# Patient Record
Sex: FEMALE | Race: WHITE | NOT HISPANIC OR LATINO | Employment: OTHER | URBAN - METROPOLITAN AREA
[De-identification: names, ages, dates, MRNs, and addresses within clinical notes are randomized per-mention and may not be internally consistent; named-entity substitution may affect disease eponyms.]

---

## 2017-01-16 ENCOUNTER — GENERIC CONVERSION - ENCOUNTER (OUTPATIENT)
Dept: OTHER | Facility: OTHER | Age: 62
End: 2017-01-16

## 2017-02-15 ENCOUNTER — ALLSCRIPTS OFFICE VISIT (OUTPATIENT)
Dept: OTHER | Facility: OTHER | Age: 62
End: 2017-02-15

## 2017-02-21 ENCOUNTER — LAB CONVERSION - ENCOUNTER (OUTPATIENT)
Dept: OTHER | Facility: OTHER | Age: 62
End: 2017-02-21

## 2017-02-21 LAB
A/G RATIO (HISTORICAL): 1.5 (CALC) (ref 1–2.5)
ALBUMIN SERPL BCP-MCNC: 3.9 G/DL (ref 3.6–5.1)
ALP SERPL-CCNC: 73 U/L (ref 33–130)
ALT SERPL W P-5'-P-CCNC: 26 U/L (ref 6–29)
AST SERPL W P-5'-P-CCNC: 22 U/L (ref 10–35)
BILIRUB SERPL-MCNC: 0.4 MG/DL (ref 0.2–1.2)
BUN SERPL-MCNC: 15 MG/DL (ref 7–25)
BUN/CREA RATIO (HISTORICAL): NORMAL (CALC) (ref 6–22)
CALCIUM SERPL-MCNC: 9 MG/DL (ref 8.6–10.4)
CHLORIDE SERPL-SCNC: 103 MMOL/L (ref 98–110)
CHOLEST SERPL-MCNC: 195 MG/DL (ref 125–200)
CHOLEST/HDLC SERPL: 3.4 (CALC)
CO2 SERPL-SCNC: 29 MMOL/L (ref 20–31)
CREAT SERPL-MCNC: 0.67 MG/DL (ref 0.5–0.99)
EGFR AFRICAN AMERICAN (HISTORICAL): 108 ML/MIN/1.73M2
EGFR-AMERICAN CALC (HISTORICAL): 94 ML/MIN/1.73M2
GAMMA GLOBULIN (HISTORICAL): 2.6 G/DL (CALC) (ref 1.9–3.7)
GLUCOSE (HISTORICAL): 85 MG/DL (ref 65–99)
HDLC SERPL-MCNC: 57 MG/DL
LDL CHOLESTEROL (HISTORICAL): 104 MG/DL (CALC)
LDL CHOLESTEROL DIRECT (HISTORICAL): 112 MG/DL
NON-HDL-CHOL (CHOL-HDL) (HISTORICAL): 138 MG/DL (CALC)
POTASSIUM SERPL-SCNC: 4.5 MMOL/L (ref 3.5–5.3)
SODIUM SERPL-SCNC: 141 MMOL/L (ref 135–146)
T4 FREE SERPL-MCNC: 0.9 NG/DL (ref 0.8–1.8)
TOTAL PROTEIN (HISTORICAL): 6.5 G/DL (ref 6.1–8.1)
TRIGL SERPL-MCNC: 172 MG/DL
TSH SERPL DL<=0.05 MIU/L-ACNC: 4.96 MIU/L (ref 0.4–4.5)

## 2017-02-22 ENCOUNTER — GENERIC CONVERSION - ENCOUNTER (OUTPATIENT)
Dept: OTHER | Facility: OTHER | Age: 62
End: 2017-02-22

## 2017-04-05 DIAGNOSIS — E03.9 HYPOTHYROIDISM: ICD-10-CM

## 2017-07-21 ENCOUNTER — ALLSCRIPTS OFFICE VISIT (OUTPATIENT)
Dept: OTHER | Facility: OTHER | Age: 62
End: 2017-07-21

## 2017-08-06 ENCOUNTER — LAB CONVERSION - ENCOUNTER (OUTPATIENT)
Dept: OTHER | Facility: OTHER | Age: 62
End: 2017-08-06

## 2017-08-06 LAB
A/G RATIO (HISTORICAL): 1.6 (CALC) (ref 1–2.5)
ALBUMIN SERPL BCP-MCNC: 4.2 G/DL (ref 3.6–5.1)
ALP SERPL-CCNC: 67 U/L (ref 33–130)
ALT SERPL W P-5'-P-CCNC: 25 U/L (ref 6–29)
AST SERPL W P-5'-P-CCNC: 25 U/L (ref 10–35)
BILIRUB SERPL-MCNC: 0.5 MG/DL (ref 0.2–1.2)
BUN SERPL-MCNC: 19 MG/DL (ref 7–25)
BUN/CREA RATIO (HISTORICAL): NORMAL (CALC) (ref 6–22)
CALCIUM SERPL-MCNC: 9.1 MG/DL (ref 8.6–10.4)
CHLORIDE SERPL-SCNC: 105 MMOL/L (ref 98–110)
CHOLEST SERPL-MCNC: 205 MG/DL (ref 125–200)
CHOLEST/HDLC SERPL: 3.2 (CALC)
CO2 SERPL-SCNC: 28 MMOL/L (ref 20–31)
CREAT SERPL-MCNC: 0.64 MG/DL (ref 0.5–0.99)
EGFR AFRICAN AMERICAN (HISTORICAL): 110 ML/MIN/1.73M2
EGFR-AMERICAN CALC (HISTORICAL): 95 ML/MIN/1.73M2
GAMMA GLOBULIN (HISTORICAL): 2.6 G/DL (CALC) (ref 1.9–3.7)
GLUCOSE (HISTORICAL): 94 MG/DL (ref 65–99)
HDLC SERPL-MCNC: 65 MG/DL
LDL CHOLESTEROL (HISTORICAL): 114 MG/DL (CALC)
LDL CHOLESTEROL DIRECT (HISTORICAL): 119 MG/DL
NON-HDL-CHOL (CHOL-HDL) (HISTORICAL): 140 MG/DL (CALC)
POTASSIUM SERPL-SCNC: 4.3 MMOL/L (ref 3.5–5.3)
SODIUM SERPL-SCNC: 141 MMOL/L (ref 135–146)
T4 FREE SERPL-MCNC: 0.9 NG/DL (ref 0.8–1.8)
TOTAL PROTEIN (HISTORICAL): 6.8 G/DL (ref 6.1–8.1)
TRIGL SERPL-MCNC: 129 MG/DL
TSH SERPL DL<=0.05 MIU/L-ACNC: 2.87 MIU/L (ref 0.4–4.5)

## 2017-08-18 ENCOUNTER — GENERIC CONVERSION - ENCOUNTER (OUTPATIENT)
Dept: OTHER | Facility: OTHER | Age: 62
End: 2017-08-18

## 2017-11-30 ENCOUNTER — ALLSCRIPTS OFFICE VISIT (OUTPATIENT)
Dept: OTHER | Facility: OTHER | Age: 62
End: 2017-11-30

## 2017-11-30 DIAGNOSIS — Z13.820 ENCOUNTER FOR SCREENING FOR OSTEOPOROSIS: ICD-10-CM

## 2017-11-30 DIAGNOSIS — Z12.31 ENCOUNTER FOR SCREENING MAMMOGRAM FOR MALIGNANT NEOPLASM OF BREAST: ICD-10-CM

## 2017-12-05 NOTE — PROGRESS NOTES
Assessment    1  Hypertension (401 9) (I10)   2  Hypothyroidism (244 9) (E03 9)   3  Hyperlipidemia (272 4) (E78 5)    Plan  Encounter for screening mammogram for breast cancer    · MAMMO SCREENING BILATERAL W 3D & CAD; Status:Active; Requested for:30Nov2017;   Hyperlipidemia, Hypertension, Hypothyroidism    · Follow-up visit in 4 Months Evaluation and Treatment  Follow-up  Status: Complete Done: 28BOS3292  Screening for osteoporosis    · * DXA BONE DENSITY SPINE HIP AND PELVIS; Status:Active; Requested for:30Nov2017;    Discussion/Summary    Jeaneth Mckinney is stable on her current medications  We have made no changes  We will see her back as scheduled  She will go for the testing as ordered  Possible side effects of new medications were reviewed with the patient/guardian today  The treatment plan was reviewed with the patient/guardian  The patient/guardian understands and agrees with the treatment plan      Chief Complaint  Check up Hypertension/Hypothyroidism/Hyperlipidemia  Patient is here today for follow up of chronic conditions described in HPI  History of Present Illness  Coby De La Torre is a 26-year-old female who presents today for checkup her hypertension, hypothyroidism, and hyperlipidemia  She is tolerating the Zetia and has not had muscle cramps  The patient denies any chest pain, shortness of breath, or palpitations  There is no edema  There are no headaches or visual changes  There is no lightheadedness, dizziness, or fainting spells  are occasional headaches, but not bad  is trying to watch her diet and exercise  She was on vacation  The patient states her hyperlipidemia has been under good control since the last visit  Comorbid Illnesses: hypertension  She has no significant interval events  Symptoms: The patient is currently asymptomatic  Associated symptoms include no memory loss  Medications: the patient is adherent with her medication regimen  -- She denies medication side effects     The patient presents for follow-up of essential hypertension  The patient states she has been doing well with her blood pressure control since the last visit  She has no comorbid illnesses  She has no significant interval events  Symptoms: denies impaired vision,-- denies dyspnea,-- denies chest pain,-- denies intermittent leg claudication-- and-- denies lower extremity edema  Medications: the patient is adherent with her medication regimen  -- She denies medication side effects  Review of Systems   Constitutional: as noted in HPI  Eyes: as noted in HPI   ENT: as noted in HPI  Cardiovascular: as noted in HPI  Respiratory: as noted in HPI  Gastrointestinal: No complaints of abdominal pain, no constipation, no nausea or vomiting, no diarrhea, no bloody stools  Genitourinary: No complaints of dysuria, no incontinence, no pelvic pain, no dysmenorrhea, no vaginal discharge or bleeding  Musculoskeletal: No complaints of arthralgias, no myalgias, no joint swelling or stiffness, no limb pain or swelling  Active Problems  1  Cystocele   2  Generalized osteoarthritis (715 00) (M15 9)   3  Hyperlipidemia (272 4) (E78 5)   4  Hypertension (401 9) (I10)   5  Hypothyroidism (244 9) (E03 9)   6  Intermittent claudication (443 9) (I73 9)   7  Sleep disturbances (780 50) (G47 9)   8  Urge and stress incontinence (788 33) (N39 46)   9  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)   10  Vitamin D deficiency (268 9) (E55 9)    Past Medical History  1  History of Allergic contact dermatitis, unspecified trigger (692 9) (L23 9)   2  History of Blunt trauma of face, initial encounter (959 09) (P31 92VZ)   3  History of Facial contusion (920) (S00 83XA)   4  History of Fatigue (780 79) (R53 83)   5  History of Headache (784 0) (R51)   6  History of Polyp of sigmoid colon (211 3) (D12 5)   7  Screening for breast cancer (V76 10) (Z12 31)   8  Screening for cervical cancer (V76 2) (Z12 4)   9   History of Subacromial Bursitis On The Right (726 19)   10  History of Viral URI (465 9) (J06 9,B97 89)    Surgical History  1  History of Complete Colonoscopy    The surgical history was reviewed and updated today  Family History  Mother    1  Family history of Alzheimer's disease (V17 2) (Z82 0)   2  Family history of diabetes mellitus (V18 0) (Z83 3)    The family history was reviewed and updated today  Social History     · Never a smoker   · No alcohol use   · No drug use   · Sleep disturbances (780 50) (G47 9)  The social history was reviewed and updated today  The social history was reviewed and is unchanged  Current Meds   1  Aspirin 81 MG TABS; Take 1 tablet daily Recorded   2  Ezetimibe 10 MG Oral Tablet; TAKE 1 TABLET DAILY; Therapy: 23XFX9523 to (Evaluate:27Jan2018)  Requested for: 04PEB1369; Last Rx:24Qae3620 Ordered   3  Levothyroxine Sodium 100 MCG Oral Tablet; TAKE 1 TABLET DAILY; Therapy: 63HAM6809 to (([phone])  Requested for: 44LXG2532; Last Rx:78Ebh5325 Ordered   4  Lisinopril 20 MG Oral Tablet; TAKE 1 TABLET DAILY; Therapy: 72MXI9071 to (Evaluate:20Apr2018)  Requested for: 96QIQ8670; Last Rx:97Dpr5547 Ordered    Allergies  1  Statins    Vitals  Vital Signs    Recorded: 06OTG8760 05:29PM Recorded: 89IGA6207 05:05PM   Heart Rate 68 72   Systolic 869 089, LUE, Sitting   Diastolic 78 80, LUE, Sitting   BP CUFF SIZE  Large   Height  5 ft 4 5 in   Weight  248 lb    BMI Calculated  41 91   BSA Calculated  2 16       Physical Exam   Constitutional  General appearance: No acute distress, well appearing and well nourished  Eyes  Conjunctiva and lids: No swelling, erythema or discharge  Pupils and irises: Equal, round and reactive to light  Ears, Nose, Mouth, and Throat  External inspection of ears and nose: Normal    Otoscopic examination: Tympanic membranes translucent with normal light reflex  Canals patent without erythema     Nasal mucosa, septum, and turbinates: Normal without edema or erythema  Oropharynx: Normal with no erythema, edema, exudate or lesions  Pulmonary  Respiratory effort: No increased work of breathing or signs of respiratory distress  Auscultation of lungs: Clear to auscultation  Cardiovascular  Auscultation of heart: Normal rate and rhythm, normal S1 and S2, without murmurs  Examination of extremities for edema and/or varicosities: Normal    Carotid pulses: Normal    Abdomen  Abdomen: Non-tender, no masses  Liver and spleen: No hepatomegaly or splenomegaly  Lymphatic  Palpation of lymph nodes in neck: No lymphadenopathy  Skin  Skin and subcutaneous tissue: Normal without rashes or lesions  Neurologic  Cranial nerves: Cranial nerves 2-12 intact  Reflexes: 2+ and symmetric  Sensation: No sensory loss  Health Management  History of Colon cancer screening   COLONOSCOPY; every 5 years; Last 12LDU3847; Next Due: 32OXF2634; Active  History of Encounter for screening mammogram for malignant neoplasm of breast   MAMMO SCREENING BILATERAL W 3D & CAD; every 1 year; Last 89HXQ3016; Next Due: 07UCM5349; Overdue  Digital Bilateral Screening Mammogram With CAD; every 1 year; Last 33ESD1327; Next Due:14Shl7362; Overdue  History of Screening for osteoporosis   * Dexa Scan Axial Skel (Hip, Pelvis, Spine); every 2 years; Last 04FFW6002; Next Due: 77PNO0553; Overdue    Signatures   Electronically signed by : Joan Leyva DO; Dec  3 2017 11:36PM EST                       (Author)

## 2018-01-10 NOTE — MISCELLANEOUS
Message   Recorded as Task   Date: 11/02/2016 01:20 PM, Created By: Juan Murphy   Task Name: Follow Up   Assigned To: Arianna Melendez   Regarding Patient: Leonidas Reis, Status: Active   Comment:    Judy Yadav - 02 Nov 2016 1:20 PM     TASK CREATED  Please let the patient know the pap was normal    Lowe,April - 02 Nov 2016 3:52 PM     TASK EDITED  patient informed by home number        Active Problems    1  Cystocele (618 01) (N81 10)   2  Encounter for cervical Pap smear with pelvic exam (V76 2,V72 31) (Z01 419)   3  Encounter for screening mammogram for malignant neoplasm of breast (V76 12)   (Z12 31)   4  Generalized osteoarthritis (715 00) (M15 9)   5  Hyperlipidemia (272 4) (E78 5)   6  Hypertension (401 9) (I10)   7  Hypothyroidism (244 9) (E03 9)   8  Intermittent claudication (443 9) (I73 9)   9  Left ankle pain (719 47) (M25 572)   10  Pain of left anterior lower extremity (729 5) (M79 605)   11  Sleep disturbances (780 50) (G47 9)   12  Urge and stress incontinence (788 33) (N39 46)   13  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)   14  Vitamin D deficiency (268 9) (E55 9)    Current Meds   1  Aspirin 81 MG TABS; Take 1 tablet daily Recorded   2  Levothyroxine Sodium 75 MCG Oral Tablet; TAKE 1 TABLET DAILY; Therapy: 33WJM6568 to (Trino Figueroa)  Requested for: 92NJB8590; Last   Rx:09Jun2016 Ordered   3  Lisinopril 20 MG Oral Tablet; TAKE 1 TABLET DAILY; Therapy: 36CGL8572 to (Trino Figueroa)  Requested for: 71GCZ2852; Last   Rx:09Jun2016 Ordered   4  Meloxicam 7 5 MG Oral Tablet; TAKE 1 TABLET Twice daily with food; Therapy: 26MWX9029 to (Complete:15Nov2016)  Requested for: 26Oct2016; Last   Rx:26Oct2016 Ordered   5  Zetia 10 MG Oral Tablet; TAKE 1 TABLET DAILY; Therapy: 27TVG9447 to (Trino Figueroa)  Requested for: 83RMB3480; Last   Rx:09Jun2016 Ordered    Allergies    1   Statins    Signatures   Electronically signed by : Greg Lizarraga, ; Nov 2 2016  3:53PM EST                       (Author)

## 2018-01-10 NOTE — MISCELLANEOUS
Message   Recorded as Task   Date: 08/14/2017 03:47 PM, Created By: Dg Armstrong   Task Name: Call Back   Assigned To: Antonietta Hamlin   Regarding Patient: Caleb Stokes, Status: In Progress   IsaiasKitronald Kamkarla - 14 Aug 2017 3:47 PM     TASK CREATED  Caller: Self; Results Inquiry; [phone] (Home); [phone] x,,,,, (Work)  PT IS ASKING FOR HER BLOODWORK TEST RESULTS CONTACT NO FOR HER IS [address] - 14 Aug 2017 5:25 PM     TASK REASSIGNED: Previously Assigned To Judy Yadav  Please let Alex Pinedo know that the labs are okay- her lipids are stable  Her other labs were okay- stay on the same medications and follow up as scheduled  Antonietta Hamlin - 14 Aug 2017 5:43 PM     TASK EDITED  Left message call office  trb   Antonietta Hamlin - 14 Aug 2017 5:43 PM     TASK IN PROGRESS   Dg Armstrong - 17 Aug 2017 3:52 PM     TASK REASSIGNED: Previously Assigned To [address] HER TEST RESULTS LEFT CONTACT NO [phone]   08/18/2017 Left message on machine labs were all okay and to continue on same medications  If she has any questions she can call the office, I would be back on Monday morning  trb      Active Problems    1  Cystocele   2  Generalized osteoarthritis (715 00) (M15 9)   3  Hyperlipidemia (272 4) (E78 5)   4  Hypertension (401 9) (I10)   5  Hypothyroidism (244 9) (E03 9)   6  Intermittent claudication (443 9) (I73 9)   7  Sleep disturbances (780 50) (G47 9)   8  Urge and stress incontinence (788 33) (N39 46)   9  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)   10  Vitamin D deficiency (268 9) (E55 9)    Current Meds   1  Aspirin 81 MG TABS; Take 1 tablet daily Recorded   2  Ezetimibe 10 MG Oral Tablet (Zetia); TAKE 1 TABLET DAILY; Therapy: 21ISS3629 to (Evaluate:27Jan2018)  Requested for: 65RQS6277; Last   Rx:02May2017 Ordered   3  Levothyroxine Sodium 100 MCG Oral Tablet; TAKE 1 TABLET DAILY;    Therapy: 22Feb2017 to (IBSFXMVK:84MLJ2117)  Requested for: 85MLK2647; Last   Rx:99Gxf0234 Ordered   4  Lisinopril 20 MG Oral Tablet; TAKE 1 TABLET DAILY; Therapy: 72BSF7474 to (Evaluate:55Poa2940)  Requested for: 94MIZ9314; Last   Rx:66Dzf0783 Ordered    Allergies    1  Statins    Signatures   Electronically signed by :  Cody Chinchilla, ; Aug 18 2017  4:05PM EST                       (Author)

## 2018-01-12 NOTE — MISCELLANEOUS
Message   Recorded as Task   Date: 08/08/2016 06:19 PM, Created By: Xiomara Carrillo   Task Name: Follow Up   Assigned To: Hortensia Castellanos   Regarding Patient: Edgar Walker, Status: In Progress   Comment:    LeifJudy - 08 Aug 2016 6:19 PM     TASK CREATED  Please let Marisa Rizo know her labs looked good, stay on the same medications  Chrissy,April - 09 Aug 2016 9:39 AM     TASK EDITED                 Left message on home number to call office back  Left message on cell phone to call office back  Chrissy,April - 09 Aug 2016 9:39 AM     TASK IN PROGRESS   Chrissy,April - 09 Aug 2016 4:29 PM     TASK EDITED                 Left message on cell phone to call office back  Andreina Farias - 09 Aug 2016 6:01 PM     TASK EDITED  Patient advised  PLM        Active Problems    1  Allergic contact dermatitis, unspecified trigger (692 9) (L23 9)   2  Cystocele (618 01) (N81 10)   3  Encounter for gynecological examination without abnormal finding (V72 31) (Z01 419)   4  Facial contusion (920) (S00 83XA)   5  Generalized osteoarthritis (715 00) (M15 9)   6  Hyperlipidemia (272 4) (E78 5)   7  Hypertension (401 9) (I10)   8  Hypothyroidism (244 9) (E03 9)   9  Intermittent claudication (443 9) (I73 9)   10  Need for immunization against influenza (V04 81) (Z23)   11  Sleep disturbances (780 50) (G47 9)   12  Urge and stress incontinence (788 33) (N39 46)   13  Varicose veins of leg with edema, unspecified laterality (454 8) (I83 899)   14  Vitamin D deficiency (268 9) (E55 9)    Current Meds   1  Aspirin 81 MG TABS; Take 1 tablet daily Recorded   2  Levothyroxine Sodium 75 MCG Oral Tablet; TAKE 1 TABLET DAILY; Therapy: 19XQL8546 to (Ney Jeffers)  Requested for: 23KVC3124; Last   Rx:09Jun2016 Ordered   3  Lisinopril 20 MG Oral Tablet; TAKE 1 TABLET DAILY; Therapy: 29OPA4681 to (Ney Jeffers)  Requested for: 93DMS3175; Last   Rx:09Jun2016 Ordered   4  MethylPREDNISolone 4 MG Oral Tablet Therapy Pack;  Take as directed on pack   instructions; Therapy: 49KJU2873 to (Last Rx:09Jun2016)  Requested for: 55LTN9621 Ordered   5  Zetia 10 MG Oral Tablet; TAKE 1 TABLET DAILY; Therapy: 07QGI4058 to (Wan Herbert)  Requested for: 68EGB7800; Last   Rx:09Jun2016 Ordered    Allergies    1   Statins    Signatures   Electronically signed by : Yadira Brand, ; Aug  9 2016  6:01PM EST                       (Author)

## 2018-01-14 VITALS
HEIGHT: 65 IN | HEART RATE: 68 BPM | WEIGHT: 242.5 LBS | DIASTOLIC BLOOD PRESSURE: 80 MMHG | BODY MASS INDEX: 40.4 KG/M2 | SYSTOLIC BLOOD PRESSURE: 134 MMHG

## 2018-01-14 VITALS
WEIGHT: 248 LBS | HEIGHT: 65 IN | DIASTOLIC BLOOD PRESSURE: 78 MMHG | BODY MASS INDEX: 41.32 KG/M2 | TEMPERATURE: 98.3 F | HEART RATE: 68 BPM | SYSTOLIC BLOOD PRESSURE: 130 MMHG

## 2018-01-14 VITALS
SYSTOLIC BLOOD PRESSURE: 110 MMHG | DIASTOLIC BLOOD PRESSURE: 78 MMHG | HEART RATE: 68 BPM | HEIGHT: 65 IN | BODY MASS INDEX: 41.32 KG/M2 | WEIGHT: 248 LBS

## 2018-01-14 NOTE — MISCELLANEOUS
Message   Recorded as Task   Date: 02/22/2017 08:35 AM, Created By: Sujit Martin   Task Name: Go to Result   Assigned To: Andreina Farias   Regarding Patient: Crystal Nair, Status: Active   Comment:    Judy Yadav - 22 Feb 2017 8:35 AM     TASK CREATED  Let the patient know that her labs were okay, but she is showing signs of being in the hypothyroid range- we need to increase her levothyroxine to 100mcg  daily, #30, RF5 and check TSH and T4 free in 6 weeks- please send med in and notify her  Her other labs are okay  Andreina Farias - 22 Feb 2017 4:15 PM     TASK EDITED  Patient advised of lab results and change in Levothyroxine dose  [facility] requisition completed and ready to   PLM        Active Problems    1  Cystocele (618 01) (N81 10)   2  Generalized osteoarthritis (715 00) (M15 9)   3  Hyperlipidemia (272 4) (E78 5)   4  Hypertension (401 9) (I10)   5  Hypothyroidism (244 9) (E03 9)   6  Intermittent claudication (443 9) (I73 9)   7  Sleep disturbances (780 50) (G47 9)   8  Urge and stress incontinence (788 33) (N39 46)   9  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)   10  Viral URI (465 9) (J06 9,B97 89)   11  Vitamin D deficiency (268 9) (E55 9)    Current Meds   1  Aspirin 81 MG TABS; Take 1 tablet daily Recorded   2  Levothyroxine Sodium 75 MCG Oral Tablet; TAKE 1 TABLET DAILY; Therapy: 65KXR8426 to (Gerldine Reynaldo)  Requested for: 91AXM8502; Last   Rx:09Jun2016 Ordered   3  Lisinopril 20 MG Oral Tablet; TAKE 1 TABLET DAILY; Therapy: 67KVJ1024 to (Gerldine Reynaldo)  Requested for: 12SUK3521; Last   Rx:09Jun2016 Ordered   4  Zetia 10 MG Oral Tablet (Ezetimibe); TAKE 1 TABLET DAILY; Therapy: 07MKG4942 to (Gerldine Reynaldo)  Requested for: 48RDP4879; Last   Rx:09Jun2016 Ordered    Allergies    1   Statins    Plan  Hypothyroidism    · Levothyroxine Sodium 75 MCG Oral Tablet   · Levothyroxine Sodium 100 MCG Oral Tablet; TAKE 1 TABLET DAILY   · (1) T4, FREE; Status:Active - Retrospective Authorization; Requested for:05Apr2017;    · (1) TSH; Status:Active - Retrospective Authorization;  Requested for:05Apr2017;     Signatures   Electronically signed by : Zahraa Rios, ; Feb 22 2017  4:15PM EST                       (Author)

## 2018-02-05 ENCOUNTER — HOSPITAL ENCOUNTER (OUTPATIENT)
Dept: MAMMOGRAPHY | Facility: CLINIC | Age: 63
Discharge: HOME | End: 2018-02-05
Payer: COMMERCIAL

## 2018-02-05 DIAGNOSIS — Z13.820 ENCOUNTER FOR SCREENING FOR OSTEOPOROSIS: ICD-10-CM

## 2018-02-05 DIAGNOSIS — Z12.31 ENCOUNTER FOR SCREENING MAMMOGRAM FOR MALIGNANT NEOPLASM OF BREAST: ICD-10-CM

## 2018-02-05 PROCEDURE — 77063 BREAST TOMOSYNTHESIS BI: CPT

## 2018-02-05 PROCEDURE — 77080 DXA BONE DENSITY AXIAL: CPT

## 2018-02-05 PROCEDURE — 77067 SCR MAMMO BI INCL CAD: CPT

## 2018-02-11 DIAGNOSIS — E03.9 HYPOTHYROIDISM, UNSPECIFIED TYPE: Primary | ICD-10-CM

## 2018-02-11 RX ORDER — LEVOTHYROXINE SODIUM 0.1 MG/1
TABLET ORAL
Qty: 90 TABLET | Refills: 2 | Status: SHIPPED | OUTPATIENT
Start: 2018-02-11 | End: 2018-12-03 | Stop reason: SDUPTHER

## 2018-02-12 DIAGNOSIS — E78.2 MIXED HYPERLIPIDEMIA: Primary | ICD-10-CM

## 2018-02-12 RX ORDER — EZETIMIBE 10 MG/1
TABLET ORAL
Qty: 90 TABLET | Refills: 2 | Status: SHIPPED | OUTPATIENT
Start: 2018-02-12 | End: 2018-12-03 | Stop reason: SDUPTHER

## 2018-02-21 NOTE — PROGRESS NOTES
Dr Pascale Perez notified, via EPIC, that I have been unable to contact this patient regarding the need for additional imaging as a result of her screening mammogram

## 2018-02-23 ENCOUNTER — TELEPHONE (OUTPATIENT)
Dept: FAMILY MEDICINE CLINIC | Facility: CLINIC | Age: 63
End: 2018-02-23

## 2018-02-23 NOTE — TELEPHONE ENCOUNTER
----- Message from Darci Hernández RN sent at 2/22/2018  6:00 PM EST -----  Attempted to call Jean Pierre Graham 02/21/18 & 02/22/18  No answer to the calls and unable to leave message  PLM  ----- Message -----  From: Michael Felipe DO  Sent: 2/21/2018   3:22 PM  To: Darci Hernández RN    Please see if you are able to reach this patient to notify her that she needs additional imaging     ----- Message -----  From: Keren Naylor RN  Sent: 2/21/2018   9:23 AM  To: Michael Felipe DO    I have been unable to reach this patient regarding the need for additional imaging as a result of her screening mammogram   I have left multiple messages and a result letter was mailed on 2/7/18

## 2018-02-26 ENCOUNTER — TELEPHONE (OUTPATIENT)
Dept: FAMILY MEDICINE CLINIC | Facility: CLINIC | Age: 63
End: 2018-02-26

## 2018-02-26 NOTE — TELEPHONE ENCOUNTER
----- Message from Jennifer Rivera RN sent at 2/22/2018  6:00 PM EST -----  Attempted to call Ann Lugo 02/21/18 & 02/22/18  No answer to the calls and unable to leave message  PLM  ----- Message -----  From: Lexii Richardson DO  Sent: 2/21/2018   3:22 PM  To: Jennifer Rivera RN    Please see if you are able to reach this patient to notify her that she needs additional imaging     ----- Message -----  From: Thea Stern RN  Sent: 2/21/2018   9:23 AM  To: Lexii Richardson DO    I have been unable to reach this patient regarding the need for additional imaging as a result of her screening mammogram   I have left multiple messages and a result letter was mailed on 2/7/18

## 2018-03-28 ENCOUNTER — APPOINTMENT (OUTPATIENT)
Dept: RADIOLOGY | Facility: CLINIC | Age: 63
End: 2018-03-28
Payer: COMMERCIAL

## 2018-03-28 ENCOUNTER — OFFICE VISIT (OUTPATIENT)
Dept: FAMILY MEDICINE CLINIC | Facility: CLINIC | Age: 63
End: 2018-03-28
Payer: COMMERCIAL

## 2018-03-28 VITALS
SYSTOLIC BLOOD PRESSURE: 130 MMHG | DIASTOLIC BLOOD PRESSURE: 74 MMHG | HEART RATE: 78 BPM | WEIGHT: 250 LBS | BODY MASS INDEX: 41.65 KG/M2 | HEIGHT: 65 IN

## 2018-03-28 DIAGNOSIS — M19.011 PRIMARY OSTEOARTHRITIS OF RIGHT SHOULDER: Primary | ICD-10-CM

## 2018-03-28 DIAGNOSIS — M25.511 CHRONIC RIGHT SHOULDER PAIN: Primary | ICD-10-CM

## 2018-03-28 DIAGNOSIS — G89.29 CHRONIC RIGHT SHOULDER PAIN: ICD-10-CM

## 2018-03-28 DIAGNOSIS — G89.29 CHRONIC RIGHT SHOULDER PAIN: Primary | ICD-10-CM

## 2018-03-28 DIAGNOSIS — M75.101 ROTATOR CUFF TEAR ARTHROPATHY OF RIGHT SHOULDER: ICD-10-CM

## 2018-03-28 DIAGNOSIS — M75.41 IMPINGEMENT SYNDROME OF RIGHT SHOULDER: ICD-10-CM

## 2018-03-28 DIAGNOSIS — S43.001A SUBLUXATION OF RIGHT SHOULDER JOINT, INITIAL ENCOUNTER: ICD-10-CM

## 2018-03-28 DIAGNOSIS — M12.811 ROTATOR CUFF TEAR ARTHROPATHY OF RIGHT SHOULDER: ICD-10-CM

## 2018-03-28 DIAGNOSIS — M25.511 CHRONIC RIGHT SHOULDER PAIN: ICD-10-CM

## 2018-03-28 PROCEDURE — 73030 X-RAY EXAM OF SHOULDER: CPT

## 2018-03-28 PROCEDURE — 99213 OFFICE O/P EST LOW 20 MIN: CPT | Performed by: FAMILY MEDICINE

## 2018-03-28 RX ORDER — LISINOPRIL 20 MG/1
1 TABLET ORAL DAILY
COMMUNITY
Start: 2012-01-18 | End: 2018-05-16 | Stop reason: SDUPTHER

## 2018-03-28 RX ORDER — MELOXICAM 7.5 MG/1
7.5 TABLET ORAL DAILY
Qty: 20 TABLET | Refills: 0 | Status: SHIPPED | OUTPATIENT
Start: 2018-03-28 | End: 2018-05-11 | Stop reason: SDUPTHER

## 2018-03-28 NOTE — PATIENT INSTRUCTIONS
Exercises for Shoulder Flexion and Extension   WHAT YOU NEED TO KNOW:   What are exercises for shoulder flexion and extension? Shoulder flexion and extension exercises work the muscles in your upper back  What should I do before I exercise? Warm up and stretch before you exercise  Walk or ride a stationary bike for 5 to 10 minutes to help you warm up  Stretching helps increase range of motion  It may also decrease muscle soreness and help prevent another injury  Your healthcare provider will tell you which of the following stretches to do:  · Crossover arm stretch:  Relax your shoulders  Hold your upper arm with the opposite hand  Pull your arm across your chest until you feel a stretch  Hold the stretch for 30 seconds  Return to the starting position  · Shoulder flexion stretch:  Stand facing a wall  Slowly walk your fingers up the wall until you feel a stretch  Hold the stretch for 30 seconds  Return to the starting position  · Sleeper stretch:  Lie on your injured side on a firm, flat surface  Bend the elbow of your injured arm 90° with your hand facing up  Use your arm that is not injured to slowly push your injured arm down  Stop when you feel a stretch at the back of your injured shoulder  Hold the stretch for 30 seconds  Slowly return to the starting position  How do I exercise with a weight? Your healthcare provider will tell you how much weight to exercise with  · Shoulder extension:  Lie on a hard table on your stomach  Let your arms hang off the side  Hold a weight in both hands with your palms facing toward your body  Keep your arms straight and slowly raise your arms parallel to the floor in a "Y" shape  Stop when your arms are level with your body  Hold for as long as directed  Slowly return to the starting position  · Shoulder flexion:  Stand and hold a weight in the hand of your injured shoulder   Keep your arm straight and slowly raise your arm over your head as far as you can without pain  Do not raise your arm over your head unless your healthcare provider says it is okay  Do not let your shoulder shrug  Hold this position for as many seconds as directed  Slowly return to the starting position  How do I exercise with an exercise band? · Shoulder extension:  Wrap the exercise band around a heavy, stable object  The band should be level with your chest  Stand and hold each end of the band in both hands  Step back and extend your arms straight  Squeeze your shoulder blades together and pull your arms back and down  Hold for as long as directed  Slowly return to the starting position  · Shoulder flexion:  Wrap the exercise band around a heavy, stable object near your foot  Grab the band with the hand of your injured shoulder  Keep your arm straight  Slowly pull the band up and past your head as far as you can without pain  Do not raise your arm over your head unless your healthcare provider says it is okay  Do not let your shoulder shrug  Hold this position for as many seconds as directed  Slowly return to the starting position  When should I contact my healthcare provider? · You have sharp or worsening pain during exercise or at rest     · You have questions or concerns about your shoulder exercises  CARE AGREEMENT:   You have the right to help plan your care  Learn about your health condition and how it may be treated  Discuss treatment options with your caregivers to decide what care you want to receive  You always have the right to refuse treatment  The above information is an  only  It is not intended as medical advice for individual conditions or treatments  Talk to your doctor, nurse or pharmacist before following any medical regimen to see if it is safe and effective for you  © 2017 [employer] Information is for End User's use only and may not be sold, redistributed or otherwise used for commercial purposes   All illustrations and images included in [employer] are the copyrighted property of [employer]  or [employer]

## 2018-03-28 NOTE — PROGRESS NOTES
Assessment/Plan:  Chronic right shoulder pain in impingement of the right shoulder we will get an initial x-ray of the shoulder to evaluate the joint  She has had limited relief at least 3 weeks of physical therapy  I have also given her an anti-inflammatory to use and she will continue to alternate between ice and heat  We will follow up closely with the results of the x-ray and have further instructions at that time  Diagnoses and all orders for this visit:    Chronic right shoulder pain  -     XR shoulder 2+ vw right; Future  -     meloxicam (MOBIC) 7 5 mg tablet; Take 1 tablet (7 5 mg total) by mouth daily    Impingement syndrome of right shoulder  -     XR shoulder 2+ vw right; Future  -     meloxicam (MOBIC) 7 5 mg tablet; Take 1 tablet (7 5 mg total) by mouth daily    Other orders  -     aspirin 81 MG tablet; Take 1 tablet by mouth daily  -     lisinopril (ZESTRIL) 20 mg tablet; Take 1 tablet by mouth daily       No Follow-up on file  Subjective:   Chief Complaint   Patient presents with    Shoulder Pain     right shoulder        Patient ID: Calixto Jenkins is a 57 y o  female presents today for evaluation of right shoulder pain  Kelsie Hernandez is a 49-year-old female presents today with right shoulder pain for several months  She has been receiving physical therapy on the shoulder at work for this with some relief  She does a lot of repetitious work with her right arm which has been aggravating it  The discomfort waxes and wanes  There is no known injury related to this  There is decreased range of motion  There is decreased elevation of the shoulder  There is no numbness or tingling  There is no cracking or popping in the shoulder  She has not had to take any medication for it  There  Is occasional radiation down her arm  She has no history of shoulder problems  There is no chest pain or shortness of breath  Patient is unable to elevate her arm fully        Shoulder Pain    The pain is present in the right shoulder  This is a chronic problem  The current episode started more than 1 month ago  The problem occurs constantly  The problem has been gradually worsening  The quality of the pain is described as aching and burning  The pain is moderate  Associated symptoms include a limited range of motion and stiffness  Pertinent negatives include no fever, inability to bear weight, itching, joint locking, joint swelling, numbness or tingling  The symptoms are aggravated by activity  She has tried cold, heat and NSAIDS for the symptoms  The treatment provided mild relief  The following portions of the patient's history were reviewed and updated as appropriate: allergies, current medications, past family history, past medical history, past social history, past surgical history and problem list   Patient Active Problem List   Diagnosis    Female cystocele    Generalized osteoarthritis    Hyperlipidemia    Hypertension    Hypothyroidism    Intermittent claudication ([address] )    Sleep disturbances    Urge and stress incontinence    Varicose veins of both lower extremities with pain    Vitamin D deficiency     Past Medical History:   Diagnosis Date    Polyp of sigmoid colon     last assessed 6/12/12    Sleep disturbances     last assessed 6/5/14     Past Surgical History:   Procedure Laterality Date    COLONOSCOPY  06/18/2011    Complete  Repeat in 5yrs       Allergies   Allergen Reactions    Statins Myalgia     Family History   Problem Relation Age of Onset    Alzheimer's disease Mother     Diabetes Mother      Social History     Social History    Marital status: Single     Spouse name: N/A    Number of children: N/A    Years of education: N/A     Occupational History    Not on file       Social History Main Topics    Smoking status: Never Smoker    Smokeless tobacco: Never Used    Alcohol use No    Drug use: No    Sexual activity: Not on file     Other Topics Concern    Not on file     Social History Narrative    Sleep disturbances      Current Outpatient Prescriptions on File Prior to Visit   Medication Sig Dispense Refill    ezetimibe (ZETIA) 10 mg tablet TAKE 1 TABLET DAILY  90 tablet 2    levothyroxine 100 mcg tablet TAKE 1 TABLET DAILY  90 tablet 2     No current facility-administered medications on file prior to visit  Review of Systems   Constitutional: Negative for fever  Musculoskeletal: Positive for stiffness  Skin: Negative for itching  Neurological: Negative for tingling and numbness  Objective:  Vitals:    03/28/18 1457   BP: 130/74   BP Location: Right arm   Patient Position: Sitting   Cuff Size: Standard   Pulse: 78   Weight: 113 kg (250 lb)   Height: 5' 4 5" (1 638 m)     Body mass index is 42 25 kg/m²  Physical Exam   Constitutional: She is oriented to person, place, and time  She appears well-developed and well-nourished  HENT:   Head: Normocephalic and atraumatic  Mouth/Throat: No oropharyngeal exudate  Eyes: Conjunctivae and EOM are normal  Pupils are equal, round, and reactive to light  Neck: Normal range of motion  No JVD present  No tracheal deviation present  No thyromegaly present  Cardiovascular: Normal rate, regular rhythm, normal heart sounds and intact distal pulses  Exam reveals no gallop and no friction rub  No murmur heard  Pulmonary/Chest: Effort normal and breath sounds normal  No stridor  No respiratory distress  She has no wheezes  She has no rales  She exhibits no tenderness  Abdominal: Soft  Bowel sounds are normal  She exhibits no distension and no mass  There is no tenderness  There is no rebound and no guarding  Musculoskeletal: She exhibits tenderness  She exhibits no edema  Right shoulder: She exhibits decreased range of motion, tenderness, bony tenderness, swelling, deformity, pain and spasm  Arms:  Lymphadenopathy:     She has no cervical adenopathy     Neurological: She is alert and oriented to person, place, and time  She has normal reflexes  No cranial nerve deficit  She exhibits normal muscle tone  Coordination normal    Skin: Skin is warm and dry         }

## 2018-03-29 PROBLEM — S43.001A SHOULDER SUBLUXATION, RIGHT: Status: ACTIVE | Noted: 2018-03-29

## 2018-03-29 PROBLEM — M19.011 DJD OF RIGHT SHOULDER: Status: ACTIVE | Noted: 2018-03-29

## 2018-03-29 PROBLEM — M75.101 ROTATOR CUFF TEAR ARTHROPATHY OF RIGHT SHOULDER: Status: ACTIVE | Noted: 2018-03-29

## 2018-03-29 PROBLEM — M12.811 ROTATOR CUFF TEAR ARTHROPATHY OF RIGHT SHOULDER: Status: ACTIVE | Noted: 2018-03-29

## 2018-03-30 NOTE — PROGRESS NOTES
I had spoken with the patient and reviewed the results of the x-ray of her right shoulder  It demonstrates a subluxation of the humeral head consistent with arthropathy or tear of the rotator cuff  There is also evidence of arthritis at the acromioclavicular joint in the right shoulder  Patient has failed several weeks of physical therapy and has limited range of motion in her shoulder  I suspect that she has a tear of the rotator cuff  Will send her for the MRI of the shoulder as ordered

## 2018-03-30 NOTE — PROGRESS NOTES
I spoke with the patient on 3/29/2018 and reviewed the results of her testing  The x-ray of the right shoulder demonstrated progressive degenerative changes and superior subluxation of the humeral head consistent with rotator cuff arthropathy or tear  The patient has arthritis in the acromioclavicular joint  I advised the patient that we will need to send her for an MRI of the right shoulder to further evaluate this  We will follow up closely with her with the results  The patient has tried several weeks of physical therapy and had little improvement

## 2018-04-09 ENCOUNTER — HOSPITAL ENCOUNTER (OUTPATIENT)
Dept: MAMMOGRAPHY | Facility: CLINIC | Age: 63
Discharge: HOME | End: 2018-04-09
Payer: COMMERCIAL

## 2018-04-09 ENCOUNTER — HOSPITAL ENCOUNTER (OUTPATIENT)
Dept: ULTRASOUND IMAGING | Facility: CLINIC | Age: 63
Discharge: HOME | End: 2018-04-09
Payer: COMMERCIAL

## 2018-04-09 DIAGNOSIS — R92.8 ABNORMAL SCREENING MAMMOGRAM: ICD-10-CM

## 2018-04-09 PROCEDURE — 76642 ULTRASOUND BREAST LIMITED: CPT

## 2018-04-09 PROCEDURE — 77065 DX MAMMO INCL CAD UNI: CPT

## 2018-04-09 PROCEDURE — G0279 TOMOSYNTHESIS, MAMMO: HCPCS

## 2018-04-25 ENCOUNTER — HOSPITAL ENCOUNTER (OUTPATIENT)
Dept: MRI IMAGING | Facility: HOSPITAL | Age: 63
Discharge: HOME | End: 2018-04-25
Payer: COMMERCIAL

## 2018-04-25 DIAGNOSIS — G89.29 CHRONIC RIGHT SHOULDER PAIN: ICD-10-CM

## 2018-04-25 DIAGNOSIS — M75.41 IMPINGEMENT SYNDROME OF RIGHT SHOULDER: ICD-10-CM

## 2018-04-25 DIAGNOSIS — M12.811 ROTATOR CUFF TEAR ARTHROPATHY OF RIGHT SHOULDER: ICD-10-CM

## 2018-04-25 DIAGNOSIS — M75.101 ROTATOR CUFF TEAR ARTHROPATHY OF RIGHT SHOULDER: ICD-10-CM

## 2018-04-25 DIAGNOSIS — M25.511 CHRONIC RIGHT SHOULDER PAIN: ICD-10-CM

## 2018-04-25 DIAGNOSIS — S43.001A SUBLUXATION OF RIGHT SHOULDER JOINT, INITIAL ENCOUNTER: ICD-10-CM

## 2018-04-25 DIAGNOSIS — M19.011 PRIMARY OSTEOARTHRITIS OF RIGHT SHOULDER: ICD-10-CM

## 2018-04-25 PROCEDURE — 73221 MRI JOINT UPR EXTREM W/O DYE: CPT

## 2018-05-04 DIAGNOSIS — M75.121 COMPLETE TEAR OF RIGHT ROTATOR CUFF: Primary | ICD-10-CM

## 2018-05-11 ENCOUNTER — TELEPHONE (OUTPATIENT)
Dept: FAMILY MEDICINE CLINIC | Facility: CLINIC | Age: 63
End: 2018-05-11

## 2018-05-11 DIAGNOSIS — M75.41 IMPINGEMENT SYNDROME OF RIGHT SHOULDER: ICD-10-CM

## 2018-05-11 DIAGNOSIS — G89.29 CHRONIC RIGHT SHOULDER PAIN: ICD-10-CM

## 2018-05-11 DIAGNOSIS — M15.9 GENERALIZED OSTEOARTHRITIS: Primary | ICD-10-CM

## 2018-05-11 DIAGNOSIS — M25.511 CHRONIC RIGHT SHOULDER PAIN: ICD-10-CM

## 2018-05-11 RX ORDER — MELOXICAM 7.5 MG/1
7.5 TABLET ORAL DAILY
Qty: 30 TABLET | Refills: 0 | Status: SHIPPED | OUTPATIENT
Start: 2018-05-11 | End: 2018-09-11 | Stop reason: ALTCHOICE

## 2018-05-14 ENCOUNTER — OFFICE VISIT (OUTPATIENT)
Dept: FAMILY MEDICINE CLINIC | Facility: CLINIC | Age: 63
End: 2018-05-14
Payer: COMMERCIAL

## 2018-05-14 VITALS
HEART RATE: 68 BPM | DIASTOLIC BLOOD PRESSURE: 78 MMHG | RESPIRATION RATE: 15 BRPM | BODY MASS INDEX: 42.49 KG/M2 | WEIGHT: 255 LBS | HEIGHT: 65 IN | SYSTOLIC BLOOD PRESSURE: 118 MMHG

## 2018-05-14 DIAGNOSIS — E03.9 HYPOTHYROIDISM, UNSPECIFIED TYPE: ICD-10-CM

## 2018-05-14 DIAGNOSIS — E78.2 MIXED HYPERLIPIDEMIA: ICD-10-CM

## 2018-05-14 DIAGNOSIS — I10 ESSENTIAL HYPERTENSION: Primary | ICD-10-CM

## 2018-05-14 PROCEDURE — 99214 OFFICE O/P EST MOD 30 MIN: CPT | Performed by: FAMILY MEDICINE

## 2018-05-14 NOTE — PROGRESS NOTES
Assessment/Plan:  Hypertension  1  Hypertension-the patient's blood pressure is under excellent control with her current medication  She will continue to work on her diet and increasing exercise to lose some weight  We will see her back as scheduled  Hyperlipidemia  2  Hyperlipidemia-the patient will continue on her current dose of her statin  She will also work on her diet and exercise  Hypothyroidism  3  Hypothyroidism-the patient will continue on her current dose of the levothyroxine 100 mcg daily  She will go for the lab work as ordered and we will follow up with the results  Diagnoses and all orders for this visit:    Essential hypertension  -     CBC and differential; Future  -     Comprehensive metabolic panel; Future  -     LDL cholesterol, direct; Future  -     Lipid panel; Future  -     Urinalysis with reflex to microscopic; Future  -     T4, free; Future  -     TSH, 3rd generation; Future  -     CBC and differential  -     Comprehensive metabolic panel  -     LDL cholesterol, direct  -     Lipid panel  -     Urinalysis with reflex to microscopic  -     T4, free  -     TSH, 3rd generation    Mixed hyperlipidemia  -     Comprehensive metabolic panel; Future  -     LDL cholesterol, direct; Future  -     Lipid panel; Future  -     Comprehensive metabolic panel  -     LDL cholesterol, direct  -     Lipid panel    Hypothyroidism, unspecified type  -     T4, free; Future  -     TSH, 3rd generation; Future  -     T4, free  -     TSH, 3rd generation       Return in about 5 months (around 10/14/2018) for Recheck, Pap  Subjective:   Chief Complaint   Patient presents with    Follow-up     6 month follow up for htn        Patient ID: Nicky Feliciano is a 57 y o  female presents today for A routine checkup  Nicky Feliciano is a 57 y o  Female  Who presents today for follow-up of her hypertension, hyperlipidemia, and hypothyroidism    She is seeing Orthopedics on 5/16/2018 for her right rotator cuff tear  The patient denies any chest pain, shortness of breath, or palpitations  There is no edema  There are no headaches or visual changes  There is no lightheadedness, dizziness, or fainting spells  There are no problems with her medications  There are no Gi problems  Hypertension   This is a chronic problem  The current episode started more than 1 year ago  The problem is unchanged  The problem is controlled  Pertinent negatives include no anxiety, blurred vision, chest pain, headaches, malaise/fatigue, neck pain, orthopnea, palpitations, peripheral edema, PND, shortness of breath or sweats  Past treatments include ACE inhibitors  The current treatment provides significant improvement  The following portions of the patient's history were reviewed and updated as appropriate: allergies, current medications, past family history, past medical history, past social history, past surgical history and problem list   Patient Active Problem List   Diagnosis    Female cystocele    Generalized osteoarthritis    Hyperlipidemia    Hypertension    Hypothyroidism    Intermittent claudication ([address] )    Sleep disturbances    Urge and stress incontinence    Varicose veins of both lower extremities with pain    Vitamin D deficiency    Shoulder subluxation, right    DJD of right shoulder    Rotator cuff tear arthropathy of right shoulder     Past Medical History:   Diagnosis Date    Polyp of sigmoid colon     last assessed 6/12/12    Sleep disturbances     last assessed 6/5/14     Past Surgical History:   Procedure Laterality Date    COLONOSCOPY  06/18/2011    Complete     Repeat in 5yrs       Allergies   Allergen Reactions    Statins Myalgia     Family History   Problem Relation Age of Onset    Alzheimer's disease Mother     Diabetes Mother      Social History     Social History    Marital status: Single     Spouse name: N/A    Number of children: N/A    Years of education: N/A Occupational History    Not on file  Social History Main Topics    Smoking status: Never Smoker    Smokeless tobacco: Never Used    Alcohol use No    Drug use: No    Sexual activity: Not on file     Other Topics Concern    Not on file     Social History Narrative    Sleep disturbances      Current Outpatient Prescriptions on File Prior to Visit   Medication Sig Dispense Refill    aspirin 81 MG tablet Take 1 tablet by mouth daily      ezetimibe (ZETIA) 10 mg tablet TAKE 1 TABLET DAILY  90 tablet 2    levothyroxine 100 mcg tablet TAKE 1 TABLET DAILY  90 tablet 2    lisinopril (ZESTRIL) 20 mg tablet Take 1 tablet by mouth daily      meloxicam (MOBIC) 7 5 mg tablet Take 1 tablet (7 5 mg total) by mouth daily 30 tablet 0     No current facility-administered medications on file prior to visit  Review of Systems   Constitutional: Negative  Negative for malaise/fatigue  HENT: Negative  Eyes: Negative  Negative for blurred vision  Respiratory: Negative  Negative for shortness of breath  Cardiovascular: Negative  Negative for chest pain, palpitations, orthopnea and PND  Gastrointestinal: Negative  Endocrine: Negative  Genitourinary: Negative  Musculoskeletal: Negative  Negative for neck pain  Skin: Negative  Allergic/Immunologic: Negative  Neurological: Negative  Negative for headaches  Hematological: Negative  Psychiatric/Behavioral: Negative  Objective:  Vitals:    05/14/18 1528 05/14/18 1611   BP: 118/78 118/78   BP Location: Right arm    Patient Position: Sitting    Cuff Size: Large    Pulse: 92 68   Resp: 15    Weight: 116 kg (255 lb)    Height: 5' 4 5" (1 638 m)      Body mass index is 43 09 kg/m²    Wt Readings from Last 3 Encounters:   05/14/18 116 kg (255 lb)   04/25/18 113 kg (250 lb)   03/28/18 113 kg (250 lb)     Temp Readings from Last 3 Encounters:   02/15/17 98 3 °F (36 8 °C)   06/09/16 97 7 °F (36 5 °C) (Tympanic)   07/29/15 97 8 °F (36 6 °C) (Tympanic)     BP Readings from Last 3 Encounters:   05/14/18 118/78   03/28/18 130/74   11/30/17 110/78     Pulse Readings from Last 3 Encounters:   05/14/18 68   03/28/18 78   11/30/17 68        Physical Exam   Constitutional: She is oriented to person, place, and time  She appears well-developed and well-nourished  HENT:   Head: Normocephalic and atraumatic  Mouth/Throat: No oropharyngeal exudate  Eyes: Conjunctivae and EOM are normal  Pupils are equal, round, and reactive to light  Neck: Normal range of motion  No JVD present  No tracheal deviation present  No thyromegaly present  Cardiovascular: Normal rate, regular rhythm, normal heart sounds and intact distal pulses  Exam reveals no gallop and no friction rub  No murmur heard  Pulmonary/Chest: Effort normal and breath sounds normal  No stridor  No respiratory distress  She has no wheezes  She has no rales  She exhibits no tenderness  Abdominal: Soft  Bowel sounds are normal  She exhibits no distension and no mass  There is no tenderness  There is no rebound and no guarding  Musculoskeletal: Normal range of motion  She exhibits no edema, tenderness or deformity  Lymphadenopathy:     She has no cervical adenopathy  Neurological: She is alert and oriented to person, place, and time  She has normal reflexes  No cranial nerve deficit  She exhibits normal muscle tone  Coordination normal    Skin: Skin is warm and dry  No visits with results within 2 Month(s) from this visit     Latest known visit with results is:   Lab Conversion - Encounter on 08/06/2017   Component Date Value    TSH 3RD GENERATON 08/05/2017 2 87

## 2018-05-15 NOTE — ASSESSMENT & PLAN NOTE
1   Hypertension-the patient's blood pressure is under excellent control with her current medication  She will continue to work on her diet and increasing exercise to lose some weight  We will see her back as scheduled

## 2018-05-15 NOTE — ASSESSMENT & PLAN NOTE
2   Hyperlipidemia-the patient will continue on her current dose of her statin  She will also work on her diet and exercise

## 2018-05-15 NOTE — ASSESSMENT & PLAN NOTE
3   Hypothyroidism-the patient will continue on her current dose of the levothyroxine 100 mcg daily  She will go for the lab work as ordered and we will follow up with the results

## 2018-05-16 DIAGNOSIS — I10 ESSENTIAL HYPERTENSION: Primary | ICD-10-CM

## 2018-05-16 RX ORDER — LISINOPRIL 20 MG/1
TABLET ORAL
Qty: 90 TABLET | Refills: 2 | Status: SHIPPED | OUTPATIENT
Start: 2018-05-16 | End: 2019-02-28 | Stop reason: SDUPTHER

## 2018-06-05 ENCOUNTER — OFFICE VISIT (OUTPATIENT)
Dept: OBGYN CLINIC | Facility: CLINIC | Age: 63
End: 2018-06-05
Payer: COMMERCIAL

## 2018-06-05 VITALS — HEIGHT: 65 IN | HEART RATE: 87 BPM | DIASTOLIC BLOOD PRESSURE: 75 MMHG | SYSTOLIC BLOOD PRESSURE: 109 MMHG

## 2018-06-05 DIAGNOSIS — M75.121 COMPLETE TEAR OF RIGHT ROTATOR CUFF: ICD-10-CM

## 2018-06-05 DIAGNOSIS — M12.811 ROTATOR CUFF TEAR ARTHROPATHY OF RIGHT SHOULDER: Primary | ICD-10-CM

## 2018-06-05 DIAGNOSIS — M75.101 ROTATOR CUFF TEAR ARTHROPATHY OF RIGHT SHOULDER: Primary | ICD-10-CM

## 2018-06-05 PROCEDURE — 20610 DRAIN/INJ JOINT/BURSA W/O US: CPT | Performed by: ORTHOPAEDIC SURGERY

## 2018-06-05 PROCEDURE — 99203 OFFICE O/P NEW LOW 30 MIN: CPT | Performed by: ORTHOPAEDIC SURGERY

## 2018-06-05 RX ORDER — BETAMETHASONE SODIUM PHOSPHATE AND BETAMETHASONE ACETATE 3; 3 MG/ML; MG/ML
6 INJECTION, SUSPENSION INTRA-ARTICULAR; INTRALESIONAL; INTRAMUSCULAR; SOFT TISSUE
Status: COMPLETED | OUTPATIENT
Start: 2018-06-05 | End: 2018-06-05

## 2018-06-05 RX ORDER — LIDOCAINE HYDROCHLORIDE 10 MG/ML
8 INJECTION, SOLUTION INFILTRATION; PERINEURAL
Status: COMPLETED | OUTPATIENT
Start: 2018-06-05 | End: 2018-06-05

## 2018-06-05 RX ADMIN — BETAMETHASONE SODIUM PHOSPHATE AND BETAMETHASONE ACETATE 6 MG: 3; 3 INJECTION, SUSPENSION INTRA-ARTICULAR; INTRALESIONAL; INTRAMUSCULAR; SOFT TISSUE at 15:33

## 2018-06-05 RX ADMIN — LIDOCAINE HYDROCHLORIDE 8 ML: 10 INJECTION, SOLUTION INFILTRATION; PERINEURAL at 15:33

## 2018-06-05 NOTE — ASSESSMENT & PLAN NOTE
76-year-old female with right shoulder rotator cuff tear arthropathy  I discussed the diagnosis with her  We discussed treatment options including cortisone injection versus a reverse total shoulder arthroplasty  She is not interested in surgery at this time  She did wish to proceed with an injection  Please refer to the procedure note  She has no restrictions  She was encouraged to continue using the shoulder as much as she wished  I will see her back as needed  The soon as she could get another injection would be in three months

## 2018-06-05 NOTE — PROGRESS NOTES
Assessment:     1  Rotator cuff tear arthropathy of right shoulder    2  Complete tear of right rotator cuff          Plan:     Problem List Items Addressed This Visit        Musculoskeletal and Integument    Rotator cuff tear arthropathy of right shoulder - Primary     76-year-old female with right shoulder rotator cuff tear arthropathy  I discussed the diagnosis with her  We discussed treatment options including cortisone injection versus a reverse total shoulder arthroplasty  She is not interested in surgery at this time  She did wish to proceed with an injection  Please refer to the procedure note  She has no restrictions  She was encouraged to continue using the shoulder as much as she wished  I will see her back as needed  The soon as she could get another injection would be in three months  Other Visit Diagnoses     Complete tear of right rotator cuff               Patient ID: Arsenio Goetz is a 57 y o  female  Chief Complaint:  Right shoulder pain    HPI:  76-year-old female who comes in today for evaluation of her right shoulder  She thinks she has had pain in that shoulder for the last six months or so  She has difficulty recalling exactly how long  She has had no specific injuries  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 and finds that it gets her sore when she does this  She has pain, sometimes at night  She is ambidextrous but does seem to use her right hand more than the left  She complains of difficulty with lifting or having her arms out in front of her or overhead  She has done physical therapy at work which failed to give her any significant relief  She saw her primary care provider who ordered the MRI  She has not had any injections  She would like to see what she can do to help with her pain   She has been taking meloxicam         Allergy:  Allergies   Allergen Reactions    Statins Myalgia       Medications:  all current active meds have been reviewed    Past Medical History:  Past Medical History:   Diagnosis Date    Polyp of sigmoid colon     last assessed 6/12/12    Sleep disturbances     last assessed 6/5/14       Past Surgical History:  Past Surgical History:   Procedure Laterality Date    COLONOSCOPY  06/18/2011    Complete  Repeat in 5yrs         Family History:  Family History   Problem Relation Age of Onset    Alzheimer's disease Mother     Diabetes Mother        Social History:  History   Alcohol Use No     History   Drug Use No     History   Smoking Status    Never Smoker   Smokeless Tobacco    Never Used           ROS:  Review of Systems   Constitutional: Negative  HENT: Negative  Eyes: Negative  Respiratory: Negative  Gastrointestinal: Negative  Endocrine: Negative  Genitourinary: Negative  Musculoskeletal: Positive for arthralgias  Skin: Negative  Allergic/Immunologic: Negative  Neurological: Negative  Hematological: Negative  Psychiatric/Behavioral: Negative  Objective:  BP Readings from Last 1 Encounters:   06/05/18 109/75      Wt Readings from Last 1 Encounters:   05/14/18 116 kg (255 lb)        BMI:   Estimated body mass index is 43 09 kg/m² as calculated from the following:    Height as of 5/14/18: 5' 4 5" (1 638 m)  Weight as of 5/14/18: 116 kg (255 lb)  EXAM:   Physical Exam   Constitutional: She is oriented to person, place, and time  She appears well-developed and well-nourished  No distress  HENT:   Head: Normocephalic and atraumatic  Eyes: Right eye exhibits no discharge  Left eye exhibits no discharge  Neck: Normal range of motion  Neck supple  No tracheal deviation present  Cardiovascular: Normal rate and regular rhythm  Pulmonary/Chest: Effort normal and breath sounds normal  No respiratory distress  She exhibits no tenderness  Abdominal: Soft  She exhibits no distension  There is no tenderness     Neurological: She is alert and oriented to person, place, and time    Skin: Skin is warm and dry  She is not diaphoretic  No erythema  Psychiatric: She has a normal mood and affect  Her behavior is normal    Vitals reviewed  Right Shoulder Exam     Tenderness   The patient is experiencing no tenderness  Range of Motion   Forward Flexion: 150   External Rotation: 30     Muscle Strength   External Rotation: 4/5   Supraspinatus: 4/5   Subscapularis: 5/5     Tests   Apprehension: negative  Drop Arm: negative  Hawkin's test: positive  Impingement: positive    Other   Erythema: absent  Sensation: normal  Pulse: present    Comments:  Positive Arroyo's test      Left Shoulder Exam   Left shoulder exam is normal     Tenderness   The patient is experiencing no tenderness  Range of Motion   The patient has normal left shoulder ROM  Muscle Strength   The patient has normal left shoulder strength  Tests   Apprehension: negative  Cross Arm: negative  Drop Arm: negative  Hawkin's test: negative  Impingement: negative    Other   Erythema: absent  Sensation: normal  Pulse: present               Radiographs:  I have personally reviewed pertinent films in PACS and my interpretation is Right shoulder rotator cuff tear arthropathy, MRI confirms the diagnosis with retraction past the glenoid of the supraspinatus and infraspinatus tears with muscle atrophy and biceps tendinitis      Large joint arthrocentesis  Date/Time: 6/5/2018 3:33 PM  Consent given by: patient  Timeout: Immediately prior to procedure a time out was called to verify the correct patient, procedure, equipment, support staff and site/side marked as required   Supporting Documentation  Indications: pain   Procedure Details  Location: shoulder - R subacromial bursa  Preparation: Patient was prepped and draped in the usual sterile fashion  Needle size: 22 G  Ultrasound guidance: no  Approach: posterior  Medications administered: 8 mL lidocaine 1 %; 6 mg betamethasone acetate-betamethasone sodium phosphate 6 (3-3) mg/mL    Patient tolerance: patient tolerated the procedure well with no immediate complications  Dressing:  Sterile dressing applied

## 2018-06-09 LAB
ALBUMIN SERPL-MCNC: 4.2 G/DL (ref 3.6–5.1)
ALBUMIN/GLOB SERPL: 1.7 (CALC) (ref 1–2.5)
ALP SERPL-CCNC: 62 U/L (ref 33–130)
ALT SERPL-CCNC: 25 U/L (ref 6–29)
APPEARANCE UR: CLEAR
AST SERPL-CCNC: 21 U/L (ref 10–35)
BASOPHILS # BLD AUTO: 58 CELLS/UL (ref 0–200)
BASOPHILS NFR BLD AUTO: 0.8 %
BILIRUB SERPL-MCNC: 0.5 MG/DL (ref 0.2–1.2)
BILIRUB UR QL STRIP: NEGATIVE
BUN SERPL-MCNC: 20 MG/DL (ref 7–25)
BUN/CREAT SERPL: NORMAL (CALC) (ref 6–22)
CALCIUM SERPL-MCNC: 9.4 MG/DL (ref 8.6–10.4)
CHLORIDE SERPL-SCNC: 104 MMOL/L (ref 98–110)
CHOLEST SERPL-MCNC: 237 MG/DL
CHOLEST/HDLC SERPL: 3.4 (CALC)
CO2 SERPL-SCNC: 31 MMOL/L (ref 20–31)
COLOR UR: YELLOW
CREAT SERPL-MCNC: 0.6 MG/DL (ref 0.5–0.99)
EOSINOPHIL # BLD AUTO: 310 CELLS/UL (ref 15–500)
EOSINOPHIL NFR BLD AUTO: 4.3 %
ERYTHROCYTE [DISTWIDTH] IN BLOOD BY AUTOMATED COUNT: 13.1 % (ref 11–15)
GLOBULIN SER CALC-MCNC: 2.5 G/DL (CALC) (ref 1.9–3.7)
GLUCOSE SERPL-MCNC: 84 MG/DL (ref 65–99)
GLUCOSE UR QL STRIP: NEGATIVE
HCT VFR BLD AUTO: 39.4 % (ref 35–45)
HDLC SERPL-MCNC: 69 MG/DL
HGB BLD-MCNC: 12.9 G/DL (ref 11.7–15.5)
HGB UR QL STRIP: NEGATIVE
KETONES UR QL STRIP: NEGATIVE
LDLC SERPL CALC-MCNC: 140 MG/DL (CALC)
LDLC SERPL DIRECT ASSAY-MCNC: 141 MG/DL
LEUKOCYTE ESTERASE UR QL STRIP: NEGATIVE
LYMPHOCYTES # BLD AUTO: 2196 CELLS/UL (ref 850–3900)
LYMPHOCYTES NFR BLD AUTO: 30.5 %
MCH RBC QN AUTO: 31.5 PG (ref 27–33)
MCHC RBC AUTO-ENTMCNC: 32.7 G/DL (ref 32–36)
MCV RBC AUTO: 96.1 FL (ref 80–100)
MONOCYTES # BLD AUTO: 605 CELLS/UL (ref 200–950)
MONOCYTES NFR BLD AUTO: 8.4 %
NEUTROPHILS # BLD AUTO: 4032 CELLS/UL (ref 1500–7800)
NEUTROPHILS NFR BLD AUTO: 56 %
NITRITE UR QL STRIP: NEGATIVE
NONHDLC SERPL-MCNC: 168 MG/DL (CALC)
PH UR STRIP: 7 [PH] (ref 5–8)
PLATELET # BLD AUTO: 175 THOUSAND/UL (ref 140–400)
PMV BLD REES-ECKER: 12.3 FL (ref 7.5–12.5)
POTASSIUM SERPL-SCNC: 4.4 MMOL/L (ref 3.5–5.3)
PROT SERPL-MCNC: 6.7 G/DL (ref 6.1–8.1)
PROT UR QL STRIP: NEGATIVE
RBC # BLD AUTO: 4.1 MILLION/UL (ref 3.8–5.1)
SL AMB EGFR AFRICAN AMERICAN: 112 ML/MIN/1.73M2
SL AMB EGFR NON AFRICAN AMERICAN: 96 ML/MIN/1.73M2
SODIUM SERPL-SCNC: 142 MMOL/L (ref 135–146)
SP GR UR STRIP: 1.01 (ref 1–1.03)
T4 FREE SERPL-MCNC: 1.2 NG/DL (ref 0.8–1.8)
TRIGL SERPL-MCNC: 153 MG/DL
TSH SERPL-ACNC: 4.68 MIU/L (ref 0.4–4.5)
WBC # BLD AUTO: 7.2 THOUSAND/UL (ref 3.8–10.8)

## 2018-09-05 ENCOUNTER — TELEPHONE (OUTPATIENT)
Dept: FAMILY MEDICINE CLINIC | Facility: CLINIC | Age: 63
End: 2018-09-05

## 2018-09-05 NOTE — TELEPHONE ENCOUNTER
Patient seen was admitted to [facility] from 8/24 to 8/31 and has a follow up appointment with you on 9/11  She would like a work note but does not know yet when she will be returning to work

## 2018-09-11 ENCOUNTER — TELEPHONE (OUTPATIENT)
Dept: FAMILY MEDICINE CLINIC | Facility: CLINIC | Age: 63
End: 2018-09-11

## 2018-09-11 ENCOUNTER — OFFICE VISIT (OUTPATIENT)
Dept: FAMILY MEDICINE CLINIC | Facility: CLINIC | Age: 63
End: 2018-09-11
Payer: COMMERCIAL

## 2018-09-11 VITALS
DIASTOLIC BLOOD PRESSURE: 94 MMHG | TEMPERATURE: 97.6 F | BODY MASS INDEX: 39.48 KG/M2 | WEIGHT: 233.6 LBS | SYSTOLIC BLOOD PRESSURE: 130 MMHG | HEART RATE: 96 BPM

## 2018-09-11 DIAGNOSIS — Z78.9 ALCOHOL CONSUMPTION OF MORE THAN FOUR DRINKS PER WEEK: ICD-10-CM

## 2018-09-11 DIAGNOSIS — E66.09 OBESITY DUE TO EXCESS CALORIES, UNSPECIFIED CLASSIFICATION, UNSPECIFIED WHETHER SERIOUS COMORBIDITY PRESENT: ICD-10-CM

## 2018-09-11 DIAGNOSIS — E03.9 HYPOTHYROIDISM, UNSPECIFIED TYPE: ICD-10-CM

## 2018-09-11 DIAGNOSIS — E78.2 MIXED HYPERLIPIDEMIA: ICD-10-CM

## 2018-09-11 DIAGNOSIS — I10 ESSENTIAL HYPERTENSION: ICD-10-CM

## 2018-09-11 DIAGNOSIS — K85.20 ALCOHOL-INDUCED ACUTE PANCREATITIS, UNSPECIFIED COMPLICATION STATUS: Primary | ICD-10-CM

## 2018-09-11 DIAGNOSIS — R74.01 TRANSAMINITIS: ICD-10-CM

## 2018-09-11 DIAGNOSIS — K80.10 CCC (CHRONIC CALCULOUS CHOLECYSTITIS): ICD-10-CM

## 2018-09-11 PROBLEM — E66.9 OBESE: Status: ACTIVE | Noted: 2018-08-25

## 2018-09-11 PROCEDURE — 99214 OFFICE O/P EST MOD 30 MIN: CPT | Performed by: FAMILY MEDICINE

## 2018-09-11 NOTE — PROGRESS NOTES
Assessment/Plan:  1  Status post alcohol induced pancreatitis-the patient has improved since her hospitalization  She finished out the course of her antibiotics  We will send her for the follow-up lab work as ordered to recheck her pancreas enzymes and her blood count  She will continue to refrain from alcohol intake  She will gradually advance her diet but I did advise her to stick with a bland diet because of her gallbladder issues  I did advise the patient to schedule an appointment with GI as recommended as soon as possible  2   Chronic calculous cholecystitis-the patient will follow up with General surgery as scheduled for probable cholecystectomy  I advised her to avoid fatty foods since this will trigger a gallbladder attack  She should increase her fluids and rest   I advised her to follow up in the emergency room immediately with any worsening abdominal pain, nausea, fever, or bloody stool  3   Elevated transaminases-the patient will go for the repeat lab work as ordered  We will follow up with the results when available  The patient will schedule her follow-up with GI   4   Hypertension-the patient's blood pressure stable on her current medication we have made no changes  5   History of excessive alcohol consumption-the patient has quit drinking altogether  We will continue to monitor her as scheduled  6   Hypothyroidism-the patient will continue with her current dose of the levothyroxine  8   Hyperlipidemia-the patient will continue with her Zetia  She will follow up with General surgery and GI as recommended  We will see her after her surgery  Diagnoses and all orders for this visit:    Alcohol-induced acute pancreatitis, unspecified complication status  -     CBC and differential; Future  -     Comprehensive metabolic panel; Future  -     Gamma GT; Future  -     Amylase; Future  -     Lipase;  Future  -     CBC and differential  -     Comprehensive metabolic panel  -     Gamma GT  -     Amylase  -     Lipase    CCC (chronic calculous cholecystitis)  -     CBC and differential; Future  -     Comprehensive metabolic panel; Future  -     Gamma GT; Future  -     Amylase; Future  -     Lipase; Future  -     CBC and differential  -     Comprehensive metabolic panel  -     Gamma GT  -     Amylase  -     Lipase    Transaminitis  -     CBC and differential; Future  -     Comprehensive metabolic panel; Future  -     Gamma GT; Future  -     Amylase; Future  -     Lipase; Future  -     CBC and differential  -     Comprehensive metabolic panel  -     Gamma GT  -     Amylase  -     Lipase    Obesity due to excess calories, unspecified classification, unspecified whether serious comorbidity present    Alcohol consumption of more than four drinks per week    Essential hypertension    Hypothyroidism, unspecified type    Mixed hyperlipidemia       Return if symptoms worsen or fail to improve  Subjective:   Chief Complaint   Patient presents with    Follow-up     Follow up [facility] for abdominal pain  Patient ID: Sandie Gupta is a 57 y o  female presents today for a hospital follow-up  Sandie Gupta is a 57 y o  Female who presents today for a follow-up from recent hospital admission at [facility] from 8/24/2018 until 8/31/2018 for an alcohol induced pancreatitis, leukocytosis, and transaminitis  The patient presents today for follow-up  Hospital records from [facility] were obtained and reviewed today  The patient presented to [facility] on 8/24/2018 with the chief complaint of abdominal pain and nausea and vomiting  She was diagnosed with acute pancreatitis from alcohol, possibly gallstones  The symptoms had started on 8/21/2018 and progressively became worse  Patient admitted to drinking about 10-12 beers per week  The day prior to admission, she vomited 3 times and was unable to eat or drink    She underwent a workup in the emergency room demonstrating leukocytosis and is CT scan of the abdomen pelvis was performed demonstrating acute uncomplicated pancreatitis, colonic diverticulosis without diverticulitis, hepatic steatosis, and gallbladder thickening  She also had an ultrasound performed of the right upper quadrant which demonstrated a prominent slightly edematous pancreas and cholelithiasis with a top normal gallbladder wall thickness  She was admitted for IV fluids and pain control  Her pain did improve and her diet was advanced and she was able to tolerated a  p o  diet  Since the right upper quadrant ultrasound revealed gallstones, General surgery was consulted for evaluation of possible cholecystectomy  Surgery recommended outpatient follow-up for possible cholecystectomy  During the course of her hospitalization, she developed worsening back pain and her white blood cell count was going up  Therefore, surgery recommended repeating the CT of her abdomen and pelvis  The repeat CT scan of the abdomen pelvis with contrast was performed on 8/30/2018 and the findings demonstrated progression of the patient's previously seen acute pancreatitis with significant interval increase in the degree of aaliyah pancreatic inflammatory changes and fluid  It suggested a possible evolving pseudocyst with superimposed infection  There was also a concern of possible pancreatic necrosis  GI was consulted for the CT scan findings as well as transaminitis and they started the patient on a course of Cipro and Flagyl  The patient's abdominal pain and back pain resolved by the time of the day of discharge on 8/31/2018  She was cleared for discharge by both GI and General surgery  She is scheduled to follow up with both of the specialties on an outpatient basis  She was discharged home on her regular medications along with Cipro 500 mg twice a day for 6 days and metronidazole 500 mg t i d  for 6 days    The patient still demonstrated a slightly elevated white blood cell count upon discharge  Her other lab work was stable  She is going to see [facility] on September 17, 2018, to discuss scheduling a laparoscopic cholecystectomy  In the near future for the gallstone  She also needs to follow up with [facility] and needs to call and schedule  She has stopped drinking all alcohol intake completely! She is still having mid-epigastric pain on and off and back pain- it is brief and not nearly as severe  It is waking her up at times  In the am, she feels like her stomach is upset  There is no nausea or vomiting  There is normal stools now, no diarrhea  She still has a decreased appetite  She is tolerating fluids  There are no GERD symptoms  There are no further fevers  She is watching her diet  Abdominal Pain   This is a new problem  The current episode started 1 to 4 weeks ago  The onset quality is gradual  The problem occurs constantly  The pain is located in the epigastric region and RUQ  The quality of the pain is cramping and sharp  The abdominal pain radiates to the back  Associated symptoms include nausea  Pertinent negatives include no anorexia, arthralgias, belching, constipation, diarrhea, dysuria, fever, flatus, frequency, headaches, hematochezia, hematuria, melena, myalgias, vomiting or weight loss  She has tried antibiotics for the symptoms  The treatment provided moderate relief  Prior diagnostic workup includes CT scan, GI consult and ultrasound       The following portions of the patient's history were reviewed and updated as appropriate: allergies, current medications, past family history, past medical history, past social history, past surgical history and problem list   Patient Active Problem List   Diagnosis    Female cystocele    Generalized osteoarthritis    Hyperlipidemia    Hypertension    Hypothyroidism    Intermittent claudication ([address] )    Sleep disturbances    Urge and stress incontinence    Varicose veins of both lower extremities with pain    Vitamin D deficiency    Shoulder subluxation, right    DJD of right shoulder    Rotator cuff tear arthropathy of right shoulder    Alcohol induced acute pancreatitis    Obese     Past Medical History:   Diagnosis Date    Polyp of sigmoid colon     last assessed 6/12/12    Sleep disturbances     last assessed 6/5/14     Past Surgical History:   Procedure Laterality Date    COLONOSCOPY  06/18/2011    Complete  Repeat in 5yrs       Allergies   Allergen Reactions    Statins Myalgia     Family History   Problem Relation Age of Onset    Alzheimer's disease Mother     Diabetes Mother      Social History     Social History    Marital status: Single     Spouse name: N/A    Number of children: N/A    Years of education: N/A     Occupational History    Not on file  Social History Main Topics    Smoking status: Never Smoker    Smokeless tobacco: Never Used    Alcohol use No    Drug use: No    Sexual activity: Not on file     Other Topics Concern    Not on file     Social History Narrative    Sleep disturbances      Current Outpatient Prescriptions on File Prior to Visit   Medication Sig Dispense Refill    aspirin 81 MG tablet Take 1 tablet by mouth daily      ezetimibe (ZETIA) 10 mg tablet TAKE 1 TABLET DAILY  90 tablet 2    levothyroxine 100 mcg tablet TAKE 1 TABLET DAILY  90 tablet 2    lisinopril (ZESTRIL) 20 mg tablet TAKE 1 TABLET DAILY  90 tablet 2    [DISCONTINUED] meloxicam (MOBIC) 7 5 mg tablet Take 1 tablet (7 5 mg total) by mouth daily 30 tablet 0     No current facility-administered medications on file prior to visit  Review of Systems   Constitutional: Negative  Negative for fever and weight loss  HENT: Negative  Eyes: Negative  Respiratory: Negative  Cardiovascular: Negative  Gastrointestinal: Positive for abdominal pain and nausea   Negative for anorexia, constipation, diarrhea, flatus, hematochezia, melena and vomiting  Endocrine: Negative  Genitourinary: Negative  Negative for dysuria, frequency and hematuria  Musculoskeletal: Negative  Negative for arthralgias and myalgias  Skin: Negative  Allergic/Immunologic: Negative  Neurological: Negative  Negative for headaches  Hematological: Negative  Psychiatric/Behavioral: Negative  Objective:  Vitals:    09/11/18 1122   BP: 130/94   BP Location: Right arm   Patient Position: Sitting   Cuff Size: Standard   Pulse: 96   Temp: 97 6 °F (36 4 °C)   TempSrc: Tympanic   Weight: 106 kg (233 lb 9 6 oz)     Body mass index is 39 48 kg/m²  Wt Readings from Last 3 Encounters:   09/11/18 106 kg (233 lb 9 6 oz)   05/14/18 116 kg (255 lb)   04/25/18 113 kg (250 lb)     Temp Readings from Last 3 Encounters:   09/11/18 97 6 °F (36 4 °C) (Tympanic)   02/15/17 98 3 °F (36 8 °C)   06/09/16 97 7 °F (36 5 °C) (Tympanic)     BP Readings from Last 3 Encounters:   09/11/18 130/94   06/05/18 109/75   05/14/18 118/78     Pulse Readings from Last 3 Encounters:   09/11/18 96   06/05/18 87   05/14/18 68        Physical Exam   Constitutional: She is oriented to person, place, and time  She appears well-developed and well-nourished  HENT:   Head: Normocephalic and atraumatic  Mouth/Throat: No oropharyngeal exudate  Eyes: Conjunctivae and EOM are normal  Pupils are equal, round, and reactive to light  Neck: Normal range of motion  No JVD present  No tracheal deviation present  No thyromegaly present  Cardiovascular: Normal rate, regular rhythm, normal heart sounds and intact distal pulses  Exam reveals no gallop and no friction rub  No murmur heard  Pulmonary/Chest: Effort normal and breath sounds normal  No stridor  No respiratory distress  She has no wheezes  She has no rales  She exhibits no tenderness  Abdominal: Soft   Bowel sounds are normal  She exhibits no shifting dullness, no distension, no pulsatile liver, no fluid wave, no abdominal bruit, no ascites, no pulsatile midline mass and no mass  There is splenomegaly  There is no hepatosplenomegaly or hepatomegaly  There is tenderness in the right upper quadrant and epigastric area  There is no rebound, no guarding and no CVA tenderness  No hernia  Hernia confirmed negative in the ventral area, confirmed negative in the right inguinal area and confirmed negative in the left inguinal area  Musculoskeletal: Normal range of motion  She exhibits no edema, tenderness or deformity  Lymphadenopathy:     She has no cervical adenopathy  Neurological: She is alert and oriented to person, place, and time  She has normal reflexes  No cranial nerve deficit  She exhibits normal muscle tone  Coordination normal    Skin: Skin is warm and dry  No visits with results within 2 Month(s) from this visit     Latest known visit with results is:   Orders Only on 06/08/2018   Component Date Value    Total Cholesterol 06/08/2018 237*    HDL 06/08/2018 69     Triglycerides 06/08/2018 153*    LDL Direct 06/08/2018 140*    Chol HDLC Ratio 06/08/2018 3 4     Non-HDL Cholesterol 06/08/2018 168*    LDL Cholesterol 06/08/2018 141*    Glucose 06/08/2018 84     BUN 06/08/2018 20     Creatinine 06/08/2018 0 60     eGFR Non  06/08/2018 96     SL AMB EGFR  AMER* 06/08/2018 112     SL AMB BUN/CREATININE RA* 07/17/7525 NOT APPLICABLE     Sodium 70/80/9276 142     SL AMB POTASSIUM 06/08/2018 4 4     Chloride 06/08/2018 104     CO2 06/08/2018 31     SL AMB CALCIUM 06/08/2018 9 4     SL AMB PROTEIN, TOTAL 06/08/2018 6 7     Albumin 06/08/2018 4 2     Globulin 06/08/2018 2 5     SL AMB ALBUMIN/GLOBULIN * 06/08/2018 1 7     TOTAL BILIRUBIN 06/08/2018 0 5     Alkaline Phosphatase 06/08/2018 62     SL AMB AST 06/08/2018 21     SL AMB ALT 06/08/2018 25     White Blood Cell Count 06/08/2018 7 2     Red Blood Cell Count 06/08/2018 4 10     Hemoglobin 06/08/2018 12 9     HCT 06/08/2018 39 4     MCV 06/08/2018 96 1     MCH 06/08/2018 31 5     MCHC 06/08/2018 32 7     RDW 06/08/2018 13 1     Platelet Count 38/47/8416 175     SL AMB MPV 06/08/2018 12 3     Neutrophils (Absolute) 06/08/2018 4032     Lymphocytes (Absolute) 06/08/2018 2196     Monocytes (Absolute) 06/08/2018 605     Eosinophils (Absolute) 06/08/2018 310     Basophils (Absolute) 06/08/2018 58     Neutrophils 06/08/2018 56     Lymphocytes 06/08/2018 30 5     Monocytes 06/08/2018 8 4     Eosinophils 06/08/2018 4 3     Basophils Relative 06/08/2018 0 8     TSH 06/08/2018 4 68*    Free t4 06/08/2018 1 2     Color UA 06/08/2018 YELLOW     Urine Appearance 06/08/2018 CLEAR     Specific Gravity 06/08/2018 1 015     Ph 06/08/2018 7 0     SL AMB GLUCOSE, URINE, Q* 06/08/2018 NEGATIVE     SL AMB BILIRUBIN, URINE 06/08/2018 NEGATIVE     SL AMB KETONE, URINE, QU* 06/08/2018 NEGATIVE     SL AMB BLOOD, URINE 06/08/2018 NEGATIVE     SL AMB PROTEIN, URINE, Q* 06/08/2018 NEGATIVE     SL AMB NITRITES URINE, Q* 06/08/2018 NEGATIVE     SL AMB LEUKOCYTE ESTERAS* 06/08/2018 NEGATIVE

## 2018-09-11 NOTE — PATIENT INSTRUCTIONS
Pancreatitis   WHAT YOU NEED TO KNOW:   Pancreatitis is inflammation of your pancreas  The pancreas is an organ that makes insulin  It also makes enzymes (digestive juices) that help your body digest food  Pancreatitis may be an acute (short-term) problem that happens only once  It may become a chronic (long-term) problem that comes and goes over time  WHILE YOU ARE HERE:   Informed consent  is a legal document that explains the tests, treatments, or procedures that you may need  Informed consent means you understand what will be done and can make decisions about what you want  You give your permission when you sign the consent form  You can have someone sign this form for you if you are not able to sign it  You have the right to understand your medical care in words you know  Before you sign the consent form, understand the risks and benefits of what will be done  Make sure all your questions are answered  An IV  is a small tube placed in your vein that is used to give you medicine or liquids  You may need extra oxygen  if your blood oxygen level is lower than it should be  You may get oxygen through a mask placed over your nose and mouth or through small tubes placed in your nostrils  Ask your healthcare provider before you take off the mask or oxygen tubing  A pulse oximeter  is a device that measures the amount of oxygen in your blood  A cord with a clip or sticky strip is placed on your finger, ear, or toe  The other end of the cord is hooked to a machine  Telemetry  is continuous monitoring of your heart rhythm  Sticky pads placed on your skin connect to an EKG machine that records your heart rhythm  Medicines:   · Antibiotics  treat a bacterial infection  · Nausea medicine  helps calm your stomach and prevents vomiting  · Pain medicine  may be given  Do not wait until the pain is severe before you ask for more medicine    Nutrition support:  If you are not able to eat food normally, you may need to be fed through a feeding tube or a catheter  You may need any of the following:  · Total parenteral nutrition (TPN)  is given through your IV  TPN provides your body with nutrition such as protein, sugar, and vitamins  · A nasogastric (NG) tube  is inserted through your nose and guided down into your stomach  Food and medicine may be given through the NG tube  The tube may instead be attached to suction if your stomach needs to be empty  · A jejunostomy tube (J-tube)  is inserted through an incision in your abdomen  The end of the tube goes into your intestine  The tube is used to give you liquids, food, and medicine  Tests: You may be given contrast liquid to help your pancreas show up better in the pictures  Tell the healthcare provider if you have ever had an allergic reaction to contrast liquid  You may need any of the following:  · Blood tests  may show infection, pancreas function, or provide information about your overall health  · An x-ray, ultrasound, or CT  may show the cause of your pancreatitis and help healthcare providers plan your treatment  · Fine needle aspiration  is a procedure used to find tissue damage or infection in your pancreas  · Endoscopic retrograde cholangiopancreatography (ERCP)  is a procedure used to find stones, tumors, or narrowed bile ducts  · Magnetic retrograde cholangiopancreatography (MRCP)  is similar to ERCP but uses magnetic waves to find gallstones, masses, or other problems  Surgery  may be needed to remove your gallbladder if gallstones are causing your pancreatitis  Surgery may also be done to open blocked ducts or drain pockets of pus caused by infection  RISKS:   Pancreatitis may lead to tissue damage and infection inside the pancreas  It can cause bleeding and fluid leakage into the abdomen  This can lead to low blood pressure, and failure of other organs  Pancreatitis can be life-threatening     CARE AGREEMENT:   You have the right to help plan your care  Learn about your health condition and how it may be treated  Discuss treatment options with your caregivers to decide what care you want to receive  You always have the right to refuse treatment  © 2017 [employer] [employer] Information is for End User's use only and may not be sold, redistributed or otherwise used for commercial purposes  All illustrations and images included in CareNotes® are the copyrighted property of A D A M , Inc  or Brian Cross  The above information is an  only  It is not intended as medical advice for individuBiliary Colic   WHAT YOU NEED TO KNOW:   What is biliary colic? Biliary colic is severe pain in your upper abdomen caused by a gallbladder problem  Your gallbladder stores bile, which helps break down the fats that you eat  What causes biliary colic? Biliary colic happens when something blocks the duct that moves bile out of the gallbladder  Any of the following can cause blockage:  Gallstones    Swelling of the gallbladder    Narrow bile duct    Injury    Pancreatitis (inflammation of pancreas)    Duodenitis (inflammation of small intestine)    Spasms in the esophagus  What increases my risk for biliary colic? Family history of biliary colic    Certain medicines, such as birth control    Older age    Pregnancy    Obesity    Medical conditions, such as liver cirrhosis or hemolytic disease  What are the signs and symptoms of biliary colic? Pain in the middle of your upper abdomen, just below your breast bone    Pain that is worse on your right side, just below your ribs    Pain in your back, just under your shoulder blade    Nausea and vomiting    Pain after you eat a meal  How is biliary colic diagnosed? Blood tests  check for what is causing your biliary colic  Urine tests  check gallbladder function  An x-ray  of your abdomen may help determine what is causing your biliary colic      An ultrasound  uses sound waves to show pictures on a monitor  An ultrasound may be done to show blockages or other causes of your pain  A CT scan , or CAT scan, is a type of x-ray that uses a computer takes pictures of your abdomen  The pictures may show a blockage or other causes of your pain  You may be given a dye before the pictures are taken to help healthcare providers see the pictures better  Tell the healthcare provider if you have ever had an allergic reaction to contrast dye  An MRI  is a scan that uses powerful magnets and a computer to take pictures of your biliary system  An MRI may show blockages or growths  You may be given dye to help the pictures show up better  Tell the healthcare provider if you have ever had an allergic reaction to contrast dye  Do not enter the MRI room with anything metal  Metal can cause serious injury  Tell the healthcare provider if you have any metal in or on your body  How is biliary colic treated? Medicines  can help decrease pain and muscle spasms  You may also need medicine to calm your stomach and stop vomiting  Procedures  may be needed to remove blockages or widen your bile duct  Ask your healthcare provider for more information about any procedures you may need  How can I manage my symptoms? Avoid alcohol  Alcohol can damage your gallbladder and make your symptoms worse  Maintain a healthy weight  Ask your healthcare provider how much you should weigh  Ask him to help you create a weight loss plan if you are overweight  Eat a variety of healthy foods  Healthy foods include fruits, vegetables, whole-grain breads, low-fat dairy products, beans, lean meats, and fish  Foods that are high in fiber and low in fat and cholesterol may decrease your symptoms  Ask if you need to be on a special diet  Exercise  Ask your healthcare provider about the best exercise plan for you  Exercise may help improve your symptoms  When should I contact my healthcare provider? You have a fever  Your pain gets worse, even after you take medicine  You have nausea or are vomiting  Your skin or eyes are yellow  You have questions or concerns about your condition or care  When should I seek immediate care or call 911? You have severe pain  You feel like you are going to faint  You are short of breath  CARE AGREEMENT:   You have the right to help plan your care  Learn about your health condition and how it may be treated  Discuss treatment options with your caregivers to decide what care you want to receive  You always have the right to refuse treatment  The above information is an  only  It is not intended as medical advice for individual conditions or treatments  Talk to your doctor, nurse or pharmacist before following any medical regimen to see if it is safe and effective for you  © 2017 [address] [employer]  Information is for End User's use only and may not be sold, redistributed or otherwise used for commercial purposes  All illustrations and images included in CareNotes® are the copyrighted property of A D A M , Inc  or Brian flores conditions or treatments  Talk to your doctor, nurse or pharmacist before following any medical regimen to see if it is safe and effective for you  Diet for Stomach Ulcers and Gastritis   AMBULATORY CARE:   A diet for stomach ulcers and gastritis  is a meal plan that limits foods that irritate your stomach  Certain foods may worsen symptoms such as stomach pain, bloating, heartburn, or indigestion  Foods to limit or avoid:  You may need to avoid acidic, spicy, or high-fat foods  Not all foods affect everyone the same way  You will need to learn which foods worsen your symptoms and limit those foods   The following are some foods that may worsen ulcer or gastritis symptoms:  · Beverages:      ¨ Whole milk and chocolate milk    ¨ Hot cocoa and cola    ¨ Any beverage with caffeine    ¨ Regular and decaffeinated coffee    ¨ Peppermint and spearmint tea    ¨ Green and black tea, with or without caffeine    ¨ Orange and grapefruit juices    ¨ Drinks that contain alcohol    · Spices and seasonings:      ¨ Black and red pepper    ¨ Chili powder    ¨ Mustard seed and nutmeg    · Other foods:      ¨ Dairy foods made from whole milk or cream    ¨ Chocolate    ¨ Spicy or strongly flavored cheeses, such as jalapeno or black pepper    ¨ Highly seasoned, high-fat meats, such as sausage, salami, ragland, ham, and cold cuts    ¨ Hot chiles and peppers    ¨ Tomato products, such as tomato paste, tomato sauce, or tomato juice  Foods to include:  Eat a variety of healthy foods from all the food groups  Eat fruits, vegetables, whole grains, and fat-free or low-fat dairy foods  Whole grains include whole-wheat breads, cereals, pasta, and brown rice  Choose lean meats, poultry (chicken and turkey), fish, beans, eggs, and nuts  A healthy meal plan is low in unhealthy fats, salt, and added sugar  Healthy fats include olive oil and canola oil  Ask your dietitian for more information about a healthy diet  Other helpful guidelines:   · Do not eat right before bedtime  Stop eating at least 2 hours before bedtime  · Eat small, frequent meals  Your stomach may tolerate small, frequent meals better than large meals  © 2017 [address] [employer] Information is for End User's use only and may not be sold, redistributed or otherwise used for commercial purposes  All illustrations and images included in CareNotes® are the copyrighted property of [employer] [place]  or Brian Cross  The above information is an  only  It is not intended as medical advice for individual conditions or treatments  Talk to your doctor, nurse or pharmacist before following any medical regimen to see if it is safe and effective for you

## 2018-09-11 NOTE — LETTER
September 11, 2018     Patient: Doris Dye   YOB: 1955   Date of Visit: 9/11/2018       To Whom it May Concern:    Jaguar Gonzalez is under my professional care  She was seen in my office on 9/11/2018  She may return to work on 10/01/2018  Please excuse Jaguar Gonzalez from work from 8/24/2018 until 10/01/2018 for a serious medical condition  If you have any questions or concerns, please don't hesitate to call           Sincerely,          Julius Ortiz,         CC: No Recipients

## 2018-09-11 NOTE — TELEPHONE ENCOUNTER
LEAH'S BROTHER FROM [facility] IS CALLING AND WANTS A CALL BACK RE LEAH'S VISIT TODAY AND POSSIBLY  SURGERY CALL Faheem Nieto GOES BY [facility] # [phone] ANY TIME AFTER 8PM TODAY

## 2018-09-12 NOTE — TELEPHONE ENCOUNTER
I spoke with the patient's sister in law on 9/11/2018 at 8:10 p m   I advised her that the patient will be seeing a general surgeon at [facility] on follow-up to discuss possible cholecystectomy for calculous cholecystitis  I advised her that she should be contacting patient's surgeon after the patient has been seen to further discuss the scheduled procedure

## 2018-09-13 LAB
ALBUMIN SERPL-MCNC: 3.7 G/DL (ref 3.6–5.1)
ALBUMIN/GLOB SERPL: 1.2 (CALC) (ref 1–2.5)
ALP SERPL-CCNC: 92 U/L (ref 33–130)
ALT SERPL-CCNC: 18 U/L (ref 6–29)
AMYLASE SERPL-CCNC: 71 U/L (ref 21–101)
AST SERPL-CCNC: 26 U/L (ref 10–35)
BASOPHILS # BLD AUTO: 72 CELLS/UL (ref 0–200)
BASOPHILS NFR BLD AUTO: 0.8 %
BILIRUB SERPL-MCNC: 0.6 MG/DL (ref 0.2–1.2)
BUN SERPL-MCNC: 10 MG/DL (ref 7–25)
BUN/CREAT SERPL: NORMAL (CALC) (ref 6–22)
CALCIUM SERPL-MCNC: 9.3 MG/DL (ref 8.6–10.4)
CHLORIDE SERPL-SCNC: 99 MMOL/L (ref 98–110)
CO2 SERPL-SCNC: 29 MMOL/L (ref 20–32)
CREAT SERPL-MCNC: 0.7 MG/DL (ref 0.5–0.99)
EOSINOPHIL # BLD AUTO: 279 CELLS/UL (ref 15–500)
EOSINOPHIL NFR BLD AUTO: 3.1 %
ERYTHROCYTE [DISTWIDTH] IN BLOOD BY AUTOMATED COUNT: 12.6 % (ref 11–15)
GGT SERPL-CCNC: 96 U/L (ref 3–65)
GLOBULIN SER CALC-MCNC: 3.2 G/DL (CALC) (ref 1.9–3.7)
GLUCOSE SERPL-MCNC: 107 MG/DL (ref 65–139)
HCT VFR BLD AUTO: 37.7 % (ref 35–45)
HGB BLD-MCNC: 12.5 G/DL (ref 11.7–15.5)
LIPASE SERPL-CCNC: 59 U/L (ref 7–60)
LYMPHOCYTES # BLD AUTO: 1584 CELLS/UL (ref 850–3900)
LYMPHOCYTES NFR BLD AUTO: 17.6 %
MCH RBC QN AUTO: 32 PG (ref 27–33)
MCHC RBC AUTO-ENTMCNC: 33.2 G/DL (ref 32–36)
MCV RBC AUTO: 96.4 FL (ref 80–100)
MONOCYTES # BLD AUTO: 846 CELLS/UL (ref 200–950)
MONOCYTES NFR BLD AUTO: 9.4 %
NEUTROPHILS # BLD AUTO: 6219 CELLS/UL (ref 1500–7800)
NEUTROPHILS NFR BLD AUTO: 69.1 %
PLATELET # BLD AUTO: 404 THOUSAND/UL (ref 140–400)
PMV BLD REES-ECKER: 11.4 FL (ref 7.5–12.5)
POTASSIUM SERPL-SCNC: 4.8 MMOL/L (ref 3.5–5.3)
PROT SERPL-MCNC: 6.9 G/DL (ref 6.1–8.1)
RBC # BLD AUTO: 3.91 MILLION/UL (ref 3.8–5.1)
SL AMB EGFR AFRICAN AMERICAN: 106 ML/MIN/1.73M2
SL AMB EGFR NON AFRICAN AMERICAN: 92 ML/MIN/1.73M2
SODIUM SERPL-SCNC: 138 MMOL/L (ref 135–146)
WBC # BLD AUTO: 9 THOUSAND/UL (ref 3.8–10.8)

## 2018-09-14 ENCOUNTER — TELEPHONE (OUTPATIENT)
Dept: FAMILY MEDICINE CLINIC | Facility: CLINIC | Age: 63
End: 2018-09-14

## 2018-09-14 NOTE — TELEPHONE ENCOUNTER
Patient's brother called with update that patient is back in the hospital at [facility]  If there are any questions Jeremy Greco can be reached at [phone]

## 2018-09-20 ENCOUNTER — TRANSITIONAL CARE MANAGEMENT (OUTPATIENT)
Dept: FAMILY MEDICINE CLINIC | Facility: CLINIC | Age: 63
End: 2018-09-20

## 2018-09-21 DIAGNOSIS — E78.2 MIXED HYPERLIPIDEMIA: ICD-10-CM

## 2018-09-21 DIAGNOSIS — I10 ESSENTIAL HYPERTENSION: ICD-10-CM

## 2018-09-21 DIAGNOSIS — E03.9 HYPOTHYROIDISM, UNSPECIFIED TYPE: Primary | ICD-10-CM

## 2018-09-21 DIAGNOSIS — E55.9 VITAMIN D DEFICIENCY: ICD-10-CM

## 2018-09-27 ENCOUNTER — OFFICE VISIT (OUTPATIENT)
Dept: FAMILY MEDICINE CLINIC | Facility: CLINIC | Age: 63
End: 2018-09-27
Payer: COMMERCIAL

## 2018-09-27 VITALS
BODY MASS INDEX: 37.45 KG/M2 | DIASTOLIC BLOOD PRESSURE: 80 MMHG | RESPIRATION RATE: 15 BRPM | TEMPERATURE: 97.5 F | SYSTOLIC BLOOD PRESSURE: 122 MMHG | HEIGHT: 65 IN | WEIGHT: 224.8 LBS | OXYGEN SATURATION: 98 % | HEART RATE: 86 BPM

## 2018-09-27 DIAGNOSIS — K76.0 STEATOSIS OF LIVER: ICD-10-CM

## 2018-09-27 DIAGNOSIS — E03.9 HYPOTHYROIDISM, UNSPECIFIED TYPE: ICD-10-CM

## 2018-09-27 DIAGNOSIS — E78.2 MIXED HYPERLIPIDEMIA: ICD-10-CM

## 2018-09-27 DIAGNOSIS — K86.1 CHRONIC PANCREATITIS, UNSPECIFIED PANCREATITIS TYPE (HCC): Primary | ICD-10-CM

## 2018-09-27 DIAGNOSIS — K86.3 PANCREATIC PSEUDOCYST/CYST: ICD-10-CM

## 2018-09-27 DIAGNOSIS — K86.2 PANCREATIC PSEUDOCYST/CYST: ICD-10-CM

## 2018-09-27 DIAGNOSIS — I10 ESSENTIAL HYPERTENSION: ICD-10-CM

## 2018-09-27 PROCEDURE — 99495 TRANSJ CARE MGMT MOD F2F 14D: CPT | Performed by: FAMILY MEDICINE

## 2018-09-27 RX ORDER — HYDROCODONE BITARTRATE AND ACETAMINOPHEN 5; 325 MG/1; MG/1
TABLET ORAL
COMMUNITY
Start: 2018-09-19 | End: 2018-09-27

## 2018-09-27 NOTE — LETTER
September 27, 2018     Patient: Shasha Cuevas   YOB: 1955   Date of Visit: 9/27/2018       To Whom it May Concern:    Will High is under my professional care  She was seen in my office on 9/27/2018  She may return to work on 11/01/2018  Please excuse Rudy You from work from 09/19/2018 until 11/01/2018 for a medical illness  If you have any questions or concerns, please don't hesitate to call           Sincerely,          Maegan Peres,         CC: No Recipients

## 2018-09-27 NOTE — PROGRESS NOTES
Assessment/Plan:  Chronic pancreatitis and pancreatic pseudocyst-the patient has improved since her hospitalization  She will follow up with GI as scheduled  She will continue with her bland diet  She will continue to avoid alcohol use  We will monitor her closely and send her for the follow-up lab work next week  We will see her back as scheduled  She will call or follow up in the emergency room with any worsening abdominal pain, nausea, or vomiting  Zeenat Sousa was seen today for transition of care management  Diagnoses and all orders for this visit:    Chronic pancreatitis, unspecified pancreatitis type ([facility] [address] )  -     Comprehensive metabolic panel; Future  -     CBC and differential; Future  -     Lipase; Future  -     Amylase; Future  -     Comprehensive metabolic panel  -     CBC and differential  -     Lipase  -     Amylase    Pancreatic pseudocyst/cyst  -     Comprehensive metabolic panel; Future  -     CBC and differential; Future  -     Lipase; Future  -     Amylase; Future  -     Comprehensive metabolic panel  -     CBC and differential  -     Lipase  -     Amylase    Essential hypertension  -     Comprehensive metabolic panel; Future  -     CBC and differential; Future  -     Lipase; Future  -     Amylase; Future  -     Comprehensive metabolic panel  -     CBC and differential  -     Lipase  -     Amylase    Mixed hyperlipidemia    Hypothyroidism, unspecified type    Steatosis of liver        Chief Complaint   Patient presents with    Transition of Care Management     [facility] follow up  discharged 9/19/18       Subjective:    Patient ID:  David Bledsoe is a 57 y  o female  David Bledsoe is a 57 y o  female who presents for transitional care management visit after her recent hospitalization [facility] from 9/13/2018 until 9/19/2018 for worsening pancreatitis  Her hospital records were reviewed  She presented to the hospital on 9/13/2018 with worsening abdominal pain   She has a history of alcohol abuse, hyperlipidemia, and hypothyroidism  The patient had a repeat CT scan performed in the emergency room that demonstrated worsening pancreatitis with a pseudocyst formation  The pseudocyst was noted to be 10 x 5 centimeters in size  The patient was admitted to the hospital and GI and surgery were both consulted  She was treated for pancreatitis with IV fluids, being placed on NPO, and pain control  There is also evidence of hepatic steatosis on her CT scan and was recommended that she have hepatology follow her as an outpatient  She demonstrated improvement with pain control and IV fluids  She was tolerating a diet prior to discharge  She did have a mild temp of a 100 5 the night prior to discharge but there are no signs of infection and she was clinically well appearing  She was given consultation about alcohol cessation and monitoring the types of food she eats  She was given 10 tablets of Norco to help with severe pain and instructed only to take it if absolutely needed  She was advised to avoid alcohol or operating heavy machinery red taking the medication  She did have it extremely high lipase on admission which did normalize during her hospitalization  She was discharged home in stable condition  She was discharged home with pain medication and she was given pain medication, but she has not had to take them due to the pain subsiding  She has visiting nurses coming to the home- they are coming out tomorrow  She is scheduled to see Denita [facility]BARD[facility] GI on October 9, 2018 and she is scheduled for a repeat CT scan on 10/15/2018    She is feeling better and she is following a diet and she had a decreased appetite  She is eating bland foods  The patient denies any chest pain, shortness of breath, or palpitations  There is no edema  There are no headaches or visual changes  There is no lightheadedness, dizziness, or fainting spells      There is no nausea, vomiting, or diarrhea  Her cholecystectomy is postponed until the pancreatitis has completely resolved  The following portions of the patient's history were reviewed and updated as appropriate: allergies, current medications, past family history, past medical history, past social history, past surgical history and problem list   Patient Active Problem List   Diagnosis    Female cystocele    Generalized osteoarthritis    Hyperlipidemia    Hypertension    Hypothyroidism    Intermittent claudication ([address] )    Sleep disturbances    Urge and stress incontinence    Varicose veins of both lower extremities with pain    Vitamin D deficiency    Shoulder subluxation, right    DJD of right shoulder    Rotator cuff tear arthropathy of right shoulder    Alcohol induced acute pancreatitis    Obese     Past Medical History:   Diagnosis Date    Polyp of sigmoid colon     last assessed 6/12/12    Sleep disturbances     last assessed 6/5/14     Past Surgical History:   Procedure Laterality Date    COLONOSCOPY  06/18/2011    Complete  Repeat in 5yrs       Allergies   Allergen Reactions    Statins Myalgia     Family History   Problem Relation Age of Onset    Alzheimer's disease Mother     Diabetes Mother      Social History     Social History    Marital status: Single     Spouse name: N/A    Number of children: N/A    Years of education: N/A     Occupational History    Not on file  Social History Main Topics    Smoking status: Never Smoker    Smokeless tobacco: Never Used    Alcohol use No    Drug use: No    Sexual activity: Not on file     Other Topics Concern    Not on file     Social History Narrative    Sleep disturbances      Current Outpatient Prescriptions on File Prior to Visit   Medication Sig Dispense Refill    aspirin 81 MG tablet Take 1 tablet by mouth daily      ezetimibe (ZETIA) 10 mg tablet TAKE 1 TABLET DAILY  90 tablet 2    levothyroxine 100 mcg tablet TAKE 1 TABLET DAILY   90 tablet 2  lisinopril (ZESTRIL) 20 mg tablet TAKE 1 TABLET DAILY  90 tablet 2     No current facility-administered medications on file prior to visit  Review of Systems   Constitutional: Negative  HENT: Negative  Eyes: Negative  Respiratory: Negative  Cardiovascular: Negative  Gastrointestinal: Negative  Endocrine: Negative  Genitourinary: Negative  Musculoskeletal: Negative  Skin: Negative  Allergic/Immunologic: Negative  Neurological: Negative  Hematological: Negative  Psychiatric/Behavioral: Negative  Objective:    Vitals:    09/27/18 1202   BP: 122/80   BP Location: Left arm   Patient Position: Sitting   Cuff Size: Large   Pulse: 86   Resp: 15   Temp: 97 5 °F (36 4 °C)   TempSrc: Tympanic   SpO2: 98%   Weight: 102 kg (224 lb 12 8 oz)   Height: 5' 4 5" (1 638 m)     Physical Exam   Constitutional: She is oriented to person, place, and time  She appears well-developed and well-nourished  No distress  HENT:   Head: Normocephalic and atraumatic  Right Ear: External ear normal    Left Ear: External ear normal    Nose: Nose normal    Mouth/Throat: Oropharynx is clear and moist  No oropharyngeal exudate  Eyes: Pupils are equal, round, and reactive to light  EOM are normal  Right eye exhibits no discharge  Left eye exhibits no discharge  No scleral icterus  Neck: Normal range of motion  Neck supple  No JVD present  No tracheal deviation present  No thyromegaly present  Cardiovascular: Normal rate, regular rhythm, normal heart sounds and intact distal pulses  Exam reveals no gallop and no friction rub  No murmur heard  Pulmonary/Chest: Effort normal and breath sounds normal  No respiratory distress  She has no wheezes  She has no rales  She exhibits no tenderness  Abdominal: Soft  Bowel sounds are normal  She exhibits no distension and no mass  There is no tenderness  There is no rebound and no guarding  No hernia  Musculoskeletal: Normal range of motion  She exhibits no edema, tenderness or deformity  Lymphadenopathy:     She has no cervical adenopathy  Neurological: She is alert and oriented to person, place, and time  She displays normal reflexes  No cranial nerve deficit or sensory deficit  She exhibits normal muscle tone  Coordination normal    Skin: Skin is warm and dry  No rash noted  She is not diaphoretic  No erythema  No pallor  Psychiatric: She has a normal mood and affect  Her behavior is normal  Thought content normal          Date and time hospital follow up call was made:  9/20/2018  3:39 PM  Hospital care reviewed:  Records not available  Patient was hopsitalized at:  [facility]  Date of admission:  9/13/18  Date of discharge:  9/19/18  Disposition:  Home  Were the patients medicaitons reviewed and updated:  Yes  Current symptoms:  None  Post hospital issues:  None  Should patient be enrolled in anticoag monitoring?:  No  Scheduled for follow up?:  Yes  Referrals needed:  Bariatic Surgeon  Did you obtain your prescribed medications:  Yes  Do you need help managing your perscriptions or medications:  No  Is transportation to your appointments needed:  No  I have advised the patient to call PCP with any new or worsening symptoms (please type in name along with any credentials):  Shaw VILLATORO    Living Arrangements:  Alone  Support System:  Friends  The type of support provided:  Physical, Emotional  Do you have social support:  No, not at all  Are you recieving outpatient services:  No  Are you recieving home care services:  Yes  Types of home care services:  Home health aid  Are you using any community resources:  No  Current waiver service:  No  Have you fallen in the last 12 months:  No  Interperter language line required?:  No

## 2018-10-04 LAB
ALBUMIN SERPL-MCNC: 3.6 G/DL (ref 3.6–5.1)
ALBUMIN/GLOB SERPL: 1.1 (CALC) (ref 1–2.5)
ALP SERPL-CCNC: 107 U/L (ref 33–130)
ALT SERPL-CCNC: 17 U/L (ref 6–29)
AMYLASE SERPL-CCNC: 320 U/L (ref 21–101)
AST SERPL-CCNC: 20 U/L (ref 10–35)
BASOPHILS # BLD AUTO: 58 CELLS/UL (ref 0–200)
BASOPHILS NFR BLD AUTO: 0.7 %
BILIRUB SERPL-MCNC: 0.5 MG/DL (ref 0.2–1.2)
BUN SERPL-MCNC: 7 MG/DL (ref 7–25)
BUN/CREAT SERPL: ABNORMAL (CALC) (ref 6–22)
CALCIUM SERPL-MCNC: 9.4 MG/DL (ref 8.6–10.4)
CHLORIDE SERPL-SCNC: 100 MMOL/L (ref 98–110)
CO2 SERPL-SCNC: 30 MMOL/L (ref 20–32)
CREAT SERPL-MCNC: 0.68 MG/DL (ref 0.5–0.99)
EOSINOPHIL # BLD AUTO: 199 CELLS/UL (ref 15–500)
EOSINOPHIL NFR BLD AUTO: 2.4 %
ERYTHROCYTE [DISTWIDTH] IN BLOOD BY AUTOMATED COUNT: 12.4 % (ref 11–15)
GLOBULIN SER CALC-MCNC: 3.3 G/DL (CALC) (ref 1.9–3.7)
GLUCOSE SERPL-MCNC: 105 MG/DL (ref 65–99)
HCT VFR BLD AUTO: 39.5 % (ref 35–45)
HGB BLD-MCNC: 12.5 G/DL (ref 11.7–15.5)
LIPASE SERPL-CCNC: 255 U/L (ref 7–60)
LYMPHOCYTES # BLD AUTO: 1569 CELLS/UL (ref 850–3900)
LYMPHOCYTES NFR BLD AUTO: 18.9 %
MCH RBC QN AUTO: 29.8 PG (ref 27–33)
MCHC RBC AUTO-ENTMCNC: 31.6 G/DL (ref 32–36)
MCV RBC AUTO: 94.3 FL (ref 80–100)
MONOCYTES # BLD AUTO: 523 CELLS/UL (ref 200–950)
MONOCYTES NFR BLD AUTO: 6.3 %
NEUTROPHILS # BLD AUTO: 5951 CELLS/UL (ref 1500–7800)
NEUTROPHILS NFR BLD AUTO: 71.7 %
PLATELET # BLD AUTO: 326 THOUSAND/UL (ref 140–400)
PMV BLD REES-ECKER: 11.9 FL (ref 7.5–12.5)
POTASSIUM SERPL-SCNC: 4.4 MMOL/L (ref 3.5–5.3)
PROT SERPL-MCNC: 6.9 G/DL (ref 6.1–8.1)
RBC # BLD AUTO: 4.19 MILLION/UL (ref 3.8–5.1)
SL AMB EGFR AFRICAN AMERICAN: 107 ML/MIN/1.73M2
SL AMB EGFR NON AFRICAN AMERICAN: 92 ML/MIN/1.73M2
SODIUM SERPL-SCNC: 139 MMOL/L (ref 135–146)
WBC # BLD AUTO: 8.3 THOUSAND/UL (ref 3.8–10.8)

## 2018-10-05 ENCOUNTER — TELEPHONE (OUTPATIENT)
Dept: FAMILY MEDICINE CLINIC | Facility: CLINIC | Age: 63
End: 2018-10-05

## 2018-10-05 NOTE — TELEPHONE ENCOUNTER
LEAH was supposed to be flying out to [place] but since she was in the hospital that didn't happen so she needs a letter from you if you would be willing to help her out, saying that she was in the hospital and was unable to fly so she can try to g et her money back or maybe some of it      [phone]  Any questions

## 2018-10-05 NOTE — TELEPHONE ENCOUNTER
[employer]      December 24th, 2017 and January 3rd, 2018    Confirmation #: [id number]    Patient added the confirmation number but I am not sure if you needed it

## 2018-10-05 NOTE — TELEPHONE ENCOUNTER
Please let Kita Whitehead know that I can do this, but I would need the dates of the flight and who this gets directed to

## 2018-10-05 NOTE — LETTER
[address]  [address] [place] [address] Jose BasisCode [address]  Phone#  [phone]  Fax#  [phone]      October 15, 2018      Re: Siomara Baldwin          1955    [employer] Confirmation# [id number]      To Whom It May Concern:     Liz Dueñas is a 57 y o  female who is a well-established patient in our practice  Unfortunately, she has had multiple hospitalizations for an ongoing medical condition, pancreatitis, in both September and 2018  She continues to undergo treatment and further evaluation for this medical condition  As result, she continues to feel unwell and is not feeling well enough for any travel  Therefore, she will not be  making any future strips this year  Therefore, she will not be able to make her upcoming trip to [place]  She cannot travel on her upcoming [employer] flight leaving on 2018 and scheduled to return in 1/3/2019  She needs to cancel this flight and should be issued a refund due to medical reasons  Thank you for your assistance in this matter  Please contact our office with any additional questions  Sincerely,        Mally Crowe

## 2018-10-06 ENCOUNTER — TELEPHONE (OUTPATIENT)
Dept: FAMILY MEDICINE CLINIC | Facility: CLINIC | Age: 63
End: 2018-10-06

## 2018-10-06 DIAGNOSIS — K86.0 ALCOHOL-INDUCED CHRONIC PANCREATITIS (HCC): Primary | ICD-10-CM

## 2018-10-06 NOTE — TELEPHONE ENCOUNTER
I spoke with the patient on 9/4/2018  I advised her that her amylase and lipase levels are still high  They have improved significantly though however since her hospitalization  Her other labs look normal   She has scheduled follow-up with GI in next week  She states that her abdominal pain has improved significantly and she is feeling good and has started to advance her diet  I advised her that we will repeat her amylase and lipase again early next week  She will call or follow up in the emergency room with any worsening abdominal pain  She will follow up with GI as scheduled      Office Visit on 09/27/2018   Component Date Value    Glucose 10/03/2018 105*    BUN 10/03/2018 7     Creatinine 10/03/2018 0 68     eGFR Non African American 10/03/2018 92     SL AMB EGFR  AMER* 10/03/2018 107     SL AMB BUN/CREATININE RA* 24/40/9938 NOT APPLICABLE     Sodium 24/42/3909 139     SL AMB POTASSIUM 10/03/2018 4 4     Chloride 10/03/2018 100     CO2 10/03/2018 30     SL AMB CALCIUM 10/03/2018 9 4     SL AMB PROTEIN, TOTAL 10/03/2018 6 9     Albumin 10/03/2018 3 6     Globulin 10/03/2018 3 3     SL AMB ALBUMIN/GLOBULIN * 10/03/2018 1 1     TOTAL BILIRUBIN 10/03/2018 0 5     Alkaline Phosphatase 10/03/2018 107     SL AMB AST 10/03/2018 20     SL AMB ALT 10/03/2018 17     White Blood Cell Count 10/03/2018 8 3     Red Blood Cell Count 10/03/2018 4 19     Hemoglobin 10/03/2018 12 5     HCT 10/03/2018 39 5     MCV 10/03/2018 94 3     MCH 10/03/2018 29 8     MCHC 10/03/2018 31 6*    RDW 10/03/2018 12 4     Platelet Count 80/28/5503 326     SL AMB MPV 10/03/2018 11 9     Neutrophils (Absolute) 10/03/2018 5951     Lymphocytes (Absolute) 10/03/2018 1569     Monocytes (Absolute) 10/03/2018 523     Eosinophils (Absolute) 10/03/2018 199     Basophils (Absolute) 10/03/2018 58     Neutrophils 10/03/2018 71 7     Lymphocytes 10/03/2018 18 9     Monocytes 10/03/2018 6 3     Eosinophils 10/03/2018 2 4     Basophils Relative 10/03/2018 0 7     Lipase, Serum 10/03/2018 255*    SL AMB AMYLASE, SERUM 10/03/2018 320*   Office Visit on 09/11/2018   Component Date Value    White Blood Cell Count 09/12/2018 9 0     Red Blood Cell Count 09/12/2018 3 91     Hemoglobin 09/12/2018 12 5     HCT 09/12/2018 37 7     MCV 09/12/2018 96 4     MCH 09/12/2018 32 0     MCHC 09/12/2018 33 2     RDW 09/12/2018 12 6     Platelet Count 29/85/5223 404*    SL AMB MPV 09/12/2018 11 4     Neutrophils (Absolute) 09/12/2018 6219     Lymphocytes (Absolute) 09/12/2018 1584     Monocytes (Absolute) 09/12/2018 846     Eosinophils (Absolute) 09/12/2018 279     Basophils (Absolute) 09/12/2018 72     Neutrophils 09/12/2018 69 1     Lymphocytes 09/12/2018 17 6     Monocytes 09/12/2018 9 4     Eosinophils 09/12/2018 3 1     Basophils Relative 09/12/2018 0 8     Glucose 09/12/2018 107     BUN 09/12/2018 10     Creatinine 09/12/2018 0 70     eGFR Non  09/12/2018 92     SL AMB EGFR  AMER* 09/12/2018 106     SL AMB BUN/CREATININE RA* 79/56/1776 NOT APPLICABLE     Sodium 21/26/7882 138     SL AMB POTASSIUM 09/12/2018 4 8     Chloride 09/12/2018 99     CO2 09/12/2018 29     SL AMB CALCIUM 09/12/2018 9 3     SL AMB PROTEIN, TOTAL 09/12/2018 6 9     Albumin 09/12/2018 3 7     Globulin 09/12/2018 3 2     SL AMB ALBUMIN/GLOBULIN * 09/12/2018 1 2     TOTAL BILIRUBIN 09/12/2018 0 6     Alkaline Phosphatase 09/12/2018 92     SL AMB AST 09/12/2018 26     SL AMB ALT 09/12/2018 18     GGT 09/12/2018 96*    SL AMB AMYLASE, SERUM 09/12/2018 71     Lipase, Serum 09/12/2018 59

## 2018-10-08 NOTE — TELEPHONE ENCOUNTER
Please double check with Brandyn Ordoñez about these dates  I was trying to complete this over the weekend  Brandyn Ordoñez was just in the hospital last month- I can't use that to excuse her from a flight back in December 2017  Is this to cancel an upcoming flight this December? I was not aware she was in the hospital in December 2017

## 2018-10-08 NOTE — TELEPHONE ENCOUNTER
Dr Christie Stoner, it looks like there was some miscommunication during the orginal phone call and so on  Mynor Bal has an upcoming flight out to see her brother on December 24th 2018 and to return home on January 3rd 2019  She has been feeling unwell since the multiple hospitalizations and does not feel up to making the trip out to see him  Her brother is going to travel here instead to see her but she wants a refund on the airline tickets she wont be using        [employer]    December 24th 2018 and January 3rd 2019    Confirmation #: [id number] (not sure if the confirmation # is needed or not)

## 2018-10-09 LAB
ALBUMIN SERPL-MCNC: 3.8 G/DL (ref 3.6–5.1)
ALBUMIN/GLOB SERPL: 1.2 (CALC) (ref 1–2.5)
ALP SERPL-CCNC: 99 U/L (ref 33–130)
ALT SERPL-CCNC: 14 U/L (ref 6–29)
AMYLASE SERPL-CCNC: 163 U/L (ref 21–101)
AST SERPL-CCNC: 17 U/L (ref 10–35)
BILIRUB SERPL-MCNC: 0.4 MG/DL (ref 0.2–1.2)
BUN SERPL-MCNC: 7 MG/DL (ref 7–25)
BUN/CREAT SERPL: NORMAL (CALC) (ref 6–22)
CALCIUM SERPL-MCNC: 9.3 MG/DL (ref 8.6–10.4)
CHLORIDE SERPL-SCNC: 103 MMOL/L (ref 98–110)
CO2 SERPL-SCNC: 29 MMOL/L (ref 20–32)
CREAT SERPL-MCNC: 0.58 MG/DL (ref 0.5–0.99)
GLOBULIN SER CALC-MCNC: 3.1 G/DL (CALC) (ref 1.9–3.7)
GLUCOSE SERPL-MCNC: 98 MG/DL (ref 65–99)
LIPASE SERPL-CCNC: 111 U/L (ref 7–60)
POTASSIUM SERPL-SCNC: 4.3 MMOL/L (ref 3.5–5.3)
PROT SERPL-MCNC: 6.9 G/DL (ref 6.1–8.1)
SL AMB EGFR AFRICAN AMERICAN: 113 ML/MIN/1.73M2
SL AMB EGFR NON AFRICAN AMERICAN: 97 ML/MIN/1.73M2
SODIUM SERPL-SCNC: 141 MMOL/L (ref 135–146)

## 2018-10-11 ENCOUNTER — TELEPHONE (OUTPATIENT)
Dept: FAMILY MEDICINE CLINIC | Facility: CLINIC | Age: 63
End: 2018-10-11

## 2018-10-11 NOTE — TELEPHONE ENCOUNTER
PT HAD BW LAST WEEK AND WANTS THE RESULTS AND ALSO SHE NEEDS A COPY OF THE RESULTS TO GIVE TO THE [facility] PAU ON Monday CALL [phone]

## 2018-10-15 NOTE — TELEPHONE ENCOUNTER
I spoke with the patient on 10/11/2018 and reviewed the results of her testing  Her amylase and lipase have improved significantly    Telephone on 10/06/2018   Component Date Value Ref Range Status    SL AMB AMYLASE, SERUM 10/08/2018 163* 21 - 101 U/L Final    Lipase, Serum 10/08/2018 111* 7 - 60 U/L Final    Glucose 10/08/2018 98  65 - 99 mg/dL Final    BUN 10/08/2018 7  7 - 25 mg/dL Final    Creatinine 10/08/2018 0 58  0 50 - 0 99 mg/dL Final    eGFR Non  10/08/2018 97  > OR = 60 mL/min/1 73m2 Final    SL AMB EGFR  10/08/2018 113  > OR = 60 mL/min/1 73m2 Final    SL AMB BUN/CREATININE RATIO 51/85/5591 NOT APPLICABLE  6 - 22 (calc) Final    Sodium 10/08/2018 141  135 - 146 mmol/L Final    SL AMB POTASSIUM 10/08/2018 4 3  3 5 - 5 3 mmol/L Final    Chloride 10/08/2018 103  98 - 110 mmol/L Final    CO2 10/08/2018 29  20 - 32 mmol/L Final    SL AMB CALCIUM 10/08/2018 9 3  8 6 - 10 4 mg/dL Final    SL AMB PROTEIN, TOTAL 10/08/2018 6 9  6 1 - 8 1 g/dL Final    Albumin 10/08/2018 3 8  3 6 - 5 1 g/dL Final    Globulin 10/08/2018 3 1  1 9 - 3 7 g/dL (calc) Final    SL AMB ALBUMIN/GLOBULIN RATIO 10/08/2018 1 2  1 0 - 2 5 (calc) Final    TOTAL BILIRUBIN 10/08/2018 0 4  0 2 - 1 2 mg/dL Final    Alkaline Phosphatase 10/08/2018 99  33 - 130 U/L Final    SL AMB AST 10/08/2018 17  10 - 35 U/L Final    SL AMB ALT 10/08/2018 14  6 - 29 U/L Final   Office Visit on 09/27/2018   Component Date Value Ref Range Status    Glucose 10/03/2018 105* 65 - 99 mg/dL Final    BUN 10/03/2018 7  7 - 25 mg/dL Final    Creatinine 10/03/2018 0 68  0 50 - 0 99 mg/dL Final    eGFR Non African American 10/03/2018 92  > OR = 60 mL/min/1 73m2 Final    SL AMB EGFR  10/03/2018 107  > OR = 60 mL/min/1 73m2 Final    SL AMB BUN/CREATININE RATIO 85/62/3053 NOT APPLICABLE  6 - 22 (calc) Final    Sodium 10/03/2018 139  135 - 146 mmol/L Final    SL AMB POTASSIUM 10/03/2018 4 4  3 5 - 5 3 mmol/L Final    Chloride 10/03/2018 100  98 - 110 mmol/L Final    CO2 10/03/2018 30  20 - 32 mmol/L Final    SL AMB CALCIUM 10/03/2018 9 4  8 6 - 10 4 mg/dL Final    SL AMB PROTEIN, TOTAL 10/03/2018 6 9  6 1 - 8 1 g/dL Final    Albumin 10/03/2018 3 6  3 6 - 5 1 g/dL Final    Globulin 10/03/2018 3 3  1 9 - 3 7 g/dL (calc) Final    SL AMB ALBUMIN/GLOBULIN RATIO 10/03/2018 1 1  1 0 - 2 5 (calc) Final    TOTAL BILIRUBIN 10/03/2018 0 5  0 2 - 1 2 mg/dL Final    Alkaline Phosphatase 10/03/2018 107  33 - 130 U/L Final    SL AMB AST 10/03/2018 20  10 - 35 U/L Final    SL AMB ALT 10/03/2018 17  6 - 29 U/L Final    White Blood Cell Count 10/03/2018 8 3  3 8 - 10 8 Thousand/uL Final    Red Blood Cell Count 10/03/2018 4 19  3 80 - 5 10 Million/uL Final    Hemoglobin 10/03/2018 12 5  11 7 - 15 5 g/dL Final    HCT 10/03/2018 39 5  35 0 - 45 0 % Final    MCV 10/03/2018 94 3  80 0 - 100 0 fL Final    MCH 10/03/2018 29 8  27 0 - 33 0 pg Final    MCHC 10/03/2018 31 6* 32 0 - 36 0 g/dL Final    RDW 10/03/2018 12 4  11 0 - 15 0 % Final    Platelet Count 08/95/1521 326  140 - 400 Thousand/uL Final    SL AMB MPV 10/03/2018 11 9  7 5 - 12 5 fL Final    Neutrophils (Absolute) 10/03/2018 5951  1,500 - 7,800 cells/uL Final    Lymphocytes (Absolute) 10/03/2018 1569  850 - 3,900 cells/uL Final    Monocytes (Absolute) 10/03/2018 523  200 - 950 cells/uL Final    Eosinophils (Absolute) 10/03/2018 199  15 - 500 cells/uL Final    Basophils (Absolute) 10/03/2018 58  0 - 200 cells/uL Final    Neutrophils 10/03/2018 71 7  % Final    Lymphocytes 10/03/2018 18 9  % Final    Monocytes 10/03/2018 6 3  % Final    Eosinophils 10/03/2018 2 4  % Final    Basophils Relative 10/03/2018 0 7  % Final    Lipase, Serum 10/03/2018 255* 7 - 60 U/L Final    SL AMB AMYLASE, SERUM 10/03/2018 320* 21 - 101 U/L Final   Office Visit on 09/11/2018   Component Date Value Ref Range Status    White Blood Cell Count 09/12/2018 9 0  3 8 - 10 8 Thousand/uL Final    Red Blood Cell Count 09/12/2018 3 91  3 80 - 5 10 Million/uL Final    Hemoglobin 09/12/2018 12 5  11 7 - 15 5 g/dL Final    HCT 09/12/2018 37 7  35 0 - 45 0 % Final    MCV 09/12/2018 96 4  80 0 - 100 0 fL Final    MCH 09/12/2018 32 0  27 0 - 33 0 pg Final    MCHC 09/12/2018 33 2  32 0 - 36 0 g/dL Final    RDW 09/12/2018 12 6  11 0 - 15 0 % Final    Platelet Count 93/15/1088 404* 140 - 400 Thousand/uL Final    SL AMB MPV 09/12/2018 11 4  7 5 - 12 5 fL Final    Neutrophils (Absolute) 09/12/2018 6219  1,500 - 7,800 cells/uL Final    Lymphocytes (Absolute) 09/12/2018 1584  850 - 3,900 cells/uL Final    Monocytes (Absolute) 09/12/2018 846  200 - 950 cells/uL Final    Eosinophils (Absolute) 09/12/2018 279  15 - 500 cells/uL Final    Basophils (Absolute) 09/12/2018 72  0 - 200 cells/uL Final    Neutrophils 09/12/2018 69 1  % Final    Lymphocytes 09/12/2018 17 6  % Final    Monocytes 09/12/2018 9 4  % Final    Eosinophils 09/12/2018 3 1  % Final    Basophils Relative 09/12/2018 0 8  % Final    Glucose 09/12/2018 107  65 - 139 mg/dL Final    BUN 09/12/2018 10  7 - 25 mg/dL Final    Creatinine 09/12/2018 0 70  0 50 - 0 99 mg/dL Final    eGFR Non  09/12/2018 92  > OR = 60 mL/min/1 73m2 Final    SL AMB EGFR  09/12/2018 106  > OR = 60 mL/min/1 73m2 Final    SL AMB BUN/CREATININE RATIO 36/94/0617 NOT APPLICABLE  6 - 22 (calc) Final    Sodium 09/12/2018 138  135 - 146 mmol/L Final    SL AMB POTASSIUM 09/12/2018 4 8  3 5 - 5 3 mmol/L Final    Chloride 09/12/2018 99  98 - 110 mmol/L Final    CO2 09/12/2018 29  20 - 32 mmol/L Final    SL AMB CALCIUM 09/12/2018 9 3  8 6 - 10 4 mg/dL Final    SL AMB PROTEIN, TOTAL 09/12/2018 6 9  6 1 - 8 1 g/dL Final    Albumin 09/12/2018 3 7  3 6 - 5 1 g/dL Final    Globulin 09/12/2018 3 2  1 9 - 3 7 g/dL (calc) Final    SL AMB ALBUMIN/GLOBULIN RATIO 09/12/2018 1 2  1 0 - 2 5 (calc) Final    TOTAL BILIRUBIN 09/12/2018 0 6  0 2 - 1 2 mg/dL Final    Alkaline Phosphatase 09/12/2018 92  33 - 130 U/L Final    SL AMB AST 09/12/2018 26  10 - 35 U/L Final    SL AMB ALT 09/12/2018 18  6 - 29 U/L Final    GGT 09/12/2018 96* 3 - 65 U/L Final    SL AMB AMYLASE, SERUM 09/12/2018 71  21 - 101 U/L Final    Lipase, Serum 09/12/2018 59  7 - 60 U/L Final       She is scheduled to follow up with GI in next week  She will follow up as scheduled  We will see her back in the office as scheduled  She states her pain has subsided

## 2018-10-23 ENCOUNTER — TELEPHONE (OUTPATIENT)
Dept: FAMILY MEDICINE CLINIC | Facility: CLINIC | Age: 63
End: 2018-10-23

## 2018-10-23 NOTE — TELEPHONE ENCOUNTER
Patient's brother Tiera Tse would like update on how patient is doing  He can be reached at [phone] or [phone] if during the work day this is his office #

## 2018-10-23 NOTE — TELEPHONE ENCOUNTER
Please let Janes Moore know Kita Whitehead is coming in for an appointment tomorrow, I will have more info then  He may also want to touch base with her GI specialist as well

## 2018-10-23 NOTE — TELEPHONE ENCOUNTER
I called Chris Lira and told him you would call him tomorrow after Vee's Appt  He said he appreciates us doing for Tyra Suero and you taking the time to call him     [employer]

## 2018-10-24 ENCOUNTER — OFFICE VISIT (OUTPATIENT)
Dept: FAMILY MEDICINE CLINIC | Facility: CLINIC | Age: 63
End: 2018-10-24
Payer: COMMERCIAL

## 2018-10-24 VITALS
BODY MASS INDEX: 36.22 KG/M2 | TEMPERATURE: 97.9 F | DIASTOLIC BLOOD PRESSURE: 80 MMHG | RESPIRATION RATE: 14 BRPM | SYSTOLIC BLOOD PRESSURE: 124 MMHG | WEIGHT: 217.4 LBS | HEART RATE: 86 BPM | HEIGHT: 65 IN

## 2018-10-24 DIAGNOSIS — K86.3 PANCREATIC PSEUDOCYST: Primary | ICD-10-CM

## 2018-10-24 DIAGNOSIS — K85.20 ALCOHOL-INDUCED ACUTE PANCREATITIS, UNSPECIFIED COMPLICATION STATUS: ICD-10-CM

## 2018-10-24 PROCEDURE — 99213 OFFICE O/P EST LOW 20 MIN: CPT | Performed by: FAMILY MEDICINE

## 2018-10-24 RX ORDER — CIPROFLOXACIN 500 MG/1
1 TABLET, FILM COATED ORAL DAILY
Refills: 0 | COMMUNITY
Start: 2018-08-31 | End: 2019-01-03 | Stop reason: ALTCHOICE

## 2018-10-24 NOTE — PROGRESS NOTES
Assessment/Plan:  1  Pancreatic pseudocyst-the patient will follow up with GI as scheduled for endoscopy and drainage of the cyst   She will continue with her current prescription for the Dexilant  We will continue to monitor closely  2  Alcohol-induced pancreatitis/chronic pancreatitis-the patient continues with some mild abdominal pain but has improved significantly  We will check up-to-date lab work on her as ordered  She will follow up with GI as scheduled  She does continue to improve and and we will give her a few more days to rest   At this point we will release her to return to work on 11/1/2018 without restriction  We will monitor her closely  Diagnoses and all orders for this visit:    Pancreatic pseudocyst  -     CBC and differential; Future  -     Comprehensive metabolic panel; Future  -     Amylase; Future  -     Lipase; Future  -     CBC and differential  -     Comprehensive metabolic panel  -     Amylase  -     Lipase    Alcohol-induced acute pancreatitis, unspecified complication status  -     CBC and differential; Future  -     Comprehensive metabolic panel; Future  -     Amylase; Future  -     Lipase; Future  -     CBC and differential  -     Comprehensive metabolic panel  -     Amylase  -     Lipase    Other orders  -     ciprofloxacin (CIPRO) 500 mg tablet; Take 1 tablet by mouth daily  -     DEXILANT 60 MG capsule; Take 1 tablet by mouth daily       Return in about 3 weeks (around 11/14/2018) for Recheck  Subjective:   Chief Complaint   Patient presents with    Follow-up     one month follow up         Patient ID: Layla Lugo is a 57 y o  female presents today for a routine follow-up  Layla Lugo is a 57 y o  female who presents today for follow-up of her pancreatic pseudocyst and pancreatitis    She was recently in to see Dr Kimberley Jimenez, the surgeon at [facility] for follow-up on the pancreatic pseudocyst   It was decided that she was going to follow up with GI on 11/5/2018 to discuss and discopathy drainage of the pancreatic pseudocyst and if this is unsuccessful they will consider doing either laparoscopic or open drainage of the cyst   She was told the cyst is getting smaller  She still has sharp abdominal pains on and off  They are no pursuing gallbladder surgery at this point  She still has the pain on and off, but it is better  She is eating better and eating more  She has been eating a lot of soup  There is no nausea or vomiting  She was a little nauseated today  They did start her on Dexilant and that is helping  There are no fevers or chills  She has lost 7 lbs  The patient denies any chest pain, shortness of breath, or palpitations  There is no edema  There are no headaches or visual changes  There is no lightheadedness, dizziness, or fainting spells  She does not need refills  She is looking to return to work on 11/01/2018  Abdominal Pain   This is a recurrent problem  The current episode started more than 1 month ago  The onset quality is sudden  The problem occurs intermittently  The problem has been gradually improving  The pain is located in the epigastric region and periumbilical region  The pain is at a severity of 3/10  The pain is mild  The quality of the pain is colicky  Pertinent negatives include no anorexia, arthralgias, belching, constipation, diarrhea, dysuria, fever, flatus, frequency, headaches, hematochezia, hematuria, melena, myalgias, nausea, vomiting or weight loss       The following portions of the patient's history were reviewed and updated as appropriate: allergies, current medications, past family history, past medical history, past social history, past surgical history and problem list   Patient Active Problem List   Diagnosis    Female cystocele    Generalized osteoarthritis    Hyperlipidemia    Hypertension    Hypothyroidism    Intermittent claudication ([facility] [address] )    Sleep disturbances    Urge and stress incontinence    Varicose veins of both lower extremities with pain    Vitamin D deficiency    Shoulder subluxation, right    DJD of right shoulder    Rotator cuff tear arthropathy of right shoulder    Alcohol induced acute pancreatitis    Obese    Pancreatic pseudocyst     Past Medical History:   Diagnosis Date    Polyp of sigmoid colon     last assessed 6/12/12    Sleep disturbances     last assessed 6/5/14     Past Surgical History:   Procedure Laterality Date    COLONOSCOPY  06/18/2011    Complete  Repeat in 5yrs       Allergies   Allergen Reactions    Statins Myalgia     Family History   Problem Relation Age of Onset    Alzheimer's disease Mother     Diabetes Mother      Social History     Social History    Marital status: Single     Spouse name: N/A    Number of children: N/A    Years of education: N/A     Occupational History    Not on file  Social History Main Topics    Smoking status: Never Smoker    Smokeless tobacco: Never Used    Alcohol use No    Drug use: No    Sexual activity: Not on file     Other Topics Concern    Not on file     Social History Narrative    Sleep disturbances      Current Outpatient Prescriptions on File Prior to Visit   Medication Sig Dispense Refill    aspirin 81 MG tablet Take 1 tablet by mouth daily      ezetimibe (ZETIA) 10 mg tablet TAKE 1 TABLET DAILY  90 tablet 2    levothyroxine 100 mcg tablet TAKE 1 TABLET DAILY  90 tablet 2    lisinopril (ZESTRIL) 20 mg tablet TAKE 1 TABLET DAILY  90 tablet 2     No current facility-administered medications on file prior to visit  Review of Systems   Constitutional: Negative  Negative for fever and weight loss  HENT: Negative  Eyes: Negative  Respiratory: Negative  Cardiovascular: Negative  Gastrointestinal: Positive for abdominal pain  Negative for anorexia, constipation, diarrhea, flatus, hematochezia, melena, nausea and vomiting  Endocrine: Negative  Genitourinary: Negative  Negative for dysuria, frequency and hematuria  Musculoskeletal: Negative  Negative for arthralgias and myalgias  Skin: Negative  Allergic/Immunologic: Negative  Neurological: Negative  Negative for headaches  Hematological: Negative  Psychiatric/Behavioral: Negative  Objective:  Vitals:    10/24/18 1109   BP: 124/80   BP Location: Left arm   Patient Position: Sitting   Cuff Size: Large   Pulse: 86   Resp: 14   Temp: 97 9 °F (36 6 °C)   TempSrc: Tympanic   Weight: 98 6 kg (217 lb 6 4 oz)   Height: 5' 4 5" (1 638 m)     Body mass index is 36 74 kg/m²  Wt Readings from Last 3 Encounters:   10/24/18 98 6 kg (217 lb 6 4 oz)   09/27/18 102 kg (224 lb 12 8 oz)   09/11/18 106 kg (233 lb 9 6 oz)     Temp Readings from Last 3 Encounters:   10/24/18 97 9 °F (36 6 °C) (Tympanic)   09/27/18 97 5 °F (36 4 °C) (Tympanic)   09/11/18 97 6 °F (36 4 °C) (Tympanic)     BP Readings from Last 3 Encounters:   10/24/18 124/80   09/27/18 122/80   09/11/18 130/94     Pulse Readings from Last 3 Encounters:   10/24/18 86   09/27/18 86   09/11/18 96        Physical Exam   Constitutional: She is oriented to person, place, and time  She appears well-developed and well-nourished  HENT:   Head: Normocephalic and atraumatic  Mouth/Throat: No oropharyngeal exudate  Eyes: Pupils are equal, round, and reactive to light  Conjunctivae and EOM are normal    Neck: Normal range of motion  No JVD present  No tracheal deviation present  No thyromegaly present  Cardiovascular: Normal rate, regular rhythm, normal heart sounds and intact distal pulses  Exam reveals no gallop and no friction rub  No murmur heard  Pulmonary/Chest: Effort normal and breath sounds normal  No stridor  No respiratory distress  She has no wheezes  She has no rales  She exhibits no tenderness  Abdominal: Soft  Bowel sounds are normal  She exhibits no distension and no mass  There is no tenderness  There is no rebound and no guarding  Musculoskeletal: Normal range of motion  She exhibits no edema, tenderness or deformity  Lymphadenopathy:     She has no cervical adenopathy  Neurological: She is alert and oriented to person, place, and time  She has normal reflexes  No cranial nerve deficit  She exhibits normal muscle tone  Coordination normal    Skin: Skin is warm and dry         Telephone on 10/06/2018   Component Date Value    SL AMB AMYLASE, SERUM 10/08/2018 163*    Lipase, Serum 10/08/2018 111*    Glucose 10/08/2018 98     BUN 10/08/2018 7     Creatinine 10/08/2018 0 58     eGFR Non  10/08/2018 97     SL AMB EGFR  AMER* 10/08/2018 113     SL AMB BUN/CREATININE RA* 64/77/4476 NOT APPLICABLE     Sodium 76/20/9901 141     Potassium 10/08/2018 4 3     Chloride 10/08/2018 103     CO2 10/08/2018 29     SL AMB CALCIUM 10/08/2018 9 3     SL AMB PROTEIN, TOTAL 10/08/2018 6 9     Albumin 10/08/2018 3 8     Globulin 10/08/2018 3 1     SL AMB ALBUMIN/GLOBULIN * 10/08/2018 1 2     TOTAL BILIRUBIN 10/08/2018 0 4     Alkaline Phosphatase 10/08/2018 99     SL AMB AST 10/08/2018 17     SL AMB ALT 10/08/2018 14    Office Visit on 09/27/2018   Component Date Value    Glucose 10/03/2018 105*    BUN 10/03/2018 7     Creatinine 10/03/2018 0 68     eGFR Non African American 10/03/2018 92     SL AMB EGFR  AMER* 10/03/2018 107     SL AMB BUN/CREATININE RA* 78/74/8335 NOT APPLICABLE     Sodium 51/13/1557 139     Potassium 10/03/2018 4 4     Chloride 10/03/2018 100     CO2 10/03/2018 30     SL AMB CALCIUM 10/03/2018 9 4     SL AMB PROTEIN, TOTAL 10/03/2018 6 9     Albumin 10/03/2018 3 6     Globulin 10/03/2018 3 3     SL AMB ALBUMIN/GLOBULIN * 10/03/2018 1 1     TOTAL BILIRUBIN 10/03/2018 0 5     Alkaline Phosphatase 10/03/2018 107     SL AMB AST 10/03/2018 20     SL AMB ALT 10/03/2018 17     White Blood Cell Count 10/03/2018 8 3     Red Blood Cell Count 10/03/2018 4 19     Hemoglobin 10/03/2018 12 5     HCT 10/03/2018 39 5     MCV 10/03/2018 94 3     MCH 10/03/2018 29 8     MCHC 10/03/2018 31 6*    RDW 10/03/2018 12 4     Platelet Count 20/72/3700 326     SL AMB MPV 10/03/2018 11 9     Neutrophils (Absolute) 10/03/2018 5951     Lymphocytes (Absolute) 10/03/2018 1569     Monocytes (Absolute) 10/03/2018 523     Eosinophils (Absolute) 10/03/2018 199     Basophils (Absolute) 10/03/2018 58     Neutrophils 10/03/2018 71 7     Lymphocytes 10/03/2018 18 9     Monocytes 10/03/2018 6 3     Eosinophils 10/03/2018 2 4     Basophils Relative 10/03/2018 0 7     Lipase, Serum 10/03/2018 255*    SL AMB AMYLASE, SERUM 10/03/2018 320*   Office Visit on 09/11/2018   Component Date Value    White Blood Cell Count 09/12/2018 9 0     Red Blood Cell Count 09/12/2018 3 91     Hemoglobin 09/12/2018 12 5     HCT 09/12/2018 37 7     MCV 09/12/2018 96 4     MCH 09/12/2018 32 0     MCHC 09/12/2018 33 2     RDW 09/12/2018 12 6     Platelet Count 86/21/0743 404*    SL AMB MPV 09/12/2018 11 4     Neutrophils (Absolute) 09/12/2018 6219     Lymphocytes (Absolute) 09/12/2018 1584     Monocytes (Absolute) 09/12/2018 846     Eosinophils (Absolute) 09/12/2018 279     Basophils (Absolute) 09/12/2018 72     Neutrophils 09/12/2018 69 1     Lymphocytes 09/12/2018 17 6     Monocytes 09/12/2018 9 4     Eosinophils 09/12/2018 3 1     Basophils Relative 09/12/2018 0 8     Glucose 09/12/2018 107     BUN 09/12/2018 10     Creatinine 09/12/2018 0 70     eGFR Non  09/12/2018 92     SL AMB EGFR  AMER* 09/12/2018 106     SL AMB BUN/CREATININE RA* 63/09/2164 NOT APPLICABLE     Sodium 17/04/2750 138     Potassium 09/12/2018 4 8     Chloride 09/12/2018 99     CO2 09/12/2018 29     SL AMB CALCIUM 09/12/2018 9 3     SL AMB PROTEIN, TOTAL 09/12/2018 6 9     Albumin 09/12/2018 3 7     Globulin 09/12/2018 3 2     SL AMB ALBUMIN/GLOBULIN * 09/12/2018 1 2     TOTAL BILIRUBIN 09/12/2018 0 6     Alkaline Phosphatase 09/12/2018 92     SL AMB AST 09/12/2018 26     SL AMB ALT 09/12/2018 18     GGT 09/12/2018 96*    SL AMB AMYLASE, SERUM 09/12/2018 71     Lipase, Serum 09/12/2018 59

## 2018-10-29 ENCOUNTER — TELEPHONE (OUTPATIENT)
Dept: FAMILY MEDICINE CLINIC | Facility: CLINIC | Age: 63
End: 2018-10-29

## 2018-10-29 NOTE — TELEPHONE ENCOUNTER
I spoke with Branden Norton on 10/29/2018 at 5:45 p m  He advised me that the patient's GI appointment was moved up to tomorrow because she was not feeling well last PM  I advised that we will await the outcome of her visit tomorrow and determine when she can return to work  Will follow-up from there

## 2018-10-29 NOTE — TELEPHONE ENCOUNTER
I did speak with Caron Narvaez on 10/20/2018 at 5:45 p m  Dara Soulier Please see the note from that day

## 2018-10-29 NOTE — TELEPHONE ENCOUNTER
Patients brother Tiffani Lu returned your call   He would like to be called after 4:30 today @ [phone]

## 2018-10-30 LAB
ALBUMIN SERPL-MCNC: 3.7 G/DL (ref 3.6–5.1)
ALBUMIN/GLOB SERPL: 1.3 (CALC) (ref 1–2.5)
ALP SERPL-CCNC: 76 U/L (ref 33–130)
ALT SERPL-CCNC: 12 U/L (ref 6–29)
AMYLASE SERPL-CCNC: 145 U/L (ref 21–101)
AST SERPL-CCNC: 18 U/L (ref 10–35)
BASOPHILS # BLD AUTO: 57 CELLS/UL (ref 0–200)
BASOPHILS NFR BLD AUTO: 0.8 %
BILIRUB SERPL-MCNC: 0.4 MG/DL (ref 0.2–1.2)
BUN SERPL-MCNC: 8 MG/DL (ref 7–25)
BUN/CREAT SERPL: NORMAL (CALC) (ref 6–22)
CALCIUM SERPL-MCNC: 9.2 MG/DL (ref 8.6–10.4)
CHLORIDE SERPL-SCNC: 102 MMOL/L (ref 98–110)
CO2 SERPL-SCNC: 32 MMOL/L (ref 20–32)
CREAT SERPL-MCNC: 0.61 MG/DL (ref 0.5–0.99)
EOSINOPHIL # BLD AUTO: 227 CELLS/UL (ref 15–500)
EOSINOPHIL NFR BLD AUTO: 3.2 %
ERYTHROCYTE [DISTWIDTH] IN BLOOD BY AUTOMATED COUNT: 13.3 % (ref 11–15)
GLOBULIN SER CALC-MCNC: 2.9 G/DL (CALC) (ref 1.9–3.7)
GLUCOSE SERPL-MCNC: 97 MG/DL (ref 65–99)
HCT VFR BLD AUTO: 40.2 % (ref 35–45)
HGB BLD-MCNC: 12.8 G/DL (ref 11.7–15.5)
LIPASE SERPL-CCNC: 47 U/L (ref 7–60)
LYMPHOCYTES # BLD AUTO: 1477 CELLS/UL (ref 850–3900)
LYMPHOCYTES NFR BLD AUTO: 20.8 %
MCH RBC QN AUTO: 28.8 PG (ref 27–33)
MCHC RBC AUTO-ENTMCNC: 31.8 G/DL (ref 32–36)
MCV RBC AUTO: 90.5 FL (ref 80–100)
MONOCYTES # BLD AUTO: 540 CELLS/UL (ref 200–950)
MONOCYTES NFR BLD AUTO: 7.6 %
NEUTROPHILS # BLD AUTO: 4800 CELLS/UL (ref 1500–7800)
NEUTROPHILS NFR BLD AUTO: 67.6 %
PLATELET # BLD AUTO: 232 THOUSAND/UL (ref 140–400)
PMV BLD REES-ECKER: 11.8 FL (ref 7.5–12.5)
POTASSIUM SERPL-SCNC: 4 MMOL/L (ref 3.5–5.3)
PROT SERPL-MCNC: 6.6 G/DL (ref 6.1–8.1)
RBC # BLD AUTO: 4.44 MILLION/UL (ref 3.8–5.1)
SL AMB EGFR AFRICAN AMERICAN: 111 ML/MIN/1.73M2
SL AMB EGFR NON AFRICAN AMERICAN: 96 ML/MIN/1.73M2
SODIUM SERPL-SCNC: 141 MMOL/L (ref 135–146)
WBC # BLD AUTO: 7.1 THOUSAND/UL (ref 3.8–10.8)

## 2018-10-31 ENCOUNTER — TELEPHONE (OUTPATIENT)
Dept: FAMILY MEDICINE CLINIC | Facility: CLINIC | Age: 63
End: 2018-10-31

## 2018-10-31 NOTE — TELEPHONE ENCOUNTER
LEAH'S brother daya called to let you know that Leslye Lopez is going to be operated on by Dr Dorothy Anand on 11/12    That is all he said, his #'s are [phone] office and [phone] home

## 2018-11-12 ENCOUNTER — TELEPHONE (OUTPATIENT)
Dept: FAMILY MEDICINE CLINIC | Facility: CLINIC | Age: 63
End: 2018-11-12

## 2018-11-12 NOTE — TELEPHONE ENCOUNTER
Patient's brother Jessica Corea wanted to update you that she had surgery this morning at [facility] by Dr Nubia Arias

## 2018-11-16 ENCOUNTER — OFFICE VISIT (OUTPATIENT)
Dept: FAMILY MEDICINE CLINIC | Facility: CLINIC | Age: 63
End: 2018-11-16
Payer: COMMERCIAL

## 2018-11-16 VITALS
DIASTOLIC BLOOD PRESSURE: 78 MMHG | HEIGHT: 65 IN | BODY MASS INDEX: 34.85 KG/M2 | WEIGHT: 209.2 LBS | TEMPERATURE: 97.6 F | HEART RATE: 68 BPM | SYSTOLIC BLOOD PRESSURE: 120 MMHG

## 2018-11-16 DIAGNOSIS — K86.3 PANCREATIC PSEUDOCYST: Primary | ICD-10-CM

## 2018-11-16 DIAGNOSIS — E03.9 HYPOTHYROIDISM, UNSPECIFIED TYPE: ICD-10-CM

## 2018-11-16 DIAGNOSIS — I10 ESSENTIAL HYPERTENSION: ICD-10-CM

## 2018-11-16 DIAGNOSIS — E78.2 MIXED HYPERLIPIDEMIA: ICD-10-CM

## 2018-11-16 PROCEDURE — 3078F DIAST BP <80 MM HG: CPT | Performed by: FAMILY MEDICINE

## 2018-11-16 PROCEDURE — 99213 OFFICE O/P EST LOW 20 MIN: CPT | Performed by: FAMILY MEDICINE

## 2018-11-16 PROCEDURE — 3074F SYST BP LT 130 MM HG: CPT | Performed by: FAMILY MEDICINE

## 2018-11-16 PROCEDURE — 3008F BODY MASS INDEX DOCD: CPT | Performed by: FAMILY MEDICINE

## 2018-11-16 PROCEDURE — 1036F TOBACCO NON-USER: CPT | Performed by: FAMILY MEDICINE

## 2018-11-16 RX ORDER — DIPHENOXYLATE HYDROCHLORIDE AND ATROPINE SULFATE 2.5; .025 MG/1; MG/1
1 TABLET ORAL
COMMUNITY

## 2018-11-16 NOTE — PROGRESS NOTES
Assessment/Plan:  1  Pancreatic pseudocyst-the patient will continue to follow up with GI as scheduled  She is steadily improving  2  Hypertension-the patient is stable on her current medication have made no changes today  3  Hyperlipidemia-the patient will continue with her current dose of the Zetia  4  Hypothyroidism-the patient will continue with her current dose of levothyroxine  We will see her back as scheduled  Diagnoses and all orders for this visit:    Pancreatic pseudocyst    Essential hypertension    Mixed hyperlipidemia    Hypothyroidism, unspecified type    Other orders  -     Omega-3 Fatty Acids (FISH OIL PO); Take 2 g by mouth  -     multivitamin (THERAGRAN) TABS; Take 1 tablet by mouth       Return in about 1 month (around 12/16/2018), or physical      Subjective:   Chief Complaint   Patient presents with    Follow-up     3 week follow up        Patient ID: Cassie Hinson is a 57 y o  female presents today for a routine follow-up  Cassie Hinson is a 57 y o  female who presents today for routine follow-up of her pancreatic pseudocyst, hypertension, hypothyroidism, and hyperlipidemia  The patient had a recent EGD performed on 11/12/2018 with a drainage of her pseudocyst and a stent placement by Dr Monique Ortega  She is going to be having a repeat EGD on 11/20/2018  She does not have much of an appetite  There is no pain  She does have some nausea  There is no diarrhea  There was some slight vomiting  She tolerating liquids and bland foods  There is no fever  She is not permitted to return to work  The patient denies any chest pain, shortness of breath, or palpitations  There is no edema  There are no headaches or visual changes  There is no fevers or chills  There is no lightheadedness, dizziness, or fainting spells  There is slight lightheadedness  She is stable on all of her other medications        The following portions of the patient's history were reviewed and updated as appropriate: allergies, current medications, past family history, past medical history, past social history, past surgical history and problem list   Patient Active Problem List   Diagnosis    Female cystocele    Generalized osteoarthritis    Hyperlipidemia    Hypertension    Hypothyroidism    Intermittent claudication ([address] )    Sleep disturbances    Urge and stress incontinence    Varicose veins of both lower extremities with pain    Vitamin D deficiency    Shoulder subluxation, right    DJD of right shoulder    Rotator cuff tear arthropathy of right shoulder    Alcohol induced acute pancreatitis    Obese    Pancreatic pseudocyst     Past Medical History:   Diagnosis Date    Polyp of sigmoid colon     last assessed 6/12/12    Sleep disturbances     last assessed 6/5/14     Past Surgical History:   Procedure Laterality Date    COLONOSCOPY  06/18/2011    Complete  Repeat in 5yrs       Allergies   Allergen Reactions    Statins Myalgia     Family History   Problem Relation Age of Onset    Alzheimer's disease Mother     Diabetes Mother      Social History     Social History    Marital status: Single     Spouse name: N/A    Number of children: N/A    Years of education: N/A     Occupational History    Not on file  Social History Main Topics    Smoking status: Never Smoker    Smokeless tobacco: Never Used    Alcohol use No    Drug use: No    Sexual activity: Not on file     Other Topics Concern    Not on file     Social History Narrative    Sleep disturbances      Current Outpatient Prescriptions on File Prior to Visit   Medication Sig Dispense Refill    aspirin 81 MG tablet Take 1 tablet by mouth daily      ciprofloxacin (CIPRO) 500 mg tablet Take 1 tablet by mouth daily  0    DEXILANT 60 MG capsule Take 1 tablet by mouth daily  4    ezetimibe (ZETIA) 10 mg tablet TAKE 1 TABLET DAILY  90 tablet 2    levothyroxine 100 mcg tablet TAKE 1 TABLET DAILY   90 tablet 2    lisinopril (ZESTRIL) 20 mg tablet TAKE 1 TABLET DAILY  90 tablet 2     No current facility-administered medications on file prior to visit  Review of Systems   Constitutional: Negative  HENT: Negative  Eyes: Negative  Respiratory: Negative  Cardiovascular: Negative  Gastrointestinal: Negative  Endocrine: Negative  Genitourinary: Negative  Musculoskeletal: Negative  Skin: Negative  Allergic/Immunologic: Negative  Neurological: Negative  Hematological: Negative  Psychiatric/Behavioral: Negative  Objective:  Vitals:    11/16/18 1459 11/16/18 1558   BP: 110/70 120/78   BP Location: Left arm    Patient Position: Sitting    Cuff Size: Large    Pulse: 84 68   Temp: 97 6 °F (36 4 °C)    TempSrc: Tympanic    Weight: 94 9 kg (209 lb 3 2 oz)    Height: 5' 4 5" (1 638 m)      Body mass index is 35 35 kg/m²  Wt Readings from Last 3 Encounters:   11/16/18 94 9 kg (209 lb 3 2 oz)   10/24/18 98 6 kg (217 lb 6 4 oz)   09/27/18 102 kg (224 lb 12 8 oz)     Temp Readings from Last 3 Encounters:   11/16/18 97 6 °F (36 4 °C) (Tympanic)   10/24/18 97 9 °F (36 6 °C) (Tympanic)   09/27/18 97 5 °F (36 4 °C) (Tympanic)     BP Readings from Last 3 Encounters:   11/16/18 120/78   10/24/18 124/80   09/27/18 122/80     Pulse Readings from Last 3 Encounters:   11/16/18 68   10/24/18 86   09/27/18 86        Physical Exam   Constitutional: She is oriented to person, place, and time  She appears well-developed and well-nourished  HENT:   Head: Normocephalic and atraumatic  Mouth/Throat: No oropharyngeal exudate  Eyes: Pupils are equal, round, and reactive to light  Conjunctivae and EOM are normal    Neck: Normal range of motion  No JVD present  No tracheal deviation present  No thyromegaly present  Cardiovascular: Normal rate, regular rhythm, normal heart sounds and intact distal pulses  Exam reveals no gallop and no friction rub  No murmur heard    Pulmonary/Chest: Effort normal and breath sounds normal  No stridor  No respiratory distress  She has no wheezes  She has no rales  She exhibits no tenderness  Abdominal: Soft  Bowel sounds are normal  She exhibits no distension and no mass  There is no tenderness  There is no rebound and no guarding  Musculoskeletal: Normal range of motion  She exhibits no edema, tenderness or deformity  Lymphadenopathy:     She has no cervical adenopathy  Neurological: She is alert and oriented to person, place, and time  She has normal reflexes  No cranial nerve deficit  She exhibits normal muscle tone  Coordination normal    Skin: Skin is warm and dry

## 2018-11-26 DIAGNOSIS — E03.9 HYPOTHYROIDISM, UNSPECIFIED TYPE: ICD-10-CM

## 2018-11-26 DIAGNOSIS — E78.2 MIXED HYPERLIPIDEMIA: ICD-10-CM

## 2018-11-26 RX ORDER — LEVOTHYROXINE SODIUM 0.1 MG/1
TABLET ORAL
Qty: 90 TABLET | Refills: 2 | OUTPATIENT
Start: 2018-11-26

## 2018-11-26 RX ORDER — EZETIMIBE 10 MG/1
TABLET ORAL
Qty: 90 TABLET | Refills: 2 | OUTPATIENT
Start: 2018-11-26

## 2018-12-03 ENCOUNTER — TELEPHONE (OUTPATIENT)
Dept: FAMILY MEDICINE CLINIC | Facility: CLINIC | Age: 63
End: 2018-12-03

## 2018-12-03 DIAGNOSIS — E03.9 HYPOTHYROIDISM, UNSPECIFIED TYPE: ICD-10-CM

## 2018-12-03 DIAGNOSIS — E78.2 MIXED HYPERLIPIDEMIA: ICD-10-CM

## 2018-12-03 RX ORDER — EZETIMIBE 10 MG/1
10 TABLET ORAL DAILY
Qty: 90 TABLET | Refills: 1 | Status: SHIPPED | OUTPATIENT
Start: 2018-12-03 | End: 2019-06-20 | Stop reason: SDUPTHER

## 2018-12-03 RX ORDER — LEVOTHYROXINE SODIUM 0.1 MG/1
100 TABLET ORAL DAILY
Qty: 90 TABLET | Refills: 1 | Status: SHIPPED | OUTPATIENT
Start: 2018-12-03 | End: 2018-12-04 | Stop reason: SDUPTHER

## 2018-12-04 RX ORDER — LEVOTHYROXINE SODIUM 0.05 MG/1
100 TABLET ORAL DAILY
Qty: 60 TABLET | Refills: 5 | Status: SHIPPED | OUTPATIENT
Start: 2018-12-04 | End: 2019-06-20 | Stop reason: SDUPTHER

## 2018-12-04 NOTE — TELEPHONE ENCOUNTER
The patient's pharmacy contacted us to notify us that the levothyroxine 100 mcg tablets are currently on back order  Therefore we will substitute with 50 mcg and have the patient's take 2 tablets daily

## 2018-12-04 NOTE — TELEPHONE ENCOUNTER
Brianna Hilario from [facility] called the office and the LEVOTHYROXINE 100 MCG tablets are on back order with their supplier and he wants to know if the PT can be prescribed 2 tablets of 50 MCG  If so please send a new script  Brianna Hilario can be contacted at   Thank You!

## 2018-12-18 ENCOUNTER — TELEPHONE (OUTPATIENT)
Dept: FAMILY MEDICINE CLINIC | Facility: CLINIC | Age: 63
End: 2018-12-18

## 2018-12-18 DIAGNOSIS — R53.81 PHYSICAL DECONDITIONING: ICD-10-CM

## 2018-12-18 DIAGNOSIS — R53.1 WEAKNESS GENERALIZED: Primary | ICD-10-CM

## 2018-12-18 NOTE — TELEPHONE ENCOUNTER
I would like to refer the patient to physical therapy downstairs  Please give her an order for this and advised her I would like her to follow up with our physical therapy department downstairs  The diagnosis could be for generalized deconditioning and generalized weakness  Please put the order in for this

## 2018-12-18 NOTE — TELEPHONE ENCOUNTER
Patient had just seen Dr Sharan Diego he is very pleased with you she is recovering from getting cyst drained the CT came back that her gallbladder is normal, he has released her to go back to work on 1/18/19  Patient would like to get some physical therapy before returning to work so she better perform her job  If an order could be created to refer her to physical therapy  Please advise patient unsure if she can go downstairs or to [employer]

## 2018-12-27 ENCOUNTER — EVALUATION (OUTPATIENT)
Dept: PHYSICAL THERAPY | Facility: CLINIC | Age: 63
End: 2018-12-27
Payer: COMMERCIAL

## 2018-12-27 DIAGNOSIS — R53.81 PHYSICAL DECONDITIONING: ICD-10-CM

## 2018-12-27 DIAGNOSIS — R53.1 WEAKNESS GENERALIZED: Primary | ICD-10-CM

## 2018-12-27 PROCEDURE — G8978 MOBILITY CURRENT STATUS: HCPCS | Performed by: PHYSICAL THERAPIST

## 2018-12-27 PROCEDURE — G8979 MOBILITY GOAL STATUS: HCPCS | Performed by: PHYSICAL THERAPIST

## 2018-12-27 PROCEDURE — 97161 PT EVAL LOW COMPLEX 20 MIN: CPT | Performed by: PHYSICAL THERAPIST

## 2018-12-27 PROCEDURE — 97110 THERAPEUTIC EXERCISES: CPT | Performed by: PHYSICAL THERAPIST

## 2018-12-27 NOTE — PROGRESS NOTES
PT Evaluation     Today's date: 2018  Patient name: Jacinta Sanchez  : 1955  MRN: [id number]  Referring provider: Charu Khan DO  Dx:   Encounter Diagnosis     ICD-10-CM    1  Weakness generalized R53 1 Ambulatory referral to Physical Therapy   2  Physical deconditioning R53 81 Ambulatory referral to Physical Therapy                  Assessment  Assessment details: Patient presents with upper extremity weakness generalized following 4 month medical leave from work  Demonstrates decrease bilateral shoulder strength and ROM, thoracic hypomobility, and poor postural control and awareness  Salazar Aguilar presents with the impairments as listed above and would benefit from skilled Physical Therapy to address these impairments in order to maximize functional capacity and return to prior level of function  Thank you for this referral!  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 in 4-8 weeks  3   Patient is able to reach and lift overhead at work without shoulder pain in 4-8 weeks    Plan  Patient would benefit from: PT eval and skilled physical therapy  Planned therapy interventions: manual therapy, home exercise program, therapeutic activities, therapeutic exercise, neuromuscular re-education, postural training and patient education  Frequency: 2x week  Duration in weeks: 8  Plan of Care expiration date: 2019  Treatment plan discussed with: patient        Subjective Evaluation    History of Present Illness  Mechanism of injury: 71 yo female presents with upper extremity deconditioning and weakness following 4 months of inactivity and not working due to medical leave for GI infection  Pt reports stents from belly was removed 2 weeks ago and infection cleared  Pt notes she has stopped drinking alcohol, which was the cause of the infection  Pt worries right shoulder pain with return upon returning to work due to level of UE weakness  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  Pt is cleared to return to work by GI doctor on 19, but pt waiting to be cleared by PCP as well  Pt reports hx of right shoulder pain, but pain subsided with cortisone injection in   Pain  Current pain ratin  At best pain ratin  At worst pain ratin  Location: Shoulders    Patient Goals  Patient goals for therapy: increased strength and return to work          Objective     Tenderness     Left Shoulder   Tenderness in the biceps tendon (proximal) and bicipital groove  Right Shoulder  Tenderness in the bicipital groove and clavicle       Passive Range of Motion   Left Shoulder   Flexion: 130 degrees with pain  Abduction: 115 degrees with pain  External rotation 45°: 75 degrees with pain  Internal rotation 45°: 85 degrees with pain    Right Shoulder   Flexion: 135 degrees with pain  Abduction: 100 degrees with pain  External rotation 45°: 50 degrees with pain  Internal rotation 45°: 88 degrees with pain    Additional Passive Range of Motion Details  Thoracic mobility: severe hypomobility with PA mobs to thoracic spine, pt denies pain    Posture: moderate forward head and rounded shoulders, moderate-severe thoracic kyphosis    Strength/Myotome Testing     Left Shoulder     Planes of Motion   Flexion: 4   Abduction: 4   External rotation at 0°: 3   Internal rotation at 0°: 4-     Isolated Muscles   Lower trapezius: 3   Middle trapezius: 4-   Upper trapezius: 4     Right Shoulder     Planes of Motion   Flexion: 4- (pain)   Abduction: 4- (pain)   External rotation at 0°: 3- Internal rotation at 0°: 4-     Isolated Muscles   Lower trapezius: 3   Middle trapezius: 4-   Upper trapezius: 4     Tests     Left Shoulder   Positive Hawkin's and Neer's  Negative empty can and full can  Right Shoulder   Positive empty can, full can and Neer's  Negative Hawkin's             Precautions: HTN, Hx of GI infection, hx of right shoulder pain    Daily Treatment Diary     Manual  12/27            Thoracic PA mobs                                                                     Exercise Diary  12/27            Shoulder flexion S supine 10"x10            Scapular Retraction 5"x10            Shoulder Shrugs 10x            TB shoulder ext             TB Rows             TB Triceps             Prone I             Prone T                                                                                                                                                                             Modalities

## 2018-12-31 ENCOUNTER — OFFICE VISIT (OUTPATIENT)
Dept: PHYSICAL THERAPY | Facility: CLINIC | Age: 63
End: 2018-12-31
Payer: COMMERCIAL

## 2018-12-31 DIAGNOSIS — R53.1 WEAKNESS GENERALIZED: Primary | ICD-10-CM

## 2018-12-31 DIAGNOSIS — R53.81 PHYSICAL DECONDITIONING: ICD-10-CM

## 2018-12-31 PROCEDURE — 97110 THERAPEUTIC EXERCISES: CPT

## 2018-12-31 NOTE — PROGRESS NOTES
Daily Note     Today's date: 2018  Patient name: Nancy Isaac  : 1955  MRN: [id number]  Referring provider: Denis Connelly DO  Dx:   Encounter Diagnosis     ICD-10-CM    1  Weakness generalized R53 1    2  Physical deconditioning R53 81                   Subjective: notes achiness in shoulder upon entering  Objective: See treatment diary below  Precautions: HTN, Hx of GI infection, hx of right shoulder pain    Daily Treatment Diary     Manual             Thoracic PA mobs  RN                                                                   Exercise Diary             Shoulder flexion S supine 10"x10 10"x  10           Scapular Retraction 5"x10 HEP           Shoulder Shrugs 10x            TB shoulder ext  gtb  5"x10           TB Rows  gtb  5"x10           TB Triceps  gtb  2x10           Prone I  5"x10           Prone T  2x10           UBE  2'x2'           S/l ER  2x10           S/l sh abd  2x10           S/l sh flex  2x10                                                                                                                       Modalities                                                             Assessment:   Initiated exercise program as noted above, tolerated with no pain  Patient demonstrated fatigue post treatment  Generalized muscular soreness in B shoulders post, no modalities needed  Plan: Continue per plan of care

## 2019-01-03 ENCOUNTER — APPOINTMENT (OUTPATIENT)
Dept: PHYSICAL THERAPY | Facility: CLINIC | Age: 64
End: 2019-01-03
Payer: COMMERCIAL

## 2019-01-03 ENCOUNTER — OFFICE VISIT (OUTPATIENT)
Dept: FAMILY MEDICINE CLINIC | Facility: CLINIC | Age: 64
End: 2019-01-03
Payer: COMMERCIAL

## 2019-01-03 VITALS
TEMPERATURE: 96.3 F | BODY MASS INDEX: 33.79 KG/M2 | WEIGHT: 202.8 LBS | HEIGHT: 65 IN | HEART RATE: 68 BPM | SYSTOLIC BLOOD PRESSURE: 130 MMHG | DIASTOLIC BLOOD PRESSURE: 70 MMHG

## 2019-01-03 DIAGNOSIS — K85.20 ALCOHOL-INDUCED ACUTE PANCREATITIS, UNSPECIFIED COMPLICATION STATUS: ICD-10-CM

## 2019-01-03 DIAGNOSIS — E78.2 MIXED HYPERLIPIDEMIA: ICD-10-CM

## 2019-01-03 DIAGNOSIS — K86.3 PANCREATIC PSEUDOCYST: ICD-10-CM

## 2019-01-03 DIAGNOSIS — I10 ESSENTIAL HYPERTENSION: Primary | ICD-10-CM

## 2019-01-03 DIAGNOSIS — E03.9 HYPOTHYROIDISM, UNSPECIFIED TYPE: ICD-10-CM

## 2019-01-03 PROCEDURE — 1036F TOBACCO NON-USER: CPT | Performed by: FAMILY MEDICINE

## 2019-01-03 PROCEDURE — 3075F SYST BP GE 130 - 139MM HG: CPT | Performed by: FAMILY MEDICINE

## 2019-01-03 PROCEDURE — 1160F RVW MEDS BY RX/DR IN RCRD: CPT | Performed by: FAMILY MEDICINE

## 2019-01-03 PROCEDURE — 99213 OFFICE O/P EST LOW 20 MIN: CPT | Performed by: FAMILY MEDICINE

## 2019-01-03 PROCEDURE — 3008F BODY MASS INDEX DOCD: CPT | Performed by: FAMILY MEDICINE

## 2019-01-03 PROCEDURE — 3078F DIAST BP <80 MM HG: CPT | Performed by: FAMILY MEDICINE

## 2019-01-03 NOTE — PROGRESS NOTES
Daily Note     Today's date: 1/3/2019  Patient name: Layla Lugo  : 1955  MRN: [id number]  Referring provider: Jaxon Childers DO  Dx:   Encounter Diagnosis     ICD-10-CM    1  Weakness generalized R53 1    2  Physical deconditioning R53 81                   Subjective: ***      Objective: See treatment diary below      Assessment: Tolerated treatment {Tolerated treatment :[id number]}   Patient {assessment:}      Plan: {PLAN:[id number]}      Precautions: HTN, Hx of GI infection, hx of right shoulder pain    Daily Treatment Diary     Manual             Thoracic PA cirilo  RN                                                                   Exercise Diary             Shoulder flexion S supine 10"x10 10"x  10           Scapular Retraction 5"x10 HEP           Shoulder Shrugs 10x            TB shoulder ext  gtb  5"x10           TB Rows  gtb  5"x10           TB Triceps  gtb  2x10           Prone I  5"x10           Prone T  2x10           UBE  2'x2'           S/l ER  2x10           S/l sh abd  2x10           S/l sh flex  2x10                                                                                                                       Modalities

## 2019-01-03 NOTE — PROGRESS NOTES
Assessment/Plan:  Hypertension  The patient's blood pressure is well controlled with her current medication  We have made no changes today  She will continue with her healthy diet and exercise  We will see her back as scheduled  Hypothyroidism  The patient will continue with her current dose of the levothyroxine 50 mcg daily  We have made no changes today  Hyperlipidemia  The patient will continue with her current dose of the generic Zetia  We have made no changes today  Pancreatic pseudocyst  The pseudocyst has reduced in size with drainage  The patient is doing much better  She will continue to follow up with GI as scheduled  She will follow up with the surgeon tomorrow as scheduled for evaluation of her gallbladder disease  We will see her back in the office as scheduled  Diagnoses and all orders for this visit:    Essential hypertension    Hypothyroidism, unspecified type    Mixed hyperlipidemia    Pancreatic pseudocyst    Alcohol-induced acute pancreatitis, unspecified complication status       Return in about 1 month (around 2/3/2019) for Recheck  Subjective:   Chief Complaint   Patient presents with    Follow-up     Check up Hypothyroidism/Hypertension        Patient ID: Fantasma Daily is a 70 y o  female presents today for a routine checkup  Fantasma Daily is a 70 y o  Female who presents today for checkup of hypertension, hypothyroidism, and hyperlipidemia  She is seeing the General Surgeon tomorrow to discuss her gallbladder  She is seeing the GI physician in March 2018  The pseudocyst was drained and has improved  She is returning to work on January 18, 2019  There is still some stomach discomfort on and off- but it is not as severe  There are no fevers or chills  She is lightheaded in the am for a few seconds  There is no nausea or vomiting  There is no diarrhea  There is improving appetite  She is refraining from alcohol    The pain is midepigastric and last a few minutes to an hour at times  She is taking her medications  The patient denies any chest pain, shortness of breath, or palpitations  There is no edema  There are no headaches or visual changes  There is no lightheadedness, dizziness, or fainting spells  She is going to physical therapy to get her strength back and it is helping  She is spraying and working on the line  Hypertension   This is a chronic problem  The current episode started more than 1 year ago  The problem is unchanged  The problem is controlled  Pertinent negatives include no anxiety, blurred vision, chest pain, headaches, malaise/fatigue, neck pain, orthopnea, palpitations, peripheral edema, PND, shortness of breath or sweats  Risk factors for coronary artery disease include dyslipidemia  Past treatments include ACE inhibitors  The current treatment provides significant improvement  Hyperlipidemia   This is a chronic problem  The problem is controlled  Recent lipid tests were reviewed and are normal  Pertinent negatives include no chest pain or shortness of breath  Current antihyperlipidemic treatment includes ezetimibe  The current treatment provides moderate improvement of lipids  There are no compliance problems        The following portions of the patient's history were reviewed and updated as appropriate: allergies, current medications, past family history, past medical history, past social history, past surgical history and problem list   Patient Active Problem List   Diagnosis    Female cystocele    Generalized osteoarthritis    Hyperlipidemia    Hypertension    Hypothyroidism    Intermittent claudication ([address] )    Sleep disturbances    Urge and stress incontinence    Varicose veins of both lower extremities with pain    Vitamin D deficiency    Shoulder subluxation, right    DJD of right shoulder    Rotator cuff tear arthropathy of right shoulder    Alcohol induced acute pancreatitis    Obese    Pancreatic pseudocyst     Past Medical History:   Diagnosis Date    Polyp of sigmoid colon     last assessed 6/12/12    Sleep disturbances     last assessed 6/5/14     Past Surgical History:   Procedure Laterality Date    COLONOSCOPY  06/18/2011    Complete  Repeat in 5yrs       Allergies   Allergen Reactions    Statins Myalgia     Family History   Problem Relation Age of Onset    Alzheimer's disease Mother     Diabetes Mother      Social History     Social History    Marital status: Single     Spouse name: N/A    Number of children: N/A    Years of education: N/A     Occupational History    Not on file  Social History Main Topics    Smoking status: Never Smoker    Smokeless tobacco: Never Used    Alcohol use No    Drug use: No    Sexual activity: Not on file     Other Topics Concern    Not on file     Social History Narrative    Sleep disturbances      Current Outpatient Prescriptions on File Prior to Visit   Medication Sig Dispense Refill    aspirin 81 MG tablet Take 1 tablet by mouth daily      DEXILANT 60 MG capsule Take 1 tablet by mouth daily  4    ezetimibe (ZETIA) 10 mg tablet Take 1 tablet (10 mg total) by mouth daily 90 tablet 1    levothyroxine 50 mcg tablet Take 2 tablets (100 mcg total) by mouth daily 60 tablet 5    lisinopril (ZESTRIL) 20 mg tablet TAKE 1 TABLET DAILY  90 tablet 2    multivitamin (THERAGRAN) TABS Take 1 tablet by mouth      Omega-3 Fatty Acids (FISH OIL PO) Take 2 g by mouth       No current facility-administered medications on file prior to visit  Review of Systems   Constitutional: Negative  Negative for malaise/fatigue  HENT: Negative  Eyes: Negative  Negative for blurred vision  Respiratory: Negative  Negative for shortness of breath  Cardiovascular: Negative  Negative for chest pain, palpitations, orthopnea and PND  Gastrointestinal: Negative  Endocrine: Negative  Genitourinary: Negative  Musculoskeletal: Negative  Negative for neck pain  Skin: Negative  Allergic/Immunologic: Negative  Neurological: Negative  Negative for headaches  Hematological: Negative  Psychiatric/Behavioral: Negative  Objective:  Vitals:    01/03/19 1328 01/03/19 1430   BP: 140/88 130/70   BP Location: Right arm    Patient Position: Sitting    Cuff Size: Standard    Pulse: 70 68   Temp: (!) 96 3 °F (35 7 °C)    TempSrc: Tympanic    Weight: 92 kg (202 lb 12 8 oz)    Height: 5' 4 5" (1 638 m)      Body mass index is 34 27 kg/m²  Wt Readings from Last 3 Encounters:   01/03/19 92 kg (202 lb 12 8 oz)   11/16/18 94 9 kg (209 lb 3 2 oz)   10/24/18 98 6 kg (217 lb 6 4 oz)     Temp Readings from Last 3 Encounters:   01/03/19 (!) 96 3 °F (35 7 °C) (Tympanic)   11/16/18 97 6 °F (36 4 °C) (Tympanic)   10/24/18 97 9 °F (36 6 °C) (Tympanic)     BP Readings from Last 3 Encounters:   01/03/19 130/70   11/16/18 120/78   10/24/18 124/80     Pulse Readings from Last 3 Encounters:   01/03/19 68   11/16/18 68   10/24/18 86        Physical Exam   Constitutional: She is oriented to person, place, and time  She appears well-developed and well-nourished  HENT:   Head: Normocephalic and atraumatic  Mouth/Throat: No oropharyngeal exudate  Eyes: Pupils are equal, round, and reactive to light  Conjunctivae and EOM are normal    Neck: Normal range of motion  No JVD present  No tracheal deviation present  No thyromegaly present  Cardiovascular: Normal rate, regular rhythm, normal heart sounds and intact distal pulses  Exam reveals no gallop and no friction rub  No murmur heard  Pulmonary/Chest: Effort normal and breath sounds normal  No stridor  No respiratory distress  She has no wheezes  She has no rales  She exhibits no tenderness  Abdominal: Soft  Bowel sounds are normal  She exhibits no distension and no mass  There is no tenderness  There is no rebound and no guarding  Musculoskeletal: Normal range of motion   She exhibits no edema, tenderness or deformity  Lymphadenopathy:     She has no cervical adenopathy  Neurological: She is alert and oriented to person, place, and time  She has normal reflexes  No cranial nerve deficit  She exhibits normal muscle tone  Coordination normal    Skin: Skin is warm and dry  No visits with results within 2 Month(s) from this visit     Latest known visit with results is:   Office Visit on 10/24/2018   Component Date Value    White Blood Cell Count 10/29/2018 7 1     Red Blood Cell Count 10/29/2018 4 44     Hemoglobin 10/29/2018 12 8     HCT 10/29/2018 40 2     MCV 10/29/2018 90 5     MCH 10/29/2018 28 8     MCHC 10/29/2018 31 8*    RDW 10/29/2018 13 3     Platelet Count 33/95/3852 232     SL AMB MPV 10/29/2018 11 8     Neutrophils (Absolute) 10/29/2018 4800     Lymphocytes (Absolute) 10/29/2018 1477     Monocytes (Absolute) 10/29/2018 540     Eosinophils (Absolute) 10/29/2018 227     Basophils ABS 10/29/2018 57     Neutrophils 10/29/2018 67 6     Lymphocytes 10/29/2018 20 8     Monocytes 10/29/2018 7 6     Eosinophils 10/29/2018 3 2     Basophils PCT 10/29/2018 0 8     Glucose, Random 10/29/2018 97     BUN 10/29/2018 8     Creatinine 10/29/2018 0 61     eGFR Non African American 10/29/2018 96     SL AMB EGFR  AMER* 10/29/2018 111     SL AMB BUN/CREATININE RA* 62/02/5126 NOT APPLICABLE     Sodium 53/95/0176 141     Potassium 10/29/2018 4 0     Chloride 10/29/2018 102     CO2 10/29/2018 32     SL AMB CALCIUM 10/29/2018 9 2     SL AMB PROTEIN, TOTAL 10/29/2018 6 6     Albumin 10/29/2018 3 7     Globulin 10/29/2018 2 9     Albumin/Globulin Ratio 10/29/2018 1 3     TOTAL BILIRUBIN 10/29/2018 0 4     Alkaline Phosphatase 10/29/2018 76     SL AMB AST 10/29/2018 18     SL AMB ALT 10/29/2018 12     Amylase, Serum 10/29/2018 145*    Lipase, Serum 10/29/2018 47

## 2019-01-04 NOTE — ASSESSMENT & PLAN NOTE
The pseudocyst has reduced in size with drainage  The patient is doing much better  She will continue to follow up with GI as scheduled  She will follow up with the surgeon tomorrow as scheduled for evaluation of her gallbladder disease  We will see her back in the office as scheduled

## 2019-01-04 NOTE — ASSESSMENT & PLAN NOTE
The patient will continue with her current dose of the levothyroxine 50 mcg daily  We have made no changes today

## 2019-01-04 NOTE — ASSESSMENT & PLAN NOTE
The patient's blood pressure is well controlled with her current medication  We have made no changes today  She will continue with her healthy diet and exercise  We will see her back as scheduled

## 2019-01-04 NOTE — ASSESSMENT & PLAN NOTE
The patient will continue with her current dose of the generic Zetia  We have made no changes today

## 2019-01-07 ENCOUNTER — OFFICE VISIT (OUTPATIENT)
Dept: PHYSICAL THERAPY | Facility: CLINIC | Age: 64
End: 2019-01-07
Payer: COMMERCIAL

## 2019-01-07 DIAGNOSIS — R53.81 PHYSICAL DECONDITIONING: ICD-10-CM

## 2019-01-07 DIAGNOSIS — R53.1 WEAKNESS GENERALIZED: Primary | ICD-10-CM

## 2019-01-07 PROCEDURE — 97110 THERAPEUTIC EXERCISES: CPT | Performed by: PHYSICAL THERAPIST

## 2019-01-07 PROCEDURE — 97112 NEUROMUSCULAR REEDUCATION: CPT | Performed by: PHYSICAL THERAPIST

## 2019-01-07 NOTE — PROGRESS NOTES
Daily Note     Today's date: 2019  Patient name: Katy Reynoso  : 1955  MRN: [id number]  Referring provider: Aleksandr Lowe DO  Dx:   Encounter Diagnosis     ICD-10-CM    1  Weakness generalized R53 1    2  Physical deconditioning R53 81               Subjective: Pt reports that she had no soreness after last session  Objective: See treatment diary below    Precautions: HTN, Hx of GI infection, hx of right shoulder pain     Daily Treatment Diary      Manual                   Thoracic PA cirilo   RN RN                                                                                                                       Exercise Diary                   Shoulder flexion S supine 10"x10 10"x  10 10x10''                 Scapular Retraction 5"x10 HEP                  Shoulder Shrugs 10x                    TB shoulder ext   gtb  5"x10 5''x10 BTB                 TB Rows   gtb  5"x10 5''x10 BTB                  TB Triceps   gtb  2x10 2x10 BTB                 Prone I   5"x10 5''x10                 Prone T   2x10 2x10                 UBE   2'x2' 3'x3' lvl 3                 S/l ER   2x10 2x10 ea, 2#                 S/l sh abd   2x10 2x10 ea                 S/l sh flex   2x10 2x10 ea                                                                                                                                                                                                                       Modalities                                                                                                   Assessment: Tolerated treatment well  Patient demonstrated fatigue post treatment  Pt responded well to increased resistance during TE this session  Verbal cueing required throughout session to correct scapular activation and position  No pain reproduced during session, continue to progress nv as able  Plan: Continue per plan of care

## 2019-01-10 ENCOUNTER — OFFICE VISIT (OUTPATIENT)
Dept: PHYSICAL THERAPY | Facility: CLINIC | Age: 64
End: 2019-01-10
Payer: COMMERCIAL

## 2019-01-10 DIAGNOSIS — R53.1 WEAKNESS GENERALIZED: Primary | ICD-10-CM

## 2019-01-10 DIAGNOSIS — R53.81 PHYSICAL DECONDITIONING: ICD-10-CM

## 2019-01-10 PROCEDURE — 97140 MANUAL THERAPY 1/> REGIONS: CPT | Performed by: PHYSICAL THERAPIST

## 2019-01-10 PROCEDURE — 97112 NEUROMUSCULAR REEDUCATION: CPT | Performed by: PHYSICAL THERAPIST

## 2019-01-10 NOTE — PROGRESS NOTES
Daily Note     Today's date: 1/10/2019  Patient name: Katie Feliciano  : 1955  MRN: [id number]  Referring provider: Julio Gao DO  Dx:   Encounter Diagnosis     ICD-10-CM    1  Weakness generalized R53 1    2  Physical deconditioning R53 81                   Subjective: Pt c/o left shoulder pain with rolling over in bed  Objective: See treatment diary below      Assessment: Pt c/o left shoulder pain with TB exercises if arm extended behind 0* extension  Instructed patient to avoid left shoulder pain range during exercises  Demonstrates appropriate fatigue with exercises  Pt notes decrease in left shoulder soreness post tx  Patient would benefit from continued PT      Plan: Continue per plan of care         Precautions: HTN, Hx of GI infection, hx of right shoulder pain    Daily Treatment Diary     Manual  12/27 12/31 1/7 1/10         Thoracic PA cirilo BELTRAN RN [facility]                                                                 Exercise Diary   1/10         Shoulder flexion S supine 10"x10 10"x  10 10"x10 10"x10         Scapular Retraction 5"x10 HEP           Shoulder Shrugs 10x   Blue  10x         TB shoulder ext  gtb  5"x10 gtb  5"x20 gtb  5"x20         TB Rows  gtb  5"x10 michelle  5"x10 michelle  5"x10         TB Triceps  gtb  2x10 grn  2x10 grn  2x10         Prone I  5"x10  5"x20         Prone T  2x10  5"x20         UBE  2'x2' 3'/3' 2'/2'         S/l ER  2x10           S/l sh abd  2x10           S/l sh flex  2x10                                                                                                                       Modalities

## 2019-01-14 ENCOUNTER — OFFICE VISIT (OUTPATIENT)
Dept: PHYSICAL THERAPY | Facility: CLINIC | Age: 64
End: 2019-01-14
Payer: COMMERCIAL

## 2019-01-14 ENCOUNTER — APPOINTMENT (OUTPATIENT)
Dept: PHYSICAL THERAPY | Facility: CLINIC | Age: 64
End: 2019-01-14
Payer: COMMERCIAL

## 2019-01-14 DIAGNOSIS — R53.81 PHYSICAL DECONDITIONING: ICD-10-CM

## 2019-01-14 DIAGNOSIS — R53.1 WEAKNESS GENERALIZED: Primary | ICD-10-CM

## 2019-01-14 PROCEDURE — 97110 THERAPEUTIC EXERCISES: CPT | Performed by: PHYSICAL THERAPIST

## 2019-01-14 PROCEDURE — 97112 NEUROMUSCULAR REEDUCATION: CPT | Performed by: PHYSICAL THERAPIST

## 2019-01-14 PROCEDURE — 97140 MANUAL THERAPY 1/> REGIONS: CPT | Performed by: PHYSICAL THERAPIST

## 2019-01-14 NOTE — PROGRESS NOTES
Daily Note     Today's date: 2019  Patient name: Nixon Davis  : 1955  MRN: [id number]  Referring provider: Aldo Taveras DO  Dx:   Encounter Diagnosis     ICD-10-CM    1  Weakness generalized R53 1    2  Physical deconditioning R53 81                   Subjective: Pt continues to c/o anterior shoulder/bicep soreness in left shoulder  But denies increase in left shoulder pain with HEP over the weekend  Objective: See treatment diary below      Assessment: Pt tolerated modified TB exercises with isometric shoulder ext and rows  Pt noted left shoulder soreness through most exercises, but reports willingness to continue exercises to make progress  Patient would benefit from continued PT      Plan: Continue per plan of care         Precautions: HTN, Hx of GI infection, hx of right shoulder pain    Daily Treatment Diary     Manual  12/27 12/31 1/7 1/10 1/14        Thoracic PA carlines  RN  [email or web address]        Left Shoulder ROM    [address]                                                   Exercise Diary  12/27 12/31 1/7 1/10 1/14        Shoulder flexion S supine 10"x10 10"x  10 10"x10 10"x10 10"x10        Scapular Retraction 5"x10 HEP   5"x10        Shoulder Shrugs 10x   Blue  10x Blue  10x        TB shoulder ext  gtb  5"x10 gtb  5"x20 gtb  5"x20 Grn  5"x20  iso        TB Rows  gtb  5"x10 michelle  5"x10 michelle  5"x10 michelle  5"x20  iso        TB Triceps  gtb  2x10 grn  2x10 grn  2x10 grn  2x10        Prone I  5"x10  5"x20 5"x20        Prone T  2x10  5"x20 5"x20        UBE  2'x2' 3'/3' 2'/2' 2'/2'        S/l ER  2x10   10x        S/l sh abd  2x10   10x        S/l sh flex  2x10   10x        Posterior cap S     10"x10        Bicep Curls     4#  10x        Pulleys     10"  2 min                                                                             Modalities

## 2019-01-17 ENCOUNTER — OFFICE VISIT (OUTPATIENT)
Dept: PHYSICAL THERAPY | Facility: CLINIC | Age: 64
End: 2019-01-17
Payer: COMMERCIAL

## 2019-01-17 ENCOUNTER — APPOINTMENT (OUTPATIENT)
Dept: PHYSICAL THERAPY | Facility: CLINIC | Age: 64
End: 2019-01-17
Payer: COMMERCIAL

## 2019-01-17 DIAGNOSIS — R53.1 WEAKNESS GENERALIZED: Primary | ICD-10-CM

## 2019-01-17 DIAGNOSIS — R53.81 PHYSICAL DECONDITIONING: ICD-10-CM

## 2019-01-17 PROCEDURE — 97140 MANUAL THERAPY 1/> REGIONS: CPT | Performed by: PHYSICAL THERAPIST

## 2019-01-17 PROCEDURE — 97112 NEUROMUSCULAR REEDUCATION: CPT | Performed by: PHYSICAL THERAPIST

## 2019-01-17 PROCEDURE — 97110 THERAPEUTIC EXERCISES: CPT | Performed by: PHYSICAL THERAPIST

## 2019-01-17 NOTE — PROGRESS NOTES
Daily Note     Today's date: 2019  Patient name: Dale Saini  : 1955  MRN: [id number]  Referring provider: Eliana Martinez DO  Dx:   Encounter Diagnosis     ICD-10-CM    1  Weakness generalized R53 1    2  Physical deconditioning R53 81                   Subjective: Pt continues to report constant mild left shoulder pain pre tx  Objective: See treatment diary below      Assessment: Patient tolerated exercises despite chronic level of shoulder pain, but denies increase in pain with exercises  Patient required cues to avoid compensation and pain  Patient would benefit from continued PT      Plan: Continue per plan of care         Precautions: HTN, Hx of GI infection, hx of right shoulder pain    Daily Treatment Diary     Manual  12/27 12/31 1/7 1/10 1/14 1/17       Thoracic PA cirilo  RN  [facility]       Left Shoulder ROM    [facility] DONNIE DONNIE                                                  Exercise Diary  12/27 12/31 1/7 1/10 1/14 1/17       Shoulder flexion S supine 10"x10 10"x  10 10"x10 10"x10 10"x10 10"x10       Scapular Retraction 5"x10 HEP   5"x10        Shoulder Shrugs 10x   Blue  10x Blue  10x Blue  2x10       TB shoulder ext  gtb  5"x10 gtb  5"x20 gtb  5"x20 Grn  5"x20  iso Blue  5"x20  iso       TB Rows  gtb  5"x10 michelle  5"x10 michelle  5"x10 michelle  5"x20  iso Blue  5"x20 Iso       TB Triceps  gtb  2x10 grn  2x10 grn  2x10 grn  2x10 michelle  2x10       Prone I  5"x10  5"x20 5"x20 5"x20       Prone T  2x10  5"x20 5"x20 5"x20       UBE  2'x2' 3'/3' 2'/2' 2'/2' 2'/2'       S/l ER  2x10   10x 5"x10       S/l sh abd  2x10   10x 10x       S/l sh flex  2x10   10x        Posterior cap S     10"x10 10"x10       Bicep Curls     4#  10x 4#  2x10       Pulleys     10"  2 min 10"  2 min                                                                            Modalities

## 2019-01-21 ENCOUNTER — APPOINTMENT (OUTPATIENT)
Dept: PHYSICAL THERAPY | Facility: CLINIC | Age: 64
End: 2019-01-21
Payer: COMMERCIAL

## 2019-01-24 ENCOUNTER — OFFICE VISIT (OUTPATIENT)
Dept: PHYSICAL THERAPY | Facility: CLINIC | Age: 64
End: 2019-01-24
Payer: COMMERCIAL

## 2019-01-24 DIAGNOSIS — R53.81 PHYSICAL DECONDITIONING: ICD-10-CM

## 2019-01-24 DIAGNOSIS — R53.1 WEAKNESS GENERALIZED: Primary | ICD-10-CM

## 2019-01-24 PROCEDURE — 97140 MANUAL THERAPY 1/> REGIONS: CPT | Performed by: PHYSICAL THERAPIST

## 2019-01-24 PROCEDURE — 97112 NEUROMUSCULAR REEDUCATION: CPT | Performed by: PHYSICAL THERAPIST

## 2019-01-24 NOTE — PROGRESS NOTES
Daily Note     Today's date: 2019  Patient name: Juan Ramon Mcgowan  : 1955  MRN: [id number]  Referring provider: Krystal Gamboa DO  Dx:   Encounter Diagnosis     ICD-10-CM    1  Weakness generalized R53 1    2  Physical deconditioning R53 81                   Subjective: Pt returned to work on Friday and pt notes left shoulder pain with overhead activities at work  Objective: See treatment diary below      Assessment: Patient tolerated exercises without increase in shoulder pain, but required cues to maintain proper posture with TB exercises  Patient demonstrated fatigue post treatment      Plan: Continue per plan of care         Precautions: HTN, Hx of GI infection, hx of right shoulder pain    Daily Treatment Diary     Manual  12/27 12/31 1/7 1/10 1/14 1/17 1/24      Thoracic PA mobs  RN  [address] DONNIE       B/l Shoulder ROM    [address] DONNIE DONNIE      Cervical PROM       DONNIE      [facility]       DONNIE                       Exercise Diary  12/27 12/31 1/7 1/10 1/14 1/17 1/24      Shoulder flexion S supine 10"x10 10"x  10 10"x10 10"x10 10"x10 10"x10 10"x10      Scapular Retraction 5"x10 HEP   5"x10        Shoulder Shrugs 10x   Blue  10x Blue  10x Blue  2x10 Black  2x10      TB shoulder ext  gtb  5"x10 gtb  5"x20 gtb  5"x20 Grn  5"x20  iso Blue  5"x20  iso Black  5"x20      TB Rows  gtb  5"x10 michelle  5"x10 michelle  5"x10 michelle  5"x20  iso Blue  5"x20 Iso Black  5"x20      TB Triceps  gtb  2x10 grn  2x10 grn  2x10 grn  2x10 michelle  2x10 michelle  2x10      Prone I  5"x10  5"x20 5"x20 5"x20 5"x20      Prone T  2x10  5"x20 5"x20 5"x20 5"x20      UBE  2'x2' 3'/3' 2'/2' 2'/2' 2'/2' 3'/3'      S/l ER  2x10   10x 5"x10       S/l sh abd  2x10   10x 10x       S/l sh flex  2x10   10x        Posterior cap S     10"x10 10"x10       Bicep Curls     4#  10x 4#  2x10 4#  2x10      Pulleys     10"  2 min 10"  2 min 10"  2 min                                                                           Modalities

## 2019-01-28 ENCOUNTER — OFFICE VISIT (OUTPATIENT)
Dept: PHYSICAL THERAPY | Facility: CLINIC | Age: 64
End: 2019-01-28
Payer: COMMERCIAL

## 2019-01-28 DIAGNOSIS — R53.81 PHYSICAL DECONDITIONING: ICD-10-CM

## 2019-01-28 DIAGNOSIS — R53.1 WEAKNESS GENERALIZED: Primary | ICD-10-CM

## 2019-01-28 PROCEDURE — 97112 NEUROMUSCULAR REEDUCATION: CPT | Performed by: PHYSICAL THERAPIST

## 2019-01-28 PROCEDURE — G8990 OTHER PT/OT CURRENT STATUS: HCPCS | Performed by: PHYSICAL THERAPIST

## 2019-01-28 PROCEDURE — G8991 OTHER PT/OT GOAL STATUS: HCPCS | Performed by: PHYSICAL THERAPIST

## 2019-01-28 PROCEDURE — 97140 MANUAL THERAPY 1/> REGIONS: CPT | Performed by: PHYSICAL THERAPIST

## 2019-01-28 NOTE — PROGRESS NOTES
Daily Note     Today's date: 2019  Patient name: Juan Ramon Mcgowan  : 1955  MRN: [id number]  Referring provider: Krystal Gamboa DO  Dx:   Encounter Diagnosis     ICD-10-CM    1  Weakness generalized R53 1    2  Physical deconditioning R53 81                   Subjective: Pt reports minimal shoulder pain pre tx  Pt notes she was instructed to hold off work until cleared by PCP  Objective: See treatment diary below      Assessment: Pt tolerated exercises without complaints and added wall slides and AROM scaption with mild fatigue noted  Patient would benefit from continued PT  Plan: Continue per plan of care  Addendum 19: Pt reports she was must transfer her physical therapy to on work site therapy facility per work physician instruction  Self-discharge at this time        Precautions: HTN, Hx of GI infection, hx of right shoulder pain    Daily Treatment Diary     Manual  12/27 12/31 1/7 1/10 1/14 1/17 1/24 1/28     Thoracic PA cirilo  RN  [address] DONNIE       B/l Shoulder ROM    [facility] DONNIE DONNIE     Cervical PROM       [facility] DONNIE     UT TPR       [facility] DONNIE                      Exercise Diary  12/27 12/31 1/7 1/10 1/14 1/17 1/24 1/28     Shoulder flexion S supine 10"x10 10"x  10 10"x10 10"x10 10"x10 10"x10 10"x10 10"x10     Scapular Retraction 5"x10 HEP   5"x10        Shoulder Shrugs 10x   Blue  10x Blue  10x Blue  2x10 Black  2x10      TB shoulder ext  gtb  5"x10 gtb  5"x20 gtb  5"x20 Grn  5"x20  iso Blue  5"x20  iso Black  5"x20 Black  5"x20     TB Rows  gtb  5"x10 michelle  5"x10 michelle  5"x10 michelle  5"x20  iso Blue  5"x20 Iso Black  5"x20 Black  5"x20     TB Triceps  gtb  2x10 grn  2x10 grn  2x10 grn  2x10 michelle  2x10 michelle  2x10 Black  2x10     Prone I  5"x10  5"x20 5"x20 5"x20 5"x20 5"x20     Prone T  2x10  5"x20 5"x20 5"x20 5"x20 5"x20     UBE  2'x2' 3'/3' 2'/2' 2'/2' 2'/2' 3'/3' 3'/3'     S/l ER  2x10   10x 5"x10       S/l sh abd  2x10   10x 10x       S/l sh flex  2x10   10x        Posterior cap S     10"x10 10"x10       Bicep Curls     4#  10x 4#  2x10 4#  2x10 4#  2x10     Pulleys     10"  2 min 10"  2 min 10"  2 min 10"  2 min     Scaption AROM        10x     Wall Slid Shld flexion        10x                                                Modalities

## 2019-01-31 ENCOUNTER — APPOINTMENT (OUTPATIENT)
Dept: PHYSICAL THERAPY | Facility: CLINIC | Age: 64
End: 2019-01-31
Payer: COMMERCIAL

## 2019-01-31 NOTE — PROGRESS NOTES
Daily Note     Today's date: 2019  Patient name: Nancy Gimenez  : 1955  MRN: [id number]  Referring provider: Nicholas Perez DO  Dx:   Encounter Diagnosis     ICD-10-CM    1  Weakness generalized R53 1    2  Physical deconditioning R53 81                   Subjective: ***      Objective: See treatment diary below      Assessment: Tolerated treatment {Tolerated treatment :4015134108}   Patient {assessment:9859316521}      Plan: {PLAN:0130438543}      Precautions: HTN, Hx of GI infection, hx of right shoulder pain    Daily Treatment Diary     Manual  12/27 12/31 1/7 1/10 1/14 1/17 1/24 1/28     Thoracic PA mobs  RN  [address]       B/l Shoulder ROM    [place] DONNIE DONNIE     Cervical PROM       [address] DONNIE     UT TPR       [address] DONNIE                      Exercise Diary  12/27 12/31 1/7 1/10 1/14 1/17 1/24 1/28     Shoulder flexion S supine 10"x10 10"x  10 10"x10 10"x10 10"x10 10"x10 10"x10 10"x10     Scapular Retraction 5"x10 HEP   5"x10        Shoulder Shrugs 10x   Blue  10x Blue  10x Blue  2x10 Black  2x10      TB shoulder ext  gtb  5"x10 gtb  5"x20 gtb  5"x20 Grn  5"x20  iso Blue  5"x20  iso Black  5"x20 Black  5"x20     TB Rows  gtb  5"x10 michelle  5"x10 michelle  5"x10 michelle  5"x20  iso Blue  5"x20 Iso Black  5"x20 Black  5"x20     TB Triceps  gtb  2x10 grn  2x10 grn  2x10 grn  2x10 michelle  2x10 michelle  2x10 Black  2x10     Prone I  5"x10  5"x20 5"x20 5"x20 5"x20 5"x20     Prone T  2x10  5"x20 5"x20 5"x20 5"x20 5"x20     UBE  2'x2' 3'/3' 2'/2' 2'/2' 2'/2' 3'/3' 3'/3'     S/l ER  2x10   10x 5"x10       S/l sh abd  2x10   10x 10x       S/l sh flex  2x10   10x        Posterior cap S     10"x10 10"x10       Bicep Curls     4#  10x 4#  2x10 4#  2x10 4#  2x10     Pulleys     10"  2 min 10"  2 min 10"  2 min 10"  2 min     Scaption AROM        10x     Wall Slid Shld flexion        10x                                                Modalities

## 2019-02-20 DIAGNOSIS — I10 ESSENTIAL HYPERTENSION: ICD-10-CM

## 2019-02-20 RX ORDER — LISINOPRIL 20 MG/1
TABLET ORAL
Qty: 90 TABLET | Refills: 2 | OUTPATIENT
Start: 2019-02-20

## 2019-02-28 DIAGNOSIS — I10 ESSENTIAL HYPERTENSION: ICD-10-CM

## 2019-02-28 RX ORDER — LISINOPRIL 20 MG/1
20 TABLET ORAL DAILY
Qty: 90 TABLET | Refills: 2 | Status: SHIPPED | OUTPATIENT
Start: 2019-02-28 | End: 2019-06-19 | Stop reason: SDUPTHER

## 2019-06-11 DIAGNOSIS — E03.9 HYPOTHYROIDISM, UNSPECIFIED TYPE: ICD-10-CM

## 2019-06-11 RX ORDER — LEVOTHYROXINE SODIUM 0.05 MG/1
100 TABLET ORAL DAILY
Qty: 60 TABLET | Refills: 5 | OUTPATIENT
Start: 2019-06-11

## 2019-06-19 DIAGNOSIS — I10 ESSENTIAL HYPERTENSION: ICD-10-CM

## 2019-06-20 DIAGNOSIS — E78.2 MIXED HYPERLIPIDEMIA: ICD-10-CM

## 2019-06-20 DIAGNOSIS — E03.9 HYPOTHYROIDISM, UNSPECIFIED TYPE: ICD-10-CM

## 2019-06-20 RX ORDER — EZETIMIBE 10 MG/1
TABLET ORAL
Qty: 90 TABLET | Refills: 2 | Status: SHIPPED | OUTPATIENT
Start: 2019-06-20 | End: 2019-06-20 | Stop reason: SDUPTHER

## 2019-06-20 RX ORDER — LISINOPRIL 20 MG/1
20 TABLET ORAL DAILY
Qty: 90 TABLET | Refills: 2 | Status: SHIPPED | OUTPATIENT
Start: 2019-06-20 | End: 2020-04-03 | Stop reason: SDUPTHER

## 2019-06-21 RX ORDER — LEVOTHYROXINE SODIUM 0.05 MG/1
100 TABLET ORAL DAILY
Qty: 180 TABLET | Refills: 1 | Status: SHIPPED | OUTPATIENT
Start: 2019-06-21 | End: 2019-12-27 | Stop reason: SDUPTHER

## 2019-06-21 RX ORDER — EZETIMIBE 10 MG/1
10 TABLET ORAL DAILY
Qty: 90 TABLET | Refills: 1 | Status: SHIPPED | OUTPATIENT
Start: 2019-06-21 | End: 2020-03-26 | Stop reason: SDUPTHER

## 2019-07-18 ENCOUNTER — OFFICE VISIT (OUTPATIENT)
Dept: FAMILY MEDICINE CLINIC | Facility: CLINIC | Age: 64
End: 2019-07-18
Payer: COMMERCIAL

## 2019-07-18 VITALS
TEMPERATURE: 96 F | WEIGHT: 217.4 LBS | BODY MASS INDEX: 37.11 KG/M2 | OXYGEN SATURATION: 97 % | SYSTOLIC BLOOD PRESSURE: 120 MMHG | DIASTOLIC BLOOD PRESSURE: 78 MMHG | HEART RATE: 68 BPM | RESPIRATION RATE: 16 BRPM | HEIGHT: 64 IN

## 2019-07-18 DIAGNOSIS — E78.2 MIXED HYPERLIPIDEMIA: ICD-10-CM

## 2019-07-18 DIAGNOSIS — I10 ESSENTIAL HYPERTENSION: Primary | ICD-10-CM

## 2019-07-18 DIAGNOSIS — Z12.31 ENCOUNTER FOR SCREENING MAMMOGRAM FOR BREAST CANCER: ICD-10-CM

## 2019-07-18 DIAGNOSIS — E03.9 HYPOTHYROIDISM, UNSPECIFIED TYPE: ICD-10-CM

## 2019-07-18 DIAGNOSIS — Z11.59 ENCOUNTER FOR HEPATITIS C SCREENING TEST FOR LOW RISK PATIENT: ICD-10-CM

## 2019-07-18 DIAGNOSIS — K86.3 PANCREATIC PSEUDOCYST: ICD-10-CM

## 2019-07-18 DIAGNOSIS — K85.20 ALCOHOL-INDUCED ACUTE PANCREATITIS, UNSPECIFIED COMPLICATION STATUS: ICD-10-CM

## 2019-07-18 PROCEDURE — 1160F RVW MEDS BY RX/DR IN RCRD: CPT | Performed by: FAMILY MEDICINE

## 2019-07-18 PROCEDURE — 3074F SYST BP LT 130 MM HG: CPT | Performed by: FAMILY MEDICINE

## 2019-07-18 PROCEDURE — 3008F BODY MASS INDEX DOCD: CPT | Performed by: FAMILY MEDICINE

## 2019-07-18 PROCEDURE — 99214 OFFICE O/P EST MOD 30 MIN: CPT | Performed by: FAMILY MEDICINE

## 2019-07-18 PROCEDURE — 1101F PT FALLS ASSESS-DOCD LE1/YR: CPT | Performed by: FAMILY MEDICINE

## 2019-07-18 NOTE — PROGRESS NOTES
Assessment/Plan:  Hypertension  The patient's blood pressure stable on her current medication  We have made no changes today  She will go for the lab work as ordered and we will follow up with the results  She is again encouraged to work on improving her diet and increasing her exercise  Hypothyroidism  The patient is stable on her current dose of levothyroxine  She will go for the up-to-date thyroid levels as ordered  Hyperlipidemia  The patient will continue on the generic Zetia  She will continue to work on improving her diet and exercise  Pancreatic pseudocyst  The patient will follow up with GI as scheduled  Diagnoses and all orders for this visit:    Essential hypertension  -     Comprehensive metabolic panel; Future  -     Hemoglobin A1C W/EAG With Reflex To Glycomark(R); Future  -     LDL cholesterol, direct; Future  -     Lipid panel; Future  -     T4, free; Future  -     TSH, 3rd generation; Future  -     CBC and differential; Future  -     Comprehensive metabolic panel  -     Hemoglobin A1C W/EAG With Reflex To Glycomark(R)  -     LDL cholesterol, direct  -     Lipid panel  -     T4, free  -     TSH, 3rd generation  -     CBC and differential    Hypothyroidism, unspecified type  -     T4, free; Future  -     TSH, 3rd generation; Future  -     T4, free  -     TSH, 3rd generation    Mixed hyperlipidemia  -     Comprehensive metabolic panel; Future  -     LDL cholesterol, direct; Future  -     Lipid panel; Future  -     Comprehensive metabolic panel  -     LDL cholesterol, direct  -     Lipid panel    Pancreatic pseudocyst    Alcohol-induced acute pancreatitis, unspecified complication status    Encounter for screening mammogram for breast cancer  -     Mammo screening bilateral w 3d & cad; Future    BMI 37 0-37 9, adult  -     Comprehensive metabolic panel; Future  -     Hemoglobin A1C W/EAG With Reflex To Glycomark(R); Future  -     LDL cholesterol, direct;  Future  -     Lipid panel; Future  -     T4, free; Future  -     TSH, 3rd generation; Future  -     CBC and differential; Future  -     Comprehensive metabolic panel  -     Hemoglobin A1C W/EAG With Reflex To Glycomark(R)  -     LDL cholesterol, direct  -     Lipid panel  -     T4, free  -     TSH, 3rd generation  -     CBC and differential    Encounter for hepatitis C screening test for low risk patient  -     Hepatitis C Ab W/Refl To HCV RNA, Qn, PCR; Future  -     Hepatitis C Ab W/Refl To HCV RNA, Qn, PCR       Return in about 3 months (around 10/18/2019)  Subjective:   Chief Complaint   Patient presents with    Follow-up     6 month check up         Patient ID: Talat Reynolds is a 70 y o  female presents today for a six-month checkup  Talat Reynolds is a 70 y o  Female who presents for a follow up of her HTN, hypothyroidism, hyperlipidemia, and pancreatic pseudocyst   She has not had the cholecystectomy yet  She sees the GI specialist in September, 2019- Dr Rosamaria Hilario  She has not been back to see her surgeon  The patient denies any chest pain, shortness of breath, or palpitations  There is no edema  There are no headaches or visual changes  There is no lightheadedness, dizziness, or fainting spells  The patient currently denies any nausea, vomiting, or GERD symptoms  she has normal bowel movements and normal urine output  she has a normal appetite  There are no problems  She does not need refills          The following portions of the patient's history were reviewed and updated as appropriate: allergies, current medications, past family history, past medical history, past social history, past surgical history and problem list   Patient Active Problem List   Diagnosis    Female cystocele    Generalized osteoarthritis    Hyperlipidemia    Hypertension    Hypothyroidism    Intermittent claudication ([address] )    Sleep disturbances    Urge and stress incontinence    Varicose veins of both lower extremities with pain    Vitamin D deficiency    Shoulder subluxation, right    DJD of right shoulder    Rotator cuff tear arthropathy of right shoulder    Alcohol induced acute pancreatitis    Obese    Pancreatic pseudocyst     Past Medical History:   Diagnosis Date    Polyp of sigmoid colon     last assessed 6/12/12    Sleep disturbances     last assessed 6/5/14     Past Surgical History:   Procedure Laterality Date    COLONOSCOPY  06/18/2011    Complete     Repeat in 5yrs       Allergies   Allergen Reactions    Statins Myalgia     Family History   Problem Relation Age of Onset    Alzheimer's disease Mother     Diabetes Mother      Social History     Socioeconomic History    Marital status: Single     Spouse name: Not on file    Number of children: Not on file    Years of education: Not on file    Highest education level: Not on file   Occupational History    Not on file   Social Needs    Financial resource strain: Not on file    Food insecurity:     Worry: Not on file     Inability: Not on file    Transportation needs:     Medical: Not on file     Non-medical: Not on file   Tobacco Use    Smoking status: Never Smoker    Smokeless tobacco: Never Used   Substance and Sexual Activity    Alcohol use: No    Drug use: No    Sexual activity: Not on file   Lifestyle    Physical activity:     Days per week: Not on file     Minutes per session: Not on file    Stress: Not on file   Relationships    Social connections:     Talks on phone: Not on file     Gets together: Not on file     Attends [employer]ism service: Not on file     Active member of club or organization: Not on file     Attends meetings of clubs or organizations: Not on file     Relationship status: Not on file    Intimate partner violence:     Fear of current or ex partner: Not on file     Emotionally abused: Not on file     Physically abused: Not on file     Forced sexual activity: Not on file   Other Topics Concern    Not on file   Social History Narrative Sleep disturbances      Current Outpatient Medications on File Prior to Visit   Medication Sig Dispense Refill    aspirin 81 MG tablet Take 1 tablet by mouth daily      DEXILANT 60 MG capsule Take 1 tablet by mouth daily  4    ezetimibe (ZETIA) 10 mg tablet Take 1 tablet (10 mg total) by mouth daily 90 tablet 1    levothyroxine 50 mcg tablet Take 2 tablets (100 mcg total) by mouth daily 180 tablet 1    lisinopril (ZESTRIL) 20 mg tablet Take 1 tablet (20 mg total) by mouth daily 90 tablet 2    multivitamin (THERAGRAN) TABS Take 1 tablet by mouth      Omega-3 Fatty Acids (FISH OIL PO) Take 2 g by mouth       No current facility-administered medications on file prior to visit  Review of Systems   Constitutional: Negative  HENT: Negative  Eyes: Negative  Respiratory: Negative  Cardiovascular: Negative  Gastrointestinal: Negative  Endocrine: Negative  Genitourinary: Negative  Musculoskeletal: Negative  Skin: Negative  Allergic/Immunologic: Negative  Neurological: Negative  Hematological: Negative  Psychiatric/Behavioral: Negative  Objective:  Vitals:    07/18/19 1500 07/18/19 1543   BP: 128/80 120/78   BP Location: Right arm    Patient Position: Sitting    Cuff Size: Standard    Pulse: 72 68   Resp: 16    Temp: (!) 96 °F (35 6 °C)    TempSrc: Tympanic    SpO2: 97%    Weight: 98 6 kg (217 lb 6 4 oz)    Height: 5' 4" (1 626 m)      Body mass index is 37 32 kg/m²  Physical Exam   Constitutional: She is oriented to person, place, and time  She appears well-developed and well-nourished  HENT:   Head: Normocephalic and atraumatic  Mouth/Throat: No oropharyngeal exudate  Eyes: Pupils are equal, round, and reactive to light  Conjunctivae and EOM are normal    Neck: Normal range of motion  No JVD present  No tracheal deviation present  No thyromegaly present  Cardiovascular: Normal rate, regular rhythm, normal heart sounds and intact distal pulses   Exam reveals no gallop and no friction rub  No murmur heard  Pulmonary/Chest: Effort normal and breath sounds normal  No stridor  No respiratory distress  She has no wheezes  She has no rales  She exhibits no tenderness  Abdominal: Soft  Bowel sounds are normal  She exhibits no distension and no mass  There is no tenderness  There is no rebound and no guarding  Musculoskeletal: Normal range of motion  She exhibits no edema, tenderness or deformity  Lymphadenopathy:     She has no cervical adenopathy  Neurological: She is alert and oriented to person, place, and time  She has normal reflexes  She displays normal reflexes  No cranial nerve deficit  She exhibits normal muscle tone  Coordination normal    Skin: Skin is warm and dry  No visits with results within 2 Month(s) from this visit     Latest known visit with results is:   Office Visit on 10/24/2018   Component Date Value    White Blood Cell Count 10/29/2018 7 1     Red Blood Cell Count 10/29/2018 4 44     Hemoglobin 10/29/2018 12 8     HCT 10/29/2018 40 2     MCV 10/29/2018 90 5     MCH 10/29/2018 28 8     MCHC 10/29/2018 31 8*    RDW 10/29/2018 13 3     Platelet Count 74/11/1522 232     SL AMB MPV 10/29/2018 11 8     Neutrophils (Absolute) 10/29/2018 4,800     Lymphocytes (Absolute) 10/29/2018 1,477     Monocytes (Absolute) 10/29/2018 540     Eosinophils (Absolute) 10/29/2018 227     Basophils ABS 10/29/2018 57     Neutrophils 10/29/2018 67 6     Lymphocytes 10/29/2018 20 8     Monocytes 10/29/2018 7 6     Eosinophils 10/29/2018 3 2     Basophils PCT 10/29/2018 0 8     Glucose, Random 10/29/2018 97     BUN 10/29/2018 8     Creatinine 10/29/2018 0 61     eGFR Non African American 10/29/2018 96     eGFR  10/29/2018 111     SL AMB BUN/CREATININE RA* 67/41/4108 NOT APPLICABLE     Sodium 26/92/8757 141     Potassium 10/29/2018 4 0     Chloride 10/29/2018 102     CO2 10/29/2018 32     SL AMB CALCIUM 10/29/2018 9  2     Protein, Total 10/29/2018 6 6     Albumin 10/29/2018 3 7     Globulin 10/29/2018 2 9     Albumin/Globulin Ratio 10/29/2018 1 3     TOTAL BILIRUBIN 10/29/2018 0 4     Alkaline Phosphatase 10/29/2018 76     AST 10/29/2018 18     ALT 10/29/2018 12     Amylase, Serum 10/29/2018 145*    Lipase, Serum 10/29/2018 47       BMI Counseling: Body mass index is 37 32 kg/m²  Discussed the patient's BMI with her  The BMI is above average  BMI counseling and education was provided to the patient  Nutrition recommendations include reducing portion sizes, decreasing overall calorie intake, 3-5 servings of fruits/vegetables daily, reducing fast food intake, consuming healthier snacks, decreasing soda and/or juice intake, moderation in carbohydrate intake, increasing intake of lean protein, reducing intake of saturated fat and trans fat and reducing intake of cholesterol  Exercise recommendations include exercising 3-5 times per week and strength training exercises

## 2019-07-19 NOTE — ASSESSMENT & PLAN NOTE
The patient will continue on the generic Zetia  She will continue to work on improving her diet and exercise

## 2019-07-19 NOTE — ASSESSMENT & PLAN NOTE
The patient is stable on her current dose of levothyroxine  She will go for the up-to-date thyroid levels as ordered

## 2019-07-19 NOTE — ASSESSMENT & PLAN NOTE
The patient's blood pressure stable on her current medication  We have made no changes today  She will go for the lab work as ordered and we will follow up with the results  She is again encouraged to work on improving her diet and increasing her exercise

## 2019-08-14 ENCOUNTER — HOSPITAL ENCOUNTER (OUTPATIENT)
Dept: MAMMOGRAPHY | Facility: CLINIC | Age: 64
Discharge: HOME | End: 2019-08-14
Payer: COMMERCIAL

## 2019-08-14 VITALS — WEIGHT: 217 LBS | BODY MASS INDEX: 37.05 KG/M2 | HEIGHT: 64 IN

## 2019-08-14 DIAGNOSIS — Z12.31 ENCOUNTER FOR SCREENING MAMMOGRAM FOR BREAST CANCER: ICD-10-CM

## 2019-08-14 PROCEDURE — 77067 SCR MAMMO BI INCL CAD: CPT

## 2019-08-14 PROCEDURE — 77063 BREAST TOMOSYNTHESIS BI: CPT

## 2019-08-28 LAB
ALBUMIN SERPL-MCNC: 3.9 G/DL (ref 3.6–5.1)
ALBUMIN/GLOB SERPL: 1.4 (CALC) (ref 1–2.5)
ALP SERPL-CCNC: 78 U/L (ref 33–130)
ALT SERPL-CCNC: 21 U/L (ref 6–29)
AST SERPL-CCNC: 18 U/L (ref 10–35)
BASOPHILS # BLD AUTO: 40 CELLS/UL (ref 0–200)
BASOPHILS NFR BLD AUTO: 0.7 %
BILIRUB SERPL-MCNC: 0.3 MG/DL (ref 0.2–1.2)
BUN SERPL-MCNC: 17 MG/DL (ref 7–25)
BUN/CREAT SERPL: NORMAL (CALC) (ref 6–22)
CALCIUM SERPL-MCNC: 9.1 MG/DL (ref 8.6–10.4)
CHLORIDE SERPL-SCNC: 106 MMOL/L (ref 98–110)
CHOLEST SERPL-MCNC: 193 MG/DL
CHOLEST/HDLC SERPL: 3.1 (CALC)
CO2 SERPL-SCNC: 32 MMOL/L (ref 20–32)
CREAT SERPL-MCNC: 0.68 MG/DL (ref 0.5–0.99)
EOSINOPHIL # BLD AUTO: 211 CELLS/UL (ref 15–500)
EOSINOPHIL NFR BLD AUTO: 3.7 %
ERYTHROCYTE [DISTWIDTH] IN BLOOD BY AUTOMATED COUNT: 13 % (ref 11–15)
EST. AVERAGE GLUCOSE BLD GHB EST-MCNC: 126 (CALC)
EST. AVERAGE GLUCOSE BLD GHB EST-SCNC: 7 (CALC)
GLOBULIN SER CALC-MCNC: 2.7 G/DL (CALC) (ref 1.9–3.7)
GLUCOSE SERPL-MCNC: 95 MG/DL (ref 65–99)
HBA1C MFR BLD: 6 % OF TOTAL HGB
HCT VFR BLD AUTO: 39.3 % (ref 35–45)
HCV AB S/CO SERPL IA: 0.01
HCV AB SERPL QL IA: NORMAL
HDLC SERPL-MCNC: 62 MG/DL
HGB BLD-MCNC: 12.7 G/DL (ref 11.7–15.5)
LDLC SERPL CALC-MCNC: 111 MG/DL (CALC)
LYMPHOCYTES # BLD AUTO: 1277 CELLS/UL (ref 850–3900)
LYMPHOCYTES NFR BLD AUTO: 22.4 %
MCH RBC QN AUTO: 30 PG (ref 27–33)
MCHC RBC AUTO-ENTMCNC: 32.3 G/DL (ref 32–36)
MCV RBC AUTO: 92.7 FL (ref 80–100)
MONOCYTES # BLD AUTO: 416 CELLS/UL (ref 200–950)
MONOCYTES NFR BLD AUTO: 7.3 %
NEUTROPHILS # BLD AUTO: 3756 CELLS/UL (ref 1500–7800)
NEUTROPHILS NFR BLD AUTO: 65.9 %
NONHDLC SERPL-MCNC: 131 MG/DL (CALC)
PLATELET # BLD AUTO: 178 THOUSAND/UL (ref 140–400)
PMV BLD REES-ECKER: 12.1 FL (ref 7.5–12.5)
POTASSIUM SERPL-SCNC: 4.9 MMOL/L (ref 3.5–5.3)
PROT SERPL-MCNC: 6.6 G/DL (ref 6.1–8.1)
RBC # BLD AUTO: 4.24 MILLION/UL (ref 3.8–5.1)
SL AMB EGFR AFRICAN AMERICAN: 106 ML/MIN/1.73M2
SL AMB EGFR NON AFRICAN AMERICAN: 92 ML/MIN/1.73M2
SODIUM SERPL-SCNC: 142 MMOL/L (ref 135–146)
T4 FREE SERPL-MCNC: 1 NG/DL (ref 0.8–1.8)
TRIGL SERPL-MCNC: 92 MG/DL
TSH SERPL-ACNC: 4.86 MIU/L (ref 0.4–4.5)
WBC # BLD AUTO: 5.7 THOUSAND/UL (ref 3.8–10.8)

## 2019-08-28 PROCEDURE — 3044F HG A1C LEVEL LT 7.0%: CPT | Performed by: FAMILY MEDICINE

## 2019-09-18 PROCEDURE — 88305 TISSUE EXAM BY PATHOLOGIST: CPT | Performed by: PATHOLOGY

## 2019-09-18 PROCEDURE — 88344 IMHCHEM/IMCYTCHM EA MLT ANTB: CPT | Performed by: PATHOLOGY

## 2019-09-18 PROCEDURE — 88342 IMHCHEM/IMCYTCHM 1ST ANTB: CPT | Performed by: PATHOLOGY

## 2019-09-19 ENCOUNTER — TELEPHONE (OUTPATIENT)
Dept: FAMILY MEDICINE CLINIC | Facility: CLINIC | Age: 64
End: 2019-09-19

## 2019-09-19 NOTE — TELEPHONE ENCOUNTER
Patient called about labs   I told her her labs looked okay and her cholesterol has improved and she should continue to work on her diet and exercise

## 2019-10-21 ENCOUNTER — OFFICE VISIT (OUTPATIENT)
Dept: FAMILY MEDICINE CLINIC | Facility: CLINIC | Age: 64
End: 2019-10-21
Payer: COMMERCIAL

## 2019-10-21 VITALS
DIASTOLIC BLOOD PRESSURE: 70 MMHG | RESPIRATION RATE: 18 BRPM | HEART RATE: 60 BPM | HEIGHT: 64 IN | TEMPERATURE: 97.6 F | WEIGHT: 228 LBS | SYSTOLIC BLOOD PRESSURE: 120 MMHG | BODY MASS INDEX: 38.93 KG/M2

## 2019-10-21 DIAGNOSIS — E03.9 HYPOTHYROIDISM, UNSPECIFIED TYPE: ICD-10-CM

## 2019-10-21 DIAGNOSIS — E78.2 MIXED HYPERLIPIDEMIA: ICD-10-CM

## 2019-10-21 DIAGNOSIS — I10 ESSENTIAL HYPERTENSION: Primary | ICD-10-CM

## 2019-10-21 DIAGNOSIS — Z23 NEED FOR VACCINATION: ICD-10-CM

## 2019-10-21 PROCEDURE — 99214 OFFICE O/P EST MOD 30 MIN: CPT | Performed by: FAMILY MEDICINE

## 2019-10-21 PROCEDURE — 90471 IMMUNIZATION ADMIN: CPT | Performed by: FAMILY MEDICINE

## 2019-10-21 PROCEDURE — 90662 IIV NO PRSV INCREASED AG IM: CPT | Performed by: FAMILY MEDICINE

## 2019-10-21 PROCEDURE — 3008F BODY MASS INDEX DOCD: CPT | Performed by: FAMILY MEDICINE

## 2019-10-21 PROCEDURE — 90472 IMMUNIZATION ADMIN EACH ADD: CPT | Performed by: FAMILY MEDICINE

## 2019-10-21 PROCEDURE — 3074F SYST BP LT 130 MM HG: CPT | Performed by: FAMILY MEDICINE

## 2019-10-21 PROCEDURE — 3078F DIAST BP <80 MM HG: CPT | Performed by: FAMILY MEDICINE

## 2019-10-21 PROCEDURE — 90670 PCV13 VACCINE IM: CPT | Performed by: FAMILY MEDICINE

## 2019-10-21 NOTE — PROGRESS NOTES
Assessment/Plan:  Hypertension  The patient's blood pressure is stable on her current medication  We have made no changes today  She will continue with her healthy diet and exercise  We will see her back as scheduled  Hyperlipidemia  The patient's cholesterol is well controlled with her current medication  We have made no changes today  We will see her back as scheduled  Hypothyroidism  The patient will continue on the current dose of her levothyroxine  We have made no changes today  We will see her back as scheduled  Diagnoses and all orders for this visit:    Essential hypertension    Mixed hyperlipidemia    Hypothyroidism, unspecified type    Need for vaccination  -     PNEUMOCOCCAL CONJUGATE VACCINE 13-VALENT GREATER THAN 6 MONTHS  -     influenza vaccine, [id number], high-dose, PF 0 5 mL (FLUZONE HIGH-DOSE)       Return in about 1 month (around 11/21/2019) for Annual physical- needs physical  for work  Subjective:   Chief Complaint   Patient presents with    Follow-up     3 month checkup        Patient ID: Yamila Bird is a 70 y o  female presents today for routine checkup  Yamila Bird is a 70 y o  Female who presents for a follow up of her HTN, hyperlipidemia, and hypothyroidism  She is no longer drinking  She saw GI last month and is good for a year  The patient denies any chest pain, shortness of breath, or palpitations  There is no edema  There are no headaches or visual changes  There is no lightheadedness, dizziness, or fainting spells  The patient currently denies any nausea, vomiting, or GERD symptoms  she has normal bowel movements and normal urine output  she has a normal appetite  She has gained weight  Hypertension   This is a chronic problem  The current episode started more than 1 year ago  The problem is unchanged  The problem is controlled   Pertinent negatives include no anxiety, blurred vision, chest pain, headaches, malaise/fatigue, neck pain, orthopnea, palpitations, peripheral edema, PND, shortness of breath or sweats  Past treatments include ACE inhibitors  The current treatment provides significant improvement  There are no compliance problems  The following portions of the patient's history were reviewed and updated as appropriate: allergies, current medications, past family history, past medical history, past social history, past surgical history and problem list   Patient Active Problem List   Diagnosis    Female cystocele    Generalized osteoarthritis    Hyperlipidemia    Hypertension    Hypothyroidism    Intermittent claudication ([address] )    Sleep disturbances    Urge and stress incontinence    Varicose veins of both lower extremities with pain    Vitamin D deficiency    Shoulder subluxation, right    DJD of right shoulder    Rotator cuff tear arthropathy of right shoulder    Alcohol induced acute pancreatitis    Obese    Pancreatic pseudocyst     Past Medical History:   Diagnosis Date    Disease of thyroid gland     GERD (gastroesophageal reflux disease)     HL (hearing loss)     Hyperlipidemia     Polyp of sigmoid colon     last assessed 6/12/12    Sleep disturbances     last assessed 6/5/14     Past Surgical History:   Procedure Laterality Date    BREAST CYST ASPIRATION Right     20 yrs ago in dr office net results    COLONOSCOPY  06/18/2011    Complete     Repeat in 5yrs      NASAL FRACTURE SURGERY       Allergies   Allergen Reactions    Statins Myalgia     Family History   Problem Relation Age of Onset    Alzheimer's disease Mother     Diabetes Mother     No Known Problems Father     No Known Problems Maternal Grandmother     No Known Problems Maternal Grandfather     No Known Problems Paternal Grandmother     No Known Problems Paternal Grandfather     No Known Problems Maternal Aunt     No Known Problems Maternal Aunt     No Known Problems Maternal Aunt     No Known Problems Maternal Aunt     No Known Problems Maternal Aunt     No Known Problems Paternal Aunt     No Known Problems Paternal Aunt     No Known Problems Paternal Aunt      Social History     Socioeconomic History    Marital status: Single     Spouse name: Not on file    Number of children: Not on file    Years of education: Not on file    Highest education level: Not on file   Occupational History    Not on file   Social Needs    Financial resource strain: Not on file    Food insecurity:     Worry: Not on file     Inability: Not on file    Transportation needs:     Medical: Not on file     Non-medical: Not on file   Tobacco Use    Smoking status: Never Smoker    Smokeless tobacco: Never Used   Substance and Sexual Activity    Alcohol use: No    Drug use: No    Sexual activity: Not on file   Lifestyle    Physical activity:     Days per week: Not on file     Minutes per session: Not on file    Stress: Not on file   Relationships    Social connections:     Talks on phone: Not on file     Gets together: Not on file     Attends Shinto service: Not on file     Active member of club or organization: Not on file     Attends meetings of clubs or organizations: Not on file     Relationship status: Not on file    Intimate partner violence:     Fear of current or ex partner: Not on file     Emotionally abused: Not on file     Physically abused: Not on file     Forced sexual activity: Not on file   Other Topics Concern    Not on file   Social History Narrative    Sleep disturbances      Current Outpatient Medications on File Prior to Visit   Medication Sig Dispense Refill    aspirin 81 MG tablet Take 1 tablet by mouth daily      DEXILANT 60 MG capsule Take 1 tablet by mouth daily  4    ezetimibe (ZETIA) 10 mg tablet Take 1 tablet (10 mg total) by mouth daily 90 tablet 1    levothyroxine 50 mcg tablet Take 2 tablets (100 mcg total) by mouth daily 180 tablet 1    lisinopril (ZESTRIL) 20 mg tablet Take 1 tablet (20 mg total) by mouth daily 90 tablet 2    multivitamin (THERAGRAN) TABS Take 1 tablet by mouth      Omega-3 Fatty Acids (FISH OIL PO) Take 2 g by mouth      pantoprazole (PROTONIX) 40 mg tablet        No current facility-administered medications on file prior to visit  Review of Systems   Constitutional: Negative  Negative for malaise/fatigue  HENT: Negative  Eyes: Negative  Negative for blurred vision  Respiratory: Negative  Negative for shortness of breath  Cardiovascular: Negative  Negative for chest pain, palpitations, orthopnea and PND  Gastrointestinal: Negative  Endocrine: Negative  Genitourinary: Negative  Musculoskeletal: Negative  Negative for neck pain  Skin: Negative  Allergic/Immunologic: Negative  Neurological: Negative  Negative for headaches  Hematological: Negative  Psychiatric/Behavioral: Negative  Objective:  Vitals:    10/21/19 1433 10/21/19 1551   BP: 130/70 120/70   BP Location: Right arm    Patient Position: Sitting    Cuff Size: Standard    Pulse: 78 60   Resp: 18    Temp: 97 6 °F (36 4 °C)    TempSrc: Tympanic    Weight: 103 kg (228 lb)    Height: 5' 4" (1 626 m)      Body mass index is 39 14 kg/m²  Physical Exam   Constitutional: She is oriented to person, place, and time  She appears well-developed and well-nourished  HENT:   Head: Normocephalic and atraumatic  Mouth/Throat: No oropharyngeal exudate  Eyes: Pupils are equal, round, and reactive to light  Conjunctivae and EOM are normal    Neck: Normal range of motion  No JVD present  No tracheal deviation present  No thyromegaly present  Cardiovascular: Normal rate, regular rhythm, normal heart sounds and intact distal pulses  Exam reveals no gallop and no friction rub  No murmur heard  Pulmonary/Chest: Effort normal and breath sounds normal  No stridor  No respiratory distress  She has no wheezes  She has no rales  She exhibits no tenderness  Abdominal: Soft   Bowel sounds are normal  She exhibits no distension and no mass  There is no tenderness  There is no rebound and no guarding  Musculoskeletal: Normal range of motion  She exhibits no edema, tenderness or deformity  Lymphadenopathy:     She has no cervical adenopathy  Neurological: She is alert and oriented to person, place, and time  She has normal reflexes  She displays normal reflexes  No cranial nerve deficit  She exhibits normal muscle tone  Coordination normal    Skin: Skin is warm and dry  Office Visit on 09/18/2019   Component Date Value    Case Report 09/18/2019                      Value:Surgical Pathology Report                         Case: [id number]                                   Authorizing Provider:  Sergey Villalpando MD        Collected:           09/18/2019 1125              Ordering Location:     [address] [place] [address] Drive:            09/18/2019 1602                                     Throat                                                                       Pathologist:           Silvia Lazaro MD                                                             Specimen:    Lip, right lower lip                                                                       Final Diagnosis 09/18/2019                      Value: This result contains rich text formatting which cannot be displayed here   Additional Information 09/18/2019                      Value: This result contains rich text formatting which cannot be displayed here  [facility] Bry[facility] Gross Description 09/18/2019                      Value: This result contains rich text formatting which cannot be displayed here      Clinical Information 09/18/2019                      Value:Lip ulcer

## 2019-10-22 NOTE — ASSESSMENT & PLAN NOTE
The patient will continue on the current dose of her levothyroxine  We have made no changes today  We will see her back as scheduled

## 2019-10-22 NOTE — ASSESSMENT & PLAN NOTE
The patient's blood pressure is stable on her current medication  We have made no changes today  She will continue with her healthy diet and exercise  We will see her back as scheduled

## 2019-10-22 NOTE — ASSESSMENT & PLAN NOTE
The patient's cholesterol is well controlled with her current medication  We have made no changes today  We will see her back as scheduled

## 2019-12-04 ENCOUNTER — OFFICE VISIT (OUTPATIENT)
Dept: FAMILY MEDICINE CLINIC | Facility: CLINIC | Age: 64
End: 2019-12-04
Payer: COMMERCIAL

## 2019-12-04 VITALS
TEMPERATURE: 97.4 F | SYSTOLIC BLOOD PRESSURE: 120 MMHG | BODY MASS INDEX: 39.44 KG/M2 | DIASTOLIC BLOOD PRESSURE: 78 MMHG | RESPIRATION RATE: 15 BRPM | WEIGHT: 231 LBS | HEIGHT: 64 IN | HEART RATE: 68 BPM | OXYGEN SATURATION: 95 %

## 2019-12-04 DIAGNOSIS — Z11.4 SCREENING FOR HIV (HUMAN IMMUNODEFICIENCY VIRUS): ICD-10-CM

## 2019-12-04 DIAGNOSIS — Z00.00 ANNUAL PHYSICAL EXAM: Primary | ICD-10-CM

## 2019-12-04 PROCEDURE — 1160F RVW MEDS BY RX/DR IN RCRD: CPT | Performed by: FAMILY MEDICINE

## 2019-12-04 PROCEDURE — 99397 PER PM REEVAL EST PAT 65+ YR: CPT | Performed by: FAMILY MEDICINE

## 2019-12-04 NOTE — PATIENT INSTRUCTIONS

## 2019-12-04 NOTE — PROGRESS NOTES
[facility] \[facility]\"" PRACTICE    NAME: Rufino Munroe  AGE: 70 y o  SEX: female  : 1955     DATE: 2019     Assessment and Plan:     Problem List Items Addressed This Visit     None      Visit Diagnoses     Annual physical exam    -  Primary    Screening for HIV (human immunodeficiency virus)        Relevant Orders    Human Immunodeficiency Virus 1/2 Antigen / Antibody ( Fourth Generation) with Reflex Testing      The patient had a normal exam today in the office and she is stable on her current medications  We have made no changes today  She will continue to work on improving her diet and exercise  She will continue with her current medications  We will see her back in the office as scheduled  Immunizations and preventive care screenings were discussed with patient today  Appropriate education was printed on patient's after visit summary  Counseling:  Alcohol/drug use: discussed moderation in alcohol intake, the recommendations for healthy alcohol use, and avoidance of illicit drug use  Dental Health: discussed importance of regular tooth brushing, flossing, and dental visits  Injury prevention: discussed safety/seat belts, safety helmets, smoke detectors, carbon dioxide detectors, and smoking near bedding or upholstery  · Exercise: the importance of regular exercise/physical activity was discussed  Recommend exercise 3-5 times per week for at least 30 minutes  Return in about 3 months (around 3/4/2020) for Recheck  Chief Complaint:     Chief Complaint   Patient presents with    Annual Exam     70years old, physical exam with breast exam        History of Present Illness:     Adult Annual Physical   Patient here for a comprehensive physical exam  The patient reports no problems  Rufino Munroe is a 70 y o  female who presents today for a complete physical  she   has been feeling well and has no complaints today   The patient denies any chest pain, shortness of breath, or palpitations  There is no edema  There are no headaches or visual changes  There is no lightheadedness, dizziness, or fainting spells  There are no GI symptoms  The patient goes for dental exams every 6 months and sees her eye doctor  The patient is watching her diet and follows a regular exercise program    She is feeling well on her medications and has no complaints today  Diet and Physical Activity  · Diet/Nutrition: well balanced diet, limited junk food, low fat diet and consuming 3-5 servings of fruits/vegetables daily  · Exercise: walking and 5-7 times a week on average  Depression Screening  PHQ-9 Depression Screening    PHQ-9:    Frequency of the following problems over the past two weeks:       Little interest or pleasure in doing things:  0 - not at all  Feeling down, depressed, or hopeless:  0 - not at all  PHQ-2 Score:  0       General Health  · Sleep: sleeps well  · Hearing: normal - bilateral   · Vision: no vision problems, goes for regular eye exams, most recent eye exam <1 year ago and wears glasses  · Dental: regular dental visits  /GYN Health  · Patient is: postmenopausal  · Last menstrual period: none     Review of Systems:     Review of Systems   Constitutional: Negative  HENT: Negative  Eyes: Negative  Respiratory: Negative  Cardiovascular: Negative  Gastrointestinal: Negative  Endocrine: Negative  Genitourinary: Negative  Musculoskeletal: Negative  Skin: Negative  Allergic/Immunologic: Negative  Neurological: Negative  Hematological: Negative  Psychiatric/Behavioral: Negative         Past Medical History:     Past Medical History:   Diagnosis Date    Disease of thyroid gland     GERD (gastroesophageal reflux disease)     HL (hearing loss)     Hyperlipidemia     Polyp of sigmoid colon     last assessed 6/12/12    Sleep disturbances     last assessed 6/5/14      Past Surgical History:     Past Surgical History:   Procedure Laterality Date    BREAST CYST ASPIRATION Right     20 yrs ago in dr office net results    COLONOSCOPY  06/18/2011    Complete     Repeat in 5yrs      NASAL FRACTURE SURGERY        Social History:     Social History     Socioeconomic History    Marital status: Single     Spouse name: None    Number of children: None    Years of education: None    Highest education level: None   Occupational History    None   Social Needs    Financial resource strain: None    Food insecurity:     Worry: None     Inability: None    Transportation needs:     Medical: None     Non-medical: None   Tobacco Use    Smoking status: Never Smoker    Smokeless tobacco: Never Used   Substance and Sexual Activity    Alcohol use: No    Drug use: No    Sexual activity: None   Lifestyle    Physical activity:     Days per week: None     Minutes per session: None    Stress: None   Relationships    Social connections:     Talks on phone: None     Gets together: None     Attends Zoroastrianism service: None     Active member of club or organization: None     Attends meetings of clubs or organizations: None     Relationship status: None    Intimate partner violence:     Fear of current or ex partner: None     Emotionally abused: None     Physically abused: None     Forced sexual activity: None   Other Topics Concern    None   Social History Narrative    Sleep disturbances       Family History:     Family History   Problem Relation Age of Onset    Alzheimer's disease Mother     Diabetes Mother     No Known Problems Father     No Known Problems Maternal Grandmother     No Known Problems Maternal Grandfather     No Known Problems Paternal Grandmother     No Known Problems Paternal Grandfather     No Known Problems Maternal Aunt     No Known Problems Maternal Aunt     No Known Problems Maternal Aunt     No Known Problems Maternal Aunt     No Known Problems Maternal Aunt     No Known Problems Paternal Aunt     No Known Problems Paternal Aunt     No Known Problems Paternal Aunt       Current Medications:     Current Outpatient Medications   Medication Sig Dispense Refill    aspirin 81 MG tablet Take 1 tablet by mouth daily      DEXILANT 60 MG capsule Take 1 tablet by mouth daily  4    ezetimibe (ZETIA) 10 mg tablet Take 1 tablet (10 mg total) by mouth daily 90 tablet 1    levothyroxine 50 mcg tablet Take 2 tablets (100 mcg total) by mouth daily 180 tablet 1    lisinopril (ZESTRIL) 20 mg tablet Take 1 tablet (20 mg total) by mouth daily 90 tablet 2    multivitamin (THERAGRAN) TABS Take 1 tablet by mouth      Omega-3 Fatty Acids (FISH OIL PO) Take 2 g by mouth      pantoprazole (PROTONIX) 40 mg tablet        No current facility-administered medications for this visit  Allergies: Allergies   Allergen Reactions    Statins Myalgia      Physical Exam:     /78   Pulse 68   Temp (!) 97 4 °F (36 3 °C) (Tympanic)   Resp 15   Ht 5' 4" (1 626 m)   Wt 105 kg (231 lb)   LMP  (LMP Unknown)   SpO2 95%   BMI 39 65 kg/m²     Physical Exam   Constitutional: She is oriented to person, place, and time  She appears well-developed and well-nourished  HENT:   Head: Normocephalic and atraumatic  Mouth/Throat: No oropharyngeal exudate  Eyes: Pupils are equal, round, and reactive to light  Conjunctivae and EOM are normal    Neck: Normal range of motion  No JVD present  No tracheal deviation present  No thyromegaly present  Cardiovascular: Normal rate, regular rhythm, normal heart sounds and intact distal pulses  Exam reveals no gallop and no friction rub  No murmur heard  Pulmonary/Chest: Effort normal and breath sounds normal  No stridor  No respiratory distress  She has no wheezes  She has no rales  She exhibits no tenderness  Abdominal: Soft  Bowel sounds are normal  She exhibits no distension and no mass  There is no tenderness   There is no rebound and no guarding  Musculoskeletal: Normal range of motion  She exhibits no edema, tenderness or deformity  Lymphadenopathy:     She has no cervical adenopathy  Neurological: She is alert and oriented to person, place, and time  She has normal reflexes  She displays normal reflexes  No cranial nerve deficit  She exhibits normal muscle tone  Coordination normal    Skin: Skin is warm and dry  The 10-year ASCVD risk score (Johan Pittman et al , 2013) is: 6 1%    Values used to calculate the score:      Age: 70 years      Sex: Female      Is Non- : No      Diabetic: No      Tobacco smoker: No      Systolic Blood Pressure: 433 mmHg      Is BP treated: Yes      HDL Cholesterol: 62 mg/dL      Total Cholesterol: 193 mg/dL  Office Visit on 09/18/2019   Component Date Value    Case Report 09/18/2019                      Value:Surgical Pathology Report                         Case: [id number]                                   Authorizing Provider:  Tico Pierce MD        Collected:           09/18/2019 1125              Ordering Location:     [address] [place] [address] Drive:            09/18/2019 1602                                     Throat                                                                       Pathologist:           Carie Wolfe MD                                                             Specimen:    Lip, right lower lip                                                                       Final Diagnosis 09/18/2019                      Value: This result contains rich text formatting which cannot be displayed here   Additional Information 09/18/2019                      Value: This result contains rich text formatting which cannot be displayed here  [facility] Gross Description 09/18/2019                      Value: This result contains rich text formatting which cannot be displayed here      Clinical Information 09/18/2019                      Value:Lip ulcer Office Visit on 07/18/2019   Component Date Value    Glucose, Random 08/27/2019 95     BUN 08/27/2019 17     Creatinine 08/27/2019 0 68     eGFR Non  08/27/2019 92     eGFR  08/27/2019 106     SL AMB BUN/CREATININE RA* 26/25/9799 NOT APPLICABLE     Sodium 52/89/8787 142     Potassium 08/27/2019 4 9     Chloride 08/27/2019 106     CO2 08/27/2019 32     SL AMB CALCIUM 08/27/2019 9 1     Protein, Total 08/27/2019 6 6     Albumin 08/27/2019 3 9     Globulin 08/27/2019 2 7     Albumin/Globulin Ratio 08/27/2019 1 4     TOTAL BILIRUBIN 08/27/2019 0 3     Alkaline Phosphatase 08/27/2019 78     AST 08/27/2019 18     ALT 08/27/2019 21     Hemoglobin A1C 08/27/2019 6 0*    Estimated Average Glucose 08/27/2019 126     Estimated Average Glucos* 08/27/2019 7 0     Free t4 08/27/2019 1 0     TSH 08/27/2019 4 86*    White Blood Cell Count 08/27/2019 5 7     Red Blood Cell Count 08/27/2019 4 24     Hemoglobin 08/27/2019 12 7     HCT 08/27/2019 39 3     MCV 08/27/2019 92 7     MCH 08/27/2019 30 0     MCHC 08/27/2019 32 3     RDW 08/27/2019 13 0     Platelet Count 81/01/3727 178     SL AMB MPV 08/27/2019 12 1     Neutrophils (Absolute) 08/27/2019 3,756     Lymphocytes (Absolute) 08/27/2019 1,277     Monocytes (Absolute) 08/27/2019 416     Eosinophils (Absolute) 08/27/2019 211     Basophils ABS 08/27/2019 40     Neutrophils 08/27/2019 65 9     Lymphocytes 08/27/2019 22 4     Monocytes 08/27/2019 7 3     Eosinophils 08/27/2019 3 7     Basophils PCT 08/27/2019 0 7     HEP C AB 08/27/2019 NON-REACTIVE     Signal to Cut-Off 08/27/2019 0 01     Total Cholesterol 08/27/2019 193     HDL 08/27/2019 62     Triglycerides 08/27/2019 92     LDL Direct 08/27/2019 111*    Chol HDLC Ratio 08/27/2019 3 1     Non-HDL Cholesterol 08/27/2019 131*          Imelda Biswas, DO  [facility]

## 2019-12-14 DIAGNOSIS — E03.9 HYPOTHYROIDISM, UNSPECIFIED TYPE: ICD-10-CM

## 2019-12-14 RX ORDER — LEVOTHYROXINE SODIUM 0.05 MG/1
100 TABLET ORAL DAILY
Qty: 180 TABLET | Refills: 1 | OUTPATIENT
Start: 2019-12-14

## 2019-12-26 DIAGNOSIS — E03.9 HYPOTHYROIDISM, UNSPECIFIED TYPE: ICD-10-CM

## 2019-12-27 DIAGNOSIS — E03.9 HYPOTHYROIDISM, UNSPECIFIED TYPE: ICD-10-CM

## 2019-12-27 RX ORDER — LEVOTHYROXINE SODIUM 0.05 MG/1
100 TABLET ORAL DAILY
Qty: 180 TABLET | Refills: 0 | OUTPATIENT
Start: 2019-12-27

## 2019-12-27 RX ORDER — LEVOTHYROXINE SODIUM 0.05 MG/1
100 TABLET ORAL DAILY
Qty: 180 TABLET | Refills: 1 | Status: SHIPPED | OUTPATIENT
Start: 2019-12-27 | End: 2020-04-13 | Stop reason: SDUPTHER

## 2020-02-04 ENCOUNTER — TELEPHONE (OUTPATIENT)
Dept: OTHER | Facility: OTHER | Age: 65
End: 2020-02-04

## 2020-02-10 LAB — HIV 1+2 AB+HIV1 P24 AG SERPL QL IA: NORMAL

## 2020-02-25 ENCOUNTER — OFFICE VISIT (OUTPATIENT)
Dept: FAMILY MEDICINE CLINIC | Facility: CLINIC | Age: 65
End: 2020-02-25
Payer: COMMERCIAL

## 2020-02-25 VITALS
OXYGEN SATURATION: 97 % | TEMPERATURE: 98.3 F | RESPIRATION RATE: 18 BRPM | BODY MASS INDEX: 40.22 KG/M2 | WEIGHT: 235.6 LBS | DIASTOLIC BLOOD PRESSURE: 88 MMHG | HEIGHT: 64 IN | HEART RATE: 94 BPM | SYSTOLIC BLOOD PRESSURE: 128 MMHG

## 2020-02-25 DIAGNOSIS — J01.01 ACUTE RECURRENT MAXILLARY SINUSITIS: Primary | ICD-10-CM

## 2020-02-25 DIAGNOSIS — R06.2 WHEEZING: ICD-10-CM

## 2020-02-25 PROCEDURE — 4040F PNEUMOC VAC/ADMIN/RCVD: CPT | Performed by: NURSE PRACTITIONER

## 2020-02-25 PROCEDURE — 99213 OFFICE O/P EST LOW 20 MIN: CPT | Performed by: NURSE PRACTITIONER

## 2020-02-25 PROCEDURE — 1036F TOBACCO NON-USER: CPT | Performed by: NURSE PRACTITIONER

## 2020-02-25 PROCEDURE — 3288F FALL RISK ASSESSMENT DOCD: CPT | Performed by: NURSE PRACTITIONER

## 2020-02-25 PROCEDURE — 3079F DIAST BP 80-89 MM HG: CPT | Performed by: NURSE PRACTITIONER

## 2020-02-25 PROCEDURE — 1160F RVW MEDS BY RX/DR IN RCRD: CPT | Performed by: NURSE PRACTITIONER

## 2020-02-25 PROCEDURE — 3074F SYST BP LT 130 MM HG: CPT | Performed by: NURSE PRACTITIONER

## 2020-02-25 PROCEDURE — 1101F PT FALLS ASSESS-DOCD LE1/YR: CPT | Performed by: NURSE PRACTITIONER

## 2020-02-25 PROCEDURE — 3008F BODY MASS INDEX DOCD: CPT | Performed by: NURSE PRACTITIONER

## 2020-02-25 RX ORDER — AMOXICILLIN AND CLAVULANATE POTASSIUM 875; 125 MG/1; MG/1
1 TABLET, FILM COATED ORAL EVERY 12 HOURS SCHEDULED
Qty: 14 TABLET | Refills: 0 | Status: ON HOLD | OUTPATIENT
Start: 2020-02-25 | End: 2020-03-09

## 2020-02-25 RX ORDER — PREDNISONE 10 MG/1
10 TABLET ORAL DAILY
Qty: 15 TABLET | Refills: 0 | Status: SHIPPED | OUTPATIENT
Start: 2020-02-25 | End: 2020-03-02 | Stop reason: ALTCHOICE

## 2020-02-25 NOTE — PROGRESS NOTES
Assessment/Plan   Problem List Items Addressed This Visit     None      Visit Diagnoses     Acute recurrent maxillary sinusitis    -  Primary    Relevant Medications    amoxicillin-clavulanate (AUGMENTIN) 875-125 mg per tablet    Wheezing        Relevant Medications    predniSONE 10 mg tablet                Chief Complaint   Patient presents with    Headache     for 1 week     Cough    Sore Throat    Earache     right ear        Subjective   Patient ID: Vitaliy Sidhu is a 75 y o  female  Vitals:    02/25/20 1306   BP: 128/88   Pulse: 94   Resp: 18   Temp: 98 3 °F (36 8 °C)   SpO2: 97%     Cough   This is a new problem  The current episode started in the past 7 days  The problem has been unchanged  The problem occurs every few minutes  The cough is non-productive  Associated symptoms include chills, ear pain, headaches, myalgias and a sore throat  Pertinent negatives include no chest pain, fever, nasal congestion or shortness of breath  The symptoms are aggravated by lying down  Risk factors: none  She has tried OTC cough suppressant for the symptoms  The treatment provided no relief  There is no history of bronchitis or pneumonia  The following portions of the patient's history were reviewed and updated as appropriate: allergies, current medications, past family history, past social history, past surgical history and problem list     Review of Systems   Constitutional: Positive for chills  Negative for fever  HENT: Positive for ear pain and sore throat  Eyes: Negative  Respiratory: Positive for cough  Negative for shortness of breath  Cardiovascular: Negative  Negative for chest pain  Gastrointestinal: Negative  Endocrine: Negative  Genitourinary: Negative  Musculoskeletal: Positive for myalgias  Skin: Negative  Allergic/Immunologic: Negative  Neurological: Positive for headaches  Hematological: Negative  Psychiatric/Behavioral: Negative          Objective Physical Exam   Constitutional: She appears well-developed and well-nourished  Non-toxic appearance  She appears ill  No distress  HENT:   Head: Normocephalic and atraumatic  Right Ear: Hearing and ear canal normal  No swelling or tenderness  A middle ear effusion is present  Left Ear: Hearing and ear canal normal  No swelling or tenderness  A middle ear effusion is present  Mouth/Throat: Uvula is midline, oropharynx is clear and moist and mucous membranes are normal  Mucous membranes are not pale, not dry and not cyanotic  Tonsils are 0 on the right  Tonsils are 0 on the left  No tonsillar exudate  Cardiovascular: Normal rate, regular rhythm, normal heart sounds and intact distal pulses  Exam reveals no gallop and no friction rub  No murmur heard  Lymphadenopathy:     She has cervical adenopathy (right sided tonsilar gland swollen ,, tender to touch)  Nursing note and vitals reviewed      Allergies   Allergen Reactions    Statins Myalgia     Patient Active Problem List   Diagnosis    Female cystocele    Generalized osteoarthritis    Hyperlipidemia    Hypertension    Hypothyroidism    Intermittent claudication (HCC)    Sleep disturbances    Urge and stress incontinence    Varicose veins of both lower extremities with pain    Vitamin D deficiency    Shoulder subluxation, right    DJD of right shoulder    Rotator cuff tear arthropathy of right shoulder    Alcohol induced acute pancreatitis    Obese    Pancreatic pseudocyst       Current Outpatient Medications:     aspirin 81 MG tablet, Take 1 tablet by mouth daily, Disp: , Rfl:     DEXILANT 60 MG capsule, Take 1 tablet by mouth daily, Disp: , Rfl: 4    ezetimibe (ZETIA) 10 mg tablet, Take 1 tablet (10 mg total) by mouth daily, Disp: 90 tablet, Rfl: 1    levothyroxine 50 mcg tablet, Take 2 tablets (100 mcg total) by mouth daily, Disp: 180 tablet, Rfl: 1    lisinopril (ZESTRIL) 20 mg tablet, Take 1 tablet (20 mg total) by mouth daily, Disp: 90 tablet, Rfl: 2    multivitamin (THERAGRAN) TABS, Take 1 tablet by mouth, Disp: , Rfl:     Omega-3 Fatty Acids (FISH OIL PO), Take 2 g by mouth, Disp: , Rfl:     pantoprazole (PROTONIX) 40 mg tablet, , Disp: , Rfl:     amoxicillin-clavulanate (AUGMENTIN) 875-125 mg per tablet, Take 1 tablet by mouth every 12 (twelve) hours for 7 days, Disp: 14 tablet, Rfl: 0    predniSONE 10 mg tablet, Take 1 tablet (10 mg total) by mouth daily 5 tabs 2/25, 4 tabs 2/26, 3 tabs 2/27, 2 tabs 2/28, 1 tab 2/29, Disp: 15 tablet, Rfl: 0  Social History     Socioeconomic History    Marital status: Single     Spouse name: Not on file    Number of children: Not on file    Years of education: Not on file    Highest education level: Not on file   Occupational History    Occupation: retired    Social Needs    Financial resource strain: Not hard at all   [employer] insecurity:     Worry: Never true     Inability: Never true   [employer] needs:     Medical: No     Non-medical: No   Tobacco Use    Smoking status: Never Smoker    Smokeless tobacco: Never Used   Substance and Sexual Activity    Alcohol use: No    Drug use: No    Sexual activity: Not on file   Lifestyle    Physical activity:     Days per week: 0 days     Minutes per session: 0 min    Stress: Not at all   Relationships    Social connections:     Talks on phone: Not on file     Gets together: Not on file     Attends Episcopalian service: Not on file     Active member of club or organization: Not on file     Attends meetings of clubs or organizations: Not on file     Relationship status: Never     Intimate partner violence:     Fear of current or ex partner: No     Emotionally abused: No     Physically abused: No     Forced sexual activity: No   Other Topics Concern    Not on file   Social History Narrative    Sleep disturbances      Family History   Problem Relation Age of Onset    Alzheimer's disease Mother     Diabetes Mother     No Known Problems Father     No Known Problems Maternal Grandmother     No Known Problems Maternal Grandfather     No Known Problems Paternal Grandmother     No Known Problems Paternal Grandfather     No Known Problems Maternal Aunt     No Known Problems Maternal Aunt     No Known Problems Maternal Aunt     No Known Problems Maternal Aunt     No Known Problems Maternal Aunt     No Known Problems Paternal Aunt     No Known Problems Paternal Aunt     No Known Problems Paternal Aunt

## 2020-02-25 NOTE — PATIENT INSTRUCTIONS

## 2020-03-02 ENCOUNTER — APPOINTMENT (EMERGENCY)
Dept: CT IMAGING | Facility: HOSPITAL | Age: 65
End: 2020-03-02
Payer: MEDICARE

## 2020-03-02 ENCOUNTER — HOSPITAL ENCOUNTER (EMERGENCY)
Facility: HOSPITAL | Age: 65
End: 2020-03-02
Attending: EMERGENCY MEDICINE | Admitting: EMERGENCY MEDICINE
Payer: MEDICARE

## 2020-03-02 ENCOUNTER — HOSPITAL ENCOUNTER (INPATIENT)
Facility: HOSPITAL | Age: 65
LOS: 7 days | Discharge: HOME | DRG: 872 | End: 2020-03-09
Attending: INTERNAL MEDICINE | Admitting: INTERNAL MEDICINE
Payer: MEDICARE

## 2020-03-02 ENCOUNTER — OFFICE VISIT (OUTPATIENT)
Dept: FAMILY MEDICINE CLINIC | Facility: CLINIC | Age: 65
End: 2020-03-02
Payer: MEDICARE

## 2020-03-02 VITALS
SYSTOLIC BLOOD PRESSURE: 138 MMHG | TEMPERATURE: 102 F | HEART RATE: 102 BPM | DIASTOLIC BLOOD PRESSURE: 60 MMHG | WEIGHT: 238.25 LBS | OXYGEN SATURATION: 98 % | HEIGHT: 65 IN | BODY MASS INDEX: 39.7 KG/M2 | RESPIRATION RATE: 20 BRPM

## 2020-03-02 VITALS
HEART RATE: 115 BPM | TEMPERATURE: 100.2 F | WEIGHT: 236.8 LBS | BODY MASS INDEX: 40.43 KG/M2 | RESPIRATION RATE: 20 BRPM | DIASTOLIC BLOOD PRESSURE: 82 MMHG | OXYGEN SATURATION: 97 % | SYSTOLIC BLOOD PRESSURE: 128 MMHG | HEIGHT: 64 IN

## 2020-03-02 DIAGNOSIS — A41.9 SEPSIS (HCC): ICD-10-CM

## 2020-03-02 DIAGNOSIS — E78.5 HYPERLIPIDEMIA: ICD-10-CM

## 2020-03-02 DIAGNOSIS — L02.11 ABSCESS OF NECK: ICD-10-CM

## 2020-03-02 DIAGNOSIS — J01.01 ACUTE RECURRENT MAXILLARY SINUSITIS: ICD-10-CM

## 2020-03-02 DIAGNOSIS — R59.9 ADENOPATHY: Primary | ICD-10-CM

## 2020-03-02 DIAGNOSIS — A41.9 SEPSIS (HCC): Primary | ICD-10-CM

## 2020-03-02 DIAGNOSIS — E66.09 OBESITY DUE TO EXCESS CALORIES, UNSPECIFIED CLASSIFICATION, UNSPECIFIED WHETHER SERIOUS COMORBIDITY PRESENT: Primary | ICD-10-CM

## 2020-03-02 DIAGNOSIS — R50.9 FEVER, UNSPECIFIED FEVER CAUSE: ICD-10-CM

## 2020-03-02 DIAGNOSIS — I10 HYPERTENSION: ICD-10-CM

## 2020-03-02 PROBLEM — K21.9 GERD (GASTROESOPHAGEAL REFLUX DISEASE): Status: ACTIVE | Noted: 2020-03-02

## 2020-03-02 PROBLEM — D72.829 LEUKOCYTOSIS: Status: ACTIVE | Noted: 2020-03-02

## 2020-03-02 LAB
ALBUMIN SERPL BCP-MCNC: 3.4 G/DL (ref 3.5–5)
ALP SERPL-CCNC: 111 U/L (ref 46–116)
ALT SERPL W P-5'-P-CCNC: 51 U/L (ref 12–78)
ANION GAP SERPL CALCULATED.3IONS-SCNC: 9 MMOL/L (ref 4–13)
APTT PPP: 32 SECONDS (ref 23–37)
AST SERPL W P-5'-P-CCNC: 52 U/L (ref 5–45)
BACTERIA UR QL AUTO: ABNORMAL /HPF
BASOPHILS # BLD AUTO: 0.05 THOUSANDS/ΜL (ref 0–0.1)
BASOPHILS NFR BLD AUTO: 0 % (ref 0–1)
BILIRUB SERPL-MCNC: 0.7 MG/DL (ref 0.2–1)
BILIRUB UR QL STRIP: NEGATIVE
BUN SERPL-MCNC: 11 MG/DL (ref 5–25)
CALCIUM SERPL-MCNC: 9 MG/DL (ref 8.3–10.1)
CHLORIDE SERPL-SCNC: 96 MMOL/L (ref 100–108)
CLARITY UR: CLEAR
CO2 SERPL-SCNC: 31 MMOL/L (ref 21–32)
COLOR UR: YELLOW
CREAT SERPL-MCNC: 0.68 MG/DL (ref 0.6–1.3)
EOSINOPHIL # BLD AUTO: 0.14 THOUSAND/ΜL (ref 0–0.61)
EOSINOPHIL NFR BLD AUTO: 1 % (ref 0–6)
ERYTHROCYTE [DISTWIDTH] IN BLOOD BY AUTOMATED COUNT: 13 % (ref 11.6–15.1)
GFR SERPL CREATININE-BSD FRML MDRD: 91 ML/MIN/1.73SQ M
GLUCOSE SERPL-MCNC: 147 MG/DL (ref 65–140)
GLUCOSE UR STRIP-MCNC: NEGATIVE MG/DL
HCT VFR BLD AUTO: 42 % (ref 34.8–46.1)
HGB BLD-MCNC: 13.6 G/DL (ref 11.5–15.4)
HGB UR QL STRIP.AUTO: ABNORMAL
IMM GRANULOCYTES # BLD AUTO: 0.06 THOUSAND/UL (ref 0–0.2)
IMM GRANULOCYTES NFR BLD AUTO: 0 % (ref 0–2)
INR PPP: 1.02 (ref 0.84–1.19)
KETONES UR STRIP-MCNC: NEGATIVE MG/DL
LACTATE SERPL-SCNC: 2 MMOL/L (ref 0.5–2)
LEUKOCYTE ESTERASE UR QL STRIP: ABNORMAL
LYMPHOCYTES # BLD AUTO: 1.27 THOUSANDS/ΜL (ref 0.6–4.47)
LYMPHOCYTES NFR BLD AUTO: 8 % (ref 14–44)
MCH RBC QN AUTO: 29.9 PG (ref 26.8–34.3)
MCHC RBC AUTO-ENTMCNC: 32.4 G/DL (ref 31.4–37.4)
MCV RBC AUTO: 92 FL (ref 82–98)
MONOCYTES # BLD AUTO: 0.85 THOUSAND/ΜL (ref 0.17–1.22)
MONOCYTES NFR BLD AUTO: 5 % (ref 4–12)
NEUTROPHILS # BLD AUTO: 13.54 THOUSANDS/ΜL (ref 1.85–7.62)
NEUTS SEG NFR BLD AUTO: 86 % (ref 43–75)
NITRITE UR QL STRIP: NEGATIVE
NON-SQ EPI CELLS URNS QL MICRO: ABNORMAL /HPF
NRBC BLD AUTO-RTO: 0 /100 WBCS
PH UR STRIP.AUTO: 7 [PH]
PLATELET # BLD AUTO: 229 THOUSANDS/UL (ref 149–390)
PMV BLD AUTO: 11.2 FL (ref 8.9–12.7)
POTASSIUM SERPL-SCNC: 5.4 MMOL/L (ref 3.5–5.3)
PROCALCITONIN SERPL-MCNC: 0.07 NG/ML
PROT SERPL-MCNC: 8.3 G/DL (ref 6.4–8.2)
PROT UR STRIP-MCNC: NEGATIVE MG/DL
PROTHROMBIN TIME: 13.1 SECONDS (ref 11.6–14.5)
RBC # BLD AUTO: 4.55 MILLION/UL (ref 3.81–5.12)
RBC #/AREA URNS AUTO: ABNORMAL /HPF
SODIUM SERPL-SCNC: 136 MMOL/L (ref 136–145)
SP GR UR STRIP.AUTO: <=1.005 (ref 1–1.03)
UROBILINOGEN UR QL STRIP.AUTO: 0.2 E.U./DL
WBC # BLD AUTO: 15.91 THOUSAND/UL (ref 4.31–10.16)
WBC #/AREA URNS AUTO: ABNORMAL /HPF

## 2020-03-02 PROCEDURE — 99213 OFFICE O/P EST LOW 20 MIN: CPT | Performed by: NURSE PRACTITIONER

## 2020-03-02 PROCEDURE — 1160F RVW MEDS BY RX/DR IN RCRD: CPT | Performed by: NURSE PRACTITIONER

## 2020-03-02 PROCEDURE — 4040F PNEUMOC VAC/ADMIN/RCVD: CPT | Performed by: NURSE PRACTITIONER

## 2020-03-02 PROCEDURE — 93005 ELECTROCARDIOGRAM TRACING: CPT

## 2020-03-02 PROCEDURE — 3079F DIAST BP 80-89 MM HG: CPT | Performed by: NURSE PRACTITIONER

## 2020-03-02 PROCEDURE — 96368 THER/DIAG CONCURRENT INF: CPT

## 2020-03-02 PROCEDURE — 83605 ASSAY OF LACTIC ACID: CPT | Performed by: EMERGENCY MEDICINE

## 2020-03-02 PROCEDURE — 87040 BLOOD CULTURE FOR BACTERIA: CPT | Performed by: EMERGENCY MEDICINE

## 2020-03-02 PROCEDURE — 96365 THER/PROPH/DIAG IV INF INIT: CPT

## 2020-03-02 PROCEDURE — 96367 TX/PROPH/DG ADDL SEQ IV INF: CPT

## 2020-03-02 PROCEDURE — 99221 1ST HOSP IP/OBS SF/LOW 40: CPT | Performed by: OTOLARYNGOLOGY

## 2020-03-02 PROCEDURE — 1036F TOBACCO NON-USER: CPT | Performed by: NURSE PRACTITIONER

## 2020-03-02 PROCEDURE — 99222 1ST HOSP IP/OBS MODERATE 55: CPT | Performed by: INTERNAL MEDICINE

## 2020-03-02 PROCEDURE — 1124F ACP DISCUSS-NO DSCNMKR DOCD: CPT | Performed by: PHYSICIAN ASSISTANT

## 2020-03-02 PROCEDURE — 85730 THROMBOPLASTIN TIME PARTIAL: CPT | Performed by: EMERGENCY MEDICINE

## 2020-03-02 PROCEDURE — 99291 CRITICAL CARE FIRST HOUR: CPT | Performed by: EMERGENCY MEDICINE

## 2020-03-02 PROCEDURE — 3008F BODY MASS INDEX DOCD: CPT | Performed by: NURSE PRACTITIONER

## 2020-03-02 PROCEDURE — 70491 CT SOFT TISSUE NECK W/DYE: CPT

## 2020-03-02 PROCEDURE — 99285 EMERGENCY DEPT VISIT HI MDM: CPT

## 2020-03-02 PROCEDURE — 84145 PROCALCITONIN (PCT): CPT | Performed by: EMERGENCY MEDICINE

## 2020-03-02 PROCEDURE — 80053 COMPREHEN METABOLIC PANEL: CPT | Performed by: EMERGENCY MEDICINE

## 2020-03-02 PROCEDURE — 3074F SYST BP LT 130 MM HG: CPT | Performed by: NURSE PRACTITIONER

## 2020-03-02 PROCEDURE — 36415 COLL VENOUS BLD VENIPUNCTURE: CPT | Performed by: EMERGENCY MEDICINE

## 2020-03-02 PROCEDURE — 85025 COMPLETE CBC W/AUTO DIFF WBC: CPT | Performed by: EMERGENCY MEDICINE

## 2020-03-02 PROCEDURE — 85610 PROTHROMBIN TIME: CPT | Performed by: EMERGENCY MEDICINE

## 2020-03-02 PROCEDURE — 81001 URINALYSIS AUTO W/SCOPE: CPT | Performed by: EMERGENCY MEDICINE

## 2020-03-02 RX ORDER — ACETAMINOPHEN 325 MG/1
650 TABLET ORAL ONCE
Status: COMPLETED | OUTPATIENT
Start: 2020-03-02 | End: 2020-03-02

## 2020-03-02 RX ORDER — ONDANSETRON 2 MG/ML
4 INJECTION INTRAMUSCULAR; INTRAVENOUS EVERY 6 HOURS PRN
Status: DISCONTINUED | OUTPATIENT
Start: 2020-03-02 | End: 2020-03-09 | Stop reason: HOSPADM

## 2020-03-02 RX ORDER — PREDNISONE 10 MG/1
10 TABLET ORAL DAILY
Status: COMPLETED | OUTPATIENT
Start: 2020-03-03 | End: 2020-03-07

## 2020-03-02 RX ORDER — SODIUM CHLORIDE 9 MG/ML
100 INJECTION, SOLUTION INTRAVENOUS CONTINUOUS
Status: DISCONTINUED | OUTPATIENT
Start: 2020-03-02 | End: 2020-03-04

## 2020-03-02 RX ORDER — EZETIMIBE 10 MG/1
10 TABLET ORAL DAILY
Status: DISCONTINUED | OUTPATIENT
Start: 2020-03-03 | End: 2020-03-09 | Stop reason: HOSPADM

## 2020-03-02 RX ORDER — DOCUSATE SODIUM 100 MG/1
100 CAPSULE, LIQUID FILLED ORAL 2 TIMES DAILY
Status: DISCONTINUED | OUTPATIENT
Start: 2020-03-02 | End: 2020-03-09 | Stop reason: HOSPADM

## 2020-03-02 RX ORDER — LISINOPRIL 20 MG/1
20 TABLET ORAL DAILY
Status: DISCONTINUED | OUTPATIENT
Start: 2020-03-03 | End: 2020-03-09 | Stop reason: HOSPADM

## 2020-03-02 RX ORDER — SENNOSIDES 8.6 MG
1 TABLET ORAL DAILY
Status: DISCONTINUED | OUTPATIENT
Start: 2020-03-03 | End: 2020-03-09 | Stop reason: HOSPADM

## 2020-03-02 RX ORDER — PANTOPRAZOLE SODIUM 20 MG/1
20 TABLET, DELAYED RELEASE ORAL
Status: DISCONTINUED | OUTPATIENT
Start: 2020-03-03 | End: 2020-03-02

## 2020-03-02 RX ORDER — CHLORAL HYDRATE 500 MG
2000 CAPSULE ORAL DAILY
Status: DISCONTINUED | OUTPATIENT
Start: 2020-03-03 | End: 2020-03-09 | Stop reason: HOSPADM

## 2020-03-02 RX ORDER — ACETAMINOPHEN 325 MG/1
650 TABLET ORAL EVERY 6 HOURS PRN
Status: DISCONTINUED | OUTPATIENT
Start: 2020-03-02 | End: 2020-03-09 | Stop reason: HOSPADM

## 2020-03-02 RX ORDER — PANTOPRAZOLE SODIUM 40 MG/1
40 TABLET, DELAYED RELEASE ORAL
Status: DISCONTINUED | OUTPATIENT
Start: 2020-03-03 | End: 2020-03-09 | Stop reason: HOSPADM

## 2020-03-02 RX ORDER — ASPIRIN 81 MG/1
81 TABLET ORAL DAILY
Status: DISCONTINUED | OUTPATIENT
Start: 2020-03-03 | End: 2020-03-09 | Stop reason: HOSPADM

## 2020-03-02 RX ORDER — MAGNESIUM HYDROXIDE/ALUMINUM HYDROXICE/SIMETHICONE 120; 1200; 1200 MG/30ML; MG/30ML; MG/30ML
30 SUSPENSION ORAL EVERY 6 HOURS PRN
Status: DISCONTINUED | OUTPATIENT
Start: 2020-03-02 | End: 2020-03-09 | Stop reason: HOSPADM

## 2020-03-02 RX ORDER — CEFEPIME HYDROCHLORIDE 2 G/50ML
2000 INJECTION, SOLUTION INTRAVENOUS ONCE
Status: COMPLETED | OUTPATIENT
Start: 2020-03-02 | End: 2020-03-02

## 2020-03-02 RX ORDER — LEVOTHYROXINE SODIUM 0.1 MG/1
100 TABLET ORAL DAILY
Status: DISCONTINUED | OUTPATIENT
Start: 2020-03-03 | End: 2020-03-09 | Stop reason: HOSPADM

## 2020-03-02 RX ORDER — ACETAMINOPHEN 325 MG/1
650 TABLET ORAL EVERY 6 HOURS PRN
Status: DISCONTINUED | OUTPATIENT
Start: 2020-03-02 | End: 2020-03-02

## 2020-03-02 RX ADMIN — SODIUM CHLORIDE 100 ML/HR: 0.9 INJECTION, SOLUTION INTRAVENOUS at 19:08

## 2020-03-02 RX ADMIN — DOCUSATE SODIUM 100 MG: 100 CAPSULE, LIQUID FILLED ORAL at 20:55

## 2020-03-02 RX ADMIN — ACETAMINOPHEN 650 MG: 325 TABLET ORAL at 11:41

## 2020-03-02 RX ADMIN — CEFEPIME HYDROCHLORIDE 2000 MG: 2 INJECTION, SOLUTION INTRAVENOUS at 12:09

## 2020-03-02 RX ADMIN — SODIUM CHLORIDE 1000 ML: 0.9 INJECTION, SOLUTION INTRAVENOUS at 11:39

## 2020-03-02 RX ADMIN — ACETAMINOPHEN 650 MG: 325 TABLET ORAL at 20:55

## 2020-03-02 RX ADMIN — SODIUM CHLORIDE, SODIUM LACTATE, POTASSIUM CHLORIDE, AND CALCIUM CHLORIDE 1000 ML: .6; .31; .03; .02 INJECTION, SOLUTION INTRAVENOUS at 12:11

## 2020-03-02 RX ADMIN — IOHEXOL 100 ML: 350 INJECTION, SOLUTION INTRAVENOUS at 12:42

## 2020-03-02 RX ADMIN — VANCOMYCIN HYDROCHLORIDE 1500 MG: 1 INJECTION, POWDER, LYOPHILIZED, FOR SOLUTION INTRAVENOUS at 12:24

## 2020-03-02 NOTE — PROGRESS NOTES
Assessment/Plan   Problem List Items Addressed This Visit     None      Visit Diagnoses     Adenopathy    -  Primary    Fever, unspecified fever cause                    Chief Complaint   Patient presents with    Follow-up     for swollen gland-patient states she is not feeling better        Subjective   Patient ID: Meli Ward is a 75 y o  female  Vitals:    03/02/20 1000   BP: 128/82   Pulse: (!) 115   Resp: 20   Temp: 100 2 °F (37 9 °C)   SpO2: 97%     Returns today in follow up for swollen gland and fever, started on augmentin last week for otitis media and swollen gland, Reports that she has completed he antibiotic, admits to continued pain in right ear and "swelling in neck the same"  She denies any difficulty in swallowing, denies decreased appetite  Fever continues  Sending to ED for stat evaluation, neck now firm to touch, red and warm to touch, enlargement noted from pervious visit  The following portions of the patient's history were reviewed and updated as appropriate: allergies, past family history, past medical history, past social history, past surgical history and problem list     Review of Systems   Constitutional: Positive for fatigue and fever  HENT: Positive for sore throat  Eyes: Negative  Respiratory: Negative  Cardiovascular: Negative  Gastrointestinal: Negative  Endocrine: Negative  Genitourinary: Negative  Musculoskeletal: Positive for neck pain  Negative for arthralgias and neck stiffness  Skin: Positive for color change  Allergic/Immunologic: Negative  Neurological: Negative  Hematological: Positive for adenopathy  Psychiatric/Behavioral: Negative  Objective     Physical Exam   Constitutional: She is oriented to person, place, and time  She appears well-developed and well-nourished  No distress     HENT:   Head:       Right Ear: Hearing, tympanic membrane, external ear and ear canal normal    Left Ear: Hearing, tympanic membrane, external ear and ear canal normal    Nose: Mucosal edema present  No sinus tenderness  Mouth/Throat: Uvula is midline  Posterior oropharyngeal erythema present  No tonsillar abscesses  Tonsils are 1+ on the right  Tonsils are 1+ on the left  No tonsillar exudate  Eyes: Conjunctivae are normal    Neck: Normal range of motion  Cardiovascular: Regular rhythm     Pulmonary/Chest: Effort normal and breath sounds normal  She has no wheezes  She has no rales  Abdominal: Soft  Bowel sounds are normal  She exhibits no distension and no mass  There is no tenderness  Musculoskeletal: Normal range of motion  She exhibits no edema or tenderness  Lymphadenopathy:     She has cervical adenopathy  Neurological: She is alert and oriented to person, place, and time  Skin: Skin is warm and dry  Capillary refill takes less than 2 seconds  No rash noted  She is not diaphoretic  No erythema  Psychiatric: She has a normal mood and affect  Her behavior is normal  Judgment and thought content normal    Nursing note and vitals reviewed      Allergies   Allergen Reactions    Statins Myalgia     Patient Active Problem List   Diagnosis    Female cystocele    Generalized osteoarthritis    Hyperlipidemia    Hypertension    Hypothyroidism    Intermittent claudication (HCC)    Sleep disturbances    Urge and stress incontinence    Varicose veins of both lower extremities with pain    Vitamin D deficiency    Shoulder subluxation, right    DJD of right shoulder    Rotator cuff tear arthropathy of right shoulder    Alcohol induced acute pancreatitis    Obese    Pancreatic pseudocyst       Current Outpatient Medications:     amoxicillin-clavulanate (AUGMENTIN) 875-125 mg per tablet, Take 1 tablet by mouth every 12 (twelve) hours for 7 days, Disp: 14 tablet, Rfl: 0    aspirin 81 MG tablet, Take 1 tablet by mouth daily, Disp: , Rfl:     DEXILANT 60 MG capsule, Take 1 tablet by mouth daily, Disp: , Rfl: 4   ezetimibe (ZETIA) 10 mg tablet, Take 1 tablet (10 mg total) by mouth daily, Disp: 90 tablet, Rfl: 1    levothyroxine 50 mcg tablet, Take 2 tablets (100 mcg total) by mouth daily, Disp: 180 tablet, Rfl: 1    lisinopril (ZESTRIL) 20 mg tablet, Take 1 tablet (20 mg total) by mouth daily, Disp: 90 tablet, Rfl: 2    multivitamin (THERAGRAN) TABS, Take 1 tablet by mouth, Disp: , Rfl:     Omega-3 Fatty Acids (FISH OIL PO), Take 2 g by mouth, Disp: , Rfl:     pantoprazole (PROTONIX) 40 mg tablet, , Disp: , Rfl:   Social History     Socioeconomic History    Marital status: Single     Spouse name: Not on file    Number of children: Not on file    Years of education: Not on file    Highest education level: Not on file   Occupational History    Occupation: retired    Social Needs    Financial resource strain: Not hard at all   [employer] insecurity:     Worry: Never true     Inability: Never true   [employer] needs:     Medical: No     Non-medical: No   Tobacco Use    Smoking status: Never Smoker    Smokeless tobacco: Never Used   Substance and Sexual Activity    Alcohol use: No    Drug use: No    Sexual activity: Not on file   Lifestyle    Physical activity:     Days per week: 0 days     Minutes per session: 0 min    Stress: Not at all   Relationships    Social connections:     Talks on phone: Not on file     Gets together: Not on file     Attends Scientologist service: Not on file     Active member of club or organization: Not on file     Attends meetings of clubs or organizations: Not on file     Relationship status: Never     Intimate partner violence:     Fear of current or ex partner: No     Emotionally abused: No     Physically abused: No     Forced sexual activity: No   Other Topics Concern    Not on file   Social History Narrative    Sleep disturbances      Family History   Problem Relation Age of Onset    Alzheimer's disease Mother     Diabetes Mother     No Known Problems Father     No Known Problems Maternal Grandmother     No Known Problems Maternal Grandfather     No Known Problems Paternal Grandmother     No Known Problems Paternal Grandfather     No Known Problems Maternal Aunt     No Known Problems Maternal Aunt     No Known Problems Maternal Aunt     No Known Problems Maternal Aunt     No Known Problems Maternal Aunt     No Known Problems Paternal Aunt     No Known Problems Paternal Aunt     No Known Problems Paternal Aunt

## 2020-03-02 NOTE — LETTER
[address] [facility] [address]  [address] [place] [address]  Dept: [phone]    March 9, 2020     Patient: Bethany Hamlin   YOB: 1955   Date of Visit: 3/2/2020       To Whom it May Concern:    Zeeshan Lomeli is under my professional care  She was seen in the hospital from 3/2/2020   to 03/09/20  If you have any questions or concerns, please don't hesitate to call           Sincerely,          Lala Valentine MD

## 2020-03-02 NOTE — EMTALA/ACUTE CARE TRANSFER
[facility] EMERGENCY DEPARTMENT  [address]  [address] [facility] [address]  Dept: [phone]      EMTALA TRANSFER CONSENT    NAME Олег Jean Baptiste                                         1955                              MRN [id number]    I have been informed of my rights regarding examination, treatment, and transfer   by Dr Jaquelin Eaton DO    Benefits: Continuity of care, Specialized equipment and/or services available at the receiving facility (Include comment)________________________(ENT)    Risks: Increased discomfort during transfer      Consent for Transfer:  I acknowledge that my medical condition has been evaluated and explained to me by the emergency department physician or other qualified medical person and/or my attending physician, who has recommended that I be transferred to the service of  Accepting Physician: Zulay Han at [address] [facility] [address], [address] : [facility]  The above potential benefits of such transfer, the potential risks associated with such transfer, and the probable risks of not being transferred have been explained to me, and I fully understand them  The doctor has explained that, in my case, the benefits of transfer outweigh the risks  I agree to be transferred  I authorize the performance of emergency medical procedures and treatments upon me in both transit and upon arrival at the receiving facility  Additionally, I authorize the release of any and all medical records to the receiving facility and request they be transported with me, if possible  I understand that the safest mode of transportation during a medical emergency is an ambulance and that the Hospital advocates the use of this mode of transport  Risks of traveling to the receiving facility by car, including absence of medical control, life sustaining equipment, such as oxygen, and medical personnel has been explained to me and I fully understand them      ([place] BELOW)  [  ]  I consent to the stated transfer and to be transported by ambulance/helicopter  [  ]  I consent to the stated transfer, but refuse transportation by ambulance and accept full responsibility for my transportation by car  I understand the risks of non-ambulance transfers and I exonerate the Hospital and its staff from any deterioration in my condition that results from this refusal     X___________________________________________    DATE  20  TIME________  Signature of patient or legally responsible individual signing on patient behalf           RELATIONSHIP TO PATIENT_________________________          Provider Certification    NAME Gael Villalpando                                         1955                              MRN [id number]    A medical screening exam was performed on the above named patient  Based on the examination:    Condition Necessitating Transfer The primary encounter diagnosis was Sepsis ([address] )  Diagnoses of Abscess of neck, Hypertension, and Hyperlipidemia were also pertinent to this visit  Patient Condition: The patient has been stabilized such that within reasonable medical probability, no material deterioration of the patient condition or the condition of the unborn child(kira) is likely to result from the transfer    Reason for Transfer: Level of Care needed not available at this facility    Transfer Requirements: Facility [facility]   · Space available and qualified personnel available for treatment as acknowledged by    · Agreed to accept transfer and to provide appropriate medical treatment as acknowledged by       Farzad Patiño  · Appropriate medical records of the examination and treatment of the patient are provided at the time of transfer   [address], [address] _______  · Transfer will be performed by qualified personnel from    and appropriate transfer equipment as required, including the use of necessary and appropriate life support measures      Provider Certification: I have examined the patient and explained the following risks and benefits of being transferred/refusing transfer to the patient/family:  General risk, such as traffic hazards, adverse weather conditions, rough terrain or turbulence, possible failure of equipment (including vehicle or aircraft), or consequences of actions of persons outside the control of the transport personnel      Based on these reasonable risks and benefits to the patient and/or the unborn child(kira), and based upon the information available at the time of the patients examination, I certify that the medical benefits reasonably to be expected from the provision of appropriate medical treatments at another medical facility outweigh the increasing risks, if any, to the individuals medical condition, and in the case of labor to the unborn child, from effecting the transfer      X____________________________________________ DATE 03/02/20        TIME_______      ORIGINAL - SEND TO MEDICAL RECORDS   COPY - SEND WITH PATIENT DURING TRANSFER

## 2020-03-02 NOTE — ASSESSMENT & PLAN NOTE
Abscess of soft tissues of the neck  There is a 2 5x3x3 5 cm abscess centered deep to the right sternocleidomastoid muscle immediately above the carotid bifurcation with associated surrounding multi regional deep soft tissue neck edema  ENT was informed at [place] [address]  Will place ENT consult, would surgical drainage  Doxycycline and vancomycin ivss  Monitor vitals closely  ID Consult  Blood culture done at [place] [address]

## 2020-03-02 NOTE — ASSESSMENT & PLAN NOTE
Patient has elevated white count, tachycardia, fever to 102 probably related to abscess noted in the soft tissues of neck

## 2020-03-02 NOTE — ED NOTES
[facility]  Accepting: RajwinderMobento  Room: [place] [address]  Report#: [id number]     Jaky Nixon RN  03/02/20 2056

## 2020-03-02 NOTE — ED PROVIDER NOTES
History  Chief Complaint   Patient presents with    Neck Swelling     patient presents for right sided neck swelling from PCP  suspected mastoditis per NP  patient placed on amoxicillin and steroids last week without relief  admits to some throat soreness  Denies SOB  admits to continued fevers  75-year-old female presents for evaluation of right neck pain and swelling which has been ongoing for approximately 1 week  The patient had several days of discomfort prior to seeing the family doctor place patient on amoxicillin  The patient continued to worsen despite antibiotic treatment and was seen again in the office and then sent to the emergency department for further evaluation of worsening neck swelling with associated fevers and chills  The patient has also had diaphoresis at home  The patient denies any trouble swallowing or breathing  The patient denies any trouble handling secretions or change in her voice  The patient denies any hearing deficit or tinnitus  Prior to Admission Medications   Prescriptions Last Dose Informant Patient Reported? Taking?    DEXILANT 60 MG capsule  Self Yes No   Sig: Take 1 tablet by mouth daily   Omega-3 Fatty Acids (FISH OIL PO)  Self Yes No   Sig: Take 2 g by mouth   amoxicillin-clavulanate (AUGMENTIN) 875-125 mg per tablet  Self No No   Sig: Take 1 tablet by mouth every 12 (twelve) hours for 7 days   aspirin 81 MG tablet  Self Yes No   Sig: Take 1 tablet by mouth daily   ezetimibe (ZETIA) 10 mg tablet  Self No No   Sig: Take 1 tablet (10 mg total) by mouth daily   levothyroxine 50 mcg tablet  Self No No   Sig: Take 2 tablets (100 mcg total) by mouth daily   lisinopril (ZESTRIL) 20 mg tablet  Self No No   Sig: Take 1 tablet (20 mg total) by mouth daily   multivitamin (THERAGRAN) TABS  Self Yes No   Sig: Take 1 tablet by mouth   pantoprazole (PROTONIX) 40 mg tablet  Self Yes No      Facility-Administered Medications: None       Past Medical History:   Diagnosis Date    Disease of thyroid gland     GERD (gastroesophageal reflux disease)     HL (hearing loss)     Hyperlipidemia     Polyp of sigmoid colon     last assessed 6/12/12    Sleep disturbances     last assessed 6/5/14       Past Surgical History:   Procedure Laterality Date    BREAST CYST ASPIRATION Right     20 yrs ago in dr office net results    COLONOSCOPY  06/18/2011    Complete  Repeat in 5yrs      NASAL FRACTURE SURGERY         Family History   Problem Relation Age of Onset    Alzheimer's disease Mother     Diabetes Mother     No Known Problems Father     No Known Problems Maternal Grandmother     No Known Problems Maternal Grandfather     No Known Problems Paternal Grandmother     No Known Problems Paternal Grandfather     No Known Problems Maternal Aunt     No Known Problems Maternal Aunt     No Known Problems Maternal Aunt     No Known Problems Maternal Aunt     No Known Problems Maternal Aunt     No Known Problems Paternal Aunt     No Known Problems Paternal Aunt     No Known Problems Paternal Aunt      I have reviewed and agree with the history as documented  E-Cigarette/Vaping    E-Cigarette Use Never User      E-Cigarette/Vaping Substances     Social History     Tobacco Use    Smoking status: Never Smoker    Smokeless tobacco: Never Used   Substance Use Topics    Alcohol use: No    Drug use: No       Review of Systems   Constitutional: Positive for chills, diaphoresis and fever  HENT: Positive for facial swelling  Negative for hearing loss, tinnitus, trouble swallowing and voice change  Respiratory: Negative for cough, chest tightness and shortness of breath  Cardiovascular: Negative for chest pain  Gastrointestinal: Negative for diarrhea, nausea and vomiting  Musculoskeletal: Positive for neck pain  Negative for neck stiffness  All other systems reviewed and are negative        Physical Exam  Physical Exam   Constitutional: She is oriented to person, place, and time  She appears well-developed and well-nourished  No distress  HENT:   Head: Normocephalic and atraumatic  Right Ear: External ear normal    Left Ear: External ear normal    Eyes: Pupils are equal, round, and reactive to light  Conjunctivae and EOM are normal  No scleral icterus  Neck: Normal range of motion  Carotid bruit is not present  No tracheal deviation present  No thyroid mass present  Cardiovascular: Normal rate, regular rhythm and normal heart sounds  Pulmonary/Chest: Effort normal and breath sounds normal  No respiratory distress  Abdominal: Soft  Bowel sounds are normal  There is no tenderness  There is no rebound and no guarding  Musculoskeletal: Normal range of motion  She exhibits no edema  Neurological: She is alert and oriented to person, place, and time  Skin: Skin is warm and dry  No rash noted  Psychiatric: She has a normal mood and affect  Nursing note and vitals reviewed        Vital Signs  ED Triage Vitals   Temperature Pulse Respirations Blood Pressure SpO2   03/02/20 1114 03/02/20 1114 03/02/20 1114 03/02/20 1114 03/02/20 1114   (!) 102 °F (38 9 °C) (!) 122 18 150/78 98 %      Temp Source Heart Rate Source Patient Position - Orthostatic VS BP Location FiO2 (%)   03/02/20 1114 03/02/20 1114 03/02/20 1212 03/02/20 1212 --   Temporal Monitor Lying Left arm       Pain Score       03/02/20 1114       Worst Possible Pain           Vitals:    03/02/20 1114 03/02/20 1212 03/02/20 1230 03/02/20 1615   BP: 150/78 129/65 138/62 138/60   Pulse: (!) 122 101 105 102   Patient Position - Orthostatic VS:  Lying Lying Lying         Visual Acuity      ED Medications  Medications   acetaminophen (TYLENOL) tablet 650 mg (650 mg Oral Given 3/2/20 1141)   vancomycin (VANCOCIN) 1,500 mg in sodium chloride 0 9 % 250 mL IVPB (0 mg/kg × 108 kg Intravenous Stopped 3/2/20 1354)   cefepime (MAXIPIME) IVPB (premix) 2,000 mg (0 mg Intravenous Stopped 3/2/20 1239)   lactated ringers bolus 1,000 mL (0 mL Intravenous Stopped 3/2/20 1311)   iohexol (OMNIPAQUE) 350 MG/ML injection (MULTI-DOSE) 100 mL (100 mL Intravenous Given 3/2/20 1242)       Diagnostic Studies  Results Reviewed     Procedure Component Value Units Date/Time    Blood culture #1 [[id number]] Collected:  03/02/20 1142    Lab Status:  Preliminary result Specimen:  Blood from Arm, Right Updated:  03/02/20 1601     Blood Culture Received in Microbiology Lab  Culture in Progress  Blood culture #2 [[id number]] Collected:  03/02/20 1142    Lab Status:  Preliminary result Specimen:  Blood from Hand, Right Updated:  03/02/20 1601     Blood Culture Received in Microbiology Lab  Culture in Progress  Urine Microscopic [[id number]]  (Abnormal) Collected:  03/02/20 1429    Lab Status:  Final result Specimen:  Urine, Clean Catch Updated:  03/02/20 1539     RBC, UA 2-4 /hpf      WBC, UA 1-2 /hpf      Epithelial Cells Occasional /hpf      Bacteria, UA Occasional /hpf     Procalcitonin [[id number]]  (Normal) Collected:  03/02/20 1142    Lab Status:  Final result Specimen:  Blood from Arm, Right Updated:  03/02/20 1455     Procalcitonin 0 07 ng/ml     UA w Reflex to Microscopic w Reflex to Culture [[id number]]  (Abnormal) Collected:  03/02/20 1429    Lab Status:  Final result Specimen:  Urine, Clean Catch Updated:  03/02/20 1436     Color, UA Yellow     Clarity, UA Clear     Specific Gravity, UA <=1 005     pH, UA 7 0     Leukocytes, UA Trace     Nitrite, UA Negative     Protein, UA Negative mg/dl      Glucose, UA Negative mg/dl      Ketones, UA Negative mg/dl      Urobilinogen, UA 0 2 E U /dl      Bilirubin, UA Negative     Blood, UA Small    Lactic acid x2 [[id number]]  (Normal) Collected:  03/02/20 1142    Lab Status:  Final result Specimen:  Blood from Arm, Right Updated:  03/02/20 1246     LACTIC ACID 2 0 mmol/L     Narrative:       Result may be elevated if tourniquet was used during collection      Comprehensive metabolic panel [[id number]] (Abnormal) Collected:  03/02/20 1142    Lab Status:  Final result Specimen:  Blood from Arm, Right Updated:  03/02/20 1222     Sodium 136 mmol/L      Potassium 5 4 mmol/L      Chloride 96 mmol/L      CO2 31 mmol/L      ANION GAP 9 mmol/L      BUN 11 mg/dL      Creatinine 0 68 mg/dL      Glucose 147 mg/dL      Calcium 9 0 mg/dL      AST 52 U/L      ALT 51 U/L      Alkaline Phosphatase 111 U/L      Total Protein 8 3 g/dL      Albumin 3 4 g/dL      Total Bilirubin 0 70 mg/dL      eGFR 91 ml/min/1 73sq m     Narrative:       National Kidney Disease Foundation guidelines for Chronic Kidney Disease (CKD):     Stage 1 with normal or high GFR (GFR > 90 mL/min/1 73 square meters)    Stage 2 Mild CKD (GFR = 60-89 mL/min/1 73 square meters)    Stage 3A Moderate CKD (GFR = 45-59 mL/min/1 73 square meters)    Stage 3B Moderate CKD (GFR = 30-44 mL/min/1 73 square meters)    Stage 4 Severe CKD (GFR = 15-29 mL/min/1 73 square meters)    Stage 5 End Stage CKD (GFR <15 mL/min/1 73 square meters)  Note: GFR calculation is accurate only with a steady state creatinine    APTT [[id number]]  (Normal) Collected:  03/02/20 1142    Lab Status:  Final result Specimen:  Blood from Arm, Right Updated:  03/02/20 1207     PTT 32 seconds     Protime-INR [[id number]]  (Normal) Collected:  03/02/20 1142    Lab Status:  Final result Specimen:  Blood from Arm, Right Updated:  03/02/20 1207     Protime 13 1 seconds      INR 1 02    CBC and differential [[id number]]  (Abnormal) Collected:  03/02/20 1142    Lab Status:  Final result Specimen:  Blood from Arm, Right Updated:  03/02/20 1151     WBC 15 91 Thousand/uL      RBC 4 55 Million/uL      Hemoglobin 13 6 g/dL      Hematocrit 42 0 %      MCV 92 fL      MCH 29 9 pg      MCHC 32 4 g/dL      RDW 13 0 %      MPV 11 2 fL      Platelets 480 Thousands/uL      nRBC 0 /100 WBCs      Neutrophils Relative 86 %      Immat GRANS % 0 %      Lymphocytes Relative 8 %      Monocytes Relative 5 %      Eosinophils Relative 1 %      Basophils Relative 0 %      Neutrophils Absolute 13 54 Thousands/µL      Immature Grans Absolute 0 06 Thousand/uL      Lymphocytes Absolute 1 27 Thousands/µL      Monocytes Absolute 0 85 Thousand/µL      Eosinophils Absolute 0 14 Thousand/µL      Basophils Absolute 0 05 Thousands/µL                  CT soft tissue neck   Final Result by Andre Schaumann, MD (03/02 9989)      1  Approximately 2 5 x 3 x 3 5 cm abscess centered deep to the right sternocleidomastoid muscle immediately above the carotid bifurcation with associated surrounding multiregional deep soft tissue neck edema/phlegmon  as detailed above  2   Presumably reactive primarily right cervical adalid stations II and III lymphadenopathy  The study was marked in [facility] for immediate notification  Workstation performed: [id number]                    Procedures  ECG 12 Lead Documentation Only  Date/Time: 3/2/2020 11:56 AM  Performed by: Zahida Maurice DO  Authorized by: Zahida Maurice DO     Indications / Diagnosis:  Sepsis  ECG reviewed by me, the ED Provider: yes    Patient location:  ED  Previous ECG:     Previous ECG:  Unavailable  Interpretation:     Interpretation: normal    Rate:     ECG rate:  106    ECG rate assessment: tachycardic    Rhythm:     Rhythm: sinus tachycardia    Ectopy:     Ectopy: none    QRS:     QRS axis:  Normal    QRS intervals:  Normal  Conduction:     Conduction: normal    ST segments:     ST segments:  Normal  T waves:     T waves: normal      CriticalCare Time  Performed by: Zahida Maurice DO  Authorized by:  Zahida Maurice DO     Critical care provider statement:     Critical care time (minutes):  30    Critical care time was exclusive of:  Separately billable procedures and treating other patients and teaching time    Critical care was necessary to treat or prevent imminent or life-threatening deterioration of the following conditions:  Sepsis    Critical care was time spent personally by me on the following activities:  Blood draw for specimens, obtaining history from patient or surrogate, development of treatment plan with patient or surrogate, discussions with consultants, evaluation of patient's response to treatment, examination of patient, ordering and performing treatments and interventions, ordering and review of laboratory studies, ordering and review of radiographic studies, re-evaluation of patient's condition, review of old charts and interpretation of cardiac output measurements    I assumed direction of critical care for this patient from another provider in my specialty: no               ED Course  ED Course as of Mar 02 1828   Mon Mar 02, 2020   1202 Sepsis alert called       paged for admission  I discussed the results laboratories the CT with the patient  12 D/W Dr Vinny Moran who advised Xfer to [facility] for ENT management of abscess      56 D/W Dr Barb Casas from ENT at [facility] 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 (!) Yes (Proceed)  -ML        Suspected source of infection  soft tissue  -ML        Are two or more of the following signs & symptoms of infection both present and new to the patient? (!) Yes (Proceed)  -ML        Indicate SIRS criteria  Hyperthemia > 38 3C (100 9F); Tachycardia > 90 bpm  -ML        If the answer is yes to both questions, suspicion of sepsis is present          If severe sepsis is present AND tissue hypoperfusion perists in the hour after fluid resuscitation or lactate > 4, the patient meets criteria for SEPTIC SHOCK          Are any of the following organ dysfunction criteria present within 6 hours of suspected infection and SIRS criteria that are NOT considered to be chronic conditions? (!) Yes  -ML        Organ dysfunction          Date of presentation of severe sepsis          Time of presentation of severe sepsis          Tissue hypoperfusion persists in the hour after crystalloid fluid administration, evidenced, by either:          Was hypotension present within one hour of the conclusion of crystalloid fluid administration?         Date of presentation of septic shock          Time of presentation of septic shock            User Key  (r) = Recorded By, (t) = Taken By, (c) = Cosigned By    [address] Name Provider Type    ML Jose Lee DO Physician                  MDM  Number of Diagnoses or Management Options  Abscess of neck: new and requires workup  Hyperlipidemia: minor  Hypertension: minor  Sepsis [facility]): new and requires workup  Diagnosis management comments: Plan is to evaluate the patient for sepsis given the fever, tachycardia and tachypnea  A CT of the neck will be obtained for large right lateral neck mass  Patient will be empirically covered with broad-spectrum antibiotics      All labs reviewed and utilized in the medical decision making process    All radiology studies independently viewed by me and interpreted by the radiologist          Amount and/or Complexity of Data Reviewed  Clinical lab tests: reviewed and ordered  Tests in the radiology section of CPT®: reviewed and ordered  Review and summarize past medical records: yes  Discuss the patient with other providers: yes  Independent visualization of images, tracings, or specimens: yes          Disposition  Final diagnoses:   Sepsis ([address] )   Abscess of neck   Hypertension   Hyperlipidemia     Time reflects when diagnosis was documented in both MDM as applicable and the Disposition within this note     Time User Action Codes Description Comment    3/2/2020  2:04 PM Nora Chappellstjace Add [A41 9] Sepsis ([address] )     3/2/2020  2:05 PM Nora Chappellstjace Add [L02 11] Abscess of neck     3/2/2020  2:05 PM Yanely Roberto Loni Martinez Add [I10] Hypertension     3/2/2020  2:05 PM [place] Add [E78 5] Hyperlipidemia       ED Disposition     ED Disposition Condition Date/Time Comment    Transfer to Another Facility-In Network  Mon Mar 2, 2020  2:56 PM Elisa Guido should be transferred out to [facility]          MD Documentation      Most Recent Value   Patient Condition  The patient has been stabilized such that within reasonable medical probability, no material deterioration of the patient condition or the condition of the unborn child(kira) is likely to result from the transfer   Reason for Transfer  Level of Care needed not available at this facility   Benefits of Transfer  Continuity of care, Specialized equipment and/or services available at the receiving facility (Include comment)________________________ [ENT]   Risks of Transfer  Increased discomfort during transfer   Accepting Physician  Joo Carey [address] Name, Rainer Samaniego   Sending MD  [facility]   Provider Certification  General risk, such as traffic hazards, adverse weather conditions, rough terrain or turbulence, possible failure of equipment (including vehicle or aircraft), or consequences of actions of persons outside the control of the transport personnel      RN Documentation      [address] Name, [address]   [facility]   Transport Mode  Ambulance      Follow-up Information    None         Discharge Medication List as of 3/2/2020  5:06 PM      CONTINUE these medications which have NOT CHANGED    Details   amoxicillin-clavulanate (AUGMENTIN) 875-125 mg per tablet Take 1 tablet by mouth every 12 (twelve) hours for 7 days, Starting Tue 2/25/2020, Until Tue 3/3/2020, Normal      aspirin 81 MG tablet Take 1 tablet by mouth daily, Historical Med      DEXILANT 60 MG capsule Take 1 tablet by mouth daily, Starting Fri 10/19/2018, Historical Med      ezetimibe (ZETIA) 10 mg tablet Take 1 tablet (10 mg total) by mouth daily, Starting Fri 6/21/2019, Normal      levothyroxine 50 mcg tablet Take 2 tablets (100 mcg total) by mouth daily, Starting Fri 12/27/2019, Normal      lisinopril (ZESTRIL) 20 mg tablet Take 1 tablet (20 mg total) by mouth daily, Starting Thu 6/20/2019, Normal      multivitamin (THERAGRAN) TABS Take 1 tablet by mouth, Historical Med      Omega-3 Fatty Acids (FISH OIL PO) Take 2 g by mouth, Historical Med      pantoprazole (PROTONIX) 40 mg tablet Starting Sun 9/15/2019, Historical Med           No discharge procedures on file      PDMP Review     None          ED Provider  Electronically Signed by           Ricardo Dorado DO  03/02/20 1851

## 2020-03-02 NOTE — EMTALA/ACUTE CARE TRANSFER
[facility] EMERGENCY DEPARTMENT  [address]  [address] [facility] [address]  Dept: [phone]      EMTALA TRANSFER CONSENT    NAME Shana Lopez                                         1955                              MRN [id number]    I have been informed of my rights regarding examination, treatment, and transfer   by Dr Aminta Le DO    Benefits: Continuity of care, Specialized equipment and/or services available at the receiving facility (Include comment)________________________(ENT)    Risks: Increased discomfort during transfer      Consent for Transfer:  I acknowledge that my medical condition has been evaluated and explained to me by the emergency department physician or other qualified medical person and/or my attending physician, who has recommended that I be transferred to the service of  Accepting Physician: Carmela Coy at [facility] Name, Gal : GEO  The above potential benefits of such transfer, the potential risks associated with such transfer, and the probable risks of not being transferred have been explained to me, and I fully understand them  The doctor has explained that, in my case, the benefits of transfer outweigh the risks  I agree to be transferred  I authorize the performance of emergency medical procedures and treatments upon me in both transit and upon arrival at the receiving facility  Additionally, I authorize the release of any and all medical records to the receiving facility and request they be transported with me, if possible  I understand that the safest mode of transportation during a medical emergency is an ambulance and that the Hospital advocates the use of this mode of transport  Risks of traveling to the receiving facility by car, including absence of medical control, life sustaining equipment, such as oxygen, and medical personnel has been explained to me and I fully understand them      ([place] BELOW)  [  ]  I consent to the stated transfer and to be transported by ambulance/helicopter  [  ]  I consent to the stated transfer, but refuse transportation by ambulance and accept full responsibility for my transportation by car  I understand the risks of non-ambulance transfers and I exonerate the Hospital and its staff from any deterioration in my condition that results from this refusal     X___________________________________________    DATE  20  TIME________  Signature of patient or legally responsible individual signing on patient behalf           RELATIONSHIP TO PATIENT_________________________          Provider Certification    NAME Jared Padilla                                         1955                              MRN [id number]    A medical screening exam was performed on the above named patient  Based on the examination:    Condition Necessitating Transfer The primary encounter diagnosis was Sepsis ([address] )  Diagnoses of Abscess of neck, Hypertension, and Hyperlipidemia were also pertinent to this visit  Patient Condition: The patient has been stabilized such that within reasonable medical probability, no material deterioration of the patient condition or the condition of the unborn child(kira) is likely to result from the transfer    Reason for Transfer: Level of Care needed not available at this facility    Transfer Requirements: Facility [facility]   · Space available and qualified personnel available for treatment as acknowledged by    · Agreed to accept transfer and to provide appropriate medical treatment as acknowledged by       Rock Parker  · Appropriate medical records of the examination and treatment of the patient are provided at the time of transfer   [address], [address] _______  · Transfer will be performed by qualified personnel from    and appropriate transfer equipment as required, including the use of necessary and appropriate life support measures      Provider Certification: I have examined the patient and explained the following risks and benefits of being transferred/refusing transfer to the patient/family:  General risk, such as traffic hazards, adverse weather conditions, rough terrain or turbulence, possible failure of equipment (including vehicle or aircraft), or consequences of actions of persons outside the control of the transport personnel      Based on these reasonable risks and benefits to the patient and/or the unborn child(kira), and based upon the information available at the time of the patients examination, I certify that the medical benefits reasonably to be expected from the provision of appropriate medical treatments at another medical facility outweigh the increasing risks, if any, to the individuals medical condition, and in the case of labor to the unborn child, from effecting the transfer      X____________________________________________ DATE 03/02/20        TIME_______      ORIGINAL - SEND TO MEDICAL RECORDS   COPY - SEND WITH PATIENT DURING TRANSFER

## 2020-03-02 NOTE — H&P
H&P- Leslie Celaya 1955, 75 y o  female MRN: [id number]    Unit/Bed#: [facility] 819-01 Encounter: [id number]    Primary Care Provider: Adrian Coelho DO   Date and time admitted to hospital: 3/2/2020  5:36 PM        GERD (gastroesophageal reflux disease)  Assessment & Plan  GERD patient is on Protonix 40 mg    Leukocytosis  Assessment & Plan  WBC counts of 15 91 with left shift, would monitor closely  Daily CBC        Sepsis ([address] )  Assessment & Plan  Patient has elevated white count, tachycardia, fever to 102 probably related to abscess noted in the soft tissues of neck    Hypothyroidism  Assessment & Plan   levothyroxine 100 mcg    Hypertension  Assessment & Plan  patient blood pressure is stable continue with lisinopril        Hyperlipidemia  Assessment & Plan  Omega 3 fatty acid Zetia        * Abscess of neck  Assessment & Plan  Abscess of soft tissues of the neck  There is a 2 5x3x3 5 cm abscess centered deep to the right sternocleidomastoid muscle immediately above the carotid bifurcation with associated surrounding multi regional deep soft tissue neck edema  ENT was informed at [place] [address]  Will place ENT consult, would surgical drainage  Doxycycline and vancomycin ivss  Monitor vitals closely  ID Consult  Blood culture done at [place] [address]          VTE Prophylaxis: Enoxaparin (Lovenox)  / sequential compression device   Code Status: Full code   POLST: POLST is not applicable to this patient  Discussion with family: called brother no answer     Anticipated Length of Stay:  Patient will be admitted on an Inpatient basis with an anticipated length of stay of  More than 2 midnights  Justification for Hospital Stay: due to abscess of the neck     Total Time for Visit, including Counseling / Coordination of Care: 45 minutes  Greater than 50% of this total time spent on direct patient counseling and coordination of care      Chief Complaint:   Neck pain fever    History of Present Illness:    Brandie Strange is a 75 y o  female who presents with presented to [facility] with neck swelling and fever  She initially presented to her doctor's office about a week ago with a cold-like symptom and she was place on amoxicillin and she has completed course of antibiotics  She continues to have pain in her right jaw area that is now tender and she is also having fever on and off  In the emergency room CT scan shows, rim enhancing fluid collection located in the ED part of the righ sternocleidomastoid muscle immediately above the carotid bifurcation measuring approximately 2 5 x 3 x 3 5 cm it displaces the internal jugular vein and cervical ICA and partially effaces right internal jugular vein  There is edema noted as well  She will be going to IR for drainage  ENT and ID consulted  Review of Systems:    Review of Systems   Constitutional: Positive for activity change, fatigue and fever  Negative for unexpected weight change  HENT: Positive for dental problem, drooling, facial swelling, sinus pressure, sinus pain and trouble swallowing  Eyes: Negative  Respiratory: Negative  Cardiovascular: Negative  Gastrointestinal: Negative  Endocrine: Negative  Genitourinary: Negative  Musculoskeletal: Negative  Skin: Negative  Allergic/Immunologic: Negative  Neurological: Negative  Hematological: Negative  Psychiatric/Behavioral: Negative  Past Medical and Surgical History:     Past Medical History:   Diagnosis Date    Disease of thyroid gland     GERD (gastroesophageal reflux disease)     HL (hearing loss)     Hyperlipidemia     Polyp of sigmoid colon     last assessed 6/12/12    Sleep disturbances     last assessed 6/5/14       Past Surgical History:   Procedure Laterality Date    BREAST CYST ASPIRATION Right     20 yrs ago in dr office net results    COLONOSCOPY  06/18/2011    Complete     Repeat in 5yrs      NASAL FRACTURE SURGERY Meds/Allergies:    Prior to Admission medications    Medication Sig Start Date End Date Taking? Authorizing Provider   amoxicillin-clavulanate (AUGMENTIN) 875-125 mg per tablet Take 1 tablet by mouth every 12 (twelve) hours for 7 days 2/25/20 3/3/20  SUSY Lopez   aspirin 81 MG tablet Take 1 tablet by mouth daily    Historical Provider, MD   DEXILANT 60 MG capsule Take 1 tablet by mouth daily 10/19/18   Historical Provider, MD   ezetimibe (ZETIA) 10 mg tablet Take 1 tablet (10 mg total) by mouth daily 6/21/19   Nyra Corporal, DO   levothyroxine 50 mcg tablet Take 2 tablets (100 mcg total) by mouth daily 12/27/19   [facility] Corporal, DO   lisinopril (ZESTRIL) 20 mg tablet Take 1 tablet (20 mg total) by mouth daily 6/20/19   [facility] Corporal, DO   multivitamin (THERAGRAN) TABS Take 1 tablet by mouth    Historical Provider, MD   Omega-3 Fatty Acids (FISH OIL PO) Take 2 g by mouth    Historical Provider, MD   pantoprazole (PROTONIX) 40 mg tablet  9/15/19   Historical Provider, MD   predniSONE 10 mg tablet Take 1 tablet (10 mg total) by mouth daily 5 tabs 2/25, 4 tabs 2/26, 3 tabs 2/27, 2 tabs 2/28, 1 tab 2/29 2/25/20 3/2/20  SUSY Lopez     I have reviewed home medications with patient personally  Allergies:    Allergies   Allergen Reactions    Statins Myalgia       Social History:     Marital Status: Single   Occupation:    Patient Pre-hospital Living Situation: lives by self   Patient Pre-hospital Level of Mobility: able to ambulate   Patient Pre-hospital Diet Restrictions: none   Substance Use History:   Social History     Substance and Sexual Activity   Alcohol Use No     Social History     Tobacco Use   Smoking Status Never Smoker   Smokeless Tobacco Never Used     Social History     Substance and Sexual Activity   Drug Use No       Family History:    Family History   Problem Relation Age of Onset    Alzheimer's disease Mother     Diabetes Mother     No Known Problems Father     No Known Problems Maternal Grandmother     No Known Problems Maternal Grandfather     No Known Problems Paternal Grandmother     No Known Problems Paternal Grandfather     No Known Problems Maternal Aunt     No Known Problems Maternal Aunt     No Known Problems Maternal Aunt     No Known Problems Maternal Aunt     No Known Problems Maternal Aunt     No Known Problems Paternal Aunt     No Known Problems Paternal Aunt     No Known Problems Paternal Aunt        Physical Exam:     Vitals:   Blood Pressure: 165/87 (03/02/20 1744)  Pulse: (!) 106 (03/02/20 1744)  SpO2: 97 % (03/02/20 1744)    Physical Exam   Constitutional: She is oriented to person, place, and time  She appears well-developed and well-nourished  No distress  HENT:   Head: Normocephalic  Right Ear: External ear normal    Right sternocleidomastoid swelling, tender to touch    Eyes: Pupils are equal, round, and reactive to light  Conjunctivae and EOM are normal  Right eye exhibits no discharge  Left eye exhibits no discharge  No scleral icterus  Neck: Normal range of motion  Neck supple  Cardiovascular: Normal rate, regular rhythm, normal heart sounds and intact distal pulses  Exam reveals no friction rub  No murmur heard  Pulmonary/Chest: Effort normal and breath sounds normal  No stridor  No respiratory distress  Abdominal: Soft  Bowel sounds are normal  She exhibits no distension  There is no tenderness  Musculoskeletal: Normal range of motion  She exhibits no edema  Lymphadenopathy:     She has cervical adenopathy  Neurological: She is alert and oriented to person, place, and time  Skin: Skin is warm  She is not diaphoretic  No erythema  Nursing note and vitals reviewed  Additional Data:     Lab Results: I have personally reviewed pertinent reports        Results from last 7 days   Lab Units 03/02/20  1142   WBC Thousand/uL 15 91*   HEMOGLOBIN g/dL 13 6   HEMATOCRIT % 42 0   PLATELETS Thousands/uL 229   NEUTROS PCT % 86* LYMPHS PCT % 8*   MONOS PCT % 5   EOS PCT % 1     Results from last 7 days   Lab Units 03/02/20  1142   SODIUM mmol/L 136   POTASSIUM mmol/L 5 4*   CHLORIDE mmol/L 96*   CO2 mmol/L 31   BUN mg/dL 11   CREATININE mg/dL 0 68   ANION GAP mmol/L 9   CALCIUM mg/dL 9 0   ALBUMIN g/dL 3 4*   TOTAL BILIRUBIN mg/dL 0 70   ALK PHOS U/L 111   ALT U/L 51   AST U/L 52*   GLUCOSE RANDOM mg/dL 147*     Results from last 7 days   Lab Units 03/02/20  1142   INR  1 02             Results from last 7 days   Lab Units 03/02/20  1142   LACTIC ACID mmol/L 2 0   PROCALCITONIN ng/ml 0 07       Imaging: I have personally reviewed pertinent reports  No orders to display       EKG, Pathology, and Other Studies Reviewed on Admission:   · EKG: pending     Allscripts / Epic Records Reviewed: Yes     ** Please Note: This note has been constructed using a voice recognition system   **

## 2020-03-03 ENCOUNTER — APPOINTMENT (INPATIENT)
Dept: RADIOLOGY | Facility: HOSPITAL | Age: 65
DRG: 872 | End: 2020-03-03
Payer: MEDICARE

## 2020-03-03 PROBLEM — E66.01 MORBID OBESITY (HCC): Status: ACTIVE | Noted: 2018-08-25

## 2020-03-03 LAB
ALBUMIN SERPL BCP-MCNC: 2.9 G/DL (ref 3.5–5)
ALP SERPL-CCNC: 98 U/L (ref 46–116)
ALT SERPL W P-5'-P-CCNC: 45 U/L (ref 12–78)
ANION GAP SERPL CALCULATED.3IONS-SCNC: 6 MMOL/L (ref 4–13)
AST SERPL W P-5'-P-CCNC: 41 U/L (ref 5–45)
ATRIAL RATE: 106 BPM
BASOPHILS # BLD AUTO: 0.04 THOUSANDS/ΜL (ref 0–0.1)
BASOPHILS NFR BLD AUTO: 0 % (ref 0–1)
BILIRUB SERPL-MCNC: 0.53 MG/DL (ref 0.2–1)
BUN SERPL-MCNC: 11 MG/DL (ref 5–25)
CALCIUM SERPL-MCNC: 9.1 MG/DL (ref 8.3–10.1)
CHLORIDE SERPL-SCNC: 103 MMOL/L (ref 100–108)
CO2 SERPL-SCNC: 27 MMOL/L (ref 21–32)
CREAT SERPL-MCNC: 0.63 MG/DL (ref 0.6–1.3)
EOSINOPHIL # BLD AUTO: 0.06 THOUSAND/ΜL (ref 0–0.61)
EOSINOPHIL NFR BLD AUTO: 1 % (ref 0–6)
ERYTHROCYTE [DISTWIDTH] IN BLOOD BY AUTOMATED COUNT: 13.2 % (ref 11.6–15.1)
GFR SERPL CREATININE-BSD FRML MDRD: 94 ML/MIN/1.73SQ M
GLUCOSE SERPL-MCNC: 122 MG/DL (ref 65–140)
HCT VFR BLD AUTO: 38.1 % (ref 34.8–46.1)
HGB BLD-MCNC: 12.3 G/DL (ref 11.5–15.4)
IMM GRANULOCYTES # BLD AUTO: 0.06 THOUSAND/UL (ref 0–0.2)
IMM GRANULOCYTES NFR BLD AUTO: 1 % (ref 0–2)
LYMPHOCYTES # BLD AUTO: 1.34 THOUSANDS/ΜL (ref 0.6–4.47)
LYMPHOCYTES NFR BLD AUTO: 13 % (ref 14–44)
MAGNESIUM SERPL-MCNC: 2.5 MG/DL (ref 1.6–2.6)
MCH RBC QN AUTO: 29.8 PG (ref 26.8–34.3)
MCHC RBC AUTO-ENTMCNC: 32.3 G/DL (ref 31.4–37.4)
MCV RBC AUTO: 92 FL (ref 82–98)
MONOCYTES # BLD AUTO: 0.91 THOUSAND/ΜL (ref 0.17–1.22)
MONOCYTES NFR BLD AUTO: 9 % (ref 4–12)
NEUTROPHILS # BLD AUTO: 8.18 THOUSANDS/ΜL (ref 1.85–7.62)
NEUTS SEG NFR BLD AUTO: 76 % (ref 43–75)
NRBC BLD AUTO-RTO: 0 /100 WBCS
P AXIS: 62 DEGREES
PHOSPHATE SERPL-MCNC: 3.4 MG/DL (ref 2.3–4.1)
PLATELET # BLD AUTO: 197 THOUSANDS/UL (ref 149–390)
PMV BLD AUTO: 11.3 FL (ref 8.9–12.7)
POTASSIUM SERPL-SCNC: 4.6 MMOL/L (ref 3.5–5.3)
PR INTERVAL: 128 MS
PROT SERPL-MCNC: 7.4 G/DL (ref 6.4–8.2)
QRS AXIS: -5 DEGREES
QRSD INTERVAL: 78 MS
QT INTERVAL: 340 MS
QTC INTERVAL: 451 MS
RBC # BLD AUTO: 4.13 MILLION/UL (ref 3.81–5.12)
SODIUM SERPL-SCNC: 136 MMOL/L (ref 136–145)
T WAVE AXIS: 37 DEGREES
VENTRICULAR RATE: 106 BPM
WBC # BLD AUTO: 10.59 THOUSAND/UL (ref 4.31–10.16)

## 2020-03-03 PROCEDURE — 99232 SBSQ HOSP IP/OBS MODERATE 35: CPT | Performed by: PHYSICIAN ASSISTANT

## 2020-03-03 PROCEDURE — 10030 IMG GID FLU COLL DRG SFT TIS: CPT

## 2020-03-03 PROCEDURE — 10160 PNXR ASPIR ABSC HMTMA BULLA: CPT | Performed by: RADIOLOGY

## 2020-03-03 PROCEDURE — 99152 MOD SED SAME PHYS/QHP 5/>YRS: CPT

## 2020-03-03 PROCEDURE — 99223 1ST HOSP IP/OBS HIGH 75: CPT | Performed by: INTERNAL MEDICINE

## 2020-03-03 PROCEDURE — 99447 NTRPROF PH1/NTRNET/EHR 11-20: CPT | Performed by: PHYSICIAN ASSISTANT

## 2020-03-03 PROCEDURE — 85025 COMPLETE CBC W/AUTO DIFF WBC: CPT | Performed by: INTERNAL MEDICINE

## 2020-03-03 PROCEDURE — 97166 OT EVAL MOD COMPLEX 45 MIN: CPT

## 2020-03-03 PROCEDURE — 97163 PT EVAL HIGH COMPLEX 45 MIN: CPT

## 2020-03-03 PROCEDURE — 83735 ASSAY OF MAGNESIUM: CPT | Performed by: INTERNAL MEDICINE

## 2020-03-03 PROCEDURE — 76942 ECHO GUIDE FOR BIOPSY: CPT | Performed by: RADIOLOGY

## 2020-03-03 PROCEDURE — 99152 MOD SED SAME PHYS/QHP 5/>YRS: CPT | Performed by: RADIOLOGY

## 2020-03-03 PROCEDURE — 84100 ASSAY OF PHOSPHORUS: CPT | Performed by: INTERNAL MEDICINE

## 2020-03-03 PROCEDURE — 87077 CULTURE AEROBIC IDENTIFY: CPT | Performed by: PHYSICIAN ASSISTANT

## 2020-03-03 PROCEDURE — 93010 ELECTROCARDIOGRAM REPORT: CPT | Performed by: INTERNAL MEDICINE

## 2020-03-03 PROCEDURE — 0J943ZX DRAINAGE OF RIGHT NECK SUBCUTANEOUS TISSUE AND FASCIA, PERCUTANEOUS APPROACH, DIAGNOSTIC: ICD-10-PCS | Performed by: RADIOLOGY

## 2020-03-03 PROCEDURE — 87205 SMEAR GRAM STAIN: CPT | Performed by: PHYSICIAN ASSISTANT

## 2020-03-03 PROCEDURE — 87070 CULTURE OTHR SPECIMN AEROBIC: CPT | Performed by: PHYSICIAN ASSISTANT

## 2020-03-03 PROCEDURE — 80053 COMPREHEN METABOLIC PANEL: CPT | Performed by: INTERNAL MEDICINE

## 2020-03-03 PROCEDURE — 87075 CULTR BACTERIA EXCEPT BLOOD: CPT | Performed by: PHYSICIAN ASSISTANT

## 2020-03-03 RX ORDER — TRAMADOL HYDROCHLORIDE 50 MG/1
50 TABLET ORAL EVERY 6 HOURS PRN
Status: DISCONTINUED | OUTPATIENT
Start: 2020-03-03 | End: 2020-03-09 | Stop reason: HOSPADM

## 2020-03-03 RX ORDER — MIDAZOLAM HYDROCHLORIDE 2 MG/2ML
INJECTION, SOLUTION INTRAMUSCULAR; INTRAVENOUS CODE/TRAUMA/SEDATION MEDICATION
Status: COMPLETED | OUTPATIENT
Start: 2020-03-03 | End: 2020-03-03

## 2020-03-03 RX ORDER — FENTANYL CITRATE 50 UG/ML
INJECTION, SOLUTION INTRAMUSCULAR; INTRAVENOUS CODE/TRAUMA/SEDATION MEDICATION
Status: COMPLETED | OUTPATIENT
Start: 2020-03-03 | End: 2020-03-03

## 2020-03-03 RX ADMIN — EZETIMIBE 10 MG: 10 TABLET ORAL at 08:30

## 2020-03-03 RX ADMIN — PANTOPRAZOLE SODIUM 40 MG: 40 TABLET, DELAYED RELEASE ORAL at 05:17

## 2020-03-03 RX ADMIN — FENTANYL CITRATE 50 MCG: 50 INJECTION, SOLUTION INTRAMUSCULAR; INTRAVENOUS at 16:52

## 2020-03-03 RX ADMIN — Medication 1 TABLET: at 08:30

## 2020-03-03 RX ADMIN — LEVOTHYROXINE SODIUM 100 MCG: 100 TABLET ORAL at 05:17

## 2020-03-03 RX ADMIN — MIDAZOLAM 0.5 MG: 1 INJECTION INTRAMUSCULAR; INTRAVENOUS at 16:52

## 2020-03-03 RX ADMIN — ENOXAPARIN SODIUM 40 MG: 40 INJECTION SUBCUTANEOUS at 08:30

## 2020-03-03 RX ADMIN — OMEGA-3 FATTY ACIDS CAP 1000 MG 2000 MG: 1000 CAP at 08:30

## 2020-03-03 RX ADMIN — ASPIRIN 81 MG: 81 TABLET ORAL at 08:30

## 2020-03-03 RX ADMIN — SODIUM CHLORIDE 100 ML/HR: 0.9 INJECTION, SOLUTION INTRAVENOUS at 14:06

## 2020-03-03 RX ADMIN — PREDNISONE 10 MG: 10 TABLET ORAL at 08:30

## 2020-03-03 RX ADMIN — ACETAMINOPHEN 650 MG: 325 TABLET ORAL at 02:35

## 2020-03-03 RX ADMIN — VANCOMYCIN HYDROCHLORIDE 1500 MG: 10 INJECTION, POWDER, LYOPHILIZED, FOR SOLUTION INTRAVENOUS at 14:06

## 2020-03-03 RX ADMIN — LISINOPRIL 20 MG: 20 TABLET ORAL at 08:30

## 2020-03-03 RX ADMIN — TRAMADOL HYDROCHLORIDE 50 MG: 50 TABLET, FILM COATED ORAL at 15:32

## 2020-03-03 RX ADMIN — SENNOSIDES 8.6 MG: 8.6 TABLET ORAL at 08:31

## 2020-03-03 RX ADMIN — DOCUSATE SODIUM 100 MG: 100 CAPSULE, LIQUID FILLED ORAL at 08:30

## 2020-03-03 RX ADMIN — TRAMADOL HYDROCHLORIDE 50 MG: 50 TABLET, FILM COATED ORAL at 01:29

## 2020-03-03 NOTE — PLAN OF CARE
Problem: OCCUPATIONAL THERAPY ADULT  Goal: Performs self-care activities at highest level of function for planned discharge setting  See evaluation for individualized goals  Description  OT interventions: self care retraining, functional mobility, functional transfers, compensatory techniques, pt/family education   Equipment Recommended: (pt with no needs at this time)       See flowsheet documentation for full assessment, interventions and recommendations  Note:   Limitation: Decreased high-level ADLs  Prognosis: Good  Assessment: Pt is a 75 y o  female who was admitted to [facility] on 3/2/2020 with Abscess of neck , upper respiratory infection approx~1 week ago, sepsis, fever,  leukocytosis, HTN, morbid obesity, HTN, hypothyroidism Pt's problem list also includes PMH of disease of the thyroid gland, GERD, Hearing loss, hyperlipidemia, polyp of sigmoid colon, sleep disturbances   At baseline pt was completing I ADL's/IADL's   Pt lives  Currently pt requires  for overall ADLS and  for functional mobility/transfers  Pt currently presents with impairments in the following categories -steps to enter environment activity tolerance  These impairments, as well as pt's decreased caregiver support  limit pt's ability to safely engage in all baseline areas of occupation, includingcommunity mobility and driving From OT standpoint, recommend home with neighbor support, no DME needs at this time upon D/C  No further acute OT needs indicated at this time - Recommend continued oob for meals, ambulation to/from BR, setup for self care tasks and mobility in hallway with nursing/restorative - d/c from caseload with above recommendations  OT Discharge Recommendation: Home with family support  OT - OK to Discharge:  Yes

## 2020-03-03 NOTE — ASSESSMENT & PLAN NOTE
· Patient has elevated white count, tachycardia, fever to 102 probably related to abscess noted in the soft tissues of neck

## 2020-03-03 NOTE — SOCIAL WORK
CM met with patient at bedside to discuss CM role in dcp  Patient reported that she lives alone, trailer, [address]  Patient reports independent pta with adl's/ambulation, no DME, and drives self  Patient reports that she just retired recently  Patient's emergency contact and transport home [place], [phone]  Patient denies IP STR or [address] hx  Patient reports OP therapy in the past  Patient's PCP; Gloria Kee, DO and pharmacy; [place] [id number] [facility]  Patient denies IPMH/MH and drug/alcohol tx/hx  CM reviewed d/c planning process including the following: identifying help at home, patient preference for d/c planning needs, Discharge Lounge, [facility] Meds to Bed program, availability of treatment team to discuss questions or concerns patient and/or family may have regarding understanding medications and recognizing signs and symptoms once discharged  CM also encouraged patient to follow up with all recommended appointments after discharge  Patient advised of importance for patient and family to participate in managing patients medical well being

## 2020-03-03 NOTE — BRIEF OP NOTE (RAD/CATH)
US Guided Right Neck Abscess Aspiration Procedure Note    PATIENT NAME: Samina Seo  : 1955  MRN: [id number]     Pre-op Diagnosis:   1  Obesity due to excess calories, unspecified classification, unspecified whether serious comorbidity present    2  Abscess of neck    3  Sepsis ([facility] [address] )      Post-op Diagnosis:   1  Obesity due to excess calories, unspecified classification, unspecified whether serious comorbidity present    2  Abscess of neck    3  Sepsis [facility])        Surgeon:   Elsie Burris DO  Assistants:     No qualified resident was available  Estimated Blood Loss: None  Findings: Neck abscess, mostly decompressed following aspiration  Specimens: 10 mL brown thick fluid       Complications:  None    Anesthesia: Local    Andrena Mode, DO     Date: 3/3/2020  Time: 5:01 PM

## 2020-03-03 NOTE — CONSULTS
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

## 2020-03-03 NOTE — OCCUPATIONAL THERAPY NOTE
Occupational Therapy Evaluation     Patient Name: Asad Robb  JAUDC'X Date: 3/3/2020  Problem List  Principal Problem:    Abscess of neck  Active Problems:    Hypertension    Morbid obesity ([address] )    Sepsis ([facility] [address] )    Leukocytosis    Past Medical History  Past Medical History:   Diagnosis Date    Disease of thyroid gland     GERD (gastroesophageal reflux disease)     HL (hearing loss)     Hyperlipidemia     Polyp of sigmoid colon     last assessed 6/12/12    Sleep disturbances     last assessed 6/5/14     Past Surgical History  Past Surgical History:   Procedure Laterality Date    BREAST CYST ASPIRATION Right     20 yrs ago in dr office net results    COLONOSCOPY  06/18/2011    Complete  Repeat in 5yrs      NASAL FRACTURE SURGERY               03/03/20 0800   Note Type   Note type Eval only   Restrictions/Precautions   Weight Bearing Precautions Per Order No   Other Precautions Telemetry   Pain Assessment   Pain Assessment No/denies pain   Pain Score No Pain   Home Living   Type of Home   (Trailer)   Home Layout Stairs to enter with rails  (+2 [place])   Bathroom Shower/Tub Walk-in shower   Bathroom Toilet Standard   Bathroom Equipment   (pt denies bathroom DME)   [address]   Prior Function   Level of [place] Independent with ADLs and functional mobility   Lives With Alone   Receives Help From Neighbor;Friend(s)   ADL Assistance Independent   IADLs Independent   Falls in the last 6 months 0   Vocational Retired   Lifestyle   Autonomy I ADL's/IADL's, no AD with functional ambulation, +drives   Reciprocal Relationships neighbors   Service to Others recently retired worked at [employer] travel   [address]  [facility] [address]) [facility]   Where Assessed Standing at [address]  Independent   Grooming Deficit Wash/dry face; Wash/dry hands   UB Bathing Assistance 6 Modified Independent   LB Bathing Assistance 5  Supervision/Setup   LB Bathing Deficit Setup   UB Dressing Assistance 6  Modified independent   LB Dressing Assistance 6  Modified independent   LB Dressing Deficit Don/doff R sock; Don/doff L sock   Toileting Assistance  6  Modified independent   Toileting Deficit Perineal hygiene   Bed Mobility   Supine to Sit 6  Modified independent   Sit to Supine 6  Modified independent   Transfers   Sit to Stand 6  Modified independent   Additional items Assist x 1   Stand to Sit 6  Modified independent   Additional items Assist x 1   Functional Mobility   Functional Mobility 5  Supervision   Additional Comments S without RW household distance functional ambulation vitals WFL   Additional items Rolling walker   Balance   Static Sitting Normal   Dynamic Sitting Good   Static Standing Fair +   Dynamic Standing Fair +   Ambulatory Fair +   Activity Tolerance   Activity Tolerance Patient tolerated treatment well, SPO2 97%, 96 HR in standing   Medical Staff Made Aware PT, CM for discharge planning   Nurse Made Aware RN cleared pt for therapy   RUE Assessment   RUE Assessment WFL   LUE Assessment   LUE Assessment WFL   Hand Function   Gross Motor Coordination Functional   Fine Motor Coordination Functional   Vision-Basic Assessment   Current Vision Wears glasses all the time   Cognition   Overall Cognitive Status [facility]   Arousal/Participation Alert; Cooperative   Attention Within functional limits   Orientation Level Oriented X4   Memory Within functional limits   Following Commands Follows all commands and directions without difficulty   Comments pt demonstrated good safety, insight into deficits, able to recall current medical events     Assessment   Limitation Decreased high-level ADLs   Prognosis Good   Assessment Pt is a 75 y o  female who was admitted to [facility] on 3/2/2020 with Abscess of neck , upper respiratory infection approx~1 week ago, sepsis, fever,  leukocytosis, HTN, morbid obesity, HTN, hypothyroidism Pt's problem list also includes PMH of disease of the thyroid gland, GERD, Hearing loss, hyperlipidemia, polyp of sigmoid colon, sleep disturbances   At baseline pt was completing I ADL's/IADL's   Pt lives  Currently pt requires  for overall ADLS and  for functional mobility/transfers  Pt currently presents with impairments in the following categories -steps to enter environment activity tolerance  These impairments, as well as pt's decreased caregiver support  limit pt's ability to safely engage in all baseline areas of occupation, includingcommunity mobility and driving From OT standpoint, recommend home with neighbor support, no DME needs at this time upon D/C  No further acute OT needs indicated at this time - Recommend continued oob for meals, ambulation to/from BR, setup for self care tasks and mobility in hallway with nursing/restorative - d/c from caseload with above recommendations     Goals   Patient Goals go home   Recommendation   OT Discharge Recommendation Home with family support   Equipment Recommended   (pt with no needs at this time)   OT - OK to Discharge Yes   Barthel Index   Feeding 10   Bathing 0   Grooming Score 5   Dressing Score 10   Bladder Score 10   Bowels Score 10   Toilet Use Score 10   Transfers (Bed/Chair) Score 10   Mobility (Level Surface) Score 10   Stairs Score 5   Barthel Index Score 80   Modified Rosa Scale   Modified Rosa Scale 2     Lotus Che MOT, OTR/L

## 2020-03-03 NOTE — ASSESSMENT & PLAN NOTE
· Infected right brachial cleft cyst (transfer from [facility])  · There is a 2 5x3x3 5 cm abscess centered deep to the right sternocleidomastoid muscle immediately above the carotid bifurcation with associated surrounding multi regional deep soft tissue neck edema  · IR consulted for possible drainage  · ENT consult appreciated    They will follow and plan for definitive excision once infection resolved  · ID consulted to guide antibiotic management

## 2020-03-03 NOTE — PLAN OF CARE
Problem: Potential for Falls  Goal: Patient will remain free of falls  Description  INTERVENTIONS:  - Assess patient frequently for physical needs  -  Identify cognitive and physical deficits and behaviors that affect risk of falls    -  [place] fall precautions as indicated by assessment   - Educate patient/family on patient safety including physical limitations  - Instruct patient to call for assistance with activity based on assessment  - Modify environment to reduce risk of injury  - Consider OT/PT consult to assist with strengthening/mobility  Outcome: Progressing     Problem: PAIN - ADULT  Goal: Verbalizes/displays adequate comfort level or baseline comfort level  Description  Interventions:  - Encourage patient to monitor pain and request assistance  - Assess pain using appropriate pain scale  - Administer analgesics based on type and severity of pain and evaluate response  - Implement non-pharmacological measures as appropriate and evaluate response  - Consider cultural and social influences on pain and pain management  - Notify physician/advanced practitioner if interventions unsuccessful or patient reports new pain  Outcome: Progressing     Problem: INFECTION - ADULT  Goal: Absence or prevention of progression during hospitalization  Description  INTERVENTIONS:  - Assess and monitor for signs and symptoms of infection  - Monitor lab/diagnostic results  - Monitor all insertion sites, i e  indwelling lines, tubes, and drains  - Monitor endotracheal if appropriate and nasal secretions for changes in amount and color  - [place] appropriate cooling/warming therapies per order  - Administer medications as ordered  - Instruct and encourage patient and family to use good hand hygiene technique  - Identify and instruct in appropriate isolation precautions for identified infection/condition  Outcome: Progressing  Goal: Absence of fever/infection during neutropenic period  Description  INTERVENTIONS:  - Monitor WBC    Outcome: Progressing

## 2020-03-03 NOTE — PROGRESS NOTES
Progress Note - John Vega 1955, 75 y o  female MRN: [id number]    Unit/Bed#: [facility] 713-24 Encounter: [id number]    Primary Care Provider: Steven Garza DO   Date and time admitted to hospital: 3/2/2020  5:36 PM        * Abscess of neck  Assessment & Plan  · Infected right brachial cleft cyst (transfer from [facility])  · There is a 2 5x3x3 5 cm abscess centered deep to the right sternocleidomastoid muscle immediately above the carotid bifurcation with associated surrounding multi regional deep soft tissue neck edema  · IR consulted for possible drainage  · ENT consult appreciated  They will follow and plan for definitive excision once infection resolved  · ID consulted to guide antibiotic management        Sepsis [facility])  Assessment & Plan  · Patient has elevated white count, tachycardia, fever to 102 probably related to abscess noted in the soft tissues of neck    Leukocytosis  Assessment & Plan  · Secondary to infection, trending down        Hypertension  Assessment & Plan  · patient blood pressure is stable continue with lisinopril        Morbid obesity ([address] )  Assessment & Plan  · Secondary to excess calories as evidence by BMI 40 17      VTE Pharmacologic Prophylaxis:   Pharmacologic: Enoxaparin (Lovenox)  Mechanical VTE Prophylaxis in Place: Yes    Patient Centered Rounds: I have performed bedside rounds with nursing staff today  Discussions with Specialists or Other Care Team Provider: case management, ID, IR    Education and Discussions with Family / Patient: patient    Time Spent for Care: 30 minutes  More than 50% of total time spent on counseling and coordination of care as described above      Current Length of Stay: 1 day(s)    Current Patient Status: Inpatient   Certification Statement: The patient will continue to require additional inpatient hospital stay due to IV antibiotics    Discharge Plan: await clinical course    Code Status: Level 1 - Full Code      Subjective:   Right side of neck is sore  Denies any difficulty swallowing    Objective:     Vitals:   Temp (24hrs), Av 7 °F (37 6 °C), Min:97 8 °F (36 6 °C), Max:102 °F (38 9 °C)    Temp:  [97 8 °F (36 6 °C)-102 °F (38 9 °C)] 98 7 °F (37 1 °C)  HR:  [] 84  Resp:  [16-26] 16  BP: (120-165)/(60-87) 128/69  SpO2:  [93 %-99 %] 93 %  Body mass index is 40 17 kg/m²  Input and Output Summary (last 24 hours): Intake/Output Summary (Last 24 hours) at 3/3/2020 9731  Last data filed at 3/3/2020 4266  Gross per 24 hour   Intake 240 ml   Output 1925 ml   Net -1685 ml       Physical Exam:     Physical Exam   Constitutional: She is oriented to person, place, and time  She appears well-developed and well-nourished  No distress  Obese   HENT:   Head: Normocephalic and atraumatic  Neck: Normal range of motion  Right side of neck with swelling and erythema   Cardiovascular: Normal rate and regular rhythm  Exam reveals no friction rub  No murmur heard  Pulmonary/Chest: Effort normal and breath sounds normal  No respiratory distress  She has no wheezes  Abdominal: Soft  Bowel sounds are normal  She exhibits no distension  There is no tenderness  There is no rebound and no guarding  Musculoskeletal: She exhibits no edema  Neurological: She is alert and oriented to person, place, and time  No cranial nerve deficit  Skin: Skin is warm and dry  No rash noted  Psychiatric: She has a normal mood and affect  Nursing note and vitals reviewed        Additional Data:     Labs:    Results from last 7 days   Lab Units 20  0525   WBC Thousand/uL 10 59*   HEMOGLOBIN g/dL 12 3   HEMATOCRIT % 38 1   PLATELETS Thousands/uL 197   NEUTROS PCT % 76*   LYMPHS PCT % 13*   MONOS PCT % 9   EOS PCT % 1     Results from last 7 days   Lab Units 20  0525   SODIUM mmol/L 136   POTASSIUM mmol/L 4 6   CHLORIDE mmol/L 103   CO2 mmol/L 27   BUN mg/dL 11   CREATININE mg/dL 0 63   ANION GAP mmol/L 6   CALCIUM mg/dL 9 1   ALBUMIN g/dL 2 9* TOTAL BILIRUBIN mg/dL 0 53   ALK PHOS U/L 98   ALT U/L 45   AST U/L 41   GLUCOSE RANDOM mg/dL 122     Results from last 7 days   Lab Units 03/02/20  1142   INR  1 02             Results from last 7 days   Lab Units 03/02/20  1142   LACTIC ACID mmol/L 2 0   PROCALCITONIN ng/ml 0 07           * I Have Reviewed All Lab Data Listed Above  * Additional Pertinent Lab Tests Reviewed: All Labs Within Last 24 Hours Reviewed    Imaging:    Imaging Reports Reviewed Today Include: CT neck  Imaging Personally Reviewed by Myself Includes:  none    Recent Cultures (last 7 days):     Results from last 7 days   Lab Units 03/02/20  1142   BLOOD CULTURE  Received in Microbiology Lab  Culture in Progress  Received in Microbiology Lab  Culture in Progress         Last 24 Hours Medication List:     Current Facility-Administered Medications:  acetaminophen 650 mg Oral Q6H PRN Barbara Allan PA-C    aluminum-magnesium hydroxide-simethicone 30 mL Oral Q6H PRN Allegra Bal MD    aspirin 81 mg Oral Daily Allegra Bal MD    cefTRIAXone 1,000 mg Intravenous Q24H Allegra Bal MD    docusate sodium 100 mg Oral BID Allegra Bal MD    enoxaparin 40 mg Subcutaneous Daily Allegra Bal MD    ezetimibe 10 mg Oral Daily Allegra Bal MD    fish oil 2,000 mg Oral Daily Allegra Bal MD    levothyroxine 100 mcg Oral Daily Allegra Bal MD    lisinopril 20 mg Oral Daily Allegra Bal MD    multivitamin-minerals 1 tablet Oral Daily Allegra Bal MD    ondansetron 4 mg Intravenous Q6H PRN Allegra Bal MD    pantoprazole 40 mg Oral Early Morning Allegra Bal MD    predniSONE 10 mg Oral Daily Allegra Bal MD    senna 1 tablet Oral Daily Allegra Bal MD    sodium chloride 100 mL/hr Intravenous Continuous Cole Fragoso PA-C Last Rate: 100 mL/hr (03/02/20 1908)   traMADol 50 mg Oral Q6H PRN Danilo Alcocer PA-C         Today, Patient Was Seen By: Cole Fragoso PA-C    ** Please Note: Dictation voice to text software may have been used in the creation of this document   **

## 2020-03-03 NOTE — PLAN OF CARE
Problem: Potential for Falls  Goal: Patient will remain free of falls  Description  INTERVENTIONS:  - Assess patient frequently for physical needs  -  Identify cognitive and physical deficits and behaviors that affect risk of falls    -  [place] fall precautions as indicated by assessment   - Educate patient/family on patient safety including physical limitations  - Instruct patient to call for assistance with activity based on assessment  - Modify environment to reduce risk of injury  - Consider OT/PT consult to assist with strengthening/mobility  Outcome: Progressing     Problem: PAIN - ADULT  Goal: Verbalizes/displays adequate comfort level or baseline comfort level  Description  Interventions:  - Encourage patient to monitor pain and request assistance  - Assess pain using appropriate pain scale  - Administer analgesics based on type and severity of pain and evaluate response  - Implement non-pharmacological measures as appropriate and evaluate response  - Consider cultural and social influences on pain and pain management  - Notify physician/advanced practitioner if interventions unsuccessful or patient reports new pain  Outcome: Progressing     Problem: INFECTION - ADULT  Goal: Absence or prevention of progression during hospitalization  Description  INTERVENTIONS:  - Assess and monitor for signs and symptoms of infection  - Monitor lab/diagnostic results  - Monitor all insertion sites, i e  indwelling lines, tubes, and drains  - Monitor endotracheal if appropriate and nasal secretions for changes in amount and color  - [place] appropriate cooling/warming therapies per order  - Administer medications as ordered  - Instruct and encourage patient and family to use good hand hygiene technique  - Identify and instruct in appropriate isolation precautions for identified infection/condition  Outcome: Progressing  Goal: Absence of fever/infection during neutropenic period  Description  INTERVENTIONS:  - Monitor WBC    Outcome: Progressing     Problem: SKIN/TISSUE INTEGRITY - ADULT  Goal: Skin integrity remains intact  Description  INTERVENTIONS  - Identify patients at risk for skin breakdown  - Assess and monitor skin integrity  - Assess and monitor nutrition and hydration status  - Monitor labs (i e  albumin)  - Assess for incontinence   - Turn and reposition patient  - Assist with mobility/ambulation  - Relieve pressure over bony prominences  - Avoid friction and shearing  - Provide appropriate hygiene as needed including keeping skin clean and dry  - Evaluate need for skin moisturizer/barrier cream  - Collaborate with interdisciplinary team (i e  Nutrition, Rehabilitation, etc )   - Patient/family teaching  Outcome: Progressing  Goal: Incision(s), wounds(s) or drain site(s) healing without S/S of infection  Description  INTERVENTIONS  - Assess and document risk factors for skin impairment   - Assess and document dressing, incision, wound bed, drain sites and surrounding tissue  - Consider nutrition services referral as needed  - Oral mucous membranes remain intact  - Provide patient/ family education  Outcome: Progressing     Problem: SAFETY ADULT  Goal: Maintain or return to baseline ADL function  Description  INTERVENTIONS:  -  Assess patient's ability to carry out ADLs; assess patient's baseline for ADL function and identify physical deficits which impact ability to perform ADLs (bathing, care of mouth/teeth, toileting, grooming, dressing, etc )  - Assess/evaluate cause of self-care deficits   - Assess range of motion  - Assess patient's mobility; develop plan if impaired  - Assess patient's need for assistive devices and provide as appropriate  - Encourage maximum independence but intervene and supervise when necessary  - Involve family in performance of ADLs  - Assess for home care needs following discharge   - Consider OT consult to assist with ADL evaluation and planning for discharge  - Provide patient education as appropriate  Outcome: Progressing  Goal: Maintain or return mobility status to optimal level  Description  INTERVENTIONS:  - Assess patient's baseline mobility status (ambulation, transfers, stairs, etc )    - Identify cognitive and physical deficits and behaviors that affect mobility  - Identify mobility aids required to assist with transfers and/or ambulation (gait belt, sit-to-stand, lift, walker, cane, etc )  - [place] fall precautions as indicated by assessment  - Record patient progress and toleration of activity level on Mobility SBAR; progress patient to next Phase/Stage  - Instruct patient to call for assistance with activity based on assessment  - Consider rehabilitation consult to assist with strengthening/weightbearing, etc   Outcome: Progressing     Problem: DISCHARGE PLANNING  Goal: Discharge to home or other facility with appropriate resources  Description  INTERVENTIONS:  - Identify barriers to discharge w/patient and caregiver  - Arrange for needed discharge resources and transportation as appropriate  - Identify discharge learning needs (meds, wound care, etc )  - Arrange for interpretive services to assist at discharge as needed  - Refer to Case Management Department for coordinating discharge planning if the patient needs post-hospital services based on physician/advanced practitioner order or complex needs related to functional status, cognitive ability, or social support system  Outcome: Progressing     Problem: Knowledge Deficit  Goal: Patient/family/caregiver demonstrates understanding of disease process, treatment plan, medications, and discharge instructions  Description  Complete learning assessment and assess knowledge base    Interventions:  - Provide teaching at level of understanding  - Provide teaching via preferred learning methods  Outcome: Progressing

## 2020-03-03 NOTE — PROGRESS NOTES
Vancomycin Assessment    Sidney Macedo is a 75 y o  female who is being started on vancomycin for MRSA suspected, skin-soft tissue infection (deep neck abscess)  Relevant clinical data and objective history reviewed:  Creatinine   Date Value Ref Range Status   03/03/2020 0 63 0 60 - 1 30 mg/dL Final     Comment:     Standardized to IDMS reference method   03/02/2020 0 68 0 60 - 1 30 mg/dL Final     Comment:     Standardized to IDMS reference method   08/27/2019 0 68 0 50 - 0 99 mg/dL Final     Comment:     For patients >52years of age, the reference limit  for Creatinine is approximately 13% higher for people  identified as -American  10/29/2018 0 61 0 50 - 0 99 mg/dL Final     Comment:     For patients >52years of age, the reference limit  for Creatinine is approximately 13% higher for people  identified as -American  10/08/2018 0 58 0 50 - 0 99 mg/dL Final     Comment:     For patients >52years of age, the reference limit  for Creatinine is approximately 13% higher for people  identified as -American  08/05/2017 0 64 0 50 - 0 99 mg/dL Final     Comment:     Result Comment: For patients >52years of age, the reference limit  for Creatinine is approximately 13% higher for people  identified as -American      02/20/2017 0 67 0 50 - 0 99 mg/dL Final     Comment:     Result Comment: For patients >52years of age, the reference limit  for Creatinine is approximately 13% higher for people  identified as -American  08/06/2016 0 65 0 50 - 0 99 mg/dL Final     Comment:     Result Comment: For patients >52years of age, the reference limit  for Creatinine is approximately 13% higher for people  identified as -American  /69   Pulse 84   Temp 98 7 °F (37 1 °C)   Resp 16   Wt 109 kg (241 lb 6 4 oz)   LMP  (LMP Unknown)   SpO2 93%   BMI 40 17 kg/m²   I/O last 3 completed shifts:   In: 240 [P O :240]  Out: 0390 [Urine:1725]  Lab Results Component Value Date/Time    BUN 11 03/03/2020 05:25 AM    BUN 17 08/27/2019 08:29 AM    WBC 10 59 (H) 03/03/2020 05:25 AM    WBC 6 2 08/06/2016 09:03 AM    HGB 12 3 03/03/2020 05:25 AM    HGB 12 7 08/06/2016 09:03 AM    HCT 38 1 03/03/2020 05:25 AM    HCT 39 6 08/06/2016 09:03 AM    MCV 92 03/03/2020 05:25 AM    MCV 97 3 08/06/2016 09:03 AM     03/03/2020 05:25 AM     08/06/2016 09:03 AM     Temp Readings from Last 3 Encounters:   03/03/20 98 7 °F (37 1 °C)   03/02/20 (!) 102 °F (38 9 °C) (Temporal)   03/02/20 100 2 °F (37 9 °C) (Tympanic)     Vancomycin Days of Therapy: 1    Assessment/Plan  The patient is being started on vancomycin utilizing scheduled dosing based on adjusted body weight (due to obesity)  Baseline risks associated with therapy include: advanced age  The patient is currently ordered 1750 mg q12h and after clinical evaluation will be changed to 1500 mg q12h  Pharmacy will also follow closely for s/sx of nephrotoxicity, infusion reactions and appropriateness of therapy  BMP and CBC will be ordered per protocol  Plan for trough as patient approaches steady state, prior to the 5th  dose at approximately 1330 on 3/5/20  Due to infection severity, will target a trough of 15-20 (appropriate for most indications)  Pharmacy will continue to follow the patients culture results and clinical progress daily      Madhav Rivera, EmekaD  Pharmacist

## 2020-03-03 NOTE — PHYSICAL THERAPY NOTE
Physical Therapy Evaluation    Patient's Name: Leslie Celaya    Admitting Diagnosis  Sepsis [facility]) [A41 9]    Problem List  Patient Active Problem List   Diagnosis    Female cystocele    Generalized osteoarthritis    Hyperlipidemia    Hypertension    Hypothyroidism    Intermittent [address]audication ([facility] )    Sleep disturbances    Urge and stress incontinence    Varicose veins of both lower extremities with pain    Vitamin D deficiency    Shoulder subluxation, right    DJD of right shoulder    Rotator cuff tear arthropathy of right shoulder    Alcohol induced acute pancreatitis    Morbid obesity ([address] )    Pancreatic pseudocyst    Abscess of neck    Sepsis ([facility] )    Leukocytosis    GERD (gastroesophageal reflux disease)       Past Medical History  Past Medical History:   Diagnosis Date    Disease of thyroid gland     GERD (gastroesophageal reflux disease)     HL (hearing loss)     Hyperlipidemia     Polyp of sigmoid colon     last assessed 6/12/12    Sleep disturbances     last assessed 6/5/14       Past Surgical History  Past Surgical History:   Procedure Laterality Date    BREAST CYST ASPIRATION Right     20 yrs ago in dr office net results    COLONOSCOPY  06/18/2011    Complete  Repeat in 5yrs      NASAL FRACTURE SURGERY          03/03/20 9947   Note Type   Note type Eval only   Pain Assessment   Pain Assessment 0-10   Pain Score 5   Pain Location Head   Pain Orientation Bilateral   Pain Descriptors [facility] Pain Intervention(s) Repositioned; Ambulation/increased activity   Response to Interventions tolerated well   Home Living   Type of Home House  (trailer)   Home Layout Stairs to enter with rails  (3 ANASTASIIA with R rail )   Bathroom Shower/Tub Walk-in shower   [facility]   Prior Function   Level of [place] Independent with ADLs and functional mobility   Lives With Alone   Receives Help From Neighbor;Friend(s)   ADL Assistance Independent IADLs Independent   Falls in the last 6 months 0   Vocational Retired   Comments Pt reports has friend who cleans for her, no AD prior to admission, denies any recent falls, recently retired from [email or web address] [email or web address]    Restrictions/Precautions   [facility] Bearing Precautions Per Order No   Other Precautions Telemetry   Cognition   Overall Cognitive Status WFL   Arousal/Participation Alert   Attention Within functional limits   Orientation Level Oriented X4   Memory Within functional limits   Following Commands Follows all commands and directions without difficulty   RLE Assessment   RLE Assessment WNL   LLE Assessment   LLE Assessment WNL   Light Touch   RLE Light Touch Grossly intact   LLE Light Touch Grossly intact   Bed Mobility   Additional Comments Pt up in chair with OT upon PT arrival    Transfers   Sit to Stand 7  Independent   Stand to Sit 7  Independent   Additional Comments no AD   Ambulation/Elevation   Gait pattern Decreased foot clearance  (slight anterior lean with fatigue )   Gait Assistance 7  Independent   Additional items Assist x 1   Assistive Device None   Distance 200 ft   Stair Management Assistance 7  Independent   Additional items Assist x 1   Stair Management Technique One rail R   Number of Stairs 3   Balance   Static Sitting Normal   Dynamic Sitting Good   Static Standing Fair +   Dynamic Standing Fair +   Ambulatory Fair   Activity Tolerance   Activity Tolerance Patient tolerated treatment well   Medical Staff Made Aware OT, [address] communication with RN before and after PT session, RN present upon PT departure   Assessment   Assessment Pt is a 75 y o  female seen for PT evaluation s/p admit to [facility] on 3/2/2020  Pt was admitted with a primary dx of: abscess of neck, ENT consulted and pt for possible aspiration this afternoon  PT now consulted for assessment of mobility and d/c needs   Pts current co morbidities and personal factors effecting treatment include: GERD, HTN, HLD, lives alone  Pts current clinical presentation is Unstable/ Unpredictable (high complexity) due to Ongoing medical management for primary dx, Ongoing telemetry monitoring, NPO for procedure  Prior to admission, pt was independent for all mobility, resides alone  Upon evaluation, pt currently is requiring independent with transfers and independent for ambulation 200 ft without AD, independent with climbing 3 steps with 1 HR to simulate home environment  Pt with no further acute PT needs, pt in agreement, encouraged continued mobility to maintain strength/endurance, verbalized understanding  At conclusion of PT session pt returned back in chair with phone and call bell within reach  Pt denies any further questions at this time  Recommend home independently upon hospital D/C     Goals   Patient Goals "to go home"   Plan   PT Frequency One time visit   Recommendation   Recommendation Home independently   PT - OK to Discharge Yes   Barthel Index   Feeding 10   Bathing 0   Grooming Score 5   Dressing Score 10   Bladder Score 10   Bowels Score 10   Toilet Use Score 10   Transfers (Bed/Chair) Score 15   Mobility (Level Surface) Score 15   Stairs Score 10   Barthel Index Score 95       Vandana Carrillo, PT, DPT

## 2020-03-03 NOTE — CONSULTS
Consultation - ENT   Meli Ward 75 y o  female MRN: [id number]  Unit/Bed#: [facility] 024-62 Encounter: [id number]      Assessment/Plan     Assessment:  67-year-old female with infected right branchial cleft cyst  Plan:  Continue antibiotics  Consider interventional radiology consultation for ultrasound-guided needle aspiration  Once this infection resolves, she can follow up as an outpatient and we can discuss excision of the branchial cleft cyst     History of Present Illness   Physician Requesting Consult: Jone Reyes MD  Reason for Consult / Principal Problem:  Right neck swelling  HPI: Meli Ward is a 75y o  year old female who presents with right neck swelling since 02/27/2020  She reports having a upper respiratory infection starting approximately 1 week ago  After approximately 3-4 days of the upper respiratory infection she developed right neck swelling with subsequent fever  No prior episodes    Consults    Review of Systems    Historical Information   Past Medical History:   Diagnosis Date    Disease of thyroid gland     GERD (gastroesophageal reflux disease)     HL (hearing loss)     Hyperlipidemia     Polyp of sigmoid colon     last assessed 6/12/12    Sleep disturbances     last assessed 6/5/14     Past Surgical History:   Procedure Laterality Date    BREAST CYST ASPIRATION Right     20 yrs ago in dr office net results    COLONOSCOPY  06/18/2011    Complete     Repeat in 5yrs      NASAL FRACTURE SURGERY       Social History   Social History     Substance and Sexual Activity   Alcohol Use No     Social History     Substance and Sexual Activity   Drug Use No     E-Cigarette/Vaping    E-Cigarette Use Never User      E-Cigarette/Vaping Substances     Social History     Tobacco Use   Smoking Status Never Smoker   Smokeless Tobacco Never Used     Family History: non-contributory    Meds/Allergies   all current active meds have been reviewed    Allergies   Allergen Reactions    Statins Myalgia       Objective       Intake/Output Summary (Last 24 hours) at 3/2/2020 2243  Last data filed at 3/2/2020 2213  Gross per 24 hour   Intake 240 ml   Output 1250 ml   Net -1010 ml       Invasive Devices     Peripheral Intravenous Line            Peripheral IV 03/02/20 Right Antecubital less than 1 day                Physical Exam   NAD  TM/EAC clear bilaterally  OC/OP clear  Significant right level 2 3 swelling with appropriate tenderness  No evidence of skin changes  Nares clear on anterior rhinoscopy   Voice clear, no stridor    Lab Results:   CBC:   Lab Results   Component Value Date    WBC 15 91 (H) 03/02/2020    HGB 13 6 03/02/2020    HCT 42 0 03/02/2020    MCV 92 03/02/2020     03/02/2020    MCH 29 9 03/02/2020    MCHC 32 4 03/02/2020    RDW 13 0 03/02/2020    MPV 11 2 03/02/2020    NRBC 0 03/02/2020   , CMP:   Lab Results   Component Value Date    SODIUM 136 03/02/2020    K 5 4 (H) 03/02/2020    CL 96 (L) 03/02/2020    CO2 31 03/02/2020    BUN 11 03/02/2020    CREATININE 0 68 03/02/2020    CALCIUM 9 0 03/02/2020    AST 52 (H) 03/02/2020    ALT 51 03/02/2020    ALKPHOS 111 03/02/2020    EGFR 91 03/02/2020   , Coags:   Lab Results   Component Value Date    PTT 32 03/02/2020    INR 1 02 03/02/2020     Imaging Studies: I have personally reviewed pertinent reports  and I have personally reviewed pertinent films in PACS  EKG, Pathology, and Other Studies: I have personally reviewed pertinent reports  Code Status: Level 1 - Full Code  Advance Directive and Living Will:      Power of :    POLST:      Counseling/Coordination of Care: Total floor / unit time spent today 30 minutes  Greater than 50% of total time was spent with the patient and / or family counseling and / or coordination of care   A description of the counseling / coordination of care: 30

## 2020-03-03 NOTE — SEDATION DOCUMENTATION
Right neck aspiration procedure done without complication  10cc of purulent drainage obtained and sent for culture  Right neck site dressing CDI  Report called to floor DEVIN Concepcion  Pt tolerated procedure well  IR Procedure Bedrest Start Time is [id number]

## 2020-03-03 NOTE — CONSULTS
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    1- sepsis, POA:  Fever, leukocytosis, mild tachycardia upon admission  Sepsis is likely secondary to right-sided neck abscess  No other infectious source appreciated based on history or physical exam   - antibiotics as below  - ENT and intervention Radiology follow-up for microbiological diagnosis and for source control  - pain control and supportive care as per primary service  - repeat CBC in a m   - trend fever curve    2- neck abscess, possible infection of right branchial cleft cyst:  This is likely secondary to an episode of pharyngitis  Symptoms developed despite patient was already started on Augmentin p o  By her PCP  - continue vancomycin IV  - stop ceftriaxone for now  - pharmacy follow-up for vancomycin dosing  - IR evaluation for needle aspirate; will follow up Gram stain a aerobic and anaerobic culture of fluid aspirate once sent and adjust antibiotics accordingly      3- hypertension:  Blood pressure currently stable and controlled  - management as per primary service    4- right branchial cleft cyst:  - patient will need follow-up with ENT for cyst resection after resolution of her current infection    Plan mentioned above discussed with patient  Case discussed with primary service  HISTORY OF PRESENT ILLNESS:  Reason for Consult:  Right-sided neck abscess  HPI: Elizabeth Mcnally is a 75y o  year old female with history of obesity and hypertension, admitted 03/02/2020 for right-sided neck swelling  Patient reports having sore throat 1 week prior to presentation  Patient was evaluated by her primary care doctor and was started on Augmentin p o  On 02/24/2020  She reports being compliant Augmentin, though 4 days prior to presentation, patient started developing right-sided neck swelling and pain, associated with fever and chills  Upon admission a CT soft tissue of the neck was done showing 2 5 x 3 x 3 5 abscess centered deep to the right sternocleidomastoid mastoid muscle, typically above the carotid bifurcation  Patient also has deep soft tissue neck edema/phlegmon  Patient was evaluated by ENT  She is thought to have an infected right branchial cleft cyst    She is planned for IR aspiration of the abscess today  Patient denies similar previous infections  She denies dysphagia or shortness of breath  Her previous sore throat has resolved, but she reports mild hoarseness  Upon presentation patient was febrile at 102, with mild leukocytosis wbc 15 000;      REVIEW OF SYSTEMS:  A complete review of systems is negative other than that noted in the HPI  PAST MEDICAL HISTORY:  Past Medical History:   Diagnosis Date    Disease of thyroid gland     GERD (gastroesophageal reflux disease)     HL (hearing loss)     Hyperlipidemia     Polyp of sigmoid colon     last assessed 6/12/12    Sleep disturbances     last assessed 6/5/14     Past Surgical History:   Procedure Laterality Date    BREAST CYST ASPIRATION Right     20 yrs ago in dr office net results    COLONOSCOPY  06/18/2011    Complete     Repeat in 5yrs      NASAL FRACTURE SURGERY         FAMILY HISTORY:  Non-contributory    SOCIAL HISTORY:  Social History   Social History     Substance and Sexual Activity   Alcohol Use No     Social History     Substance and Sexual Activity   Drug Use No     Social History     Tobacco Use   Smoking Status Never Smoker   Smokeless Tobacco Never Used       ALLERGIES:  Allergies   Allergen Reactions    Statins Myalgia       MEDICATIONS:  All current active medications have been reviewed  PHYSICAL EXAM:  Temp:  [97 8 °F (36 6 °C)-98 7 °F (37 1 °C)] 98 7 °F (37 1 °C)  HR:  [] 84  Resp:  [16-20] 16  BP: (120-165)/(60-87) 128/69  SpO2:  [93 %-98 %] 93 %  Temp (24hrs), Av 3 °F (36 8 °C), Min:97 8 °F (36 6 °C), Max:98 7 °F (37 1 °C)  Current: Temperature: 98 7 °F (37 1 °C)    Intake/Output Summary (Last 24 hours) at 3/3/2020 1244  Last data filed at 3/3/2020 1143  Gross per 24 hour   Intake 240 ml   Output 2375 ml   Net -2135 ml       General Appearance:  Appearing well, nontoxic, and in no distress   Head:  Normocephalic, without obvious abnormality, atraumatic   Eyes:  Conjunctiva pink and sclera anicteric, both eyes   Nose: Nares normal, mucosa normal, no drainage   Throat: Oropharynx moist without lesions; no intraoral lesions  Right-sided anterior cervical swelling, and mild overlying skin erythema is noted  Swollen area is tender to touch  Neck: Supple, right-sided swelling over upper neck  Mild skin erythema   Back:   Symmetric, no curvature, ROM normal, no CVA tenderness   Lungs:   Clear to auscultation bilaterally, respirations unlabored   Chest Wall:  No tenderness or deformity   Heart:  RRR; no murmur, rub or gallop   Abdomen:   Soft, non-tender, non-distended, positive bowel sounds    Extremities: No cyanosis, clubbing or edema   Skin: No rashes or lesions  No draining wounds noted  Lymph nodes: Cervical, supraclavicular nodes normal   Neurologic: Alert and oriented times 3, extremity strength 5/5 and symmetric       LABS, IMAGING, & OTHER STUDIES:  Lab Results:  I have personally reviewed pertinent labs    Results from last 7 days   Lab Units 20  0525 20  1142   WBC Thousand/uL 10 59* 15 91*   HEMOGLOBIN g/dL 12 3 13 6   PLATELETS Thousands/uL 197 229     Results from last 7 days   Lab Units 03/03/20  0525 03/02/20  1142   SODIUM mmol/L 136 136   POTASSIUM mmol/L 4 6 5 4*   CHLORIDE mmol/L 103 96*   CO2 mmol/L 27 31   BUN mg/dL 11 11   CREATININE mg/dL 0 63 0 68   EGFR ml/min/1 73sq m 94 91   CALCIUM mg/dL 9 1 9 0   AST U/L 41 52*   ALT U/L 45 51   ALK PHOS U/L 98 111     Results from last 7 days   Lab Units 03/02/20  1142   BLOOD CULTURE  Received in Microbiology Lab  Culture in Progress  Received in Microbiology Lab  Culture in Progress  Results from last 7 days   Lab Units 03/02/20  1142   PROCALCITONIN ng/ml 0 07       Imaging Studies:   I have personally reviewed pertinent imaging study reports and images in PACS  CT soft tissue of the neck showing 2 5 x 3 x 3 5 abscess centered deep to the right sternocleidomastoid muscle, above carotid bifurcation  CT scan also notes multi regional deep soft tissue neck edema/phlegmon and possibly reactive right cervical lymphadenopathy    Other Studies:   I have personally reviewed pertinent reports    Blood culture 03/02/2020 is still in process

## 2020-03-04 PROCEDURE — 92610 EVALUATE SWALLOWING FUNCTION: CPT

## 2020-03-04 PROCEDURE — 99233 SBSQ HOSP IP/OBS HIGH 50: CPT | Performed by: INTERNAL MEDICINE

## 2020-03-04 PROCEDURE — 99232 SBSQ HOSP IP/OBS MODERATE 35: CPT | Performed by: PHYSICIAN ASSISTANT

## 2020-03-04 RX ADMIN — EZETIMIBE 10 MG: 10 TABLET ORAL at 08:21

## 2020-03-04 RX ADMIN — DOCUSATE SODIUM 100 MG: 100 CAPSULE, LIQUID FILLED ORAL at 17:31

## 2020-03-04 RX ADMIN — Medication 1 TABLET: at 08:22

## 2020-03-04 RX ADMIN — TRAMADOL HYDROCHLORIDE 50 MG: 50 TABLET, FILM COATED ORAL at 00:12

## 2020-03-04 RX ADMIN — LISINOPRIL 20 MG: 20 TABLET ORAL at 08:21

## 2020-03-04 RX ADMIN — ASPIRIN 81 MG: 81 TABLET ORAL at 08:22

## 2020-03-04 RX ADMIN — OMEGA-3 FATTY ACIDS CAP 1000 MG 2000 MG: 1000 CAP at 08:23

## 2020-03-04 RX ADMIN — ACETAMINOPHEN 650 MG: 325 TABLET ORAL at 20:00

## 2020-03-04 RX ADMIN — VANCOMYCIN HYDROCHLORIDE 1500 MG: 10 INJECTION, POWDER, LYOPHILIZED, FOR SOLUTION INTRAVENOUS at 14:28

## 2020-03-04 RX ADMIN — SENNOSIDES 8.6 MG: 8.6 TABLET ORAL at 08:21

## 2020-03-04 RX ADMIN — SODIUM CHLORIDE 100 ML/HR: 0.9 INJECTION, SOLUTION INTRAVENOUS at 04:44

## 2020-03-04 RX ADMIN — LEVOTHYROXINE SODIUM 100 MCG: 100 TABLET ORAL at 08:23

## 2020-03-04 RX ADMIN — ENOXAPARIN SODIUM 40 MG: 40 INJECTION SUBCUTANEOUS at 08:24

## 2020-03-04 RX ADMIN — PANTOPRAZOLE SODIUM 40 MG: 40 TABLET, DELAYED RELEASE ORAL at 05:41

## 2020-03-04 RX ADMIN — VANCOMYCIN HYDROCHLORIDE 1500 MG: 10 INJECTION, POWDER, LYOPHILIZED, FOR SOLUTION INTRAVENOUS at 01:15

## 2020-03-04 RX ADMIN — TRAMADOL HYDROCHLORIDE 50 MG: 50 TABLET, FILM COATED ORAL at 22:43

## 2020-03-04 RX ADMIN — TRAMADOL HYDROCHLORIDE 50 MG: 50 TABLET, FILM COATED ORAL at 06:15

## 2020-03-04 RX ADMIN — DOCUSATE SODIUM 100 MG: 100 CAPSULE, LIQUID FILLED ORAL at 08:23

## 2020-03-04 RX ADMIN — PREDNISONE 10 MG: 10 TABLET ORAL at 08:24

## 2020-03-04 NOTE — ASSESSMENT & PLAN NOTE
· Infected right brachial cleft cyst (transfer from [facility])  · There is a 2 5x3x3 5 cm abscess centered deep to the right sternocleidomastoid muscle immediately above the carotid bifurcation with associated surrounding multi regional deep soft tissue neck edema  · IR aspiration 3/3  · Cultures pending  · ENT consult appreciated  They will follow and plan for definitive excision once infection resolved  · ID consulted appreciated, continue Vancomycin until culture results available  · This was not able to be fully aspirated    May need repeat CT of neck to demonstrate resolution prior to stopping antibiotics

## 2020-03-04 NOTE — PLAN OF CARE
Problem: Potential for Falls  Goal: Patient will remain free of falls  Description  INTERVENTIONS:  - Assess patient frequently for physical needs  -  Identify cognitive and physical deficits and behaviors that affect risk of falls    -  [place] fall precautions as indicated by assessment   - Educate patient/family on patient safety including physical limitations  - Instruct patient to call for assistance with activity based on assessment  - Modify environment to reduce risk of injury  - Consider OT/PT consult to assist with strengthening/mobility  Outcome: Progressing     Problem: PAIN - ADULT  Goal: Verbalizes/displays adequate comfort level or baseline comfort level  Description  Interventions:  - Encourage patient to monitor pain and request assistance  - Assess pain using appropriate pain scale  - Administer analgesics based on type and severity of pain and evaluate response  - Implement non-pharmacological measures as appropriate and evaluate response  - Consider cultural and social influences on pain and pain management  - Notify physician/advanced practitioner if interventions unsuccessful or patient reports new pain  Outcome: Progressing     Problem: INFECTION - ADULT  Goal: Absence or prevention of progression during hospitalization  Description  INTERVENTIONS:  - Assess and monitor for signs and symptoms of infection  - Monitor lab/diagnostic results  - Monitor all insertion sites, i e  indwelling lines, tubes, and drains  - Monitor endotracheal if appropriate and nasal secretions for changes in amount and color  - [place] appropriate cooling/warming therapies per order  - Administer medications as ordered  - Instruct and encourage patient and family to use good hand hygiene technique  - Identify and instruct in appropriate isolation precautions for identified infection/condition  Outcome: Progressing  Goal: Absence of fever/infection during neutropenic period  Description  INTERVENTIONS:  - Monitor WBC    Outcome: Progressing     Problem: SKIN/TISSUE INTEGRITY - ADULT  Goal: Skin integrity remains intact  Description  INTERVENTIONS  - Identify patients at risk for skin breakdown  - Assess and monitor skin integrity  - Assess and monitor nutrition and hydration status  - Monitor labs (i e  albumin)  - Assess for incontinence   - Turn and reposition patient  - Assist with mobility/ambulation  - Relieve pressure over bony prominences  - Avoid friction and shearing  - Provide appropriate hygiene as needed including keeping skin clean and dry  - Evaluate need for skin moisturizer/barrier cream  - Collaborate with interdisciplinary team (i e  Nutrition, Rehabilitation, etc )   - Patient/family teaching  Outcome: Progressing  Goal: Incision(s), wounds(s) or drain site(s) healing without S/S of infection  Description  INTERVENTIONS  - Assess and document risk factors for skin impairment   - Assess and document dressing, incision, wound bed, drain sites and surrounding tissue  - Consider nutrition services referral as needed  - Oral mucous membranes remain intact  - Provide patient/ family education  Outcome: Progressing     Problem: SAFETY ADULT  Goal: Maintain or return to baseline ADL function  Description  INTERVENTIONS:  -  Assess patient's ability to carry out ADLs; assess patient's baseline for ADL function and identify physical deficits which impact ability to perform ADLs (bathing, care of mouth/teeth, toileting, grooming, dressing, etc )  - Assess/evaluate cause of self-care deficits   - Assess range of motion  - Assess patient's mobility; develop plan if impaired  - Assess patient's need for assistive devices and provide as appropriate  - Encourage maximum independence but intervene and supervise when necessary  - Involve family in performance of ADLs  - Assess for home care needs following discharge   - Consider OT consult to assist with ADL evaluation and planning for discharge  - Provide patient education as appropriate  Outcome: Progressing  Goal: Maintain or return mobility status to optimal level  Description  INTERVENTIONS:  - Assess patient's baseline mobility status (ambulation, transfers, stairs, etc )    - Identify cognitive and physical deficits and behaviors that affect mobility  - Identify mobility aids required to assist with transfers and/or ambulation (gait belt, sit-to-stand, lift, walker, cane, etc )  - [place] fall precautions as indicated by assessment  - Record patient progress and toleration of activity level on Mobility SBAR; progress patient to next Phase/Stage  - Instruct patient to call for assistance with activity based on assessment  - Consider rehabilitation consult to assist with strengthening/weightbearing, etc   Outcome: Progressing     Problem: DISCHARGE PLANNING  Goal: Discharge to home or other facility with appropriate resources  Description  INTERVENTIONS:  - Identify barriers to discharge w/patient and caregiver  - Arrange for needed discharge resources and transportation as appropriate  - Identify discharge learning needs (meds, wound care, etc )  - Arrange for interpretive services to assist at discharge as needed  - Refer to Case Management Department for coordinating discharge planning if the patient needs post-hospital services based on physician/advanced practitioner order or complex needs related to functional status, cognitive ability, or social support system  Outcome: Progressing     Problem: Knowledge Deficit  Goal: Patient/family/caregiver demonstrates understanding of disease process, treatment plan, medications, and discharge instructions  Description  Complete learning assessment and assess knowledge base    Interventions:  - Provide teaching at level of understanding  - Provide teaching via preferred learning methods  Outcome: Progressing

## 2020-03-04 NOTE — PROGRESS NOTES
Progress Note - Infectious Disease   Lupe Ji 75 y o  female MRN: [id number]  Unit/Bed#: [facility] 384-35 Encounter: [id number]      Impression/Plan:    49-year-old female patient admitted for right-sided neck swelling following an episode of sore throat  Neck swelling developed despite that patient was started on Augmentin for possible bacterial upper respiratory infection  Upon presentation she had fever and leukocytosis, and was diagnosed based on CT neck to have an abscess immediately above the carotid bifurcation, thought to be secondary to an infected right branchial cleft cyst     1- sepsis, POA:  Fever, leukocytosis, mild tachycardia upon admission  Sepsis is likely secondary to right-sided neck abscess  No other infectious source appreciated based on history or physical exam   Patient is status post aspiration of 10 cc of purulence done by IR yesterday, no drain was kept in place  - antibiotics as below  -  will follow-up result of abscess culture and adjust antibiotics accordingly  - pain control and supportive care as per primary service  - repeat CBC in a m   - trend fever curve     2- neck abscess, possible infection of right branchial cleft cyst:  This is likely secondary to an episode of pharyngitis  Symptoms developed despite patient was already started on Augmentin p o  By her PCP    - continue vancomycin IV  -  follow up abscess culture results and adjust antibiotics accordingly  - pharmacy follow-up for vancomycin dosing  -  as patient did not have full drainage of her neck abscess, will likely need to repeat CT scan of the neck to ensure resolution of the abscess before stopping antibiotic therapy       3- hypertension:  Blood pressure currently stable and controlled  - management as per primary service     4- right branchial cleft cyst:  - patient will need follow-up with ENT for cyst resection after resolution of her current infection     Plan mentioned above discussed with patient  Case discussed with primary service    Antibiotics:  Vancomycin IV day 3    Subjective:  Patient has no fever, chills, sweats; no nausea, vomiting, diarrhea; no cough, shortness of breath; no pain  No new symptoms  Patient reports improvement of her right-sided neck pain, but is still complaining of her swelling  She denies dysphagia, denies dyspnea    Objective:  Vitals:  Temp:  [97 3 °F (36 3 °C)-100 8 °F (38 2 °C)] 98 3 °F (36 8 °C)  HR:  [80-97] 81  Resp:  [16-20] 16  BP: (104-163)/(55-97) 148/89  SpO2:  [91 %-96 %] 96 %  Temp (24hrs), Av 1 °F (37 3 °C), Min:97 3 °F (36 3 °C), Max:100 8 °F (38 2 °C)  Current: Temperature: 98 3 °F (36 8 °C)    Physical Exam:   General Appearance:  Alert, interactive, nontoxic, no acute distress  Throat: Oropharynx moist without lesions  No oral lesion  Right-sided neck swelling, and tenderness are stable compared to yesterday's examination   Lungs:   Clear to auscultation bilaterally; no wheezes, rhonchi or rales; respirations unlabored   Heart:  RRR; no murmur, rub or gallop   Abdomen:   Soft, non-tender, non-distended, positive bowel sounds  Extremities: No clubbing, cyanosis or edema   Skin: No new rashes or lesions  No draining wounds noted  Labs, Imaging, & Other studies:   All pertinent labs and imaging studies were personally reviewed  Results from last 7 days   Lab Units 20  0520  1142   WBC Thousand/uL 10 59* 15 91*   HEMOGLOBIN g/dL 12 3 13 6   PLATELETS Thousands/uL 197 229     Results from last 7 days   Lab Units 20  0525 20  1142   SODIUM mmol/L 136 136   POTASSIUM mmol/L 4 6 5 4*   CHLORIDE mmol/L 103 96*   CO2 mmol/L 27 31   BUN mg/dL 11 11   CREATININE mg/dL 0 63 0 68   EGFR ml/min/1 73sq m 94 91   CALCIUM mg/dL 9 1 9 0   AST U/L 41 52*   ALT U/L 45 51   ALK PHOS U/L 98 111     Results from last 7 days   Lab Units 20  1706 20  1142   BLOOD CULTURE   --  No Growth at 24 hrs  No Growth at 24 hrs     BODY FLUID CULTURE, STERILE  Culture results to follow    --      Results from last 7 days   Lab Units 03/02/20  1142   PROCALCITONIN ng/ml 0 07

## 2020-03-04 NOTE — PROGRESS NOTES
Vancomycin IV Pharmacy-to-Dose Consultation    Lisa Ervin is a 75 y o  female who is currently receiving Vancomycin IV with management by the Pharmacy Consult service  Assessment/Plan:  The patient was reviewed  Renal function is stable and no signs or symptoms of nephrotoxicity and/or infusion reactions were documented in the chart  Based on todays assessment, continue current vancomycin (day # 2) dosing of 1500mg iv q12h, with a plan for trough to be drawn at 1330 on 3/5/20  We will continue to follow the patients culture results and clinical progress daily      Guanakito Coyle, Pharmacist

## 2020-03-04 NOTE — PROGRESS NOTES
Progress Note - Lisa Ervin 1955, 75 y o  female MRN: [id number]    Unit/Bed#: [facility] 328-98 Encounter: [id number]    Primary Care Provider: Pallavi King DO   Date and time admitted to hospital: 3/2/2020  5:36 PM        * Abscess of neck  Assessment & Plan  · Infected right brachial cleft cyst (transfer from [facility])  · There is a 2 5x3x3 5 cm abscess centered deep to the right sternocleidomastoid muscle immediately above the carotid bifurcation with associated surrounding multi regional deep soft tissue neck edema  · IR aspiration 3/3  · Cultures pending  · ENT consult appreciated  They will follow and plan for definitive excision once infection resolved  · ID consulted appreciated, continue Vancomycin until culture results available  · This was not able to be fully aspirated  May need repeat CT of neck to demonstrate resolution prior to stopping antibiotics        Sepsis [facility])  Assessment & Plan  · Patient has elevated white count, tachycardia, fever to 102 probably related to abscess noted in the soft tissues of neck    Leukocytosis  Assessment & Plan  · Secondary to infection, trending down        Hypertension  Assessment & Plan  · patient blood pressure is stable continue with lisinopril        Morbid obesity ([address] )  Assessment & Plan  · Secondary to excess calories as evidence by BMI 40 17    VTE Pharmacologic Prophylaxis:   Pharmacologic: Enoxaparin (Lovenox)  Mechanical VTE Prophylaxis in Place: Yes    Patient Centered Rounds: I have performed bedside rounds with nursing staff today  Discussions with Specialists or Other Care Team Provider: case management    Education and Discussions with Family / Patient: patient, message left for brother with update    Time Spent for Care: 30 minutes  More than 50% of total time spent on counseling and coordination of care as described above      Current Length of Stay: 2 day(s)    Current Patient Status: Inpatient   Certification Statement: The patient will continue to require additional inpatient hospital stay due to IV antibiotics    Discharge Plan: pending cultures    Code Status: Level 1 - Full Code      Subjective:   Right side of neck is a little sore    Objective:     Vitals:   Temp (24hrs), Av 9 °F (37 2 °C), Min:97 3 °F (36 3 °C), Max:100 8 °F (38 2 °C)    Temp:  [97 3 °F (36 3 °C)-100 8 °F (38 2 °C)] 97 8 °F (36 6 °C)  HR:  [80-97] 83  Resp:  [16-20] 18  BP: (104-163)/(55-97) 144/92  SpO2:  [91 %-96 %] 95 %  Body mass index is 40 14 kg/m²  Input and Output Summary (last 24 hours): Intake/Output Summary (Last 24 hours) at 3/4/2020 1627  Last data filed at 3/4/2020 1409  Gross per 24 hour   Intake 4778 34 ml   Output 2900 ml   Net 1878 34 ml       Physical Exam:     Physical Exam   Constitutional: She is oriented to person, place, and time  She appears well-developed and well-nourished  No distress  HENT:   Head: Normocephalic and atraumatic  Neck:   Swelling on right side   Cardiovascular: Normal rate and regular rhythm  Exam reveals no friction rub  No murmur heard  Pulmonary/Chest: Effort normal and breath sounds normal  No respiratory distress  She has no wheezes  Abdominal: Soft  Bowel sounds are normal  She exhibits no distension  There is no tenderness  There is no rebound and no guarding  Musculoskeletal: She exhibits no edema  Neurological: She is alert and oriented to person, place, and time  No cranial nerve deficit  Skin: Skin is warm and dry  No rash noted  Psychiatric: She has a normal mood and affect  Nursing note and vitals reviewed        Additional Data:     Labs:    Results from last 7 days   Lab Units 20  0525   WBC Thousand/uL 10 59*   HEMOGLOBIN g/dL 12 3   HEMATOCRIT % 38 1   PLATELETS Thousands/uL 197   NEUTROS PCT % 76*   LYMPHS PCT % 13*   MONOS PCT % 9   EOS PCT % 1     Results from last 7 days   Lab Units 20  0525   SODIUM mmol/L 136   POTASSIUM mmol/L 4 6   CHLORIDE mmol/L 103   CO2 mmol/L 27   BUN mg/dL 11   CREATININE mg/dL 0 63   ANION GAP mmol/L 6   CALCIUM mg/dL 9 1   ALBUMIN g/dL 2 9*   TOTAL BILIRUBIN mg/dL 0 53   ALK PHOS U/L 98   ALT U/L 45   AST U/L 41   GLUCOSE RANDOM mg/dL 122     Results from last 7 days   Lab Units 03/02/20  1142   INR  1 02             Results from last 7 days   Lab Units 03/02/20  1142   LACTIC ACID mmol/L 2 0   PROCALCITONIN ng/ml 0 07           * I Have Reviewed All Lab Data Listed Above  * Additional Pertinent Lab Tests Reviewed: All Labs Within Last 24 Hours Reviewed    Imaging:    Imaging Reports Reviewed Today Include: none  Imaging Personally Reviewed by Myself Includes:  none    Recent Cultures (last 7 days):     Results from last 7 days   Lab Units 03/03/20  1706 03/02/20  1142   BLOOD CULTURE   --  No Growth at 48 hrs  No Growth at 48 hrs  BODY FLUID CULTURE, STERILE  Culture results to follow    --        Last 24 Hours Medication List:     Current Facility-Administered Medications:  acetaminophen 650 mg Oral Q6H PRN Barbara Allan PA-C    aluminum-magnesium hydroxide-simethicone 30 mL Oral Q6H PRN Luanne Rosas MD    aspirin 81 mg Oral Daily Luanne Rosas MD    docusate sodium 100 mg Oral BID Luanne Rosas MD    enoxaparin 40 mg Subcutaneous Daily Luanne Rosas MD    ezetimibe 10 mg Oral Daily Luanne Rosas MD    fish oil 2,000 mg Oral Daily Luanne Rosas MD    levothyroxine 100 mcg Oral Daily Luanne Rosas MD    lisinopril 20 mg Oral Daily Luanne Rosas MD    multivitamin-minerals 1 tablet Oral Daily Luanne Rosas MD    ondansetron 4 mg Intravenous Q6H PRN Luanne Rosas MD    pantoprazole 40 mg Oral Early Morning Luanne Rosas MD    predniSONE 10 mg Oral Daily Luanne Rosas MD    senna 1 tablet Oral Daily Luanne Rosas MD    traMADol 50 mg Oral Q6H PRN Barbara Allan PA-C    vancomycin 20 mg/kg (Adjusted) Intravenous Q12H Buck Wayne MD Last Rate: 1,500 mg (03/04/20 9631) Today, Patient Was Seen By: Erich Phoenix PA-C    ** Please Note: Dictation voice to text software may have been used in the creation of this document   **

## 2020-03-05 LAB
BACTERIA SPEC ANAEROBE CULT: NORMAL
BACTERIA SPEC BFLD CULT: ABNORMAL
ERYTHROCYTE [DISTWIDTH] IN BLOOD BY AUTOMATED COUNT: 13 % (ref 11.6–15.1)
GRAM STN SPEC: ABNORMAL
GRAM STN SPEC: ABNORMAL
HCT VFR BLD AUTO: 35.9 % (ref 34.8–46.1)
HGB BLD-MCNC: 11.1 G/DL (ref 11.5–15.4)
MCH RBC QN AUTO: 29.3 PG (ref 26.8–34.3)
MCHC RBC AUTO-ENTMCNC: 30.9 G/DL (ref 31.4–37.4)
MCV RBC AUTO: 95 FL (ref 82–98)
PLATELET # BLD AUTO: 172 THOUSANDS/UL (ref 149–390)
PMV BLD AUTO: 10.6 FL (ref 8.9–12.7)
RBC # BLD AUTO: 3.79 MILLION/UL (ref 3.81–5.12)
WBC # BLD AUTO: 7.02 THOUSAND/UL (ref 4.31–10.16)

## 2020-03-05 PROCEDURE — 99232 SBSQ HOSP IP/OBS MODERATE 35: CPT | Performed by: INTERNAL MEDICINE

## 2020-03-05 PROCEDURE — 99233 SBSQ HOSP IP/OBS HIGH 50: CPT | Performed by: INTERNAL MEDICINE

## 2020-03-05 PROCEDURE — 85027 COMPLETE CBC AUTOMATED: CPT | Performed by: PHYSICIAN ASSISTANT

## 2020-03-05 RX ADMIN — AMPICILLIN SODIUM AND SULBACTAM SODIUM 3 G: 2; 1 INJECTION, POWDER, FOR SOLUTION INTRAMUSCULAR; INTRAVENOUS at 11:44

## 2020-03-05 RX ADMIN — EZETIMIBE 10 MG: 10 TABLET ORAL at 08:20

## 2020-03-05 RX ADMIN — ENOXAPARIN SODIUM 40 MG: 40 INJECTION SUBCUTANEOUS at 08:20

## 2020-03-05 RX ADMIN — LISINOPRIL 20 MG: 20 TABLET ORAL at 08:20

## 2020-03-05 RX ADMIN — SENNOSIDES 8.6 MG: 8.6 TABLET ORAL at 08:20

## 2020-03-05 RX ADMIN — LEVOTHYROXINE SODIUM 100 MCG: 100 TABLET ORAL at 06:08

## 2020-03-05 RX ADMIN — ASPIRIN 81 MG: 81 TABLET ORAL at 08:20

## 2020-03-05 RX ADMIN — Medication 1 TABLET: at 08:20

## 2020-03-05 RX ADMIN — OMEGA-3 FATTY ACIDS CAP 1000 MG 2000 MG: 1000 CAP at 08:20

## 2020-03-05 RX ADMIN — PANTOPRAZOLE SODIUM 40 MG: 40 TABLET, DELAYED RELEASE ORAL at 06:08

## 2020-03-05 RX ADMIN — DOCUSATE SODIUM 100 MG: 100 CAPSULE, LIQUID FILLED ORAL at 08:20

## 2020-03-05 RX ADMIN — PREDNISONE 10 MG: 10 TABLET ORAL at 08:20

## 2020-03-05 RX ADMIN — TRAMADOL HYDROCHLORIDE 50 MG: 50 TABLET, FILM COATED ORAL at 22:40

## 2020-03-05 RX ADMIN — DOCUSATE SODIUM 100 MG: 100 CAPSULE, LIQUID FILLED ORAL at 18:34

## 2020-03-05 RX ADMIN — VANCOMYCIN HYDROCHLORIDE 1500 MG: 10 INJECTION, POWDER, LYOPHILIZED, FOR SOLUTION INTRAVENOUS at 02:20

## 2020-03-05 RX ADMIN — AMPICILLIN SODIUM AND SULBACTAM SODIUM 3 G: 2; 1 INJECTION, POWDER, FOR SOLUTION INTRAMUSCULAR; INTRAVENOUS at 18:34

## 2020-03-05 RX ADMIN — ACETAMINOPHEN 650 MG: 325 TABLET ORAL at 03:48

## 2020-03-05 NOTE — ASSESSMENT & PLAN NOTE
· Infected right brachial cleft cyst (transfer from [facility])  · There is a 2 5x3x3 5 cm abscess centered deep to the right sternocleidomastoid muscle immediately above the carotid bifurcation with associated surrounding multi regional deep soft tissue neck edema  · IR aspiration 3/3  · ENT input noted outpatient ENT follow-up  · Cultures noted - placed on IV Unasyn  · Infectious Disease following

## 2020-03-05 NOTE — PROGRESS NOTES
Progress Note - Ever Prasad 1955, 75 y o  female MRN: [id number]    Unit/Bed#: [facility] 819-01 Encounter: [id number]    Primary Care Provider: Susan Johns DO   Date and time admitted to hospital: 3/2/2020  5:36 PM        * Abscess of neck  Assessment & Plan  · Infected right brachial cleft cyst (transfer from [facility])  · There is a 2 5x3x3 5 cm abscess centered deep to the right sternocleidomastoid muscle immediately above the carotid bifurcation with associated surrounding multi regional deep soft tissue neck edema  · IR aspiration 3/3  · ENT input noted outpatient ENT follow-up  · Cultures noted - placed on IV Unasyn  · Infectious Disease following        Leukocytosis  Assessment & Plan  · Monitor        Sepsis ([address] )  Assessment & Plan  · Present on admission  · Improving  · Monitor closely    Morbid obesity ([address] )  Assessment & Plan  · Therapeutic lifestyle modification  · Outpatient sleep study recommended    Hypertension  Assessment & Plan  · Monitor blood pressures  · Avoid hypotension          VTE Pharmacologic Prophylaxis:   Pharmacologic: Enoxaparin (Lovenox)  Mechanical VTE Prophylaxis in Place: Yes    Patient Centered Rounds: I have performed bedside rounds with nursing staff today  Discussions with Specialists or Other Care Team Provider:     Education and Discussions with Family / Patient:  Discussed with the patient, called and updated sister-in-law over phone in detail    Time Spent for Care: 30 minutes  More than 50% of total time spent on counseling and coordination of care as described above      Current Length of Stay: 3 day(s)    Current Patient Status: Inpatient   Certification Statement: The patient will continue to require additional inpatient hospital stay due to as mentioned    Discharge Plan: awaiting infectious disease inputs for disposition planning     Code Status: Level 1 - Full Code      Subjective:     Comfortably sitting up in chair  Reports right-sided neck swelling and pain  Tolerating p o  History chart labs medications reviewed    Objective:     Vitals:   Temp (24hrs), Av 8 °F (36 6 °C), Min:97 7 °F (36 5 °C), Max:98 °F (36 7 °C)    Temp:  [97 7 °F (36 5 °C)-98 °F (36 7 °C)] 98 °F (36 7 °C)  HR:  [66-83] 77  Resp:  [18] 18  BP: (102-144)/(60-92) 129/66  SpO2:  [95 %-100 %] 100 %  Body mass index is 39 89 kg/m²  Input and Output Summary (last 24 hours): Intake/Output Summary (Last 24 hours) at 3/5/2020 1336  Last data filed at 3/5/2020 0800  Gross per 24 hour   Intake 1281 67 ml   Output 1400 ml   Net -118 33 ml       Physical Exam:     Physical Exam    Comfortably sitting up in chair  Obese  Short thick neck  Lungs right side neck swelling tenderness noted  Heart sounds S1-S2 noted  Abdomen soft  Abdominal obesity noted  Awake obeys simple commands  Pulses noted  No rash  No pedal edema      Additional Data:     Labs:    Results from last 7 days   Lab Units 20  0544 20  0525   WBC Thousand/uL 7 02 10 59*   HEMOGLOBIN g/dL 11 1* 12 3   HEMATOCRIT % 35 9 38 1   PLATELETS Thousands/uL 172 197   NEUTROS PCT %  --  76*   LYMPHS PCT %  --  13*   MONOS PCT %  --  9   EOS PCT %  --  1     Results from last 7 days   Lab Units 20  0525   SODIUM mmol/L 136   POTASSIUM mmol/L 4 6   CHLORIDE mmol/L 103   CO2 mmol/L 27   BUN mg/dL 11   CREATININE mg/dL 0 63   ANION GAP mmol/L 6   CALCIUM mg/dL 9 1   ALBUMIN g/dL 2 9*   TOTAL BILIRUBIN mg/dL 0 53   ALK PHOS U/L 98   ALT U/L 45   AST U/L 41   GLUCOSE RANDOM mg/dL 122     Results from last 7 days   Lab Units 20  1142   INR  1 02             Results from last 7 days   Lab Units 20  1142   LACTIC ACID mmol/L 2 0   PROCALCITONIN ng/ml 0 07           * I Have Reviewed All Lab Data Listed Above  * Additional Pertinent Lab Tests Reviewed:  All Labs Within Last 24 Hours Reviewed    Imaging:    Imaging Reports Reviewed Today Include:  Imaging studies noted  Imaging Personally Reviewed by Myself Includes: Recent Cultures (last 7 days):     Results from last 7 days   Lab Units 03/03/20  1706 03/02/20  1142   BLOOD CULTURE   --  No Growth at 48 hrs  No Growth at 48 hrs  GRAM STAIN RESULT  2+ Disintegrating polys*  1+ Gram negative rods*  --    BODY FLUID CULTURE, STERILE  3+ Growth of Pasteurella multocida*  --        Last 24 Hours Medication List:     Current Facility-Administered Medications:  acetaminophen 650 mg Oral Q6H PRN Barbara Allan PA-C    aluminum-magnesium hydroxide-simethicone 30 mL Oral Q6H PRN Krish Alvares MD    ampicillin-sulbactam 3 g Intravenous Q6H Buck HurstinedMD matias Last Rate: 3 g (03/05/20 1144)   aspirin 81 mg Oral Daily Krish Alvares MD    docusate sodium 100 mg Oral BID Krish Alvares MD    enoxaparin 40 mg Subcutaneous Daily Krish Alvares MD    ezetimibe 10 mg Oral Daily Krish Alvares MD    fish oil 2,000 mg Oral Daily Krish Alvares MD    levothyroxine 100 mcg Oral Daily Krish Alvares MD    lisinopril 20 mg Oral Daily Krish Alvares MD    multivitamin-minerals 1 tablet Oral Daily Krish Alvares MD    ondansetron 4 mg Intravenous Q6H PRN Krish Alvares MD    pantoprazole 40 mg Oral Early Morning Krish Alvares MD    predniSONE 10 mg Oral Daily Krish Alvares MD    senna 1 tablet Oral Daily Krish Alvares MD    traMADol 50 mg Oral Q6H PRN Aroldo Núñez PA-C         Today, Patient Was Seen By: Carolynn Romberg, MD    ** Please Note: Dictation voice to text software may have been used in the creation of this document   **

## 2020-03-05 NOTE — PROGRESS NOTES
Vancomycin IV Pharmacy-to-Dose Consultation    Gerardo Aldana is a 75 y o  female who was receiving Vancomycin IV with management by the Pharmacy Consult service for treatment of MRSA suspected, skin-soft tissue infection deep neck abscess  The patient's Vancomycin therapy has been completed / discontinued  Thank you for allowing us to take part in this patient's care  Pharmacy will sign-off now; please call or re-consult if there are any questions          Tyler Hernandez, Pharmacist

## 2020-03-05 NOTE — RESTORATIVE TECHNICIAN NOTE
Restorative Specialist Mobility Note       Activity: Ambulate in rodriguez, Chair     Assistive Device: None        Repositioned: Up in chair, Sitting

## 2020-03-05 NOTE — PROGRESS NOTES
Cultivated a relationship of care and support  Pt declined  support       03/05/20 1400   Clinical Encounter Type   Visited With Patient   Routine Visit Introduction

## 2020-03-05 NOTE — PROGRESS NOTES
Progress Note - Infectious Disease   Bethany [place] 75 y o  female MRN: [id number]  Unit/Bed#: [facility] 679-31 Encounter: [id number]      Impression/Plan:    58-year-old female patient admitted for right-sided neck swelling following an episode of sore throat   Neck swelling developed despite that patient was started on Augmentin for possible bacterial upper respiratory infection  Upon presentation she had fever and leukocytosis, and was diagnosed based on CT neck to have 3 5 cm abscess immediately above the carotid bifurcation, thought to be secondary to an infected right branchial cleft cyst     1- sepsis, POA:  Fever, leukocytosis, mild tachycardia upon admission  Sepsis is likely secondary to right-sided neck abscess   No other infectious source appreciated based on history or physical exam   Patient is status post aspiration of 10 cc of purulence done by IR 03/03/2020, no drain was kept in place  Culture positive for pasteurella multocida  - antibiotics as below  - pain control and supportive care as per primary service  - repeat CBC in a m   - trend fever curve     2- neck abscess, possible infection of right branchial cleft cyst:  Patient was started on Augmentin 02/25/2020 by her PCP for suspected maxillary sinusitis  She reports being compliant Augmentin, and despite taking Augmentin for about 5 days, her right cervical swelling worsened  Patient had aspiration of small amount of purulence done by IR 03/03/2020, cultures positive for pasteurella multocida  Patient denies being in contact with cats  She has 1 dog, and she is usually exposed to his saliva  Denies scratches  Case discussed with interventional radiology, putting a drain risks bleeding and might be technically difficult     -  stop vancomycin  -   start Unasyn IV  -  as patient did not have full drainage of her neck abscess, will likely need to repeat CT scan of the neck in 1-2 weeks to ensure resolution of the abscess before stopping antibiotic therapy       3- hypertension:  Blood pressure currently stable and controlled  - management as per primary service     4- suspected right branchial cleft cyst:  - patient will need follow-up with ENT for cyst resection after resolution of her current infection     Plan mentioned above discussed with patient  Case discussed with primary service     Antibiotics:  Unasyn IV day 1     Subjective:  Patient has no fever, chills, sweats; no nausea, vomiting, diarrhea; no cough, shortness of breath; Patient denies dysphagia, denies shortness of breath  She still reporting right cervical swelling and pain, with only minimal improvement  Objective:  Vitals:  Temp:  [97 7 °F (36 5 °C)-98 °F (36 7 °C)] 98 °F (36 7 °C)  HR:  [66-83] 77  Resp:  [18] 18  BP: (102-144)/(60-92) 129/66  SpO2:  [95 %-100 %] 100 %  Temp (24hrs), Av 8 °F (36 6 °C), Min:97 7 °F (36 5 °C), Max:98 °F (36 7 °C)  Current: Temperature: 98 °F (36 7 °C)    Physical Exam:   General Appearance:  Alert, interactive, nontoxic, no acute distress  Throat: Oropharynx moist without lesions  No intraoral lesions, no signs of pharyngitis  Right-sided neck swelling and tenderness, slightly improved compared to previous exams  Lungs:   Clear to auscultation bilaterally; no wheezes, rhonchi or rales; respirations unlabored   Heart:  RRR; no murmur, rub or gallop   Abdomen:   Soft, non-tender, non-distended, positive bowel sounds  Extremities: No clubbing, cyanosis or edema   Skin: No new rashes or lesions  No draining wounds noted         Labs, Imaging, & Other studies:   All pertinent labs and imaging studies were personally reviewed  Results from last 7 days   Lab Units 20  0544 20  0525 20  1142   WBC Thousand/uL 7 02 10 59* 15 91*   HEMOGLOBIN g/dL 11 1* 12 3 13 6   PLATELETS Thousands/uL 172 197 229     Results from last 7 days   Lab Units 20  0525 20  1142   SODIUM mmol/L 136 136   POTASSIUM mmol/L 4 6 5 4*   CHLORIDE mmol/L 103 96*   CO2 mmol/L 27 31   BUN mg/dL 11 11   CREATININE mg/dL 0 63 0 68   EGFR ml/min/1 73sq m 94 91   CALCIUM mg/dL 9 1 9 0   AST U/L 41 52*   ALT U/L 45 51   ALK PHOS U/L 98 111     Results from last 7 days   Lab Units 03/03/20  1706 03/02/20  1142   BLOOD CULTURE   --  No Growth at 48 hrs  No Growth at 48 hrs     GRAM STAIN RESULT  2+ Disintegrating polys*  1+ Gram negative rods*  --    BODY FLUID CULTURE, STERILE  3+ Growth of Pasteurella multocida*  --      Results from last 7 days   Lab Units 03/02/20  1142   PROCALCITONIN ng/ml 0 07

## 2020-03-05 NOTE — PLAN OF CARE
Problem: Potential for Falls  Goal: Patient will remain free of falls  Description  INTERVENTIONS:  - Assess patient frequently for physical needs  -  Identify cognitive and physical deficits and behaviors that affect risk of falls    -  [place] fall precautions as indicated by assessment   - Educate patient/family on patient safety including physical limitations  - Instruct patient to call for assistance with activity based on assessment  - Modify environment to reduce risk of injury  - Consider OT/PT consult to assist with strengthening/mobility  Outcome: Progressing     Problem: PAIN - ADULT  Goal: Verbalizes/displays adequate comfort level or baseline comfort level  Description  Interventions:  - Encourage patient to monitor pain and request assistance  - Assess pain using appropriate pain scale  - Administer analgesics based on type and severity of pain and evaluate response  - Implement non-pharmacological measures as appropriate and evaluate response  - Consider cultural and social influences on pain and pain management  - Notify physician/advanced practitioner if interventions unsuccessful or patient reports new pain  Outcome: Progressing     Problem: INFECTION - ADULT  Goal: Absence or prevention of progression during hospitalization  Description  INTERVENTIONS:  - Assess and monitor for signs and symptoms of infection  - Monitor lab/diagnostic results  - Monitor all insertion sites, i e  indwelling lines, tubes, and drains  - Monitor endotracheal if appropriate and nasal secretions for changes in amount and color  - [place] appropriate cooling/warming therapies per order  - Administer medications as ordered  - Instruct and encourage patient and family to use good hand hygiene technique  - Identify and instruct in appropriate isolation precautions for identified infection/condition  Outcome: Progressing  Goal: Absence of fever/infection during neutropenic period  Description  INTERVENTIONS:  - Monitor WBC    Outcome: Progressing     Problem: SKIN/TISSUE INTEGRITY - ADULT  Goal: Skin integrity remains intact  Description  INTERVENTIONS  - Identify patients at risk for skin breakdown  - Assess and monitor skin integrity  - Assess and monitor nutrition and hydration status  - Monitor labs (i e  albumin)  - Assess for incontinence   - Turn and reposition patient  - Assist with mobility/ambulation  - Relieve pressure over bony prominences  - Avoid friction and shearing  - Provide appropriate hygiene as needed including keeping skin clean and dry  - Evaluate need for skin moisturizer/barrier cream  - Collaborate with interdisciplinary team (i e  Nutrition, Rehabilitation, etc )   - Patient/family teaching  Outcome: Progressing  Goal: Incision(s), wounds(s) or drain site(s) healing without S/S of infection  Description  INTERVENTIONS  - Assess and document risk factors for skin impairment   - Assess and document dressing, incision, wound bed, drain sites and surrounding tissue  - Consider nutrition services referral as needed  - Oral mucous membranes remain intact  - Provide patient/ family education  Outcome: Progressing     Problem: SAFETY ADULT  Goal: Maintain or return to baseline ADL function  Description  INTERVENTIONS:  -  Assess patient's ability to carry out ADLs; assess patient's baseline for ADL function and identify physical deficits which impact ability to perform ADLs (bathing, care of mouth/teeth, toileting, grooming, dressing, etc )  - Assess/evaluate cause of self-care deficits   - Assess range of motion  - Assess patient's mobility; develop plan if impaired  - Assess patient's need for assistive devices and provide as appropriate  - Encourage maximum independence but intervene and supervise when necessary  - Involve family in performance of ADLs  - Assess for home care needs following discharge   - Consider OT consult to assist with ADL evaluation and planning for discharge  - Provide patient education as appropriate  Outcome: Progressing  Goal: Maintain or return mobility status to optimal level  Description  INTERVENTIONS:  - Assess patient's baseline mobility status (ambulation, transfers, stairs, etc )    - Identify cognitive and physical deficits and behaviors that affect mobility  - Identify mobility aids required to assist with transfers and/or ambulation (gait belt, sit-to-stand, lift, walker, cane, etc )  - [place] fall precautions as indicated by assessment  - Record patient progress and toleration of activity level on Mobility SBAR; progress patient to next Phase/Stage  - Instruct patient to call for assistance with activity based on assessment  - Consider rehabilitation consult to assist with strengthening/weightbearing, etc   Outcome: Progressing     Problem: DISCHARGE PLANNING  Goal: Discharge to home or other facility with appropriate resources  Description  INTERVENTIONS:  - Identify barriers to discharge w/patient and caregiver  - Arrange for needed discharge resources and transportation as appropriate  - Identify discharge learning needs (meds, wound care, etc )  - Arrange for interpretive services to assist at discharge as needed  - Refer to Case Management Department for coordinating discharge planning if the patient needs post-hospital services based on physician/advanced practitioner order or complex needs related to functional status, cognitive ability, or social support system  Outcome: Progressing     Problem: Knowledge Deficit  Goal: Patient/family/caregiver demonstrates understanding of disease process, treatment plan, medications, and discharge instructions  Description  Complete learning assessment and assess knowledge base    Interventions:  - Provide teaching at level of understanding  - Provide teaching via preferred learning methods  Outcome: Progressing

## 2020-03-06 PROCEDURE — 99232 SBSQ HOSP IP/OBS MODERATE 35: CPT | Performed by: INTERNAL MEDICINE

## 2020-03-06 PROCEDURE — 99233 SBSQ HOSP IP/OBS HIGH 50: CPT | Performed by: INTERNAL MEDICINE

## 2020-03-06 RX ADMIN — ASPIRIN 81 MG: 81 TABLET ORAL at 09:18

## 2020-03-06 RX ADMIN — AMPICILLIN SODIUM AND SULBACTAM SODIUM 3 G: 2; 1 INJECTION, POWDER, FOR SOLUTION INTRAMUSCULAR; INTRAVENOUS at 17:51

## 2020-03-06 RX ADMIN — DOCUSATE SODIUM 100 MG: 100 CAPSULE, LIQUID FILLED ORAL at 09:17

## 2020-03-06 RX ADMIN — EZETIMIBE 10 MG: 10 TABLET ORAL at 09:17

## 2020-03-06 RX ADMIN — AMPICILLIN SODIUM AND SULBACTAM SODIUM 3 G: 2; 1 INJECTION, POWDER, FOR SOLUTION INTRAMUSCULAR; INTRAVENOUS at 05:05

## 2020-03-06 RX ADMIN — Medication 1 TABLET: at 09:17

## 2020-03-06 RX ADMIN — AMPICILLIN SODIUM AND SULBACTAM SODIUM 3 G: 2; 1 INJECTION, POWDER, FOR SOLUTION INTRAMUSCULAR; INTRAVENOUS at 11:47

## 2020-03-06 RX ADMIN — LISINOPRIL 20 MG: 20 TABLET ORAL at 09:18

## 2020-03-06 RX ADMIN — OMEGA-3 FATTY ACIDS CAP 1000 MG 2000 MG: 1000 CAP at 09:17

## 2020-03-06 RX ADMIN — SENNOSIDES 8.6 MG: 8.6 TABLET ORAL at 09:17

## 2020-03-06 RX ADMIN — LEVOTHYROXINE SODIUM 100 MCG: 100 TABLET ORAL at 05:06

## 2020-03-06 RX ADMIN — PANTOPRAZOLE SODIUM 40 MG: 40 TABLET, DELAYED RELEASE ORAL at 05:06

## 2020-03-06 RX ADMIN — AMPICILLIN SODIUM AND SULBACTAM SODIUM 3 G: 2; 1 INJECTION, POWDER, FOR SOLUTION INTRAMUSCULAR; INTRAVENOUS at 00:00

## 2020-03-06 RX ADMIN — ENOXAPARIN SODIUM 40 MG: 40 INJECTION SUBCUTANEOUS at 09:18

## 2020-03-06 RX ADMIN — PREDNISONE 10 MG: 10 TABLET ORAL at 09:18

## 2020-03-06 RX ADMIN — DOCUSATE SODIUM 100 MG: 100 CAPSULE, LIQUID FILLED ORAL at 17:51

## 2020-03-06 NOTE — PROGRESS NOTES
Progress Note - Jennifer Bryson 1955, 75 y o  female MRN: [id number]    Unit/Bed#: [facility] 819-01 Encounter: [id number]    Primary Care Provider: Delfino Mendez DO   Date and time admitted to hospital: 3/2/2020  5:36 PM        * Abscess of neck  Assessment & Plan  · Infected right brachial cleft cyst (transfer from [facility])  · There is a 2 5x3x3 5 cm abscess centered deep to the right sternocleidomastoid muscle immediately above the carotid bifurcation with associated surrounding multi regional deep soft tissue neck edema  · IR aspiration 3/3  · ENT input noted outpatient ENT follow-up  · Cultures noted - placed on IV Unasyn  · Discussed with infectious disease - IV antibiotics through week and repeat imaging on Monday based on clinical response        Leukocytosis  Assessment & Plan  · Monitor    Sepsis ([address] )  Assessment & Plan  · Present on admission  · Improving  · Monitor closely    Morbid obesity ([address] )  Assessment & Plan  · Therapeutic lifestyle modification  · Outpatient sleep study recommended    Hypertension  Assessment & Plan  · Monitor blood pressures  · Avoid hypotension            VTE Pharmacologic Prophylaxis:   Pharmacologic: Enoxaparin (Lovenox)  Mechanical VTE Prophylaxis in Place: Yes    Patient Centered Rounds: I have performed bedside rounds with nursing staff today  Discussions with Specialists or Other Care Team Provider:  Infectious Disease    Education and Discussions with Family / Patient:  Discussed with the patient    Time Spent for Care: 30 minutes  More than 50% of total time spent on counseling and coordination of care as described above      Current Length of Stay: 4 day(s)    Current Patient Status: Inpatient   Certification Statement: The patient will continue to require additional inpatient hospital stay due to As mentioned    Discharge Plan:  Awaiting clinical symptomatic improvement, receiving IV antibiotics    Code Status: Level 1 - Full Code      Subjective: Comfortably sitting up in chair  Reports feeling okay  Encouraged ambulation as able    Objective:     Vitals:   Temp (24hrs), Av 9 °F (36 6 °C), Min:97 8 °F (36 6 °C), Max:98 °F (36 7 °C)    Temp:  [97 8 °F (36 6 °C)-98 °F (36 7 °C)] 97 8 °F (36 6 °C)  HR:  [67-78] 68  Resp:  [18-20] 18  BP: (118-141)/(63-76) 141/76  SpO2:  [95 %-99 %] 99 %  Body mass index is 39 56 kg/m²  Input and Output Summary (last 24 hours): Intake/Output Summary (Last 24 hours) at 3/6/2020 1455  Last data filed at 3/6/2020 1317  Gross per 24 hour   Intake 1720 ml   Output 1150 ml   Net 570 ml       Physical Exam:     Physical Exam    Morbidly obese  Comfortably sitting up in chair  Neck tender swelling/cyst-like lesion noted right-sided neck  Lungs diminished breath sounds bilaterally  Heart sounds S1-S2 noted  Abdomen soft nontender  Abdominal obesity noted  Pulses noted  No pedal edema  No rash    Additional Data:     Labs:    Results from last 7 days   Lab Units 20  0544 20  0525   WBC Thousand/uL 7 02 10 59*   HEMOGLOBIN g/dL 11 1* 12 3   HEMATOCRIT % 35 9 38 1   PLATELETS Thousands/uL 172 197   NEUTROS PCT %  --  76*   LYMPHS PCT %  --  13*   MONOS PCT %  --  9   EOS PCT %  --  1     Results from last 7 days   Lab Units 20  0525   SODIUM mmol/L 136   POTASSIUM mmol/L 4 6   CHLORIDE mmol/L 103   CO2 mmol/L 27   BUN mg/dL 11   CREATININE mg/dL 0 63   ANION GAP mmol/L 6   CALCIUM mg/dL 9 1   ALBUMIN g/dL 2 9*   TOTAL BILIRUBIN mg/dL 0 53   ALK PHOS U/L 98   ALT U/L 45   AST U/L 41   GLUCOSE RANDOM mg/dL 122     Results from last 7 days   Lab Units 20  1142   INR  1 02             Results from last 7 days   Lab Units 20  1142   LACTIC ACID mmol/L 2 0   PROCALCITONIN ng/ml 0 07           * I Have Reviewed All Lab Data Listed Above  * Additional Pertinent Lab Tests Reviewed:  All Labs Within Last 24 Hours Reviewed    Imaging:    Imaging Reports Reviewed Today Include:   Imaging Personally Reviewed by Myself Includes:      Recent Cultures (last 7 days):     Results from last 7 days   Lab Units 03/03/20  1706 03/02/20  1142   BLOOD CULTURE   --  No Growth at 72 hrs  No Growth at 72 hrs  GRAM STAIN RESULT  2+ Disintegrating polys*  1+ Gram negative rods*  --    BODY FLUID CULTURE, STERILE  3+ Growth of Pasteurella multocida*  --        Last 24 Hours Medication List:     Current Facility-Administered Medications:  acetaminophen 650 mg Oral Q6H PRN Barbara Allan PA-C    aluminum-magnesium hydroxide-simethicone 30 mL Oral Q6H PRN Rohit Alarcon MD    ampicillin-sulbactam 3 g Intravenous Q6H Buck Wayne MD Last Rate: 3 g (03/06/20 1147)   aspirin 81 mg Oral Daily Rohit Alarcon MD    docusate sodium 100 mg Oral BID Rohit Alarcon MD    enoxaparin 40 mg Subcutaneous Daily Rohit Alarcon MD    ezetimibe 10 mg Oral Daily Rohit Alarcon MD    fish oil 2,000 mg Oral Daily Rohit Alarcon MD    levothyroxine 100 mcg Oral Daily Rohit Alarcon MD    lisinopril 20 mg Oral Daily Rohit Alarcon MD    multivitamin-minerals 1 tablet Oral Daily Rohit Alarcon MD    ondansetron 4 mg Intravenous Q6H PRN Rohit Alarcon MD    pantoprazole 40 mg Oral Early Morning Rohit Alarcon MD    predniSONE 10 mg Oral Daily Rohit Alarcon MD    senna 1 tablet Oral Daily Rohit Alarcon MD    traMADol 50 mg Oral Q6H PRN Paul Sanchez PA-C         Today, Patient Was Seen By: Bryce Murcia MD    ** Please Note: Dictation voice to text software may have been used in the creation of this document   **

## 2020-03-06 NOTE — PLAN OF CARE
Problem: Potential for Falls  Goal: Patient will remain free of falls  Description  INTERVENTIONS:  - Assess patient frequently for physical needs  -  Identify cognitive and physical deficits and behaviors that affect risk of falls    -  [place] fall precautions as indicated by assessment   - Educate patient/family on patient safety including physical limitations  - Instruct patient to call for assistance with activity based on assessment  - Modify environment to reduce risk of injury  - Consider OT/PT consult to assist with strengthening/mobility  Outcome: Progressing     Problem: PAIN - ADULT  Goal: Verbalizes/displays adequate comfort level or baseline comfort level  Description  Interventions:  - Encourage patient to monitor pain and request assistance  - Assess pain using appropriate pain scale  - Administer analgesics based on type and severity of pain and evaluate response  - Implement non-pharmacological measures as appropriate and evaluate response  - Consider cultural and social influences on pain and pain management  - Notify physician/advanced practitioner if interventions unsuccessful or patient reports new pain  Outcome: Progressing     Problem: INFECTION - ADULT  Goal: Absence or prevention of progression during hospitalization  Description  INTERVENTIONS:  - Assess and monitor for signs and symptoms of infection  - Monitor lab/diagnostic results  - Monitor all insertion sites, i e  indwelling lines, tubes, and drains  - Monitor endotracheal if appropriate and nasal secretions for changes in amount and color  - [place] appropriate cooling/warming therapies per order  - Administer medications as ordered  - Instruct and encourage patient and family to use good hand hygiene technique  - Identify and instruct in appropriate isolation precautions for identified infection/condition  Outcome: Progressing  Goal: Absence of fever/infection during neutropenic period  Description  INTERVENTIONS:  - Monitor WBC    Outcome: Progressing     Problem: SKIN/TISSUE INTEGRITY - ADULT  Goal: Skin integrity remains intact  Description  INTERVENTIONS  - Identify patients at risk for skin breakdown  - Assess and monitor skin integrity  - Assess and monitor nutrition and hydration status  - Monitor labs (i e  albumin)  - Assess for incontinence   - Turn and reposition patient  - Assist with mobility/ambulation  - Relieve pressure over bony prominences  - Avoid friction and shearing  - Provide appropriate hygiene as needed including keeping skin clean and dry  - Evaluate need for skin moisturizer/barrier cream  - Collaborate with interdisciplinary team (i e  Nutrition, Rehabilitation, etc )   - Patient/family teaching  Outcome: Progressing  Goal: Incision(s), wounds(s) or drain site(s) healing without S/S of infection  Description  INTERVENTIONS  - Assess and document risk factors for skin impairment   - Assess and document dressing, incision, wound bed, drain sites and surrounding tissue  - Consider nutrition services referral as needed  - Oral mucous membranes remain intact  - Provide patient/ family education  Outcome: Progressing     Problem: SAFETY ADULT  Goal: Maintain or return to baseline ADL function  Description  INTERVENTIONS:  -  Assess patient's ability to carry out ADLs; assess patient's baseline for ADL function and identify physical deficits which impact ability to perform ADLs (bathing, care of mouth/teeth, toileting, grooming, dressing, etc )  - Assess/evaluate cause of self-care deficits   - Assess range of motion  - Assess patient's mobility; develop plan if impaired  - Assess patient's need for assistive devices and provide as appropriate  - Encourage maximum independence but intervene and supervise when necessary  - Involve family in performance of ADLs  - Assess for home care needs following discharge   - Consider OT consult to assist with ADL evaluation and planning for discharge  - Provide patient education as appropriate  Outcome: Progressing  Goal: Maintain or return mobility status to optimal level  Description  INTERVENTIONS:  - Assess patient's baseline mobility status (ambulation, transfers, stairs, etc )    - Identify cognitive and physical deficits and behaviors that affect mobility  - Identify mobility aids required to assist with transfers and/or ambulation (gait belt, sit-to-stand, lift, walker, cane, etc )  - [place] fall precautions as indicated by assessment  - Record patient progress and toleration of activity level on Mobility SBAR; progress patient to next Phase/Stage  - Instruct patient to call for assistance with activity based on assessment  - Consider rehabilitation consult to assist with strengthening/weightbearing, etc   Outcome: Progressing     Problem: DISCHARGE PLANNING  Goal: Discharge to home or other facility with appropriate resources  Description  INTERVENTIONS:  - Identify barriers to discharge w/patient and caregiver  - Arrange for needed discharge resources and transportation as appropriate  - Identify discharge learning needs (meds, wound care, etc )  - Arrange for interpretive services to assist at discharge as needed  - Refer to Case Management Department for coordinating discharge planning if the patient needs post-hospital services based on physician/advanced practitioner order or complex needs related to functional status, cognitive ability, or social support system  Outcome: Progressing     Problem: Knowledge Deficit  Goal: Patient/family/caregiver demonstrates understanding of disease process, treatment plan, medications, and discharge instructions  Description  Complete learning assessment and assess knowledge base    Interventions:  - Provide teaching at level of understanding  - Provide teaching via preferred learning methods  Outcome: Progressing

## 2020-03-06 NOTE — PROGRESS NOTES
Progress Note - Infectious Disease   John Vega 75 y o  female MRN: [id number]  Unit/Bed#: [facility] 819-01 Encounter: [id number]      Impression/Plan:    1- sepsis, POA:  Fever, leukocytosis, mild tachycardia upon admission  Sepsis is likely secondary to right-sided neck abscess   No other infectious source appreciated based on history or physical exam   Patient is status post aspiration of 10 cc of purulence done by IR 03/03/2020, no drain was kept in place  Culture positive for pasteurella multocida  - antibiotics as below  - pain control and supportive care as per primary service  - repeat CBC in a m   - trend fever curve     2- neck abscess, possible infection of right branchial cleft cyst:  Patient was started on Augmentin 02/25/2020 by her PCP for suspected maxillary sinusitis  She reports being compliant Augmentin, and despite taking Augmentin for about 5 days, her right cervical swelling worsened  Patient had aspiration of small amount of purulence done by IR 03/03/2020, cultures positive for pasteurella multocida  Patient denies being in contact with cats  She has 1 dog, and she is usually exposed to his saliva  Denies scratches  Case discussed with interventional radiology, putting a drain risks bleeding and might be technically difficult  -  continue Unasyn IV  -  as patient did not have full drainage of her neck abscess, will continue Unasyn IV over the next 48 hours, will repeat CT scan on Monday 03/09/2020    If no decrease in size of abscess despite IV antibiotic treatment, patient would need reassessment by ENT to consider operative intervention       3- hypertension:  Blood pressure currently stable and controlled  - management as per primary service     4- suspected right branchial cleft cyst:  - patient will need follow-up with ENT for cyst resection after resolution of her current infection     Plan mentioned above discussed with patient  Case discussed with primary service   Continue IV antibiotics over the next 48 hours  Will follow up on 2020  Call ID service for questions or if change in clinical status    Antibiotics:  Unasyn IV day 2    Subjective:  Patient has no fever, chills, sweats; no nausea, vomiting, diarrhea; no cough, shortness of breath; still complaining of right-sided cervical pain and swelling      Objective:  Vitals:  Temp:  [97 8 °F (36 6 °C)-98 °F (36 7 °C)] 97 8 °F (36 6 °C)  HR:  [67-78] 68  Resp:  [18-20] 18  BP: (118-141)/(63-76) 141/76  SpO2:  [95 %-99 %] 99 %  Temp (24hrs), Av 9 °F (36 6 °C), Min:97 8 °F (36 6 °C), Max:98 °F (36 7 °C)  Current: Temperature: 97 8 °F (36 6 °C)    Physical Exam:   General Appearance:  Alert, interactive, nontoxic, no acute distress  Throat: Oropharynx moist without lesions  Right side cervical swelling is stable in appearance, no oral lesions, swelling is tender to touch, mild overlying skin erythema   Lungs:   Clear to auscultation bilaterally; no wheezes, rhonchi or rales; respirations unlabored   Heart:  RRR; no murmur, rub or gallop   Abdomen:   Soft, non-tender, non-distended, positive bowel sounds  Extremities: No clubbing, cyanosis or edema   Skin: No new rashes or lesions  No draining wounds noted         Labs, Imaging, & Other studies:   All pertinent labs and imaging studies were personally reviewed  Results from last 7 days   Lab Units 20  0544 20  0520  1142   WBC Thousand/uL 7 02 10 59* 15 91*   HEMOGLOBIN g/dL 11 1* 12 3 13 6   PLATELETS Thousands/uL 172 197 229     Results from last 7 days   Lab Units 20  0525 20  1142   SODIUM mmol/L 136 136   POTASSIUM mmol/L 4 6 5 4*   CHLORIDE mmol/L 103 96*   CO2 mmol/L 27 31   BUN mg/dL 11 11   CREATININE mg/dL 0 63 0 68   EGFR ml/min/1 73sq m 94 91   CALCIUM mg/dL 9 1 9 0   AST U/L 41 52*   ALT U/L 45 51   ALK PHOS U/L 98 111     Results from last 7 days   Lab Units 20  1706 20  1142   BLOOD CULTURE   --  No Growth at 72 hrs  No Growth at 72 hrs     GRAM STAIN RESULT  2+ Disintegrating polys*  1+ Gram negative rods*  --    BODY FLUID CULTURE, STERILE  3+ Growth of Pasteurella multocida*  --      Results from last 7 days   Lab Units 03/02/20  1142   PROCALCITONIN ng/ml 0 07

## 2020-03-06 NOTE — ASSESSMENT & PLAN NOTE
· Infected right brachial cleft cyst (transfer from [facility])  · There is a 2 5x3x3 5 cm abscess centered deep to the right sternocleidomastoid muscle immediately above the carotid bifurcation with associated surrounding multi regional deep soft tissue neck edema  · IR aspiration 3/3  · ENT input noted outpatient ENT follow-up  · Cultures noted - placed on IV Unasyn  · Discussed with infectious disease - IV antibiotics through week and repeat imaging on Monday based on clinical response

## 2020-03-07 LAB
BACTERIA BLD CULT: NORMAL
BACTERIA BLD CULT: NORMAL

## 2020-03-07 PROCEDURE — 99232 SBSQ HOSP IP/OBS MODERATE 35: CPT | Performed by: INTERNAL MEDICINE

## 2020-03-07 RX ADMIN — LEVOTHYROXINE SODIUM 100 MCG: 100 TABLET ORAL at 07:25

## 2020-03-07 RX ADMIN — AMPICILLIN SODIUM AND SULBACTAM SODIUM 3 G: 2; 1 INJECTION, POWDER, FOR SOLUTION INTRAMUSCULAR; INTRAVENOUS at 07:25

## 2020-03-07 RX ADMIN — PREDNISONE 10 MG: 10 TABLET ORAL at 08:24

## 2020-03-07 RX ADMIN — AMPICILLIN SODIUM AND SULBACTAM SODIUM 3 G: 2; 1 INJECTION, POWDER, FOR SOLUTION INTRAMUSCULAR; INTRAVENOUS at 00:46

## 2020-03-07 RX ADMIN — DOCUSATE SODIUM 100 MG: 100 CAPSULE, LIQUID FILLED ORAL at 17:48

## 2020-03-07 RX ADMIN — LISINOPRIL 20 MG: 20 TABLET ORAL at 08:24

## 2020-03-07 RX ADMIN — ENOXAPARIN SODIUM 40 MG: 40 INJECTION SUBCUTANEOUS at 08:24

## 2020-03-07 RX ADMIN — EZETIMIBE 10 MG: 10 TABLET ORAL at 08:24

## 2020-03-07 RX ADMIN — AMPICILLIN SODIUM AND SULBACTAM SODIUM 3 G: 2; 1 INJECTION, POWDER, FOR SOLUTION INTRAMUSCULAR; INTRAVENOUS at 12:04

## 2020-03-07 RX ADMIN — PANTOPRAZOLE SODIUM 40 MG: 40 TABLET, DELAYED RELEASE ORAL at 07:25

## 2020-03-07 RX ADMIN — DOCUSATE SODIUM 100 MG: 100 CAPSULE, LIQUID FILLED ORAL at 08:24

## 2020-03-07 RX ADMIN — OMEGA-3 FATTY ACIDS CAP 1000 MG 2000 MG: 1000 CAP at 08:24

## 2020-03-07 RX ADMIN — SENNOSIDES 8.6 MG: 8.6 TABLET ORAL at 08:24

## 2020-03-07 RX ADMIN — Medication 1 TABLET: at 08:24

## 2020-03-07 RX ADMIN — AMPICILLIN SODIUM AND SULBACTAM SODIUM 3 G: 2; 1 INJECTION, POWDER, FOR SOLUTION INTRAMUSCULAR; INTRAVENOUS at 17:48

## 2020-03-07 RX ADMIN — ACETAMINOPHEN 650 MG: 325 TABLET ORAL at 08:24

## 2020-03-07 RX ADMIN — ASPIRIN 81 MG: 81 TABLET ORAL at 08:24

## 2020-03-07 NOTE — PROGRESS NOTES
Progress Note - French Valle 1955, 75 y o  female MRN: [id number]    Unit/Bed#: [facility] 819-01 Encounter: [id number]    Primary Care Provider: Alexsandra Kc DO   Date and time admitted to hospital: 3/2/2020  5:36 PM        * Abscess of neck  Assessment & Plan  · Infected right brachial cleft cyst (transfer from [facility])  · There is a 2 5x3x3 5 cm abscess centered deep to the right sternocleidomastoid muscle immediately above the carotid bifurcation with associated surrounding multi regional deep soft tissue neck edema  · IR aspiration 3/3  · ENT input noted outpatient ENT follow-up  · Cultures noted - placed on IV Unasyn  · Discussed with infectious disease - IV antibiotics through week and repeat imaging on Monday based on clinical response      Leukocytosis  Assessment & Plan  · Monitor    Sepsis ([address] )  Assessment & Plan  · Present on admission  · Improving  · Monitor closely    Morbid obesity ([address] )  Assessment & Plan  · Therapeutic lifestyle modification  · Outpatient sleep study recommended    Hypertension  Assessment & Plan  · Monitor blood pressures  · Avoid hypotension      VTE Pharmacologic Prophylaxis:   Pharmacologic: Enoxaparin (Lovenox)  Mechanical VTE Prophylaxis in Place: Yes    Patient Centered Rounds: I have performed bedside rounds with nursing staff today  Discussions with Specialists or Other Care Team Provider:     Education and Discussions with Family / Patient:  Discussed with the patient    Time Spent for Care: 30 minutes  More than 50% of total time spent on counseling and coordination of care as described above      Current Length of Stay: 5 day(s)    Current Patient Status: Inpatient   Certification Statement: The patient will continue to require additional inpatient hospital stay due to As mentioned    Discharge Plan:  Awaiting clinical and symptomatic improvement, receiving IV antibiotics per infectious disease    Code Status: Level 1 - Full Code      Subjective: Sitting up in chair  Reports mild improvement in pain in her neck swelling  Encouraged ambulation as able    Objective:     Vitals:   Temp (24hrs), Av 6 °F (36 4 °C), Min:97 5 °F (36 4 °C), Max:97 7 °F (36 5 °C)    Temp:  [97 5 °F (36 4 °C)-97 7 °F (36 5 °C)] 97 7 °F (36 5 °C)  HR:  [70-84] 70  Resp:  [16] 16  BP: (121-147)/() 146/92  SpO2:  [95 %-97 %] 97 %  Body mass index is 38 96 kg/m²  Input and Output Summary (last 24 hours): Intake/Output Summary (Last 24 hours) at 3/7/2020 1245  Last data filed at 3/7/2020 1009  Gross per 24 hour   Intake 1955 ml   Output 1000 ml   Net 955 ml       Physical Exam:     Physical Exam    Comfortably sitting up in chair  Obese  Tender swelling noted right-sided neck, firm to palpation  Short thick neck  Lungs diminished breath sounds bilaterally no additional sounds  Heart sounds S1 and S2 noted  Abdomen soft  Awake obeys simple commands  Pulses noted  No rash    Additional Data:     Labs:    Results from last 7 days   Lab Units 20  0544 20  0525   WBC Thousand/uL 7 02 10 59*   HEMOGLOBIN g/dL 11 1* 12 3   HEMATOCRIT % 35 9 38 1   PLATELETS Thousands/uL 172 197   NEUTROS PCT %  --  76*   LYMPHS PCT %  --  13*   MONOS PCT %  --  9   EOS PCT %  --  1     Results from last 7 days   Lab Units 20  0525   SODIUM mmol/L 136   POTASSIUM mmol/L 4 6   CHLORIDE mmol/L 103   CO2 mmol/L 27   BUN mg/dL 11   CREATININE mg/dL 0 63   ANION GAP mmol/L 6   CALCIUM mg/dL 9 1   ALBUMIN g/dL 2 9*   TOTAL BILIRUBIN mg/dL 0 53   ALK PHOS U/L 98   ALT U/L 45   AST U/L 41   GLUCOSE RANDOM mg/dL 122     Results from last 7 days   Lab Units 20  1142   INR  1 02             Results from last 7 days   Lab Units 20  1142   LACTIC ACID mmol/L 2 0   PROCALCITONIN ng/ml 0 07           * I Have Reviewed All Lab Data Listed Above  * Additional Pertinent Lab Tests Reviewed:  All Labs Within Last 24 Hours Reviewed    Imaging:    Imaging Reports Reviewed Today Include:   Imaging Personally Reviewed by Myself Includes:     Recent Cultures (last 7 days):     Results from last 7 days   Lab Units 03/03/20  1706 03/02/20  1142   BLOOD CULTURE   --  No Growth After 4 Days  No Growth After 4 Days  GRAM STAIN RESULT  2+ Disintegrating polys*  1+ Gram negative rods*  --    BODY FLUID CULTURE, STERILE  3+ Growth of Pasteurella multocida*  --        Last 24 Hours Medication List:     Current Facility-Administered Medications:  acetaminophen 650 mg Oral Q6H PRN Barbara Allan PA-C    aluminum-magnesium hydroxide-simethicone 30 mL Oral Q6H PRN Rohit Alarcon MD    ampicillin-sulbactam 3 g Intravenous Q6H Buck Wayne MD Last Rate: 3 g (03/07/20 1204)   aspirin 81 mg Oral Daily Rohit Alarcon MD    docusate sodium 100 mg Oral BID Rohit Alarcon MD    enoxaparin 40 mg Subcutaneous Daily Rohit Alarcon MD    ezetimibe 10 mg Oral Daily Rohit Alarcon MD    fish oil 2,000 mg Oral Daily Rohit Alarcon MD    levothyroxine 100 mcg Oral Daily Rohit Alarcon MD    lisinopril 20 mg Oral Daily Rohit Alarcon MD    multivitamin-minerals 1 tablet Oral Daily Rohit Alarcon MD    ondansetron 4 mg Intravenous Q6H PRN Rohit Alarcon MD    pantoprazole 40 mg Oral Early Morning Rohit Alarcon MD    senna 1 tablet Oral Daily Rohit Alarcon MD    traMADol 50 mg Oral Q6H PRN Paul Sanchez PA-C         Today, Patient Was Seen By: Bryce Murcia MD    ** Please Note: Dictation voice to text software may have been used in the creation of this document   **

## 2020-03-07 NOTE — ASSESSMENT & PLAN NOTE
· Infected right brachial cleft cyst (transfer from [facility])  · There is a 2 5x3x3 5 cm abscess centered deep to the right sternocleidomastoid muscle immediately above the carotid bifurcation with associated surrounding multi regional deep soft tissue neck edema  · IR aspiration 3/3  · ENT input noted outpatient ENT follow-up  · Cultures noted - placed on IV Unasyn  · Discussed with infectious disease - IV antibiotics through week and repeat imaging on Monday based on clinical response

## 2020-03-07 NOTE — PLAN OF CARE
Problem: Potential for Falls  Goal: Patient will remain free of falls  Description  INTERVENTIONS:  - Assess patient frequently for physical needs  -  Identify cognitive and physical deficits and behaviors that affect risk of falls    -  [place] fall precautions as indicated by assessment   - Educate patient/family on patient safety including physical limitations  - Instruct patient to call for assistance with activity based on assessment  - Modify environment to reduce risk of injury  - Consider OT/PT consult to assist with strengthening/mobility  Outcome: Progressing     Problem: PAIN - ADULT  Goal: Verbalizes/displays adequate comfort level or baseline comfort level  Description  Interventions:  - Encourage patient to monitor pain and request assistance  - Assess pain using appropriate pain scale  - Administer analgesics based on type and severity of pain and evaluate response  - Implement non-pharmacological measures as appropriate and evaluate response  - Consider cultural and social influences on pain and pain management  - Notify physician/advanced practitioner if interventions unsuccessful or patient reports new pain  Outcome: Progressing     Problem: INFECTION - ADULT  Goal: Absence or prevention of progression during hospitalization  Description  INTERVENTIONS:  - Assess and monitor for signs and symptoms of infection  - Monitor lab/diagnostic results  - Monitor all insertion sites, i e  indwelling lines, tubes, and drains  - Monitor endotracheal if appropriate and nasal secretions for changes in amount and color  - [place] appropriate cooling/warming therapies per order  - Administer medications as ordered  - Instruct and encourage patient and family to use good hand hygiene technique  - Identify and instruct in appropriate isolation precautions for identified infection/condition  Outcome: Progressing  Goal: Absence of fever/infection during neutropenic period  Description  INTERVENTIONS:  - Monitor WBC    Outcome: Progressing     Problem: SKIN/TISSUE INTEGRITY - ADULT  Goal: Skin integrity remains intact  Description  INTERVENTIONS  - Identify patients at risk for skin breakdown  - Assess and monitor skin integrity  - Assess and monitor nutrition and hydration status  - Monitor labs (i e  albumin)  - Assess for incontinence   - Turn and reposition patient  - Assist with mobility/ambulation  - Relieve pressure over bony prominences  - Avoid friction and shearing  - Provide appropriate hygiene as needed including keeping skin clean and dry  - Evaluate need for skin moisturizer/barrier cream  - Collaborate with interdisciplinary team (i e  Nutrition, Rehabilitation, etc )   - Patient/family teaching  Outcome: Progressing  Goal: Incision(s), wounds(s) or drain site(s) healing without S/S of infection  Description  INTERVENTIONS  - Assess and document risk factors for skin impairment   - Assess and document dressing, incision, wound bed, drain sites and surrounding tissue  - Consider nutrition services referral as needed  - Oral mucous membranes remain intact  - Provide patient/ family education  Outcome: Progressing     Problem: SAFETY ADULT  Goal: Maintain or return to baseline ADL function  Description  INTERVENTIONS:  -  Assess patient's ability to carry out ADLs; assess patient's baseline for ADL function and identify physical deficits which impact ability to perform ADLs (bathing, care of mouth/teeth, toileting, grooming, dressing, etc )  - Assess/evaluate cause of self-care deficits   - Assess range of motion  - Assess patient's mobility; develop plan if impaired  - Assess patient's need for assistive devices and provide as appropriate  - Encourage maximum independence but intervene and supervise when necessary  - Involve family in performance of ADLs  - Assess for home care needs following discharge   - Consider OT consult to assist with ADL evaluation and planning for discharge  - Provide patient education as appropriate  Outcome: Progressing  Goal: Maintain or return mobility status to optimal level  Description  INTERVENTIONS:  - Assess patient's baseline mobility status (ambulation, transfers, stairs, etc )    - Identify cognitive and physical deficits and behaviors that affect mobility  - Identify mobility aids required to assist with transfers and/or ambulation (gait belt, sit-to-stand, lift, walker, cane, etc )  - [place] fall precautions as indicated by assessment  - Record patient progress and toleration of activity level on Mobility SBAR; progress patient to next Phase/Stage  - Instruct patient to call for assistance with activity based on assessment  - Consider rehabilitation consult to assist with strengthening/weightbearing, etc   Outcome: Progressing     Problem: DISCHARGE PLANNING  Goal: Discharge to home or other facility with appropriate resources  Description  INTERVENTIONS:  - Identify barriers to discharge w/patient and caregiver  - Arrange for needed discharge resources and transportation as appropriate  - Identify discharge learning needs (meds, wound care, etc )  - Arrange for interpretive services to assist at discharge as needed  - Refer to Case Management Department for coordinating discharge planning if the patient needs post-hospital services based on physician/advanced practitioner order or complex needs related to functional status, cognitive ability, or social support system  Outcome: Progressing     Problem: Knowledge Deficit  Goal: Patient/family/caregiver demonstrates understanding of disease process, treatment plan, medications, and discharge instructions  Description  Complete learning assessment and assess knowledge base    Interventions:  - Provide teaching at level of understanding  - Provide teaching via preferred learning methods  Outcome: Progressing

## 2020-03-08 PROCEDURE — 99232 SBSQ HOSP IP/OBS MODERATE 35: CPT | Performed by: INTERNAL MEDICINE

## 2020-03-08 RX ADMIN — LISINOPRIL 20 MG: 20 TABLET ORAL at 08:41

## 2020-03-08 RX ADMIN — SENNOSIDES 8.6 MG: 8.6 TABLET ORAL at 08:41

## 2020-03-08 RX ADMIN — AMPICILLIN SODIUM AND SULBACTAM SODIUM 3 G: 2; 1 INJECTION, POWDER, FOR SOLUTION INTRAMUSCULAR; INTRAVENOUS at 11:45

## 2020-03-08 RX ADMIN — AMPICILLIN SODIUM AND SULBACTAM SODIUM 3 G: 2; 1 INJECTION, POWDER, FOR SOLUTION INTRAMUSCULAR; INTRAVENOUS at 00:13

## 2020-03-08 RX ADMIN — ASPIRIN 81 MG: 81 TABLET ORAL at 08:41

## 2020-03-08 RX ADMIN — Medication 1 TABLET: at 08:41

## 2020-03-08 RX ADMIN — AMPICILLIN SODIUM AND SULBACTAM SODIUM 3 G: 2; 1 INJECTION, POWDER, FOR SOLUTION INTRAMUSCULAR; INTRAVENOUS at 17:01

## 2020-03-08 RX ADMIN — LEVOTHYROXINE SODIUM 100 MCG: 100 TABLET ORAL at 05:37

## 2020-03-08 RX ADMIN — AMPICILLIN SODIUM AND SULBACTAM SODIUM 3 G: 2; 1 INJECTION, POWDER, FOR SOLUTION INTRAMUSCULAR; INTRAVENOUS at 05:35

## 2020-03-08 RX ADMIN — ENOXAPARIN SODIUM 40 MG: 40 INJECTION SUBCUTANEOUS at 08:41

## 2020-03-08 RX ADMIN — OMEGA-3 FATTY ACIDS CAP 1000 MG 2000 MG: 1000 CAP at 08:41

## 2020-03-08 RX ADMIN — PANTOPRAZOLE SODIUM 40 MG: 40 TABLET, DELAYED RELEASE ORAL at 05:37

## 2020-03-08 RX ADMIN — AMPICILLIN SODIUM AND SULBACTAM SODIUM 3 G: 2; 1 INJECTION, POWDER, FOR SOLUTION INTRAMUSCULAR; INTRAVENOUS at 23:04

## 2020-03-08 RX ADMIN — EZETIMIBE 10 MG: 10 TABLET ORAL at 08:41

## 2020-03-08 RX ADMIN — DOCUSATE SODIUM 100 MG: 100 CAPSULE, LIQUID FILLED ORAL at 08:41

## 2020-03-08 RX ADMIN — ACETAMINOPHEN 650 MG: 325 TABLET ORAL at 23:05

## 2020-03-08 NOTE — PROGRESS NOTES
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[facility])  · There is a 2 5x3x3 5 cm abscess centered deep to the right sternocleidomastoid muscle immediately above the carotid bifurcation with associated surrounding multi regional deep soft tissue neck edema  · IR aspiration 3/3  · ENT input noted outpatient ENT follow-up  · Cultures noted - placed on IV Unasyn  · Discussed with infectious disease - IV antibiotics through week and repeat imaging on Monday based on clinical response      Leukocytosis  Assessment & Plan  · Monitor    Sepsis ([address] )  Assessment & Plan  · Present on admission  · Improving  · Monitor closely    Morbid obesity ([address] )  Assessment & Plan  · Therapeutic lifestyle modification  · Outpatient sleep study recommended    Hypertension  Assessment & Plan  · Monitor blood pressures  · Avoid hypotension        VTE Pharmacologic Prophylaxis:   Pharmacologic: Enoxaparin (Lovenox)  Mechanical VTE Prophylaxis in Place: Yes    Patient Centered Rounds: I have performed bedside rounds with nursing staff today  Discussions with Specialists or Other Care Team Provider:     Education and Discussions with Family / Patient:  Discussed with the patient    Time Spent for Care: 30 minutes  More than 50% of total time spent on counseling and coordination of care as described above      Current Length of Stay: 6 day(s)    Current Patient Status: Inpatient   Certification Statement: The patient will continue to require additional inpatient hospital stay due to As mentioned    Discharge Plan:  Awaiting clinical symptomatic improvement, receiving IV antibiotics per infectious disease    Code Status: Level 1 - Full Code      Subjective: Comfortably sitting up in chair  Reports feeling okay  Pain in her swelling improving    Objective:     Vitals:   Temp (24hrs), Av 6 °F (36 4 °C), Min:97 4 °F (36 3 °C), Max:97 9 °F (36 6 °C)    Temp:  [97 4 °F (36 3 °C)-97 9 °F (36 6 °C)] 97 9 °F (36 6 °C)  HR:  [69-82] 72  Resp:  [16-18] 16  BP: (119-140)/(69-73) 119/69  SpO2:  [95 %-98 %] 98 %  Body mass index is 39 18 kg/m²  Input and Output Summary (last 24 hours): Intake/Output Summary (Last 24 hours) at 3/8/2020 1236  Last data filed at 3/7/2020 1700  Gross per 24 hour   Intake 675 ml   Output --   Net 675 ml       Physical Exam:     Physical Exam    Comfortably sitting up in chair  Obese  Short thick neck  Right-sided neck swelling noted tenderness, firm in consistency  Lungs diminished breath sounds bilaterally the bases no additional sounds  Heart sounds S1 and S2 noted  Abdomen soft  Awake alert obeys simple commands  Pulses noted  No rash      Additional Data:     Labs:    Results from last 7 days   Lab Units 20  0544 20  0525   WBC Thousand/uL 7 02 10 59*   HEMOGLOBIN g/dL 11 1* 12 3   HEMATOCRIT % 35 9 38 1   PLATELETS Thousands/uL 172 197   NEUTROS PCT %  --  76*   LYMPHS PCT %  --  13*   MONOS PCT %  --  9   EOS PCT %  --  1     Results from last 7 days   Lab Units 20  0525   SODIUM mmol/L 136   POTASSIUM mmol/L 4 6   CHLORIDE mmol/L 103   CO2 mmol/L 27   BUN mg/dL 11   CREATININE mg/dL 0 63   ANION GAP mmol/L 6   CALCIUM mg/dL 9 1   ALBUMIN g/dL 2 9*   TOTAL BILIRUBIN mg/dL 0 53   ALK PHOS U/L 98   ALT U/L 45   AST U/L 41   GLUCOSE RANDOM mg/dL 122     Results from last 7 days   Lab Units 20  1142   INR  1 02             Results from last 7 days   Lab Units 20  1142   LACTIC ACID mmol/L 2 0   PROCALCITONIN ng/ml 0 07           * I Have Reviewed All Lab Data Listed Above  * Additional Pertinent Lab Tests Reviewed:  All Labs Within Last 24 Hours Reviewed    Imaging:    Imaging Reports Reviewed Today Include:   Imaging Personally Reviewed by Myself Includes:     Recent Cultures (last 7 days):     Results from last 7 days   Lab Units 03/03/20  1706 03/02/20  1142   BLOOD CULTURE   --  No Growth After 5 Days  No Growth After 5 Days  GRAM STAIN RESULT  2+ Disintegrating polys*  1+ Gram negative rods*  --    BODY FLUID CULTURE, STERILE  3+ Growth of Pasteurella multocida*  --        Last 24 Hours Medication List:     Current Facility-Administered Medications:  acetaminophen 650 mg Oral Q6H PRN Barbara Allan PA-C    aluminum-magnesium hydroxide-simethicone 30 mL Oral Q6H PRN Alexa Duvall MD    ampicillin-sulbactam 3 g Intravenous Q6H Buck ZeinedMD matias Last Rate: 3 g (03/08/20 1145)   aspirin 81 mg Oral Daily Alexa Duvall MD    docusate sodium 100 mg Oral BID Alexa Duvall MD    enoxaparin 40 mg Subcutaneous Daily Alexa Duvall MD    ezetimibe 10 mg Oral Daily Alexa uDvall MD    fish oil 2,000 mg Oral Daily Alexa Duvall MD    levothyroxine 100 mcg Oral Daily Alexa Duvall MD    lisinopril 20 mg Oral Daily Alexa Duvall MD    multivitamin-minerals 1 tablet Oral Daily Alexa Duvall MD    ondansetron 4 mg Intravenous Q6H PRN Alexa Duvall MD    pantoprazole 40 mg Oral Early Morning Alexa Duvall MD    senna 1 tablet Oral Daily Alexa Duvall MD    traMADol 50 mg Oral Q6H PRN Pamela Ocampo PA-C         Today, Patient Was Seen By: Gerson Gamboa MD    ** Please Note: Dictation voice to text software may have been used in the creation of this document   **

## 2020-03-08 NOTE — ASSESSMENT & PLAN NOTE
· Infected right brachial cleft cyst (transfer from [facility])  · There is a 2 5x3x3 5 cm abscess centered deep to the right sternocleidomastoid muscle immediately above the carotid bifurcation with associated surrounding multi regional deep soft tissue neck edema  · IR aspiration 3/3  · ENT input noted outpatient ENT follow-up  · Cultures noted - placed on IV Unasyn  · Discussed with infectious disease - IV antibiotics through week and repeat imaging on Monday based on clinical response

## 2020-03-09 ENCOUNTER — APPOINTMENT (INPATIENT)
Dept: RADIOLOGY | Facility: HOSPITAL | Age: 65
DRG: 872 | End: 2020-03-09
Payer: MEDICARE

## 2020-03-09 VITALS
TEMPERATURE: 98.4 F | SYSTOLIC BLOOD PRESSURE: 110 MMHG | HEART RATE: 68 BPM | OXYGEN SATURATION: 96 % | HEIGHT: 65 IN | DIASTOLIC BLOOD PRESSURE: 85 MMHG | WEIGHT: 238.76 LBS | RESPIRATION RATE: 18 BRPM | BODY MASS INDEX: 39.78 KG/M2

## 2020-03-09 PROBLEM — A41.9 SEPSIS (HCC): Status: RESOLVED | Noted: 2020-03-02 | Resolved: 2020-03-09

## 2020-03-09 LAB
ANION GAP SERPL CALCULATED.3IONS-SCNC: 3 MMOL/L (ref 4–13)
BASOPHILS # BLD AUTO: 0.05 THOUSANDS/ΜL (ref 0–0.1)
BASOPHILS NFR BLD AUTO: 1 % (ref 0–1)
BUN SERPL-MCNC: 13 MG/DL (ref 5–25)
CALCIUM SERPL-MCNC: 8.7 MG/DL (ref 8.3–10.1)
CHLORIDE SERPL-SCNC: 108 MMOL/L (ref 100–108)
CO2 SERPL-SCNC: 30 MMOL/L (ref 21–32)
CREAT SERPL-MCNC: 0.58 MG/DL (ref 0.6–1.3)
EOSINOPHIL # BLD AUTO: 0.17 THOUSAND/ΜL (ref 0–0.61)
EOSINOPHIL NFR BLD AUTO: 2 % (ref 0–6)
ERYTHROCYTE [DISTWIDTH] IN BLOOD BY AUTOMATED COUNT: 13 % (ref 11.6–15.1)
GFR SERPL CREATININE-BSD FRML MDRD: 96 ML/MIN/1.73SQ M
GLUCOSE SERPL-MCNC: 125 MG/DL (ref 65–140)
HCT VFR BLD AUTO: 37.6 % (ref 34.8–46.1)
HGB BLD-MCNC: 11.8 G/DL (ref 11.5–15.4)
IMM GRANULOCYTES # BLD AUTO: 0.04 THOUSAND/UL (ref 0–0.2)
IMM GRANULOCYTES NFR BLD AUTO: 0 % (ref 0–2)
LYMPHOCYTES # BLD AUTO: 2.14 THOUSANDS/ΜL (ref 0.6–4.47)
LYMPHOCYTES NFR BLD AUTO: 22 % (ref 14–44)
MCH RBC QN AUTO: 29.2 PG (ref 26.8–34.3)
MCHC RBC AUTO-ENTMCNC: 31.4 G/DL (ref 31.4–37.4)
MCV RBC AUTO: 93 FL (ref 82–98)
MONOCYTES # BLD AUTO: 0.65 THOUSAND/ΜL (ref 0.17–1.22)
MONOCYTES NFR BLD AUTO: 7 % (ref 4–12)
NEUTROPHILS # BLD AUTO: 6.69 THOUSANDS/ΜL (ref 1.85–7.62)
NEUTS SEG NFR BLD AUTO: 68 % (ref 43–75)
NRBC BLD AUTO-RTO: 0 /100 WBCS
PLATELET # BLD AUTO: 228 THOUSANDS/UL (ref 149–390)
PMV BLD AUTO: 10.6 FL (ref 8.9–12.7)
POTASSIUM SERPL-SCNC: 4.1 MMOL/L (ref 3.5–5.3)
RBC # BLD AUTO: 4.04 MILLION/UL (ref 3.81–5.12)
SODIUM SERPL-SCNC: 141 MMOL/L (ref 136–145)
WBC # BLD AUTO: 9.74 THOUSAND/UL (ref 4.31–10.16)

## 2020-03-09 PROCEDURE — 99233 SBSQ HOSP IP/OBS HIGH 50: CPT | Performed by: INTERNAL MEDICINE

## 2020-03-09 PROCEDURE — 80048 BASIC METABOLIC PNL TOTAL CA: CPT | Performed by: INTERNAL MEDICINE

## 2020-03-09 PROCEDURE — 99239 HOSP IP/OBS DSCHRG MGMT >30: CPT | Performed by: INTERNAL MEDICINE

## 2020-03-09 PROCEDURE — 70491 CT SOFT TISSUE NECK W/DYE: CPT

## 2020-03-09 PROCEDURE — 85025 COMPLETE CBC W/AUTO DIFF WBC: CPT | Performed by: INTERNAL MEDICINE

## 2020-03-09 RX ORDER — AMOXICILLIN AND CLAVULANATE POTASSIUM 875; 125 MG/1; MG/1
1 TABLET, FILM COATED ORAL EVERY 12 HOURS SCHEDULED
Qty: 28 TABLET | Refills: 0 | Status: SHIPPED | OUTPATIENT
Start: 2020-03-09 | End: 2020-03-09

## 2020-03-09 RX ORDER — AMOXICILLIN AND CLAVULANATE POTASSIUM 875; 125 MG/1; MG/1
1 TABLET, FILM COATED ORAL EVERY 12 HOURS SCHEDULED
Status: DISCONTINUED | OUTPATIENT
Start: 2020-03-09 | End: 2020-03-09 | Stop reason: HOSPADM

## 2020-03-09 RX ORDER — AMOXICILLIN AND CLAVULANATE POTASSIUM 875; 125 MG/1; MG/1
1 TABLET, FILM COATED ORAL EVERY 12 HOURS SCHEDULED
Qty: 28 TABLET | Refills: 0 | Status: SHIPPED | OUTPATIENT
Start: 2020-03-09 | End: 2020-03-23

## 2020-03-09 RX ADMIN — DOCUSATE SODIUM 100 MG: 100 CAPSULE, LIQUID FILLED ORAL at 09:18

## 2020-03-09 RX ADMIN — AMPICILLIN SODIUM AND SULBACTAM SODIUM 3 G: 2; 1 INJECTION, POWDER, FOR SOLUTION INTRAMUSCULAR; INTRAVENOUS at 05:30

## 2020-03-09 RX ADMIN — LEVOTHYROXINE SODIUM 100 MCG: 100 TABLET ORAL at 05:30

## 2020-03-09 RX ADMIN — ASPIRIN 81 MG: 81 TABLET ORAL at 09:19

## 2020-03-09 RX ADMIN — EZETIMIBE 10 MG: 10 TABLET ORAL at 09:19

## 2020-03-09 RX ADMIN — Medication 1 TABLET: at 09:19

## 2020-03-09 RX ADMIN — ENOXAPARIN SODIUM 40 MG: 40 INJECTION SUBCUTANEOUS at 09:19

## 2020-03-09 RX ADMIN — PANTOPRAZOLE SODIUM 40 MG: 40 TABLET, DELAYED RELEASE ORAL at 05:30

## 2020-03-09 RX ADMIN — SENNOSIDES 8.6 MG: 8.6 TABLET ORAL at 09:18

## 2020-03-09 RX ADMIN — AMOXICILLIN AND CLAVULANATE POTASSIUM 1 TABLET: 875; 125 TABLET, FILM COATED ORAL at 18:23

## 2020-03-09 RX ADMIN — AMPICILLIN SODIUM AND SULBACTAM SODIUM 3 G: 2; 1 INJECTION, POWDER, FOR SOLUTION INTRAMUSCULAR; INTRAVENOUS at 12:06

## 2020-03-09 RX ADMIN — IOHEXOL 85 ML: 350 INJECTION, SOLUTION INTRAVENOUS at 15:23

## 2020-03-09 RX ADMIN — OMEGA-3 FATTY ACIDS CAP 1000 MG 2000 MG: 1000 CAP at 09:18

## 2020-03-09 NOTE — RESTORATIVE TECHNICIAN NOTE
Restorative Specialist Mobility Note       Activity: Ambulate in rodriguze     Assistive Device: None

## 2020-03-09 NOTE — DISCHARGE SUMMARY
Discharge Summary - [facility] Internal Medicine    Patient Information: Nguyen Figueroa 75 y o  female MRN: [id number]  Unit/Bed#: [facility] 961-86 Encounter: [id number]    Discharging Physician / Practitioner: Kelly Solano MD  PCP: Judit Wade DO  Admission Date: 3/2/2020  Discharge Date: 03/09/20    Disposition:     Home     Reason for Admission:  Swelling in the lower part of the neck pain fever    Discharge Diagnoses:     Principal Problem:    Abscess of neck  Active Problems:    Hypertension    Morbid [address] [address] )    Leukocytosis  Resolved Problems:    Sepsis ([facility] )      Consultations During Hospital Stay:  · ENT  · Infectious disease    Procedures Performed:     · CT neck approximately 2 5 x 3 x 3 5 abscess deep to the right sternocleidomastoid about the carotid bifurcation  · IR guided aspiration  · CT neck marked reduction in the complex fluid collection    Hospital Course:     Nguyen Figueroa is a 75 y o  female patient who originally presented to the hospital on 3/2/2020 due to transfer from [facility] to [address] [place] [address] for further evaluation of neck swelling  Patient was treated by primary care physician with amoxicillin for cold, she subsequently noticed a tender swelling around the lower part of the right-sided neck  She was transferred to [facility] noted to have sepsis, leukocytosis  Sepsis resolved during her stay in the hospital     Imaging studies were concerning for a abscess, she was seen in consulted in ENT and was diagnosed with right infected branchial cleft cyst/abscess - she was seen in consultation with Infectious Disease and received IV Unasyn  Repeat CT scan today reveals much improvement, she will be transitioned to p o  Augmentin 875 mg q 12 hours for 14 days, she is requested to follow-up with ENT and Infectious Disease  She is hemodynamically stable symptomatically improved afebrile and is deemed ready for discharge today    Kindly review the chart for details  Condition at Discharge: fair     Discharge Day Visit / Exam:     * Please refer to separate progress note for these details *    Discussion with Family:     Discharge plan discussed with infectious disease  Discharge plan discussed with the patient, updated brother Wenona Cooks   Outpatient close follow-up with Infectious Disease ENT    Discharge instructions/Information to patient and family:   See after visit summary for information provided to patient and family  Provisions for Follow-Up Care:  See after visit summary for information related to follow-up care and any pertinent home health orders  Planned Readmission: no    Discharge Statement:  I spent 45 minutes discharging the patient  This time was spent on the day of discharge  I had direct contact with the patient on the day of discharge  Greater than 50% of the total time was spent examining patient, answering all patient questions, arranging and discussing plan of care with patient as well as directly providing post-discharge instructions  Additional time then spent on discharge activities  Discharge Medications:  See after visit summary for reconciled discharge medications provided to patient and family  ** Please Note: This note has been constructed using a voice recognition system   **

## 2020-03-09 NOTE — PROGRESS NOTES
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[facility])  · There is a 2 5x3x3 5 cm abscess centered deep to the right sternocleidomastoid muscle immediately above the carotid bifurcation with associated surrounding multi regional deep soft tissue neck edema  · IR aspiration 3/3  · ENT input noted outpatient ENT follow-up  · Cultures noted - placed on IV Unasyn  · Discussed with infectious disease, continue IV Unasyn  · Follow-up on CT neck      Leukocytosis  Assessment & Plan  · Monitor    Sepsis ([address] )  Assessment & Plan  · Present on admission  · Improving  · Monitor closely    Morbid obesity ([address] )  Assessment & Plan  · Therapeutic lifestyle modification  · Outpatient sleep study recommended    Hypertension  Assessment & Plan  · Monitor blood pressures  · Avoid hypotension        VTE Pharmacologic Prophylaxis:   Pharmacologic: Enoxaparin (Lovenox)  Mechanical VTE Prophylaxis in Place: Yes    Patient Centered Rounds: I have performed bedside rounds with nursing staff today  Discussions with Specialists or Other Care Team Provider:  Infectious Disease    Education and Discussions with Family / Patient:  Discussed with the patient    Time Spent for Care: 30 minutes  More than 50% of total time spent on counseling and coordination of care as described above      Current Length of Stay: 7 day(s)    Current Patient Status: Inpatient   Certification Statement: The patient will continue to require additional inpatient hospital stay due to As mentioned    Discharge Plan:  Awaiting clinical symptomatic improvement, CT scan neck findings, Infectious Disease inputs for disposition planning    Code Status: Level 1 - Full Code      Subjective: Sitting up in chair  Reports improving pain and swelling of the neck lesion  Ambulating the room and hallway    Objective:     Vitals:   Temp (24hrs), Av °F (36 7 °C), Min:97 7 °F (36 5 °C), Max:98 4 °F (36 9 °C)    Temp:  [97 7 °F (36 5 °C)-98 4 °F (36 9 °C)] 98 4 °F (36 9 °C)  HR:  [68-74] 68  Resp:  [17-18] 18  BP: (110-141)/(54-85) 110/85  SpO2:  [96 %-97 %] 96 %  Body mass index is 39 73 kg/m²  Input and Output Summary (last 24 hours): Intake/Output Summary (Last 24 hours) at 3/9/2020 1324  Last data filed at 3/9/2020 1300  Gross per 24 hour   Intake 1100 ml   Output 500 ml   Net 600 ml       Physical Exam:     Physical Exam    Comfortably sitting up in chair  Obese  Short thick neck  Neck swelling tenderness improved form in consistency to palpation, size seems reduce since yesterday  Lungs diminished breath sounds bilateral bases no additional sounds  Heart sounds S1-S2 noted  Abdomen soft  Awake alert obeys simple commands  Pulses noted  No pedal edema  No rash        Additional Data:     Labs:    Results from last 7 days   Lab Units 20  0605   WBC Thousand/uL 9 74   HEMOGLOBIN g/dL 11 8   HEMATOCRIT % 37 6   PLATELETS Thousands/uL 228   NEUTROS PCT % 68   LYMPHS PCT % 22   MONOS PCT % 7   EOS PCT % 2     Results from last 7 days   Lab Units 20  0605 20  0525   SODIUM mmol/L 141 136   POTASSIUM mmol/L 4 1 4 6   CHLORIDE mmol/L 108 103   CO2 mmol/L 30 27   BUN mg/dL 13 11   CREATININE mg/dL 0 58* 0 63   ANION GAP mmol/L 3* 6   CALCIUM mg/dL 8 7 9 1   ALBUMIN g/dL  --  2 9*   TOTAL BILIRUBIN mg/dL  --  0 53   ALK PHOS U/L  --  98   ALT U/L  --  45   AST U/L  --  41   GLUCOSE RANDOM mg/dL 125 122                           * I Have Reviewed All Lab Data Listed Above  * Additional Pertinent Lab Tests Reviewed:  All Labs Within Last 24 Hours Reviewed    Imaging:    Imaging Reports Reviewed Today Include:   Imaging Personally Reviewed by Myself Includes:      Recent Cultures (last 7 days):     Results from last 7 days   Lab Units 03/03/20  1706   GRAM STAIN RESULT  2+ Disintegrating polys*  1+ Gram negative rods*   BODY FLUID CULTURE, STERILE  3+ Growth of Pasteurella multocida*       Last 24 Hours Medication List:     Current Facility-Administered Medications:  acetaminophen 650 mg Oral Q6H PRN Barbara Allan PA-C    aluminum-magnesium hydroxide-simethicone 30 mL Oral Q6H PRN Uri Keita MD    ampicillin-sulbactam 3 g Intravenous Q6H Buck Wayne MD Last Rate: 3 g (03/09/20 1206)   aspirin 81 mg Oral Daily Viaalvaro Keita, MD    docusate sodium 100 mg Oral BID Uri Keita, MD    enoxaparin 40 mg Subcutaneous Daily Uri Keita, MD    ezetimibe 10 mg Oral Daily Uri Keita, MD    fish oil 2,000 mg Oral Daily Uri Keita MD    levothyroxine 100 mcg Oral Daily Uri Keita MD    lisinopril 20 mg Oral Daily Uri Keita MD    multivitamin-minerals 1 tablet Oral Daily Uri Keita MD    ondansetron 4 mg Intravenous Q6H PRN Uri Keita, MD    pantoprazole 40 mg Oral Early Morning Uri Keita MD    senna 1 tablet Oral Daily Viaalvaro [facility], MD    traMADol 50 mg Oral Q6H PRN Hattie Hutton PA-C         Today, Patient Was Seen By: Ayla Henson MD    ** Please Note: Dictation voice to text software may have been used in the creation of this document   **

## 2020-03-09 NOTE — PROGRESS NOTES
Progress Note - Infectious Disease   Jennifer Bryson 75 y o  female MRN: [id number]  Unit/Bed#: [facility] 819-01 Encounter: [id number]      Impression/Plan:    1- sepsis, POA:  Fever, leukocytosis, mild tachycardia upon admission  Sepsis is likely secondary to right-sided neck abscess   No other infectious source appreciated based on history or physical exam   Patient is status post aspiration of 10 cc of purulence done by IR 03/03/2020, no drain was kept in place   Culture positive for pasteurella multocida  - antibiotics as below  - pain control and supportive care as per primary service  - repeat CBC in a m   - trend fever curve     2- neck abscess, possible infection of right branchial cleft cyst:  Patient was started on Augmentin 02/25/2020 by her PCP for suspected maxillary sinusitis   She reports being compliant Augmentin, and despite taking Augmentin for about 5 days, her right cervical swelling worsened    Patient had aspiration of small amount of purulence done by IR 03/03/2020, cultures positive for pasteurella multocida  Patient denies being in contact with cats   She has 1 dog, and she is usually exposed to his saliva   Denies scratches  Case discussed with interventional radiology, putting a drain risks bleeding and might be technically difficult    Clinically, patient is afebrile, hemodynamically stable, still with tender neck lump  - continue Unasyn IV  - repeat CT neck today, if abscess is decreasing in size, would be ok to switch to Augmentin 875 mg po q12h, with a plan to follow-up in ID office in 2 weeks to determine end date of antibiotic therapy    If abscess is not significantly decreasing in size, patient will need to be re-evaluated by ENT for possible surgical management       3- hypertension:   Blood pressure currently stable and controlled  - management as per primary service     4- 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:  - patient will need follow-up with ENT for cyst resection after resolution of her current infection     Plan mentioned above discussed with patient  Case discussed with primary service     Antibiotics:  Unasyn IV day 5       Subjective:  Patient has no fever, chills, sweats; no nausea, vomiting, diarrhea; no cough, shortness of breath; no pain  No new symptoms  Still complaining of right-sided neck swelling and pain  Denies dysphagia, denies shortness of breath    Objective:  Vitals:  Temp:  [97 7 °F (36 5 °C)-98 4 °F (36 9 °C)] 98 4 °F (36 9 °C)  HR:  [68-74] 68  Resp:  [17-18] 18  BP: (110-141)/(54-85) 110/85  SpO2:  [96 %-97 %] 96 %  Temp (24hrs), Av °F (36 7 °C), Min:97 7 °F (36 5 °C), Max:98 4 °F (36 9 °C)  Current: Temperature: 98 4 °F (36 9 °C)    Physical Exam:   General Appearance:  Alert, interactive, nontoxic, no acute distress  Throat: Oropharynx moist without lesions  Right neck swelling and fluctuance, anteriorly to sternocleidomastoid muscle   Lungs:   Clear to auscultation bilaterally; no wheezes, rhonchi or rales; respirations unlabored   Heart:  RRR; no murmur, rub or gallop   Abdomen:   Soft, non-tender, non-distended, positive bowel sounds  Extremities: No clubbing, cyanosis or edema   Skin: No new rashes or lesions  No draining wounds noted         Labs, Imaging, & Other studies:   All pertinent labs and imaging studies were personally reviewed  Results from last 7 days   Lab Units 20  0605 20  0544 20  0525   WBC Thousand/uL 9 74 7 02 10 59*   HEMOGLOBIN g/dL 11 8 11 1* 12 3   PLATELETS Thousands/uL 228 172 197     Results from last 7 days   Lab Units 20  0605 20  0525 20  1142   SODIUM mmol/L 141 136 136   POTASSIUM mmol/L 4 1 4 6 5 4*   CHLORIDE mmol/L 108 103 96*   CO2 mmol/L 30 27 31   BUN mg/dL 13 11 11   CREATININE mg/dL 0 58* 0 63 0 68   EGFR ml/min/1 73sq m 96 94 91   CALCIUM mg/dL 8 7 9 1 9 0   AST U/L  --  41 52*   ALT U/L  --  45 51   ALK PHOS U/L  --  98 111     Results from last 7 days   Lab Units 03/03/20  1706 03/02/20  1142   BLOOD CULTURE   --  No Growth After 5 Days  No Growth After 5 Days     GRAM STAIN RESULT  2+ Disintegrating polys*  1+ Gram negative rods*  --    BODY FLUID CULTURE, STERILE  3+ Growth of Pasteurella multocida*  --      Results from last 7 days   Lab Units 03/02/20  1142   PROCALCITONIN ng/ml 0 07

## 2020-03-10 ENCOUNTER — TRANSITIONAL CARE MANAGEMENT (OUTPATIENT)
Dept: FAMILY MEDICINE CLINIC | Facility: CLINIC | Age: 65
End: 2020-03-10

## 2020-03-10 ENCOUNTER — TELEPHONE (OUTPATIENT)
Dept: FAMILY MEDICINE CLINIC | Facility: CLINIC | Age: 65
End: 2020-03-10

## 2020-03-10 DIAGNOSIS — L02.11 ABSCESS OF NECK: Primary | ICD-10-CM

## 2020-03-10 NOTE — TELEPHONE ENCOUNTER
Patient called, she said that she needs to make an appt for a TCM appt  She was released from [facility] in [place] yesterday  She had sepsis from an abscess  I told her that someone would be calling her to schedule  [phone]   Thank you

## 2020-03-11 ENCOUNTER — OFFICE VISIT (OUTPATIENT)
Dept: FAMILY MEDICINE CLINIC | Facility: CLINIC | Age: 65
End: 2020-03-11
Payer: MEDICARE

## 2020-03-11 VITALS
TEMPERATURE: 97.4 F | HEIGHT: 65 IN | BODY MASS INDEX: 39.32 KG/M2 | DIASTOLIC BLOOD PRESSURE: 84 MMHG | WEIGHT: 236 LBS | HEART RATE: 68 BPM | OXYGEN SATURATION: 98 % | SYSTOLIC BLOOD PRESSURE: 110 MMHG

## 2020-03-11 DIAGNOSIS — L02.11 ABSCESS OF NECK: Primary | ICD-10-CM

## 2020-03-11 DIAGNOSIS — Q18.0 BRANCHIAL CLEFT CYST: ICD-10-CM

## 2020-03-11 DIAGNOSIS — D72.829 LEUKOCYTOSIS, UNSPECIFIED TYPE: ICD-10-CM

## 2020-03-11 DIAGNOSIS — Z13.820 SCREENING FOR OSTEOPOROSIS: ICD-10-CM

## 2020-03-11 DIAGNOSIS — Z78.0 POSTMENOPAUSAL: ICD-10-CM

## 2020-03-11 DIAGNOSIS — I10 ESSENTIAL HYPERTENSION: ICD-10-CM

## 2020-03-11 DIAGNOSIS — E66.01 MORBID OBESITY (HCC): ICD-10-CM

## 2020-03-11 PROCEDURE — 99496 TRANSJ CARE MGMT HIGH F2F 7D: CPT | Performed by: FAMILY MEDICINE

## 2020-03-11 NOTE — PROGRESS NOTES
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-up with ENT as recommended  4  Hypertension-the patient's blood pressure stable on her current medication  5  Obesity-the patient will continue to work improving her diet and exercise  Diagnoses and all orders for this visit:    Abscess of neck    Leukocytosis, unspecified type    Left Branchial cleft cyst    Essential hypertension    Morbid obesity ([facility] [address] )    Screening for osteoporosis  -     DXA bone density spine hip and pelvis; Future    Postmenopausal  -     DXA bone density spine hip and pelvis; Future      Chief Complaint   Patient presents with    Transition of Care Management     Follow up hospital stay Sepsis       Subjective:    Patient ID:  French Valle is a 75 y  o female  French Valle is a 75 y o  female who presents today for transition of care management visit following her recent hospitalization at [place] [address]  from 3/2/2020 until 3/9/2024 an abscess of her neck with resulting sepsis, hypertension, morbid obesity, and leukocytosis  The patient was actually transferred to [facility] on 3/2/2020 from [address] [place] [address] for further evaluation of neck swelling  The patient had initially been treated in this office for a sinusitis with amoxicillin and subsequently developed swelling around the lower part of her right-sided neck  This gradually became worse and she was transferred to [place] [address]  with sepsis and leukocytosis  The sepsis did resolve during her hospital stay  I did review the records from her hospital stay    She did have a CT scan performed of her neck which demonstrated 2 5 x 3 x 3 5 abscess deep to the right sternocleidomastoid muscle around the carotid bifurcation  She did undergo IR guided aspiration of this abscess with improvement  He ENT was consulted and the diagnosed as a right infected branchial cleft cyst/abscess  She was also seen in consultation with Infectious Disease  She was started on IV Unasyn  She did have a repeat CT scan of the neck on the day of discharge demonstrating improvement  She was discharged home on a 14 day course of Augmentin 875 mg b i d  She is going to be having outpatient follow-ups with both ENT and Infectious Disease  She is still taking the Augmentin 875 mg BID for a total of 14 days  There are no further fevers since discharge  She denies any chest pain or shortness of breath  There is no trouble swallowing  She does have a mild cough  There is no runny nose or congestion  She is going to be scheduling follow-up appointments with both ENT and Infectious Disease  There has been no further vomiting or diarrhea since discharge  She has and good appetite  She is feeling better overall and has no new complaints today      The following portions of the patient's history were reviewed and updated as appropriate: allergies, current medications, past family history, past medical history, past social history, past surgical history and problem list   Patient Active Problem List   Diagnosis    Female cystocele    Generalized osteoarthritis    Hyperlipidemia    Hypertension    Hypothyroidism    Intermittent claudication ([address] )    Sleep disturbances    Urge and stress incontinence    Varicose veins of both lower extremities with pain    Vitamin D deficiency    Shoulder subluxation, right    DJD of right shoulder    Rotator cuff tear arthropathy of right shoulder    Alcohol induced acute pancreatitis    Morbid obesity ([address] )    Pancreatic pseudocyst    Abscess of neck    Leukocytosis    GERD (gastroesophageal reflux disease)    Left Branchial cleft cyst     Past Medical History:   Diagnosis Date    Disease of thyroid gland     GERD (gastroesophageal reflux disease)     HL (hearing loss)     Hyperlipidemia     Polyp of sigmoid colon     last assessed 6/12/12    Sleep disturbances     last assessed 6/5/14     Past Surgical History:   Procedure Laterality Date    BREAST CYST ASPIRATION Right     20 yrs ago in dr office net results    COLONOSCOPY  06/18/2011    Complete     Repeat in 5yrs      IR IMAGE GUIDED ASPIRATION / DRAINAGE  3/3/2020    NASAL FRACTURE SURGERY       Allergies   Allergen Reactions    Statins Myalgia     Family History   Problem Relation Age of Onset    Alzheimer's disease Mother     Diabetes Mother     No Known Problems Father     No Known Problems Maternal Grandmother     No Known Problems Maternal Grandfather     No Known Problems Paternal Grandmother     No Known Problems Paternal Grandfather     No Known Problems Maternal Aunt     No Known Problems Maternal Aunt     No Known Problems Maternal Aunt     No Known Problems Maternal Aunt     No Known Problems Maternal Aunt     No Known Problems Paternal Aunt     No Known Problems Paternal Aunt     No Known Problems Paternal Aunt      Social History     Socioeconomic History    Marital status: Single     Spouse name: Not on file    Number of children: Not on file    Years of education: Not on file    Highest education level: Not on file   Occupational History    Occupation: retired    Social Needs    Financial resource strain: Not hard at all   [facility] insecurity:     Worry: Never true     Inability: Never true    Transportation needs:     Medical: No     Non-medical: No   Tobacco Use    Smoking status: Never Smoker    Smokeless tobacco: Never Used   Substance and Sexual Activity    Alcohol use: Not Currently     Frequency: Never    Drug use: No    Sexual activity: Not on file   Lifestyle    Physical activity:     Days per week: 0 days     Minutes per session: 0 min    Stress: Not at all   Relationships    Social connections:     Talks on phone: Not on file     Gets together: Not on file     Attends Orthodoxy service: Not on file     Active member of club or organization: Not on file     Attends meetings of clubs or organizations: Not on file     Relationship status: Never     Intimate partner violence:     Fear of current or ex partner: No     Emotionally abused: No     Physically abused: No     Forced sexual activity: No   Other Topics Concern    Not on file   Social History Narrative    Sleep disturbances      Current Outpatient Medications on File Prior to Visit   Medication Sig Dispense Refill    amoxicillin-clavulanate (AUGMENTIN) 875-125 mg per tablet Take 1 tablet by mouth every 12 (twelve) hours for 14 days 28 tablet 0    aspirin 81 MG tablet Take 1 tablet by mouth daily      ezetimibe (ZETIA) 10 mg tablet Take 1 tablet (10 mg total) by mouth daily 90 tablet 1    levothyroxine 50 mcg tablet Take 2 tablets (100 mcg total) by mouth daily 180 tablet 1    lisinopril (ZESTRIL) 20 mg tablet Take 1 tablet (20 mg total) by mouth daily 90 tablet 2    multivitamin (THERAGRAN) TABS Take 1 tablet by mouth      Omega-3 Fatty Acids (FISH OIL PO) Take 2 g by mouth      pantoprazole (PROTONIX) 40 mg tablet Take 40 mg by mouth daily        No current facility-administered medications on file prior to visit  Review of Systems   Constitutional: Negative  HENT: Negative  Eyes: Negative  Respiratory: Negative  Cardiovascular: Negative  Gastrointestinal: Negative  Endocrine: Negative  Genitourinary: Negative  Musculoskeletal: Negative  Skin: Negative  Allergic/Immunologic: Negative  Neurological: Negative  Hematological: Negative  Psychiatric/Behavioral: Negative          Objective:    Vitals:    03/11/20 1009 03/11/20 1106 BP: 140/90 110/84   BP Location: Left arm    Patient Position: Sitting    Cuff Size: Large    Pulse: 83 68   Temp: (!) 97 4 °F (36 3 °C)    TempSrc: Tympanic    SpO2: 98%    Weight: 107 kg (236 lb)    Height: 5' 5" (1 651 m)      Physical Exam   Constitutional: She is oriented to person, place, and time  She appears well-developed and well-nourished  No distress  HENT:   Head: Normocephalic and atraumatic  Right Ear: External ear normal    Left Ear: External ear normal    Nose: Nose normal    Mouth/Throat: Oropharynx is clear and moist  No oropharyngeal exudate  Eyes: Pupils are equal, round, and reactive to light  EOM are normal  Right eye exhibits no discharge  Left eye exhibits no discharge  No scleral icterus  Neck: Normal range of motion  Neck supple  No JVD present  No tracheal deviation present  No thyromegaly present  There is a 2 5 cm subcutaneous lesion palpated on the right lateral neck  Cardiovascular: Normal rate, regular rhythm, normal heart sounds and intact distal pulses  Exam reveals no gallop and no friction rub  No murmur heard  Pulmonary/Chest: Effort normal and breath sounds normal  No respiratory distress  She has no wheezes  She has no rales  She exhibits no tenderness  Abdominal: Soft  Bowel sounds are normal  She exhibits no distension and no mass  There is no tenderness  There is no rebound and no guarding  No hernia  Musculoskeletal: Normal range of motion  She exhibits no edema, tenderness or deformity  Lymphadenopathy:     She has cervical adenopathy  Neurological: She is alert and oriented to person, place, and time  She displays normal reflexes  No cranial nerve deficit or sensory deficit  She exhibits normal muscle tone  Coordination normal    Skin: Skin is warm and dry  No rash noted  She is not diaphoretic  No erythema  No pallor  Psychiatric: She has a normal mood and affect   Her behavior is normal  Thought content normal          TCM Call (since 2/13/2020)     Date and time call was made  3/10/2020 11:19 AM    Hospital care reviewed  Records reviewed    Patient was hospitialized at  [facility]    Date of Admission  03/02/20    Date of discharge  03/09/20    Diagnosis  Abcess neck    Disposition  Home    Current Symptoms  None      TCM Call (since 2/13/2020)     Post hospital issues  None    Should patient be enrolled in anticoag monitoring? No    Scheduled for follow up? Yes    Referrals needed  Dr Lily Prajapati    Did you obtain your prescribed medications  Yes    Do you need help managing your prescriptions or medications  No    Is transportation to your appointment needed  No    I have advised the patient to call PCP with any new or worsening symptoms  Bhavana VILLATORO  [facility] [address]   Friends    The type of support provided  Emotional; Physical    Do you have social support  No, not at all    Are you recieving any outpatient services  No    Are you recieving home care services  No    Are you using any community resources  No    Current waiver services  No    Have you fallen in the last 12 months  No    Interperter language line needed  No    Counseling  Patient    Counseling topics  Activities of daily living;  Importance of RX compliance

## 2020-03-12 ENCOUNTER — TELEPHONE (OUTPATIENT)
Dept: INFECTIOUS DISEASES | Facility: CLINIC | Age: 65
End: 2020-03-12

## 2020-03-12 NOTE — TELEPHONE ENCOUNTER
Pt called to confirm f/u in our office  Confirmed appt for 4/1  Provided her with the phone number to call to scheduled her ct

## 2020-03-17 ENCOUNTER — HOSPITAL ENCOUNTER (OUTPATIENT)
Dept: BONE DENSITY | Facility: CLINIC | Age: 65
Discharge: HOME | End: 2020-03-17
Payer: MEDICARE

## 2020-03-17 DIAGNOSIS — Z13.820 SCREENING FOR OSTEOPOROSIS: ICD-10-CM

## 2020-03-17 DIAGNOSIS — Z78.0 POSTMENOPAUSAL: ICD-10-CM

## 2020-03-17 PROCEDURE — 77080 DXA BONE DENSITY AXIAL: CPT

## 2020-03-25 ENCOUNTER — HOSPITAL ENCOUNTER (OUTPATIENT)
Dept: CT IMAGING | Facility: HOSPITAL | Age: 65
Discharge: HOME | End: 2020-03-25
Attending: INTERNAL MEDICINE
Payer: MEDICARE

## 2020-03-25 DIAGNOSIS — L02.11 ABSCESS OF NECK: ICD-10-CM

## 2020-03-25 PROCEDURE — 70491 CT SOFT TISSUE NECK W/DYE: CPT

## 2020-03-25 RX ADMIN — IOHEXOL 100 ML: 350 INJECTION, SOLUTION INTRAVENOUS at 13:16

## 2020-03-26 DIAGNOSIS — E78.2 MIXED HYPERLIPIDEMIA: ICD-10-CM

## 2020-03-26 RX ORDER — EZETIMIBE 10 MG/1
10 TABLET ORAL DAILY
Qty: 90 TABLET | Refills: 1 | Status: SHIPPED | OUTPATIENT
Start: 2020-03-26 | End: 2020-07-07 | Stop reason: SDUPTHER

## 2020-04-01 ENCOUNTER — OFFICE VISIT (OUTPATIENT)
Dept: INFECTIOUS DISEASES | Facility: CLINIC | Age: 65
End: 2020-04-01
Payer: MEDICARE

## 2020-04-01 VITALS
WEIGHT: 244 LBS | BODY MASS INDEX: 40.65 KG/M2 | TEMPERATURE: 97.2 F | DIASTOLIC BLOOD PRESSURE: 68 MMHG | SYSTOLIC BLOOD PRESSURE: 122 MMHG | HEIGHT: 65 IN | HEART RATE: 94 BPM

## 2020-04-01 DIAGNOSIS — L02.11 ABSCESS OF NECK: Primary | ICD-10-CM

## 2020-04-01 DIAGNOSIS — Q18.0 BRANCHIAL CLEFT CYST: ICD-10-CM

## 2020-04-01 DIAGNOSIS — A28.0 PASTEURELLA INFECTION: ICD-10-CM

## 2020-04-01 PROCEDURE — 99214 OFFICE O/P EST MOD 30 MIN: CPT | Performed by: INTERNAL MEDICINE

## 2020-04-01 PROCEDURE — 1036F TOBACCO NON-USER: CPT | Performed by: INTERNAL MEDICINE

## 2020-04-01 PROCEDURE — 3008F BODY MASS INDEX DOCD: CPT | Performed by: INTERNAL MEDICINE

## 2020-04-01 PROCEDURE — 4040F PNEUMOC VAC/ADMIN/RCVD: CPT | Performed by: INTERNAL MEDICINE

## 2020-04-01 PROCEDURE — 1160F RVW MEDS BY RX/DR IN RCRD: CPT | Performed by: INTERNAL MEDICINE

## 2020-04-01 PROCEDURE — 1111F DSCHRG MED/CURRENT MED MERGE: CPT | Performed by: INTERNAL MEDICINE

## 2020-04-01 PROCEDURE — 3074F SYST BP LT 130 MM HG: CPT | Performed by: INTERNAL MEDICINE

## 2020-04-01 PROCEDURE — 3078F DIAST BP <80 MM HG: CPT | Performed by: INTERNAL MEDICINE

## 2020-04-03 DIAGNOSIS — I10 ESSENTIAL HYPERTENSION: ICD-10-CM

## 2020-04-03 RX ORDER — LISINOPRIL 20 MG/1
20 TABLET ORAL DAILY
Qty: 90 TABLET | Refills: 1 | Status: SHIPPED | OUTPATIENT
Start: 2020-04-03 | End: 2020-07-07 | Stop reason: SDUPTHER

## 2020-04-13 DIAGNOSIS — E03.9 HYPOTHYROIDISM, UNSPECIFIED TYPE: ICD-10-CM

## 2020-04-13 RX ORDER — LEVOTHYROXINE SODIUM 0.05 MG/1
100 TABLET ORAL DAILY
Qty: 180 TABLET | Refills: 1 | Status: SHIPPED | OUTPATIENT
Start: 2020-04-13 | End: 2020-07-07 | Stop reason: SDUPTHER

## 2020-06-12 ENCOUNTER — OFFICE VISIT (OUTPATIENT)
Dept: FAMILY MEDICINE CLINIC | Facility: CLINIC | Age: 65
End: 2020-06-12
Payer: MEDICARE

## 2020-06-12 VITALS
TEMPERATURE: 97.5 F | WEIGHT: 253.6 LBS | BODY MASS INDEX: 42.25 KG/M2 | HEIGHT: 65 IN | HEART RATE: 90 BPM | DIASTOLIC BLOOD PRESSURE: 64 MMHG | SYSTOLIC BLOOD PRESSURE: 120 MMHG | OXYGEN SATURATION: 98 %

## 2020-06-12 DIAGNOSIS — E03.9 HYPOTHYROIDISM, UNSPECIFIED TYPE: ICD-10-CM

## 2020-06-12 DIAGNOSIS — E55.9 VITAMIN D DEFICIENCY: ICD-10-CM

## 2020-06-12 DIAGNOSIS — I10 ESSENTIAL HYPERTENSION: Primary | ICD-10-CM

## 2020-06-12 DIAGNOSIS — E78.2 MIXED HYPERLIPIDEMIA: ICD-10-CM

## 2020-06-12 PROCEDURE — 1036F TOBACCO NON-USER: CPT | Performed by: FAMILY MEDICINE

## 2020-06-12 PROCEDURE — 3074F SYST BP LT 130 MM HG: CPT | Performed by: FAMILY MEDICINE

## 2020-06-12 PROCEDURE — 99214 OFFICE O/P EST MOD 30 MIN: CPT | Performed by: FAMILY MEDICINE

## 2020-06-12 PROCEDURE — 4040F PNEUMOC VAC/ADMIN/RCVD: CPT | Performed by: FAMILY MEDICINE

## 2020-06-12 PROCEDURE — 3008F BODY MASS INDEX DOCD: CPT | Performed by: NURSE PRACTITIONER

## 2020-06-12 PROCEDURE — 3078F DIAST BP <80 MM HG: CPT | Performed by: FAMILY MEDICINE

## 2020-06-12 PROCEDURE — 1160F RVW MEDS BY RX/DR IN RCRD: CPT | Performed by: FAMILY MEDICINE

## 2020-07-07 DIAGNOSIS — E78.2 MIXED HYPERLIPIDEMIA: ICD-10-CM

## 2020-07-07 DIAGNOSIS — E03.9 HYPOTHYROIDISM, UNSPECIFIED TYPE: ICD-10-CM

## 2020-07-07 DIAGNOSIS — I10 ESSENTIAL HYPERTENSION: ICD-10-CM

## 2020-07-07 RX ORDER — EZETIMIBE 10 MG/1
10 TABLET ORAL DAILY
Qty: 90 TABLET | Refills: 1 | Status: SHIPPED | OUTPATIENT
Start: 2020-07-07 | End: 2021-04-22 | Stop reason: SDUPTHER

## 2020-07-07 RX ORDER — LISINOPRIL 20 MG/1
20 TABLET ORAL DAILY
Qty: 90 TABLET | Refills: 1 | Status: SHIPPED | OUTPATIENT
Start: 2020-07-07 | End: 2021-04-22 | Stop reason: SDUPTHER

## 2020-07-07 RX ORDER — LEVOTHYROXINE SODIUM 0.05 MG/1
100 TABLET ORAL DAILY
Qty: 180 TABLET | Refills: 1 | Status: SHIPPED | OUTPATIENT
Start: 2020-07-07 | End: 2021-04-12 | Stop reason: SDUPTHER

## 2020-07-21 ENCOUNTER — APPOINTMENT (OUTPATIENT)
Dept: LAB | Facility: CLINIC | Age: 65
End: 2020-07-21
Payer: MEDICARE

## 2020-07-21 DIAGNOSIS — E03.9 HYPOTHYROIDISM, UNSPECIFIED TYPE: ICD-10-CM

## 2020-07-21 DIAGNOSIS — IMO0001 LEFT ASYMMETRICAL SNHL: ICD-10-CM

## 2020-07-21 DIAGNOSIS — I10 ESSENTIAL HYPERTENSION: ICD-10-CM

## 2020-07-21 DIAGNOSIS — E78.2 MIXED HYPERLIPIDEMIA: ICD-10-CM

## 2020-07-21 DIAGNOSIS — E55.9 VITAMIN D DEFICIENCY: ICD-10-CM

## 2020-07-21 LAB
25(OH)D3 SERPL-MCNC: 18.2 NG/ML (ref 30–100)
ALBUMIN SERPL BCP-MCNC: 3.4 G/DL (ref 3.5–5)
ALP SERPL-CCNC: 92 U/L (ref 46–116)
ALT SERPL W P-5'-P-CCNC: 32 U/L (ref 12–78)
ANION GAP SERPL CALCULATED.3IONS-SCNC: 4 MMOL/L (ref 4–13)
AST SERPL W P-5'-P-CCNC: 20 U/L (ref 5–45)
BASOPHILS # BLD AUTO: 0.05 THOUSANDS/ΜL (ref 0–0.1)
BASOPHILS NFR BLD AUTO: 1 % (ref 0–1)
BILIRUB SERPL-MCNC: 0.43 MG/DL (ref 0.2–1)
BUN SERPL-MCNC: 15 MG/DL (ref 5–25)
CALCIUM SERPL-MCNC: 9.7 MG/DL (ref 8.3–10.1)
CHLORIDE SERPL-SCNC: 108 MMOL/L (ref 100–108)
CHOLEST SERPL-MCNC: 230 MG/DL (ref 50–200)
CO2 SERPL-SCNC: 30 MMOL/L (ref 21–32)
CREAT SERPL-MCNC: 0.67 MG/DL (ref 0.6–1.3)
EOSINOPHIL # BLD AUTO: 0.17 THOUSAND/ΜL (ref 0–0.61)
EOSINOPHIL NFR BLD AUTO: 2 % (ref 0–6)
ERYTHROCYTE [DISTWIDTH] IN BLOOD BY AUTOMATED COUNT: 13.1 % (ref 11.6–15.1)
GFR SERPL CREATININE-BSD FRML MDRD: 92 ML/MIN/1.73SQ M
GLUCOSE P FAST SERPL-MCNC: 119 MG/DL (ref 65–99)
HCT VFR BLD AUTO: 41.6 % (ref 34.8–46.1)
HDLC SERPL-MCNC: 58 MG/DL
HGB BLD-MCNC: 13.1 G/DL (ref 11.5–15.4)
IMM GRANULOCYTES # BLD AUTO: 0.02 THOUSAND/UL (ref 0–0.2)
IMM GRANULOCYTES NFR BLD AUTO: 0 % (ref 0–2)
LDLC SERPL CALC-MCNC: 138 MG/DL (ref 0–100)
LDLC SERPL DIRECT ASSAY-MCNC: 160 MG/DL (ref 0–100)
LYMPHOCYTES # BLD AUTO: 1.56 THOUSANDS/ΜL (ref 0.6–4.47)
LYMPHOCYTES NFR BLD AUTO: 22 % (ref 14–44)
MCH RBC QN AUTO: 29.6 PG (ref 26.8–34.3)
MCHC RBC AUTO-ENTMCNC: 31.5 G/DL (ref 31.4–37.4)
MCV RBC AUTO: 94 FL (ref 82–98)
MONOCYTES # BLD AUTO: 0.48 THOUSAND/ΜL (ref 0.17–1.22)
MONOCYTES NFR BLD AUTO: 7 % (ref 4–12)
NEUTROPHILS # BLD AUTO: 4.86 THOUSANDS/ΜL (ref 1.85–7.62)
NEUTS SEG NFR BLD AUTO: 68 % (ref 43–75)
NONHDLC SERPL-MCNC: 172 MG/DL
NRBC BLD AUTO-RTO: 0 /100 WBCS
PLATELET # BLD AUTO: 176 THOUSANDS/UL (ref 149–390)
PMV BLD AUTO: 12.2 FL (ref 8.9–12.7)
POTASSIUM SERPL-SCNC: 4.6 MMOL/L (ref 3.5–5.3)
PROT SERPL-MCNC: 7.3 G/DL (ref 6.4–8.2)
RBC # BLD AUTO: 4.42 MILLION/UL (ref 3.81–5.12)
SODIUM SERPL-SCNC: 142 MMOL/L (ref 136–145)
TRIGL SERPL-MCNC: 172 MG/DL
TSH SERPL DL<=0.05 MIU/L-ACNC: 2.47 UIU/ML (ref 0.36–3.74)
WBC # BLD AUTO: 7.14 THOUSAND/UL (ref 4.31–10.16)

## 2020-07-21 PROCEDURE — 80053 COMPREHEN METABOLIC PANEL: CPT

## 2020-07-21 PROCEDURE — 83721 ASSAY OF BLOOD LIPOPROTEIN: CPT

## 2020-07-21 PROCEDURE — 84443 ASSAY THYROID STIM HORMONE: CPT

## 2020-07-21 PROCEDURE — 85025 COMPLETE CBC W/AUTO DIFF WBC: CPT

## 2020-07-21 PROCEDURE — 82306 VITAMIN D 25 HYDROXY: CPT

## 2020-07-21 PROCEDURE — 80061 LIPID PANEL: CPT

## 2020-07-21 PROCEDURE — 36415 COLL VENOUS BLD VENIPUNCTURE: CPT

## 2020-09-09 ENCOUNTER — TELEMEDICINE (OUTPATIENT)
Dept: FAMILY MEDICINE CLINIC | Facility: CLINIC | Age: 65
End: 2020-09-09
Payer: MEDICARE

## 2020-09-09 VITALS — TEMPERATURE: 98 F

## 2020-09-09 DIAGNOSIS — Z20.828 EXPOSURE TO SARS-ASSOCIATED CORONAVIRUS: ICD-10-CM

## 2020-09-09 DIAGNOSIS — R05.9 COUGH: Primary | ICD-10-CM

## 2020-09-09 DIAGNOSIS — R05.9 COUGH: ICD-10-CM

## 2020-09-09 PROCEDURE — U0003 INFECTIOUS AGENT DETECTION BY NUCLEIC ACID (DNA OR RNA); SEVERE ACUTE RESPIRATORY SYNDROME CORONAVIRUS 2 (SARS-COV-2) (CORONAVIRUS DISEASE [COVID-19]), AMPLIFIED PROBE TECHNIQUE, MAKING USE OF HIGH THROUGHPUT TECHNOLOGIES AS DESCRIBED BY CMS-2020-01-R: HCPCS | Performed by: NURSE PRACTITIONER

## 2020-09-09 PROCEDURE — 99442 PR PHYS/QHP TELEPHONE EVALUATION 11-20 MIN: CPT | Performed by: NURSE PRACTITIONER

## 2020-09-09 NOTE — PROGRESS NOTES
Virtual Regular Visit      Assessment/Plan:    Problem List Items Addressed This Visit     None      Visit Diagnoses     Cough    -  Primary    Relevant Orders    Novel Coronavirus (LLOBL-27), PCR [facility] - Collected at [facility] or [facility]    Exposure to SARS-associated coronavirus        Relevant Orders    Novel Coronavirus (HSHMT-62), PCR [facility] - Collected at   [facility] [address] or [facility]               Reason for visit is   Chief Complaint   Patient presents with    Virtual Regular Visit    Virtual Brief Visit        Encounter provider SUSY Mosqueda    Provider located at [address] [place] [id number] [phone]      Recent Visits  No visits were found meeting these conditions  Showing recent visits within past 7 days and meeting all other requirements     Today's Visits  Date Type Provider Dept   09/09/20 Telemedicine SUSY Mosqueda [facility]   Showing today's visits and meeting all other requirements     Future Appointments  No visits were found meeting these conditions  Showing future appointments within next 150 days and meeting all other requirements        The patient was identified by name and date of birth  Nancy Isaac was informed that this is a telemedicine visit and that the visit is being conducted through telephone  My office door was closed  No one else was in the room  She acknowledged consent and understanding of privacy and security of the video platform  The patient has agreed to participate and understands they can discontinue the visit at any time  Patient is aware this is a billable service  Subjective  Nancy Isaac is a 75 y o  female    Cough   This is a new problem  The current episode started in the past 7 days (reports 2-3 days ago began with "cold" type symptoms, was exposed to hospitalized covid patient, 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)   The problem has been unchanged  The problem occurs every few minutes  The cough is non-productive  Associated symptoms include myalgias, nasal congestion, postnasal drip and a sore throat  Pertinent negatives include no chest pain, chills, ear pain, fever, headaches, heartburn, hemoptysis, rash, rhinorrhea, shortness of breath, sweats, weight loss or wheezing  The symptoms are aggravated by lying down  Risk factors: none  She has tried nothing for the symptoms  The treatment provided no relief  Past Medical History:   Diagnosis Date    Disease of thyroid gland     GERD (gastroesophageal reflux disease)     HL (hearing loss)     Hyperlipidemia     Polyp of sigmoid colon     last assessed 6/12/12    Sleep disturbances     last assessed 6/5/14       Past Surgical History:   Procedure Laterality Date    BREAST CYST ASPIRATION Right     20 yrs ago in dr office net results    COLONOSCOPY  06/18/2011    Complete  Repeat in 5yrs      IR IMAGE GUIDED ASPIRATION / DRAINAGE  3/3/2020    NASAL FRACTURE SURGERY         Current Outpatient Medications   Medication Sig Dispense Refill    aspirin 81 MG tablet Take 1 tablet by mouth daily      ezetimibe (ZETIA) 10 mg tablet Take 1 tablet (10 mg total) by mouth daily 90 tablet 1    levothyroxine 50 mcg tablet Take 2 tablets (100 mcg total) by mouth daily 180 tablet 1    lisinopril (ZESTRIL) 20 mg tablet Take 1 tablet (20 mg total) by mouth daily 90 tablet 1    multivitamin (THERAGRAN) TABS Take 1 tablet by mouth      Omega-3 Fatty Acids (FISH OIL PO) Take 2 g by mouth      pantoprazole (PROTONIX) 40 mg tablet Take 40 mg by mouth daily        No current facility-administered medications for this visit  Allergies   Allergen Reactions    Statins Myalgia       Review of Systems   Constitutional: Negative  Negative for chills, fever and weight loss  HENT: Positive for postnasal drip and sore throat  Negative for ear pain and rhinorrhea  Eyes: Negative  Respiratory: Positive for cough  Negative for hemoptysis, shortness of breath and wheezing  Cardiovascular: Negative  Negative for chest pain  Gastrointestinal: Negative  Negative for heartburn  Endocrine: Negative  Genitourinary: Negative  Musculoskeletal: Positive for myalgias  Skin: Negative  Negative for rash  Allergic/Immunologic: Negative  Neurological: Negative  Negative for headaches  Hematological: Negative  Psychiatric/Behavioral: Negative  Video Exam    Vitals:    09/09/20 1016   Temp: 98 °F (36 7 °C)   TempSrc: Skin       Physical Exam  Constitutional:       Comments: Unable to visualize needed phone call, unable to use doximity    Neurological:      Mental Status: She is alert  I spent 15 minutes directly with the patient during this visit      VIRTUAL VISIT Miroslava Gaona acknowledges that she has consented to an online visit or consultation  She understands that the online visit is based solely on information provided by her, and that, in the absence of a face-to-face physical evaluation by the physician, the diagnosis she receives is both limited and provisional in terms of accuracy and completeness  This is not intended to replace a full medical face-to-face evaluation by the physician  Nancy Isaac understands and accepts these terms

## 2020-09-11 ENCOUNTER — TELEPHONE (OUTPATIENT)
Dept: FAMILY MEDICINE CLINIC | Facility: CLINIC | Age: 65
End: 2020-09-11

## 2020-09-11 LAB — SARS-COV-2 RNA SPEC QL NAA+PROBE: DETECTED

## 2020-09-11 NOTE — TELEPHONE ENCOUNTER
Please call Renny Keane and let her know that th etest results can take up to 1 week and we will call her when they trish available, as I discussed with her at her appointment

## 2020-09-17 ENCOUNTER — TELEPHONE (OUTPATIENT)
Dept: FAMILY MEDICINE CLINIC | Facility: CLINIC | Age: 65
End: 2020-09-17

## 2020-09-22 ENCOUNTER — TELEPHONE (OUTPATIENT)
Dept: FAMILY MEDICINE CLINIC | Facility: CLINIC | Age: 65
End: 2020-09-22

## 2020-09-22 NOTE — TELEPHONE ENCOUNTER
Patient tested positive on 9/9/20 and is in quarantine, she had wanted to know if she could be retested to make sure she does not have it anymore she said her 14 days is up on 9/23 please advise she can be reached at [phone]

## 2020-09-24 ENCOUNTER — TELEMEDICINE (OUTPATIENT)
Dept: FAMILY MEDICINE CLINIC | Facility: CLINIC | Age: 65
End: 2020-09-24
Payer: MEDICARE

## 2020-09-24 DIAGNOSIS — Z86.16 HISTORY OF 2019 NOVEL CORONAVIRUS DISEASE (COVID-19): Primary | ICD-10-CM

## 2020-09-24 PROCEDURE — 99442 PR PHYS/QHP TELEPHONE EVALUATION 11-20 MIN: CPT | Performed by: FAMILY MEDICINE

## 2020-09-24 NOTE — PROGRESS NOTES
COVID-19 Virtual Visit     Assessment/Plan:    Problem List Items Addressed This Visit     None      Visit Diagnoses     History of 2019 novel coronavirus disease (COVID-19)    -  Primary        This virtual check-in was done via telephone  Disposition:      The patient has been self quarantine for 14 days and has been afebrile for the entire time  She did have a positive COVID test   The only current symptom she has is a mild cough  Her symptoms have improved from initial onset of symptoms  She is feeling great  She is currently not working  I advised the patient that she should continue her self quarantine for over the weekend and as long as she is symptom free she can go back to normal activities on 9/28/2020  She still will need to maintain social distancing and wear mask as normal   She understands and will call us with any further questions or symptoms  I did advise the patient that per CDC guidelines she does not need a follow-up COVID-19 test to determine resolution  We will base our recommendations on symptoms  She understands and will follow-up with us as needed  I spent 15 minutes directly with the patient during this visit    Encounter provider Lexii Richardson DO    Provider located at [address] [place], [place] [id number] [phone]    Recent Visits  Date Type Provider Dept   09/24/20 Telemedicine DO [place]   09/22/20 Telephone George Reyes P[facility]   Showing recent visits within past 7 days and meeting all other requirements     Future Appointments  No visits were found meeting these conditions  Showing future appointments within next 150 days and meeting all other requirements        Patient agrees to participate in a virtual check in via telephone or video visit instead of presenting to the office to address urgent/immediate medical needs  Patient is aware this is a billable service  After connecting through telephone, the patient was identified by name and date of birth  Calixto Jenkins was informed that this was a telemedicine visit and that the exam was being conducted confidentially over secure lines  My office door was closed  No one else was in the room  Calixto Jenkins acknowledged consent and understanding of privacy and security of the telemedicine visit  I informed the patient that I have reviewed her record in Epic and presented the opportunity for her to ask any questions regarding the visit today  The patient agreed to participate  It was my intent to perform this visit via video technology but the patient was not able to do a video connection so the visit was completed via audio telephone only  Subjective  Calixto Jenkins is a 75 y o  female who is concerned about COVID-19  The patient did have a positive COVID-19 test which was obtained on 9/9/2020  She has been quarantine for 14 days and has had no further symptoms except for a dry cough  There are no fevers  She states that all of her initial symptoms including chest congestion, productive cough, and fatigue have all resolved  She has remained afebrile for the entire 2 week quarantine course  She is currently retired but wants to know she can resume further activity and if she needs a follow-up COVID-19 test to clear her  She reports cough  She has not experienced fever, chills, shortness of breath, fatigue, abdominal pain, diarrhea, nausea and vomiting She has had contact with a person who is under investigation for or who is positive for COVID-19 within the last 14 days  She has not been hospitalized recently for fever and/or lower respiratory symptoms      Past Medical History:   Diagnosis Date    Disease of thyroid gland     GERD (gastroesophageal reflux disease)     HL (hearing loss)     Hyperlipidemia     Polyp of sigmoid colon     last assessed 6/12/12    Sleep disturbances     last assessed 6/5/14       Past Surgical History:   Procedure Laterality Date    BREAST CYST ASPIRATION Right     20 yrs ago in dr office net results    COLONOSCOPY  06/18/2011    Complete  Repeat in 5yrs      IR IMAGE GUIDED ASPIRATION / DRAINAGE  3/3/2020    NASAL FRACTURE SURGERY         Current Outpatient Medications   Medication Sig Dispense Refill    aspirin 81 MG tablet Take 1 tablet by mouth daily      ezetimibe (ZETIA) 10 mg tablet Take 1 tablet (10 mg total) by mouth daily 90 tablet 1    levothyroxine 50 mcg tablet Take 2 tablets (100 mcg total) by mouth daily 180 tablet 1    lisinopril (ZESTRIL) 20 mg tablet Take 1 tablet (20 mg total) by mouth daily 90 tablet 1    multivitamin (THERAGRAN) TABS Take 1 tablet by mouth      Omega-3 Fatty Acids (FISH OIL PO) Take 2 g by mouth      pantoprazole (PROTONIX) 40 mg tablet Take 40 mg by mouth daily        No current facility-administered medications for this visit  Allergies   Allergen Reactions    Statins Myalgia       Review of Systems   Constitutional: Negative  HENT: Negative  Eyes: Negative  Respiratory: Positive for cough  Negative for apnea, choking, chest tightness, shortness of breath, wheezing and stridor  Cardiovascular: Negative  Gastrointestinal: Negative  Endocrine: Negative  Genitourinary: Negative  Musculoskeletal: Negative  Skin: Negative  Allergic/Immunologic: Negative  Neurological: Negative  Hematological: Negative  Psychiatric/Behavioral: Negative  Video Exam    There were no vitals filed for this visit  -patient does not have a thermometer at home to check her temperature      Patt Ortiz appears healthy, alert, no distress, cooperative  A physical exam could not be performed due to the fact this was a telephone visit  The patient was awake, alert, and oriented and answered all the questions appropriately  she was in no acute distress          VIRTUAL VISIT [place] [id number] acknowledges that she has consented to an online visit or consultation  She understands that the online visit is based solely on information provided by her, and that, in the absence of a face-to-face physical evaluation by the physician, the diagnosis she receives is both limited and provisional in terms of accuracy and completeness  This is not intended to replace a full medical face-to-face evaluation by the physician  Easton Acevedo understands and accepts these terms

## 2020-10-01 ENCOUNTER — TELEMEDICINE (OUTPATIENT)
Dept: FAMILY MEDICINE CLINIC | Facility: CLINIC | Age: 65
End: 2020-10-01
Payer: MEDICARE

## 2020-10-01 DIAGNOSIS — R05.9 COUGH: Primary | ICD-10-CM

## 2020-10-01 PROCEDURE — 99442 PR PHYS/QHP TELEPHONE EVALUATION 11-20 MIN: CPT | Performed by: NURSE PRACTITIONER

## 2020-10-26 ENCOUNTER — TELEPHONE (OUTPATIENT)
Dept: FAMILY MEDICINE CLINIC | Facility: CLINIC | Age: 65
End: 2020-10-26

## 2020-10-30 ENCOUNTER — TELEMEDICINE (OUTPATIENT)
Dept: FAMILY MEDICINE CLINIC | Facility: CLINIC | Age: 65
End: 2020-10-30
Payer: MEDICARE

## 2020-10-30 DIAGNOSIS — E66.01 MORBID OBESITY (HCC): ICD-10-CM

## 2020-10-30 DIAGNOSIS — E78.2 MIXED HYPERLIPIDEMIA: ICD-10-CM

## 2020-10-30 DIAGNOSIS — E03.9 HYPOTHYROIDISM, UNSPECIFIED TYPE: ICD-10-CM

## 2020-10-30 DIAGNOSIS — E55.9 VITAMIN D DEFICIENCY: ICD-10-CM

## 2020-10-30 DIAGNOSIS — Z12.31 ENCOUNTER FOR SCREENING MAMMOGRAM FOR BREAST CANCER: ICD-10-CM

## 2020-10-30 DIAGNOSIS — I10 ESSENTIAL HYPERTENSION: Primary | ICD-10-CM

## 2020-10-30 PROCEDURE — 99213 OFFICE O/P EST LOW 20 MIN: CPT | Performed by: FAMILY MEDICINE

## 2020-11-03 ENCOUNTER — TELEPHONE (OUTPATIENT)
Dept: ADMINISTRATIVE | Facility: OTHER | Age: 65
End: 2020-11-03

## 2020-12-01 LAB
25(OH)D3 SERPL-MCNC: 23 NG/ML (ref 30–100)
ALBUMIN SERPL-MCNC: 4.2 G/DL (ref 3.6–5.1)
ALBUMIN/GLOB SERPL: 1.6 (CALC) (ref 1–2.5)
ALP SERPL-CCNC: 77 U/L (ref 37–153)
ALT SERPL-CCNC: 21 U/L (ref 6–29)
AST SERPL-CCNC: 21 U/L (ref 10–35)
BASOPHILS # BLD AUTO: 42 CELLS/UL (ref 0–200)
BASOPHILS NFR BLD AUTO: 0.6 %
BILIRUB SERPL-MCNC: 0.4 MG/DL (ref 0.2–1.2)
BUN SERPL-MCNC: 19 MG/DL (ref 7–25)
BUN/CREAT SERPL: ABNORMAL (CALC) (ref 6–22)
CALCIUM SERPL-MCNC: 9.6 MG/DL (ref 8.6–10.4)
CHLORIDE SERPL-SCNC: 106 MMOL/L (ref 98–110)
CHOLEST SERPL-MCNC: 222 MG/DL
CHOLEST/HDLC SERPL: 3.9 (CALC)
CO2 SERPL-SCNC: 32 MMOL/L (ref 20–32)
CREAT SERPL-MCNC: 0.71 MG/DL (ref 0.5–0.99)
EOSINOPHIL # BLD AUTO: 154 CELLS/UL (ref 15–500)
EOSINOPHIL NFR BLD AUTO: 2.2 %
ERYTHROCYTE [DISTWIDTH] IN BLOOD BY AUTOMATED COUNT: 13.4 % (ref 11–15)
GLOBULIN SER CALC-MCNC: 2.7 G/DL (CALC) (ref 1.9–3.7)
GLUCOSE SERPL-MCNC: 109 MG/DL (ref 65–99)
HCT VFR BLD AUTO: 40.3 % (ref 35–45)
HDLC SERPL-MCNC: 57 MG/DL
HGB BLD-MCNC: 12.9 G/DL (ref 11.7–15.5)
LDLC SERPL CALC-MCNC: 136 MG/DL (CALC)
LDLC SERPL DIRECT ASSAY-MCNC: 150 MG/DL
LYMPHOCYTES # BLD AUTO: 1365 CELLS/UL (ref 850–3900)
LYMPHOCYTES NFR BLD AUTO: 19.5 %
MCH RBC QN AUTO: 29.6 PG (ref 27–33)
MCHC RBC AUTO-ENTMCNC: 32 G/DL (ref 32–36)
MCV RBC AUTO: 92.4 FL (ref 80–100)
MONOCYTES # BLD AUTO: 525 CELLS/UL (ref 200–950)
MONOCYTES NFR BLD AUTO: 7.5 %
NEUTROPHILS # BLD AUTO: 4914 CELLS/UL (ref 1500–7800)
NEUTROPHILS NFR BLD AUTO: 70.2 %
NONHDLC SERPL-MCNC: 165 MG/DL (CALC)
PLATELET # BLD AUTO: 184 THOUSAND/UL (ref 140–400)
PMV BLD REES-ECKER: 12.5 FL (ref 7.5–12.5)
POTASSIUM SERPL-SCNC: 4.7 MMOL/L (ref 3.5–5.3)
PROT SERPL-MCNC: 6.9 G/DL (ref 6.1–8.1)
RBC # BLD AUTO: 4.36 MILLION/UL (ref 3.8–5.1)
SL AMB EGFR AFRICAN AMERICAN: 103 ML/MIN/1.73M2
SL AMB EGFR NON AFRICAN AMERICAN: 89 ML/MIN/1.73M2
SODIUM SERPL-SCNC: 144 MMOL/L (ref 135–146)
T4 FREE SERPL-MCNC: 1.1 NG/DL (ref 0.8–1.8)
TRIGL SERPL-MCNC: 157 MG/DL
TSH SERPL-ACNC: 3.08 MIU/L (ref 0.4–4.5)
WBC # BLD AUTO: 7 THOUSAND/UL (ref 3.8–10.8)

## 2020-12-11 ENCOUNTER — OFFICE VISIT (OUTPATIENT)
Dept: FAMILY MEDICINE CLINIC | Facility: CLINIC | Age: 65
End: 2020-12-11
Payer: MEDICARE

## 2020-12-11 VITALS
HEIGHT: 65 IN | SYSTOLIC BLOOD PRESSURE: 124 MMHG | OXYGEN SATURATION: 97 % | HEART RATE: 68 BPM | TEMPERATURE: 96.3 F | BODY MASS INDEX: 43.82 KG/M2 | WEIGHT: 263 LBS | DIASTOLIC BLOOD PRESSURE: 84 MMHG

## 2020-12-11 DIAGNOSIS — Z00.00 WELCOME TO MEDICARE PREVENTIVE VISIT: Primary | ICD-10-CM

## 2020-12-11 DIAGNOSIS — Z23 NEED FOR VACCINATION: ICD-10-CM

## 2020-12-11 DIAGNOSIS — Z13.6 SCREENING FOR CARDIOVASCULAR CONDITION: ICD-10-CM

## 2020-12-11 PROCEDURE — G0402 INITIAL PREVENTIVE EXAM: HCPCS | Performed by: FAMILY MEDICINE

## 2020-12-11 PROCEDURE — G0009 ADMIN PNEUMOCOCCAL VACCINE: HCPCS | Performed by: FAMILY MEDICINE

## 2020-12-11 PROCEDURE — 90732 PPSV23 VACC 2 YRS+ SUBQ/IM: CPT | Performed by: FAMILY MEDICINE

## 2020-12-11 PROCEDURE — G0403 EKG FOR INITIAL PREVENT EXAM: HCPCS | Performed by: FAMILY MEDICINE

## 2020-12-11 RX ORDER — ZOSTER VACCINE RECOMBINANT, ADJUVANTED 50 MCG/0.5
KIT INTRAMUSCULAR
Qty: 1 EACH | Refills: 1 | Status: SHIPPED | OUTPATIENT
Start: 2020-12-11 | End: 2021-05-17 | Stop reason: ALTCHOICE

## 2020-12-11 RX ORDER — BIOTIN 1 MG
1 TABLET ORAL DAILY
Qty: 30 CAPSULE | Refills: 11
Start: 2020-12-11

## 2020-12-18 ENCOUNTER — HOSPITAL ENCOUNTER (OUTPATIENT)
Dept: MAMMOGRAPHY | Facility: CLINIC | Age: 65
Discharge: HOME | End: 2020-12-18
Payer: MEDICARE

## 2020-12-18 VITALS — WEIGHT: 263 LBS | BODY MASS INDEX: 43.82 KG/M2 | HEIGHT: 65 IN

## 2020-12-18 DIAGNOSIS — Z12.31 ENCOUNTER FOR SCREENING MAMMOGRAM FOR BREAST CANCER: ICD-10-CM

## 2020-12-18 PROCEDURE — 77063 BREAST TOMOSYNTHESIS BI: CPT

## 2020-12-18 PROCEDURE — 77067 SCR MAMMO BI INCL CAD: CPT

## 2021-01-25 ENCOUNTER — LAB (OUTPATIENT)
Dept: LAB | Facility: CLINIC | Age: 66
End: 2021-01-25
Payer: MEDICARE

## 2021-01-25 ENCOUNTER — TRANSCRIBE ORDERS (OUTPATIENT)
Dept: ADMINISTRATIVE | Facility: HOSPITAL | Age: 66
End: 2021-01-25

## 2021-01-25 DIAGNOSIS — K86.89 NECROSIS, PANCREAS ASEPTIC: Primary | ICD-10-CM

## 2021-01-25 DIAGNOSIS — K86.89 NECROSIS, PANCREAS ASEPTIC: ICD-10-CM

## 2021-01-25 LAB
ANION GAP SERPL CALCULATED.3IONS-SCNC: 3 MMOL/L (ref 4–13)
BUN SERPL-MCNC: 18 MG/DL (ref 5–25)
CALCIUM SERPL-MCNC: 9.4 MG/DL (ref 8.3–10.1)
CHLORIDE SERPL-SCNC: 110 MMOL/L (ref 100–108)
CO2 SERPL-SCNC: 31 MMOL/L (ref 21–32)
CREAT SERPL-MCNC: 0.68 MG/DL (ref 0.6–1.3)
GFR SERPL CREATININE-BSD FRML MDRD: 91 ML/MIN/1.73SQ M
GLUCOSE P FAST SERPL-MCNC: 115 MG/DL (ref 65–99)
POTASSIUM SERPL-SCNC: 4.4 MMOL/L (ref 3.5–5.3)
SODIUM SERPL-SCNC: 144 MMOL/L (ref 136–145)

## 2021-01-25 PROCEDURE — 36415 COLL VENOUS BLD VENIPUNCTURE: CPT

## 2021-01-25 PROCEDURE — 80048 BASIC METABOLIC PNL TOTAL CA: CPT

## 2021-03-05 ENCOUNTER — OFFICE VISIT (OUTPATIENT)
Dept: FAMILY MEDICINE CLINIC | Facility: CLINIC | Age: 66
End: 2021-03-05
Payer: MEDICARE

## 2021-03-05 VITALS
HEIGHT: 65 IN | OXYGEN SATURATION: 98 % | RESPIRATION RATE: 16 BRPM | TEMPERATURE: 97.9 F | SYSTOLIC BLOOD PRESSURE: 128 MMHG | WEIGHT: 263.6 LBS | BODY MASS INDEX: 43.92 KG/M2 | DIASTOLIC BLOOD PRESSURE: 82 MMHG | HEART RATE: 96 BPM

## 2021-03-05 DIAGNOSIS — S56.912A STRAIN OF UNSPECIFIED MUSCLES, FASCIA AND TENDONS AT FOREARM LEVEL, LEFT ARM, INITIAL ENCOUNTER: ICD-10-CM

## 2021-03-05 DIAGNOSIS — I10 ESSENTIAL HYPERTENSION: Primary | ICD-10-CM

## 2021-03-05 DIAGNOSIS — S46.212A BICEPS STRAIN, LEFT, INITIAL ENCOUNTER: ICD-10-CM

## 2021-03-05 DIAGNOSIS — E03.9 HYPOTHYROIDISM, UNSPECIFIED TYPE: ICD-10-CM

## 2021-03-05 DIAGNOSIS — E78.2 MIXED HYPERLIPIDEMIA: ICD-10-CM

## 2021-03-05 PROCEDURE — 99214 OFFICE O/P EST MOD 30 MIN: CPT | Performed by: FAMILY MEDICINE

## 2021-03-05 NOTE — PROGRESS NOTES
Assessment/Plan:  Problem List Items Addressed This Visit        Endocrine    Hypothyroidism       The patient will continue with the current dose of her levothyroxine  We have made no changes today  Cardiovascular and Mediastinum    Hypertension - Primary       The patient's blood pressure is controlled with her current medication  We have made no changes today  I advised her that her very brief episodes of lightheadedness in the morning could be related to her blood pressure medication and she should take her time getting out of bed in the morning by sitting on the side of the bed for few minutes before standing  I do not feel that is anything serious since it does not bother her the rest the day  She will continue to work on her diet and exercise and on losing weight  We will see her back as scheduled  Other    Hyperlipidemia       The patient will continue to work on her diet and exercise  She will continue with her current medications  Other Visit Diagnoses     Biceps strain, left, initial encounter        Relevant Medications    Diclofenac Sodium (VOLTAREN) 1 %    Other Relevant Orders    Ambulatory referral to Physical Therapy    Strain of unspecified muscles, fascia and tendons at forearm level, left arm, initial encounter        Relevant Medications    Diclofenac Sodium (VOLTAREN) 1 %    Other Relevant Orders    Ambulatory referral to Physical Therapy        I believe that the patient's arm pain is a muscular strain  We will refer her to physical therapy as ordered and she will try the Voltaren gel  We will see her back as scheduled  Return in about 2 months (around 5/5/2021) for Recheck  Subjective:   Chief Complaint   Patient presents with    Lightheadedness     Patient reports lightheadedness in the mornings when getting out of bed   She also reports headaches with them, this has been going on for about a week    Left arm pain     Patient suspects this may be from [facility], she's had pain for a few weeks  Patient ID: Janna Campbell is a 77 y o  female presents today for [reason]  Janna Campbell is a 77 y o  female who presents for a follow up of her HTN, hyperlipidemia, and hypothyroidism  She has a few complaints today  She has been getting lightheaded in the morning when she gets out of bed for 2 weeks  She feels off balance  There is no spinning  There is no presyncopal episodes  She has to hold onto something  There is no problem with change in head positions  It last a few seconds and then she is fine  There is headaches on and of on occasion unrelated to the lightheadedness  There are no episodes when she stands up quickly  She does not check her BP in the am   There is pain in her left arm- that started 2 weeks ago after shoveling  There is pain in the upper arm and down to her forearm  There is no swelling  There is no numbness or tingling  There is non cracking or popping  She did not try anything  She is requesting to see PT  She is trying to watch her diet and exercise  Hypertension  This is a chronic problem  The current episode started more than 1 year ago  The problem is unchanged  The problem is controlled  Pertinent negatives include no anxiety, blurred vision, chest pain, headaches, malaise/fatigue, neck pain, orthopnea, palpitations, peripheral edema, PND, shortness of breath or sweats       The following portions of the patient's history were reviewed and updated as appropriate: allergies, current medications, past family history, past medical history, past social history, past surgical history and problem list   Patient Active Problem List   Diagnosis    Female cystocele    Generalized osteoarthritis    Hyperlipidemia    Hypertension    Hypothyroidism    Intermittent claudication ([address] )    Sleep disturbances    Urge and stress incontinence    Varicose veins of both lower extremities with pain  Vitamin D deficiency    Shoulder subluxation, right    DJD of right shoulder    Rotator cuff tear arthropathy of right shoulder    Alcohol induced acute pancreatitis    Morbid obesity (HCC)    Pancreatic pseudocyst    Abscess of neck    Leukocytosis    GERD (gastroesophageal reflux disease)    Branchial cleft cyst     Past Medical History:   Diagnosis Date    Disease of thyroid gland     GERD (gastroesophageal reflux disease)     HL (hearing loss)     Hyperlipidemia     Polyp of sigmoid colon     last assessed 6/12/12    Sleep disturbances     last assessed 6/5/14     Past Surgical History:   Procedure Laterality Date    BREAST CYST ASPIRATION Right     20 yrs ago in dr office net results    COLONOSCOPY  06/18/2011    Complete     Repeat in 5yrs      IR IMAGE GUIDED ASPIRATION / DRAINAGE  3/3/2020    NASAL FRACTURE SURGERY       Allergies   Allergen Reactions    Statins Myalgia     Family History   Problem Relation Age of Onset    Alzheimer's disease Mother     Diabetes Mother     No Known Problems Father     No Known Problems Maternal Grandmother     No Known Problems Maternal Grandfather     No Known Problems Paternal Grandmother     No Known Problems Paternal Grandfather     No Known Problems Maternal Aunt     No Known Problems Maternal Aunt     No Known Problems Maternal Aunt     No Known Problems Maternal Aunt     No Known Problems Maternal Aunt     No Known Problems Paternal Aunt     No Known Problems Paternal Aunt     No Known Problems Paternal Aunt      Social History     Socioeconomic History    Marital status: Single     Spouse name: Not on file    Number of children: Not on file    Years of education: Not on file    Highest education level: Not on file   Occupational History    Occupation: retired    Social Needs    Financial resource strain: Not hard at all   [place] insecurity     Worry: Never true     Inability: Never true   [employer] needs Medical: No     Non-medical: No   Tobacco Use    Smoking status: Never Smoker    Smokeless tobacco: Never Used   Substance and Sexual Activity    Alcohol use: Not Currently     Frequency: Never    Drug use: No    Sexual activity: Not Currently   Lifestyle    Physical activity     Days per week: 0 days     Minutes per session: 0 min    Stress: Not at all   Relationships    Social connections     Talks on phone: More than three times a week     Gets together: Once a week     Attends Rastafari service: More than 4 times per year     Active member of club or organization: Yes     Attends meetings of clubs or organizations: 1 to 4 times per year     Relationship status: Never     Intimate partner violence     Fear of current or ex partner: No     Emotionally abused: No     Physically abused: No     Forced sexual activity: No   Other Topics Concern    Not on file   Social History Narrative    Sleep disturbances      Current Outpatient Medications on File Prior to Visit   Medication Sig Dispense Refill    aspirin 81 MG tablet Take 1 tablet by mouth daily      Cholecalciferol (Vitamin D3) 25 MCG (1000 UT) CAPS Take 1 capsule (1,000 Units total) by mouth daily 30 capsule 11    ezetimibe (ZETIA) 10 mg tablet Take 1 tablet (10 mg total) by mouth daily 90 tablet 1    levothyroxine 50 mcg tablet Take 2 tablets (100 mcg total) by mouth daily 180 tablet 1    lisinopril (ZESTRIL) 20 mg tablet Take 1 tablet (20 mg total) by mouth daily 90 tablet 1    multivitamin (THERAGRAN) TABS Take 1 tablet by mouth      Omega-3 Fatty Acids (FISH OIL PO) Take 2 g by mouth      pantoprazole (PROTONIX) 40 mg tablet Take 40 mg by mouth daily       Zoster Vac Recomb Adjuvanted (Shingrix) 50 MCG/0 5ML SUSR Administer 1 dose intramuscularly now and repeat for a 2nd dose in 2-6 months (Patient not taking: Reported on 3/5/2021) 1 each 1     No current facility-administered medications on file prior to visit        Review of Systems   Constitutional: Negative  Negative for malaise/fatigue  HENT: Negative  Eyes: Negative  Negative for blurred vision  Respiratory: Negative  Negative for shortness of breath  Cardiovascular: Negative  Negative for chest pain, palpitations, orthopnea and PND  Gastrointestinal: Negative  Endocrine: Negative  Genitourinary: Negative  Musculoskeletal: Positive for myalgias  Negative for neck pain  Skin: Negative  Allergic/Immunologic: Negative  Neurological: Negative  Negative for headaches  Hematological: Negative  Psychiatric/Behavioral: Negative  Objective:  Vitals:    03/05/21 0916   BP: 128/82   BP Location: Right arm   Patient Position: Sitting   Cuff Size: Large   Pulse: 96   Resp: 16   Temp: 97 9 °F (36 6 °C)   TempSrc: Tympanic   SpO2: 98%   Weight: 120 kg (263 lb 9 6 oz)   Height: 5' 5" (1 651 m)     Body mass index is 43 87 kg/m²  Physical Exam  Constitutional:       Appearance: She is well-developed  HENT:      Head: Normocephalic and atraumatic  Mouth/Throat:      Pharynx: No oropharyngeal exudate  Eyes:      Conjunctiva/sclera: Conjunctivae normal       Pupils: Pupils are equal, round, and reactive to light  Neck:      Musculoskeletal: Normal range of motion  Thyroid: No thyromegaly  Vascular: No JVD  Trachea: No tracheal deviation  Cardiovascular:      Rate and Rhythm: Normal rate and regular rhythm  Heart sounds: Normal heart sounds  No murmur  No friction rub  No gallop  Pulmonary:      Effort: Pulmonary effort is normal  No respiratory distress  Breath sounds: Normal breath sounds  No stridor  No wheezing or rales  Chest:      Chest wall: No tenderness  Abdominal:      General: Bowel sounds are normal  There is no distension  Palpations: Abdomen is soft  There is no mass  Tenderness: There is no abdominal tenderness  There is no guarding or rebound     Musculoskeletal: Normal range of motion  General: Tenderness present  No deformity  Comments: There is tenderness to palpation in the left biceps muscle and to the left forearm  There is no obvious deformity  There is full range of motion  Lymphadenopathy:      Cervical: No cervical adenopathy  Skin:     General: Skin is warm and dry  Neurological:      Mental Status: She is alert and oriented to person, place, and time  Cranial Nerves: No cranial nerve deficit  Motor: No abnormal muscle tone  Coordination: Coordination normal       Deep Tendon Reflexes: Reflexes are normal and symmetric  Reflexes normal            BMI Counseling: Body mass index is 43 87 kg/m²  The BMI is above normal  Nutrition recommendations include decreasing portion sizes, encouraging healthy choices of fruits and vegetables, decreasing fast food intake, consuming healthier snacks, limiting drinks that contain sugar, moderation in carbohydrate intake, increasing intake of lean protein, reducing intake of saturated and trans fat and reducing intake of cholesterol  Exercise recommendations include exercising 3-5 times per week and strength training exercises  No pharmacotherapy was ordered  Patient referred to PCP due to patient being overweight

## 2021-03-08 NOTE — ASSESSMENT & PLAN NOTE
The patient's blood pressure is controlled with her current medication  We have made no changes today  I advised her that her very brief episodes of lightheadedness in the morning could be related to her blood pressure medication and she should take her time getting out of bed in the morning by sitting on the side of the bed for few minutes before standing  I do not feel that is anything serious since it does not bother her the rest the day  She will continue to work on her diet and exercise and on losing weight  We will see her back as scheduled

## 2021-03-08 NOTE — ASSESSMENT & PLAN NOTE
The patient will continue with the current dose of her levothyroxine  We have made no changes today

## 2021-03-08 NOTE — ASSESSMENT & PLAN NOTE
The patient will continue to work on her diet and exercise  She will continue with her current medications

## 2021-04-06 DIAGNOSIS — E03.9 HYPOTHYROIDISM, UNSPECIFIED TYPE: ICD-10-CM

## 2021-04-06 RX ORDER — LEVOTHYROXINE SODIUM 50 UG/1
TABLET ORAL
Qty: 180 TABLET | Refills: 0 | OUTPATIENT
Start: 2021-04-06

## 2021-04-12 DIAGNOSIS — E03.9 HYPOTHYROIDISM, UNSPECIFIED TYPE: ICD-10-CM

## 2021-04-12 RX ORDER — LEVOTHYROXINE SODIUM 0.05 MG/1
100 TABLET ORAL DAILY
Qty: 180 TABLET | Refills: 1 | Status: SHIPPED | OUTPATIENT
Start: 2021-04-12 | End: 2021-10-18 | Stop reason: SDUPTHER

## 2021-04-20 DIAGNOSIS — I10 ESSENTIAL HYPERTENSION: ICD-10-CM

## 2021-04-20 DIAGNOSIS — E78.2 MIXED HYPERLIPIDEMIA: ICD-10-CM

## 2021-04-20 RX ORDER — EZETIMIBE 10 MG/1
TABLET ORAL
Qty: 90 TABLET | Refills: 0 | OUTPATIENT
Start: 2021-04-20

## 2021-04-20 RX ORDER — LISINOPRIL 20 MG/1
TABLET ORAL
Qty: 90 TABLET | Refills: 0 | OUTPATIENT
Start: 2021-04-20

## 2021-04-22 ENCOUNTER — TELEPHONE (OUTPATIENT)
Dept: FAMILY MEDICINE CLINIC | Facility: CLINIC | Age: 66
End: 2021-04-22

## 2021-04-22 DIAGNOSIS — I10 ESSENTIAL HYPERTENSION: ICD-10-CM

## 2021-04-22 DIAGNOSIS — E78.2 MIXED HYPERLIPIDEMIA: ICD-10-CM

## 2021-04-22 RX ORDER — EZETIMIBE 10 MG/1
10 TABLET ORAL DAILY
Qty: 90 TABLET | Refills: 1 | Status: SHIPPED | OUTPATIENT
Start: 2021-04-22 | End: 2021-10-22 | Stop reason: SDUPTHER

## 2021-04-22 RX ORDER — LISINOPRIL 20 MG/1
20 TABLET ORAL DAILY
Qty: 90 TABLET | Refills: 1 | Status: SHIPPED | OUTPATIENT
Start: 2021-04-22 | End: 2021-10-22 | Stop reason: SDUPTHER

## 2021-04-22 NOTE — TELEPHONE ENCOUNTER
lisinopril (ZESTRIL) 20 mg tablet [[id number]    ezetimibe (ZETIA) 10 mg tablet [[id number]    Patient called, she needs these Rx's sent to [facility] DR VIVIAN TEJADA in [place]  Thank you

## 2021-05-17 ENCOUNTER — OFFICE VISIT (OUTPATIENT)
Dept: FAMILY MEDICINE CLINIC | Facility: CLINIC | Age: 66
End: 2021-05-17
Payer: MEDICARE

## 2021-05-17 VITALS
OXYGEN SATURATION: 96 % | HEART RATE: 68 BPM | BODY MASS INDEX: 43.65 KG/M2 | RESPIRATION RATE: 18 BRPM | WEIGHT: 262 LBS | DIASTOLIC BLOOD PRESSURE: 80 MMHG | TEMPERATURE: 98.1 F | SYSTOLIC BLOOD PRESSURE: 134 MMHG | HEIGHT: 65 IN

## 2021-05-17 DIAGNOSIS — E03.9 HYPOTHYROIDISM, UNSPECIFIED TYPE: ICD-10-CM

## 2021-05-17 DIAGNOSIS — K86.1 CHRONIC PANCREATITIS, UNSPECIFIED PANCREATITIS TYPE (HCC): ICD-10-CM

## 2021-05-17 DIAGNOSIS — E66.01 MORBID OBESITY (HCC): ICD-10-CM

## 2021-05-17 DIAGNOSIS — M17.11 PRIMARY OSTEOARTHRITIS OF RIGHT KNEE: ICD-10-CM

## 2021-05-17 DIAGNOSIS — I10 ESSENTIAL HYPERTENSION: Primary | ICD-10-CM

## 2021-05-17 DIAGNOSIS — E78.2 MIXED HYPERLIPIDEMIA: ICD-10-CM

## 2021-05-17 PROCEDURE — 99214 OFFICE O/P EST MOD 30 MIN: CPT | Performed by: FAMILY MEDICINE

## 2021-05-17 NOTE — ASSESSMENT & PLAN NOTE
The patient will go for the up-to-date x-ray of her right knee and we will refer her to physical therapy as indicated

## 2021-05-17 NOTE — ASSESSMENT & PLAN NOTE
The patient's recent CT scan demonstrated that everything is stable  She will continue follow-up with her GI specialist as scheduled

## 2021-05-17 NOTE — ASSESSMENT & PLAN NOTE
The patient will go for the up-to-date lipid panel as ordered  She will continue on this Zetia as prescribed

## 2021-05-17 NOTE — ASSESSMENT & PLAN NOTE
The patient's blood pressure is currently stable on her medication  She will continue to work on trying to improve her diet and exercise  She will continue with her current medication  We will see her back as scheduled

## 2021-05-17 NOTE — PROGRESS NOTES
Assessment/Plan:  Problem List Items Addressed This Visit        Digestive    Chronic pancreatitis [facility])     The patient's recent CT scan demonstrated that everything is stable  She will continue follow-up with her GI specialist as scheduled  Endocrine    Hypothyroidism    Relevant Orders    Comprehensive metabolic panel       Cardiovascular and Mediastinum    Hypertension - Primary     The patient's blood pressure is currently stable on her medication  She will continue to work on trying to improve her diet and exercise  She will continue with her current medication  We will see her back as scheduled  Relevant Orders    Comprehensive metabolic panel    Lipid panel    LDL cholesterol, direct       Musculoskeletal and Integument    Primary osteoarthritis of right knee     The patient will go for the up-to-date x-ray of her right knee and we will refer her to physical therapy as indicated  Relevant Orders    XR knee 3 vw right non injury    Ambulatory referral to Physical Therapy       Other    Hyperlipidemia     The patient will go for the up-to-date lipid panel as ordered  She will continue on this Zetia as prescribed  Relevant Orders    Comprehensive metabolic panel    Lipid panel    LDL cholesterol, direct    Morbid obesity ([address] )     I advised her to start weighing herself daily to track her weight  The patient was advised to start eating 7 non starchy vegetables and 3 fruits every day as well as 10-12 nuts per day  She was also advised to add on psyllium fiber 1 tbsp twice a day for goal of 13 g per day  She was advised to drink 16 oz of water before all meals and to avoid any artificially sweetened drinks  She was also advised to try high intensity interval training for 4 minutes per day along with resistance exercises  I also advised her to keep a food diary to track everything that she is eating in the course of the day    At meals, she should always cut his dish in half and eat half her meal and then determine if she is still hungry prior to eating the additional half  We will see her back in the office as scheduled  Return in about 3 months (around 8/17/2021) for Recheck  Subjective:   Chief Complaint   Patient presents with    Follow-up     Check up 3 months hypertension        Patient ID: Fantasma Daily is a 77 y o  female presents today for a routine checkup  Fantasma Daily is a 77 y o  female for follow-up of her hypertension, hypothyroidism, and hyperlipidemia  She is doing well on her current medications  She has no major complaints today  She saw the GI and had a normal CT except for a pancreatic cyst-her chronic pancreatitis has resolved  She will continue follow-up with them as scheduled  The patient denies any chest pain, shortness of breath, or palpitations  There is no edema  There are no headaches or visual changes  There is no lightheadedness, dizziness, or fainting spells  The patient currently denies any nausea, vomiting, or GERD symptoms  she has normal bowel movements and normal urine output  she has a normal appetite  She is requesting PT for the right knee for 2 months  She has a history of knee problems- was diagnosed with arthritis- she is stiff in the am      Hypertension  This is a chronic problem  The current episode started more than 1 year ago  The problem is unchanged  The problem is controlled  Pertinent negatives include no anxiety, blurred vision, chest pain, headaches, malaise/fatigue, neck pain, orthopnea, palpitations, peripheral edema, PND, shortness of breath or sweats       The following portions of the patient's history were reviewed and updated as appropriate: allergies, current medications, past family history, past medical history, past social history, past surgical history and problem list   Patient Active Problem List   Diagnosis    Female cystocele    Generalized osteoarthritis    Hyperlipidemia    Hypertension    Hypothyroidism    Sleep disturbances    Urge and stress incontinence    Varicose veins of both lower extremities with pain    Vitamin D deficiency    Shoulder subluxation, right    DJD of right shoulder    Rotator cuff tear arthropathy of right shoulder    Alcohol induced acute pancreatitis    Morbid obesity (HCC)    Pancreatic pseudocyst    Abscess of neck    Leukocytosis    GERD (gastroesophageal reflux disease)    Branchial cleft cyst    Chronic pancreatitis (HCC)    Primary osteoarthritis of right knee     Past Medical History:   Diagnosis Date    Disease of thyroid gland     GERD (gastroesophageal reflux disease)     HL (hearing loss)     Hyperlipidemia     Polyp of sigmoid colon     last assessed 6/12/12    Sleep disturbances     last assessed 6/5/14     Past Surgical History:   Procedure Laterality Date    BREAST CYST ASPIRATION Right     20 yrs ago in dr office net results    COLONOSCOPY  06/18/2011    Complete     Repeat in 5yrs      IR IMAGE GUIDED ASPIRATION / DRAINAGE  3/3/2020    NASAL FRACTURE SURGERY       Allergies   Allergen Reactions    Statins Myalgia     Family History   Problem Relation Age of Onset    Alzheimer's disease Mother     Diabetes Mother     No Known Problems Father     No Known Problems Maternal Grandmother     No Known Problems Maternal Grandfather     No Known Problems Paternal Grandmother     No Known Problems Paternal Grandfather     No Known Problems Maternal Aunt     No Known Problems Maternal Aunt     No Known Problems Maternal Aunt     No Known Problems Maternal Aunt     No Known Problems Maternal Aunt     No Known Problems Paternal Aunt     No Known Problems Paternal Aunt     No Known Problems Paternal Aunt      Social History     Socioeconomic History    Marital status: Single     Spouse name: Not on file    Number of children: Not on file    Years of education: Not on file    Highest education level: Not on file   Occupational History    Occupation: retired    Social Needs    Financial resource strain: Not hard at all   [place] insecurity     Worry: Never true     Inability: Never true   [employer] needs     Medical: No     Non-medical: No   Tobacco Use    Smoking status: Never Smoker    Smokeless tobacco: Never Used   Substance and Sexual Activity    Alcohol use: Not Currently     Frequency: Never    Drug use: No    Sexual activity: Not Currently   Lifestyle    Physical activity     Days per week: 0 days     Minutes per session: 0 min    Stress: Not at all   Relationships    Social connections     Talks on phone: More than three times a week     Gets together: Once a week     Attends Evangelical service: More than 4 times per year     Active member of club or organization: Yes     Attends meetings of clubs or organizations: 1 to 4 times per year     Relationship status: Never     Intimate partner violence     Fear of current or ex partner: No     Emotionally abused: No     Physically abused: No     Forced sexual activity: No   Other Topics Concern    Not on file   Social History Narrative    Sleep disturbances      Current Outpatient Medications on File Prior to Visit   Medication Sig Dispense Refill    aspirin 81 MG tablet Take 1 tablet by mouth daily      Cholecalciferol (Vitamin D3) 25 MCG (1000 UT) CAPS Take 1 capsule (1,000 Units total) by mouth daily 30 capsule 11    Diclofenac Sodium (VOLTAREN) 1 % Apply 2 g topically 4 (four) times a day 100 g 0    ezetimibe (ZETIA) 10 mg tablet Take 1 tablet (10 mg total) by mouth daily 90 tablet 1    levothyroxine 50 mcg tablet Take 2 tablets (100 mcg total) by mouth daily 180 tablet 1    lisinopril (ZESTRIL) 20 mg tablet Take 1 tablet (20 mg total) by mouth daily 90 tablet 1    multivitamin (THERAGRAN) TABS Take 1 tablet by mouth      Omega-3 Fatty Acids (FISH OIL PO) Take 2 g by mouth      pantoprazole (PROTONIX) 40 mg tablet Take 40 mg by mouth daily       [DISCONTINUED] Zoster Vac Recomb Adjuvanted (Shingrix) 50 MCG/0 5ML SUSR Administer 1 dose intramuscularly now and repeat for a 2nd dose in 2-6 months (Patient not taking: Reported on 3/5/2021) 1 each 1     No current facility-administered medications on file prior to visit  Review of Systems   Constitutional: Negative  Negative for malaise/fatigue  HENT: Negative  Eyes: Negative  Negative for blurred vision  Respiratory: Negative  Negative for shortness of breath  Cardiovascular: Negative  Negative for chest pain, palpitations, orthopnea and PND  Gastrointestinal: Negative  Endocrine: Negative  Genitourinary: Negative  Musculoskeletal: Negative  Negative for neck pain  Skin: Negative  Allergic/Immunologic: Negative  Neurological: Negative  Negative for headaches  Hematological: Negative  Psychiatric/Behavioral: Negative  Objective:  Vitals:    05/17/21 1059 05/17/21 1137   BP: 140/94 134/80   BP Location: Left arm    Patient Position: Sitting    Cuff Size: Large    Pulse: 84 68   Resp: 18    Temp: 98 1 °F (36 7 °C)    TempSrc: Tympanic    SpO2: 96%    Weight: 119 kg (262 lb)    Height: 5' 5" (1 651 m)      Body mass index is 43 6 kg/m²  Physical Exam  Constitutional:       Appearance: She is well-developed  HENT:      Head: Normocephalic and atraumatic  Mouth/Throat:      Pharynx: No oropharyngeal exudate  Eyes:      Conjunctiva/sclera: Conjunctivae normal       Pupils: Pupils are equal, round, and reactive to light  Neck:      Musculoskeletal: Normal range of motion  Thyroid: No thyromegaly  Vascular: No JVD  Trachea: No tracheal deviation  Cardiovascular:      Rate and Rhythm: Normal rate and regular rhythm  Heart sounds: Normal heart sounds  No murmur  No friction rub  No gallop  Pulmonary:      Effort: Pulmonary effort is normal  No respiratory distress        Breath sounds: Normal breath sounds  No stridor  No wheezing or rales  Chest:      Chest wall: No tenderness  Abdominal:      General: Bowel sounds are normal  There is no distension  Palpations: Abdomen is soft  There is no mass  Tenderness: There is no abdominal tenderness  There is no guarding or rebound  Musculoskeletal:         General: No deformity  Right knee: She exhibits decreased range of motion and bony tenderness  She exhibits no swelling and no effusion  Tenderness found  Lymphadenopathy:      Cervical: No cervical adenopathy  Skin:     General: Skin is warm and dry  Neurological:      Mental Status: She is alert and oriented to person, place, and time  Cranial Nerves: No cranial nerve deficit  Motor: No abnormal muscle tone  Coordination: Coordination normal       Deep Tendon Reflexes: Reflexes are normal and symmetric   Reflexes normal

## 2021-05-20 ENCOUNTER — APPOINTMENT (OUTPATIENT)
Dept: RADIOLOGY | Facility: CLINIC | Age: 66
End: 2021-05-20
Payer: MEDICARE

## 2021-05-20 DIAGNOSIS — M17.11 PRIMARY OSTEOARTHRITIS OF RIGHT KNEE: ICD-10-CM

## 2021-05-20 PROCEDURE — 73562 X-RAY EXAM OF KNEE 3: CPT

## 2021-05-24 ENCOUNTER — APPOINTMENT (OUTPATIENT)
Dept: LAB | Facility: CLINIC | Age: 66
End: 2021-05-24
Payer: MEDICARE

## 2021-05-24 DIAGNOSIS — E78.2 MIXED HYPERLIPIDEMIA: ICD-10-CM

## 2021-05-24 DIAGNOSIS — I10 ESSENTIAL HYPERTENSION: ICD-10-CM

## 2021-05-24 DIAGNOSIS — E03.9 HYPOTHYROIDISM, UNSPECIFIED TYPE: ICD-10-CM

## 2021-05-24 LAB
ALBUMIN SERPL BCP-MCNC: 3.3 G/DL (ref 3.5–5)
ALP SERPL-CCNC: 81 U/L (ref 46–116)
ALT SERPL W P-5'-P-CCNC: 36 U/L (ref 12–78)
ANION GAP SERPL CALCULATED.3IONS-SCNC: 5 MMOL/L (ref 4–13)
AST SERPL W P-5'-P-CCNC: 21 U/L (ref 5–45)
BILIRUB SERPL-MCNC: 0.41 MG/DL (ref 0.2–1)
BUN SERPL-MCNC: 16 MG/DL (ref 5–25)
CALCIUM ALBUM COR SERPL-MCNC: 9.7 MG/DL (ref 8.3–10.1)
CALCIUM SERPL-MCNC: 9.1 MG/DL (ref 8.3–10.1)
CHLORIDE SERPL-SCNC: 111 MMOL/L (ref 100–108)
CHOLEST SERPL-MCNC: 205 MG/DL (ref 50–200)
CO2 SERPL-SCNC: 26 MMOL/L (ref 21–32)
CREAT SERPL-MCNC: 0.61 MG/DL (ref 0.6–1.3)
GFR SERPL CREATININE-BSD FRML MDRD: 94 ML/MIN/1.73SQ M
GLUCOSE P FAST SERPL-MCNC: 115 MG/DL (ref 65–99)
HDLC SERPL-MCNC: 51 MG/DL
LDLC SERPL CALC-MCNC: 125 MG/DL (ref 0–100)
LDLC SERPL DIRECT ASSAY-MCNC: 140 MG/DL (ref 0–100)
NONHDLC SERPL-MCNC: 154 MG/DL
POTASSIUM SERPL-SCNC: 4.4 MMOL/L (ref 3.5–5.3)
PROT SERPL-MCNC: 7.1 G/DL (ref 6.4–8.2)
SODIUM SERPL-SCNC: 142 MMOL/L (ref 136–145)
TRIGL SERPL-MCNC: 143 MG/DL

## 2021-05-24 PROCEDURE — 80053 COMPREHEN METABOLIC PANEL: CPT

## 2021-05-24 PROCEDURE — 80061 LIPID PANEL: CPT

## 2021-05-24 PROCEDURE — 83721 ASSAY OF BLOOD LIPOPROTEIN: CPT

## 2021-05-24 PROCEDURE — 36415 COLL VENOUS BLD VENIPUNCTURE: CPT

## 2021-08-18 ENCOUNTER — OFFICE VISIT (OUTPATIENT)
Dept: FAMILY MEDICINE CLINIC | Facility: CLINIC | Age: 66
End: 2021-08-18
Payer: MEDICARE

## 2021-08-18 VITALS
BODY MASS INDEX: 44.12 KG/M2 | RESPIRATION RATE: 18 BRPM | WEIGHT: 264.8 LBS | OXYGEN SATURATION: 97 % | TEMPERATURE: 98.6 F | DIASTOLIC BLOOD PRESSURE: 76 MMHG | HEART RATE: 68 BPM | HEIGHT: 65 IN | SYSTOLIC BLOOD PRESSURE: 130 MMHG

## 2021-08-18 DIAGNOSIS — R73.03 PREDIABETES: ICD-10-CM

## 2021-08-18 DIAGNOSIS — E03.9 HYPOTHYROIDISM, UNSPECIFIED TYPE: Primary | ICD-10-CM

## 2021-08-18 DIAGNOSIS — E78.2 MIXED HYPERLIPIDEMIA: ICD-10-CM

## 2021-08-18 DIAGNOSIS — I10 ESSENTIAL HYPERTENSION: ICD-10-CM

## 2021-08-18 PROCEDURE — 99214 OFFICE O/P EST MOD 30 MIN: CPT | Performed by: FAMILY MEDICINE

## 2021-08-18 RX ORDER — OXYBUTYNIN CHLORIDE 5 MG/1
5 TABLET ORAL DAILY
COMMUNITY
Start: 2021-07-13 | End: 2022-03-16 | Stop reason: DRUGHIGH

## 2021-08-18 NOTE — PROGRESS NOTES
Assessment/Plan:  Problem List Items Addressed This Visit        Endocrine    Hypothyroidism - Primary    Relevant Orders    TSH, 3rd generation with Free T4 reflex       Cardiovascular and Mediastinum    Hypertension     The patient is stable on her current medication  We have made no changes today  She will continue to work on improving her diet and losing weight  We will see her back as scheduled  Relevant Orders    Comprehensive metabolic panel    Hemoglobin A1C    LDL cholesterol, direct    Lipid panel    TSH, 3rd generation with Free T4 reflex       Other    Hyperlipidemia    Relevant Orders    Comprehensive metabolic panel    Hemoglobin A1C    LDL cholesterol, direct    Lipid panel    TSH, 3rd generation with Free T4 reflex    Prediabetes     The patient does have evidence of pre diabetes on her recent lab work  We will send her for additional testing as scheduled prior to her Medicare wellness visit  She will work on improving her diet exercise in the  See her back as scheduled         Relevant Orders    Comprehensive metabolic panel    Hemoglobin A1C          Return in about 4 months (around 12/18/2021) for Annual physical- [facility]  Subjective:   Chief Complaint   Patient presents with    Follow-up     Check up 3 months hypertension        Patient ID: Amie Renteria is a 77 y o  female presents today for a routine checkup of her HTN  Amie Renteria is a 77 y o  female who presents today for follow-up of her hypertension, hypothyroidism, hyperlipidemia, and prediabetes  The patient denies any chest pain, shortness of breath, or palpitations  There is no edema  There are no headaches or visual changes  There is no lightheadedness, dizziness, or fainting spells  There are no GI problems/    She is feeling well  She is avoiding alcohol  The patient currently denies any nausea, vomiting, or GERD symptoms  she has normal bowel movements and normal urine output    she has a normal appetite  Hypertension  This is a chronic problem  The current episode started more than 1 year ago  The problem is unchanged  The problem is controlled  Pertinent negatives include no anxiety, blurred vision, chest pain, headaches, malaise/fatigue, neck pain, orthopnea, palpitations, peripheral edema, PND, shortness of breath or sweats  The following portions of the patient's history were reviewed and updated as appropriate: allergies, current medications, past family history, past medical history, past social history, past surgical history and problem list   Patient Active Problem List   Diagnosis    Female cystocele    Generalized osteoarthritis    Hyperlipidemia    Hypertension    Hypothyroidism    Sleep disturbances    Urge and stress incontinence    Varicose veins of both lower extremities with pain    Vitamin D deficiency    Shoulder subluxation, right    DJD of right shoulder    Rotator cuff tear arthropathy of right shoulder    Alcohol induced acute pancreatitis    Morbid obesity ([address] )    Pancreatic pseudocyst    Abscess of neck    Leukocytosis    GERD (gastroesophageal reflux disease)    Branchial cleft cyst    Chronic pancreatitis ([address] )    Primary osteoarthritis of right knee    Prediabetes     Past Medical History:   Diagnosis Date    Disease of thyroid gland     GERD (gastroesophageal reflux disease)     HL (hearing loss)     Hyperlipidemia     Polyp of sigmoid colon     last assessed 6/12/12    Sleep disturbances     last assessed 6/5/14     Past Surgical History:   Procedure Laterality Date    BREAST CYST ASPIRATION Right     20 yrs ago in dr office net results    COLONOSCOPY  06/18/2011    Complete     Repeat in 5yrs      IR IMAGE GUIDED ASPIRATION / DRAINAGE  3/3/2020    NASAL FRACTURE SURGERY       Allergies   Allergen Reactions    Statins Myalgia     Family History   Problem Relation Age of Onset    Alzheimer's disease Mother     Diabetes Mother     No Known Problems Father     No Known Problems Maternal Grandmother     No Known Problems Maternal Grandfather     No Known Problems Paternal Grandmother     No Known Problems Paternal Grandfather     No Known Problems Maternal Aunt     No Known Problems Maternal Aunt     No Known Problems Maternal Aunt     No Known Problems Maternal Aunt     No Known Problems Maternal Aunt     No Known Problems Paternal Aunt     No Known Problems Paternal Aunt     No Known Problems Paternal Aunt      Social History     Socioeconomic History    Marital status: Single     Spouse name: Not on file    Number of children: Not on file    Years of education: Not on file    Highest education level: Not on file   Occupational History    Occupation: retired    Tobacco Use    Smoking status: Never Smoker    Smokeless tobacco: Never Used   Vaping Use    Vaping Use: Never used   Substance and Sexual Activity    Alcohol use: Not Currently    Drug use: No    Sexual activity: Not Currently   Other Topics Concern    Not on file   Social History Narrative    Sleep disturbances      Social Determinants of Health     Financial Resource Strain: Low Risk     Difficulty of Paying Living Expenses: Not hard at all   Food Insecurity: No Food Insecurity    Worried About Running Out of Food in the Last Year: Never true    Lina of Food in the Last Year: Never true   Transportation Needs: No Transportation Needs    Lack of Transportation (Medical): No    Lack of Transportation (Non-Medical): No   Physical Activity: Insufficiently Active    Days of Exercise per Week: 2 days    Minutes of Exercise per Session: 60 min   Stress: No Stress Concern Present    Feeling of Stress : Not at all   Social Connections:  Moderately Integrated    Frequency of Communication with Friends and Family: More than three times a week    Frequency of Social Gatherings with Friends and Family: Once a week    Attends Spiritism Services: More than 4 times per year    Active Member of Clubs or Organizations: Yes    Attends Club or Organization Meetings: 1 to 4 times per year    Marital Status: Never    Intimate Partner Violence: Not At Risk    Fear of Current or Ex-Partner: No    Emotionally Abused: No    Physically Abused: No    Sexually Abused: No     Current Outpatient Medications on File Prior to Visit   Medication Sig Dispense Refill    aspirin 81 MG tablet Take 1 tablet by mouth daily      Cholecalciferol (Vitamin D3) 25 MCG (1000 UT) CAPS Take 1 capsule (1,000 Units total) by mouth daily 30 capsule 11    Diclofenac Sodium (VOLTAREN) 1 % Apply 2 g topically 4 (four) times a day 100 g 0    ezetimibe (ZETIA) 10 mg tablet Take 1 tablet (10 mg total) by mouth daily 90 tablet 1    levothyroxine 50 mcg tablet Take 2 tablets (100 mcg total) by mouth daily 180 tablet 1    lisinopril (ZESTRIL) 20 mg tablet Take 1 tablet (20 mg total) by mouth daily 90 tablet 1    multivitamin (THERAGRAN) TABS Take 1 tablet by mouth      Omega-3 Fatty Acids (FISH OIL PO) Take 2 g by mouth      oxybutynin (DITROPAN) 5 mg tablet Take 5 mg by mouth daily      pantoprazole (PROTONIX) 40 mg tablet Take 40 mg by mouth daily        No current facility-administered medications on file prior to visit  Review of Systems   Constitutional: Negative  Negative for malaise/fatigue  HENT: Negative  Eyes: Negative  Negative for blurred vision  Respiratory: Negative  Negative for shortness of breath  Cardiovascular: Negative  Negative for chest pain, palpitations, orthopnea and PND  Gastrointestinal: Negative  Endocrine: Negative  Genitourinary: Negative  Musculoskeletal: Negative  Negative for neck pain  Skin: Negative  Allergic/Immunologic: Negative  Neurological: Negative  Negative for headaches  Hematological: Negative  Psychiatric/Behavioral: Negative          Objective:  Vitals:    08/18/21 1334 08/18/21 1443   BP: 140/88 130/76   BP Location: Right arm    Patient Position: Sitting    Cuff Size: Large    Pulse: 78 68   Resp: 18    Temp: 98 6 °F (37 °C)    TempSrc: Tympanic    SpO2: 97%    Weight: 120 kg (264 lb 12 8 oz)    Height: 5' 5" (1 651 m)      Body mass index is 44 07 kg/m²  Physical Exam  Constitutional:       Appearance: She is well-developed  HENT:      Head: Normocephalic and atraumatic  Mouth/Throat:      Pharynx: No oropharyngeal exudate  Eyes:      Conjunctiva/sclera: Conjunctivae normal       Pupils: Pupils are equal, round, and reactive to light  Neck:      Thyroid: No thyromegaly  Vascular: No JVD  Trachea: No tracheal deviation  Cardiovascular:      Rate and Rhythm: Normal rate and regular rhythm  Heart sounds: Normal heart sounds  No murmur heard  No friction rub  No gallop  Pulmonary:      Effort: Pulmonary effort is normal  No respiratory distress  Breath sounds: Normal breath sounds  No stridor  No wheezing or rales  Chest:      Chest wall: No tenderness  Abdominal:      General: Bowel sounds are normal  There is no distension  Palpations: Abdomen is soft  There is no mass  Tenderness: There is no abdominal tenderness  There is no guarding or rebound  Musculoskeletal:         General: No tenderness or deformity  Normal range of motion  Cervical back: Normal range of motion  Lymphadenopathy:      Cervical: No cervical adenopathy  Skin:     General: Skin is warm and dry  Neurological:      Mental Status: She is alert and oriented to person, place, and time  Cranial Nerves: No cranial nerve deficit  Motor: No abnormal muscle tone  Coordination: Coordination normal       Deep Tendon Reflexes: Reflexes are normal and symmetric  Reflexes normal          No visits with results within 2 Month(s) from this visit     Latest known visit with results is:   Appointment on 05/24/2021   Component Date Value    Sodium 05/24/2021 142     Potassium 05/24/2021 4 4     Chloride 05/24/2021 111*    CO2 05/24/2021 26     ANION GAP 05/24/2021 5     BUN 05/24/2021 16     Creatinine 05/24/2021 0 61     Glucose, Fasting 05/24/2021 115*    Calcium 05/24/2021 9 1     Corrected Calcium 05/24/2021 9 7     AST 05/24/2021 21     ALT 05/24/2021 36     Alkaline Phosphatase 05/24/2021 81     Total Protein 05/24/2021 7 1     Albumin 05/24/2021 3 3*    Total Bilirubin 05/24/2021 0 41     eGFR 05/24/2021 94     Cholesterol 05/24/2021 205*    Triglycerides 05/24/2021 143     HDL, Direct 05/24/2021 51     LDL Calculated 05/24/2021 125*    Non-HDL-Chol (CHOL-HDL) 05/24/2021 154     LDL Direct 05/24/2021 140*

## 2021-08-23 PROBLEM — R73.03 PREDIABETES: Status: ACTIVE | Noted: 2021-08-23

## 2021-08-23 NOTE — ASSESSMENT & PLAN NOTE
The patient is stable on her current medication  We have made no changes today  She will continue to work on improving her diet and losing weight  We will see her back as scheduled

## 2021-08-23 NOTE — ASSESSMENT & PLAN NOTE
The patient does have evidence of pre diabetes on her recent lab work  We will send her for additional testing as scheduled prior to her Medicare wellness visit  She will work on improving her diet exercise in the    See her back as scheduled

## 2021-10-06 ENCOUNTER — IMMUNIZATIONS (OUTPATIENT)
Dept: FAMILY MEDICINE CLINIC | Facility: CLINIC | Age: 66
End: 2021-10-06
Payer: MEDICARE

## 2021-10-06 DIAGNOSIS — Z23 IMMUNIZATION DUE: Primary | ICD-10-CM

## 2021-10-06 PROCEDURE — G0008 ADMIN INFLUENZA VIRUS VAC: HCPCS

## 2021-10-06 PROCEDURE — 90662 IIV NO PRSV INCREASED AG IM: CPT

## 2021-10-18 DIAGNOSIS — E03.9 HYPOTHYROIDISM, UNSPECIFIED TYPE: ICD-10-CM

## 2021-10-18 RX ORDER — LEVOTHYROXINE SODIUM 0.05 MG/1
100 TABLET ORAL DAILY
Qty: 180 TABLET | Refills: 1 | Status: SHIPPED | OUTPATIENT
Start: 2021-10-18 | End: 2022-04-19

## 2021-10-22 DIAGNOSIS — E78.2 MIXED HYPERLIPIDEMIA: ICD-10-CM

## 2021-10-22 DIAGNOSIS — I10 ESSENTIAL HYPERTENSION: ICD-10-CM

## 2021-10-22 RX ORDER — LISINOPRIL 20 MG/1
TABLET ORAL
Qty: 90 TABLET | Refills: 0 | OUTPATIENT
Start: 2021-10-22

## 2021-10-22 RX ORDER — LISINOPRIL 20 MG/1
20 TABLET ORAL DAILY
Qty: 90 TABLET | Refills: 1 | Status: SHIPPED | OUTPATIENT
Start: 2021-10-22 | End: 2022-04-25 | Stop reason: SDUPTHER

## 2021-10-22 RX ORDER — EZETIMIBE 10 MG/1
10 TABLET ORAL DAILY
Qty: 90 TABLET | Refills: 1 | Status: SHIPPED | OUTPATIENT
Start: 2021-10-22 | End: 2022-05-12

## 2021-10-22 RX ORDER — EZETIMIBE 10 MG/1
TABLET ORAL
Qty: 90 TABLET | Refills: 0 | OUTPATIENT
Start: 2021-10-22

## 2021-12-07 ENCOUNTER — APPOINTMENT (OUTPATIENT)
Dept: LAB | Facility: CLINIC | Age: 66
End: 2021-12-07
Payer: MEDICARE

## 2021-12-07 DIAGNOSIS — R73.03 PREDIABETES: ICD-10-CM

## 2021-12-07 DIAGNOSIS — E78.2 MIXED HYPERLIPIDEMIA: ICD-10-CM

## 2021-12-07 DIAGNOSIS — E03.9 HYPOTHYROIDISM, UNSPECIFIED TYPE: ICD-10-CM

## 2021-12-07 DIAGNOSIS — I10 ESSENTIAL HYPERTENSION: ICD-10-CM

## 2021-12-07 LAB
ALBUMIN SERPL BCP-MCNC: 3.5 G/DL (ref 3.5–5)
ALP SERPL-CCNC: 78 U/L (ref 46–116)
ALT SERPL W P-5'-P-CCNC: 41 U/L (ref 12–78)
ANION GAP SERPL CALCULATED.3IONS-SCNC: 6 MMOL/L (ref 4–13)
AST SERPL W P-5'-P-CCNC: 29 U/L (ref 5–45)
BILIRUB SERPL-MCNC: 0.61 MG/DL (ref 0.2–1)
BUN SERPL-MCNC: 16 MG/DL (ref 5–25)
CALCIUM SERPL-MCNC: 9.2 MG/DL (ref 8.3–10.1)
CHLORIDE SERPL-SCNC: 107 MMOL/L (ref 100–108)
CHOLEST SERPL-MCNC: 200 MG/DL
CO2 SERPL-SCNC: 29 MMOL/L (ref 21–32)
CREAT SERPL-MCNC: 0.8 MG/DL (ref 0.6–1.3)
GFR SERPL CREATININE-BSD FRML MDRD: 77 ML/MIN/1.73SQ M
GLUCOSE P FAST SERPL-MCNC: 112 MG/DL (ref 65–99)
HDLC SERPL-MCNC: 52 MG/DL
LDLC SERPL CALC-MCNC: 124 MG/DL (ref 0–100)
LDLC SERPL DIRECT ASSAY-MCNC: 118 MG/DL (ref 0–100)
NONHDLC SERPL-MCNC: 148 MG/DL
POTASSIUM SERPL-SCNC: 4.5 MMOL/L (ref 3.5–5.3)
PROT SERPL-MCNC: 7.4 G/DL (ref 6.4–8.2)
SODIUM SERPL-SCNC: 142 MMOL/L (ref 136–145)
TRIGL SERPL-MCNC: 120 MG/DL
TSH SERPL DL<=0.05 MIU/L-ACNC: 2.79 UIU/ML (ref 0.36–3.74)

## 2021-12-07 PROCEDURE — 36415 COLL VENOUS BLD VENIPUNCTURE: CPT

## 2021-12-07 PROCEDURE — 83721 ASSAY OF BLOOD LIPOPROTEIN: CPT

## 2021-12-07 PROCEDURE — 84443 ASSAY THYROID STIM HORMONE: CPT

## 2021-12-07 PROCEDURE — 83036 HEMOGLOBIN GLYCOSYLATED A1C: CPT

## 2021-12-07 PROCEDURE — 80061 LIPID PANEL: CPT

## 2021-12-07 PROCEDURE — 80053 COMPREHEN METABOLIC PANEL: CPT

## 2021-12-08 LAB
EST. AVERAGE GLUCOSE BLD GHB EST-MCNC: 140 MG/DL
HBA1C MFR BLD: 6.5 %

## 2021-12-16 ENCOUNTER — OFFICE VISIT (OUTPATIENT)
Dept: FAMILY MEDICINE CLINIC | Facility: CLINIC | Age: 66
End: 2021-12-16
Payer: MEDICARE

## 2021-12-16 VITALS
TEMPERATURE: 97.6 F | SYSTOLIC BLOOD PRESSURE: 128 MMHG | RESPIRATION RATE: 20 BRPM | DIASTOLIC BLOOD PRESSURE: 72 MMHG | HEIGHT: 65 IN | WEIGHT: 259.4 LBS | OXYGEN SATURATION: 96 % | HEART RATE: 80 BPM | BODY MASS INDEX: 43.22 KG/M2

## 2021-12-16 DIAGNOSIS — E66.01 MORBID OBESITY (HCC): ICD-10-CM

## 2021-12-16 DIAGNOSIS — Z00.00 MEDICARE ANNUAL WELLNESS VISIT, INITIAL: Primary | ICD-10-CM

## 2021-12-16 DIAGNOSIS — Z12.31 ENCOUNTER FOR SCREENING MAMMOGRAM FOR BREAST CANCER: ICD-10-CM

## 2021-12-16 DIAGNOSIS — M17.11 PRIMARY OSTEOARTHRITIS OF RIGHT KNEE: ICD-10-CM

## 2021-12-16 DIAGNOSIS — I10 PRIMARY HYPERTENSION: ICD-10-CM

## 2021-12-16 DIAGNOSIS — R73.03 PREDIABETES: ICD-10-CM

## 2021-12-16 PROCEDURE — G0438 PPPS, INITIAL VISIT: HCPCS | Performed by: FAMILY MEDICINE

## 2021-12-16 PROCEDURE — 1123F ACP DISCUSS/DSCN MKR DOCD: CPT | Performed by: FAMILY MEDICINE

## 2021-12-16 RX ORDER — OXYBUTYNIN CHLORIDE 10 MG/1
10 TABLET, EXTENDED RELEASE ORAL DAILY
COMMUNITY
Start: 2021-12-06

## 2022-01-28 ENCOUNTER — HOSPITAL ENCOUNTER (OUTPATIENT)
Dept: MAMMOGRAPHY | Facility: CLINIC | Age: 67
Discharge: HOME | End: 2022-01-28
Payer: MEDICARE

## 2022-01-28 VITALS — HEIGHT: 65 IN | BODY MASS INDEX: 43.34 KG/M2 | WEIGHT: 260.14 LBS

## 2022-01-28 DIAGNOSIS — Z12.31 ENCOUNTER FOR SCREENING MAMMOGRAM FOR BREAST CANCER: ICD-10-CM

## 2022-01-28 PROCEDURE — 77063 BREAST TOMOSYNTHESIS BI: CPT

## 2022-01-28 PROCEDURE — 77067 SCR MAMMO BI INCL CAD: CPT

## 2022-02-15 ENCOUNTER — APPOINTMENT (OUTPATIENT)
Dept: LAB | Facility: CLINIC | Age: 67
End: 2022-02-15
Payer: MEDICARE

## 2022-02-15 DIAGNOSIS — R73.03 PREDIABETES: ICD-10-CM

## 2022-02-15 DIAGNOSIS — I10 PRIMARY HYPERTENSION: ICD-10-CM

## 2022-02-15 LAB
ALBUMIN SERPL BCP-MCNC: 3.4 G/DL (ref 3.5–5)
ALP SERPL-CCNC: 81 U/L (ref 46–116)
ALT SERPL W P-5'-P-CCNC: 39 U/L (ref 12–78)
ANION GAP SERPL CALCULATED.3IONS-SCNC: 4 MMOL/L (ref 4–13)
AST SERPL W P-5'-P-CCNC: 26 U/L (ref 5–45)
BILIRUB SERPL-MCNC: 0.42 MG/DL (ref 0.2–1)
BUN SERPL-MCNC: 16 MG/DL (ref 5–25)
CALCIUM ALBUM COR SERPL-MCNC: 9.7 MG/DL (ref 8.3–10.1)
CALCIUM SERPL-MCNC: 9.2 MG/DL (ref 8.3–10.1)
CHLORIDE SERPL-SCNC: 108 MMOL/L (ref 100–108)
CHOLEST SERPL-MCNC: 207 MG/DL
CO2 SERPL-SCNC: 29 MMOL/L (ref 21–32)
CREAT SERPL-MCNC: 0.67 MG/DL (ref 0.6–1.3)
EST. AVERAGE GLUCOSE BLD GHB EST-MCNC: 146 MG/DL
GFR SERPL CREATININE-BSD FRML MDRD: 90 ML/MIN/1.73SQ M
GLUCOSE P FAST SERPL-MCNC: 126 MG/DL (ref 65–99)
HBA1C MFR BLD: 6.7 %
HDLC SERPL-MCNC: 49 MG/DL
LDLC SERPL CALC-MCNC: 128 MG/DL (ref 0–100)
LDLC SERPL DIRECT ASSAY-MCNC: 108 MG/DL (ref 0–100)
NONHDLC SERPL-MCNC: 158 MG/DL
POTASSIUM SERPL-SCNC: 4.3 MMOL/L (ref 3.5–5.3)
PROT SERPL-MCNC: 7.5 G/DL (ref 6.4–8.2)
SODIUM SERPL-SCNC: 141 MMOL/L (ref 136–145)
TRIGL SERPL-MCNC: 151 MG/DL

## 2022-02-15 PROCEDURE — 80053 COMPREHEN METABOLIC PANEL: CPT

## 2022-02-15 PROCEDURE — 83721 ASSAY OF BLOOD LIPOPROTEIN: CPT

## 2022-02-15 PROCEDURE — 83036 HEMOGLOBIN GLYCOSYLATED A1C: CPT

## 2022-02-15 PROCEDURE — 80061 LIPID PANEL: CPT

## 2022-02-15 PROCEDURE — 36415 COLL VENOUS BLD VENIPUNCTURE: CPT

## 2022-03-16 ENCOUNTER — OFFICE VISIT (OUTPATIENT)
Dept: FAMILY MEDICINE CLINIC | Facility: CLINIC | Age: 67
End: 2022-03-16
Payer: MEDICARE

## 2022-03-16 VITALS
HEIGHT: 65 IN | DIASTOLIC BLOOD PRESSURE: 78 MMHG | WEIGHT: 250.6 LBS | RESPIRATION RATE: 20 BRPM | HEART RATE: 68 BPM | OXYGEN SATURATION: 97 % | SYSTOLIC BLOOD PRESSURE: 120 MMHG | TEMPERATURE: 97.4 F | BODY MASS INDEX: 41.75 KG/M2

## 2022-03-16 DIAGNOSIS — E78.2 MIXED HYPERLIPIDEMIA: ICD-10-CM

## 2022-03-16 DIAGNOSIS — E11.9 TYPE 2 DIABETES MELLITUS WITHOUT COMPLICATION, WITHOUT LONG-TERM CURRENT USE OF INSULIN (HCC): Primary | ICD-10-CM

## 2022-03-16 DIAGNOSIS — I10 PRIMARY HYPERTENSION: ICD-10-CM

## 2022-03-16 DIAGNOSIS — K86.1 CHRONIC PANCREATITIS, UNSPECIFIED PANCREATITIS TYPE (HCC): ICD-10-CM

## 2022-03-16 DIAGNOSIS — E66.01 MORBID OBESITY (HCC): ICD-10-CM

## 2022-03-16 DIAGNOSIS — M85.89 OTHER SPECIFIED DISORDERS OF BONE DENSITY AND STRUCTURE, MULTIPLE SITES: ICD-10-CM

## 2022-03-16 PROBLEM — L02.11 ABSCESS OF NECK: Status: RESOLVED | Noted: 2020-03-02 | Resolved: 2022-03-16

## 2022-03-16 PROCEDURE — 99214 OFFICE O/P EST MOD 30 MIN: CPT | Performed by: FAMILY MEDICINE

## 2022-03-16 RX ORDER — METFORMIN HYDROCHLORIDE 500 MG/1
500 TABLET, EXTENDED RELEASE ORAL
Qty: 30 TABLET | Refills: 5 | Status: SHIPPED | OUTPATIENT
Start: 2022-03-16 | End: 2022-05-26 | Stop reason: SDUPTHER

## 2022-03-16 NOTE — PATIENT INSTRUCTIONS

## 2022-03-16 NOTE — ASSESSMENT & PLAN NOTE
This is a cyst on the patient's MRI of the abdomen is actually smaller  Everything else looks stable  She will continue follow-up with her GI specialist as scheduled and will also be scheduling a colonoscopy with them for routine screening

## 2022-03-16 NOTE — ASSESSMENT & PLAN NOTE
Lab Results   Component Value Date    HGBA1C 6 7 (H) 02/15/2022   The patient's hemoglobin A1c is elevated consistent with new onset type 2 diabetes  We are going to start her on the metformin as indicated  She is going to work on decreasing her carbohydrate intake and monitoring her diet as well as increasing her exercise  We will see her back again in the office in 3 months and she will get lab work done prior to that visit

## 2022-03-16 NOTE — ASSESSMENT & PLAN NOTE
The patient will continue with the current dose of her Zetia  She will continue to work on diet and exercise

## 2022-03-16 NOTE — PROGRESS NOTES
Assessment/Plan:  Problem List Items Addressed This Visit        Digestive    Chronic pancreatitis ([facility] [address] )     This is a cyst on the patient's MRI of the abdomen is actually smaller  Everything else looks stable  She will continue follow-up with her GI specialist as scheduled and will also be scheduling a colonoscopy with them for routine screening  Endocrine    Type 2 diabetes mellitus without complication, without long-term current use of insulin ([facility] [address] ) - Primary       Lab Results   Component Value Date    HGBA1C 6 7 (H) 02/15/2022   The patient's hemoglobin A1c is elevated consistent with new onset type 2 diabetes  We are going to start her on the metformin as indicated  She is going to work on decreasing her carbohydrate intake and monitoring her diet as well as increasing her exercise  We will see her back again in the office in 3 months and she will get lab work done prior to that visit  Relevant Medications    metFORMIN (GLUCOPHAGE-XR) 500 mg 24 hr tablet    Other Relevant Orders    Comprehensive metabolic panel    Hemoglobin A1C    LDL cholesterol, direct    Lipid panel    Microalbumin / creatinine urine ratio       Cardiovascular and Mediastinum    Hypertension     The patient's blood pressure is well controlled with her current medication  We have made no changes today  Relevant Medications    metFORMIN (GLUCOPHAGE-XR) 500 mg 24 hr tablet    Other Relevant Orders    Comprehensive metabolic panel    Hemoglobin A1C    LDL cholesterol, direct    Lipid panel    Microalbumin / creatinine urine ratio       Other    Hyperlipidemia     The patient will continue with the current dose of her Zetia  She will continue to work on diet and exercise           Relevant Medications    metFORMIN (GLUCOPHAGE-XR) 500 mg 24 hr tablet    Other Relevant Orders    Comprehensive metabolic panel    Hemoglobin A1C    LDL cholesterol, direct    Lipid panel    Microalbumin / creatinine urine ratio Morbid obesity ([address] )     I advised her to start weighing herself daily to track her weight  The patient was advised to start eating 7 non starchy vegetables and 3 fruits every day as well as 10-12 nuts per day  She was also advised to add on psyllium fiber 1 tbsp twice a day for goal of 13 g per day  She was advised to drink 16 oz of water before all meals and to avoid any artificially sweetened drinks  She was also advised to try high intensity interval training for 4 minutes per day along with resistance exercises  I also advised her to keep a food diary to track everything that she is eating in the course of the day  At meals, she should always cut his dish in  half and eat half her meal and then determine if she is still hungry prior to eating the additional half  We will see her back in the office as scheduled  Other Visit Diagnoses     Other specified disorders of bone density and structure, multiple sites        Relevant Orders    DXA bone density spine hip and pelvis          Return in about 3 months (around 6/16/2022) for Recheck  I spent 20 minutes during the visit reviewing the history from the patient, performing the examination, discussing the findings with the patient, providing counseling and education, and making a plan  I spent 10 minutes ordering referrals and testing and documenting  Subjective:   Chief Complaint   Patient presents with    3 month follow-up     Review labs from 02/15/2022        Patient ID: Jacinta Sanchez is a 74 y o  female presents today for a routine checkup  Jacinta Sanchez is a 74 y o  female who presents today for follow-up of her hypertension hyperlipidemia and to review her recent lab work  She is seeing a GI in [facility] and had a recent MRI of her liver to follow up on the history of pancreatitis- she is abstaining fom Alcohol  There is no abdominal pain  There is no nausea or vomiting      She is watching her diet- she has a decrease in her appetite she lost 9 lbs  She has had a loss of appetite since she had covid- she is still eating  The patient denies any chest pain, shortness of breath, or palpitations  There is no edema  There are no headaches or visual changes  There is no lightheadedness, dizziness, or fainting spells  The patient currently denies any nausea, vomiting, or GERD symptoms  she has normal bowel movements and normal urine output  she has a normal appetite  She is seeing the GI doctors in follow up  Hypertension  This is a chronic problem  The current episode started more than 1 year ago  The problem is unchanged  The problem is controlled  Pertinent negatives include no anxiety, blurred vision, chest pain, headaches, malaise/fatigue, neck pain, orthopnea, palpitations, peripheral edema, PND, shortness of breath or sweats       The following portions of the patient's history were reviewed and updated as appropriate: allergies, current medications, past family history, past medical history, past social history, past surgical history and problem list   Patient Active Problem List   Diagnosis    Female cystocele    Generalized osteoarthritis    Hyperlipidemia    Hypertension    Hypothyroidism    Sleep disturbances    Urge and stress incontinence    Varicose veins of both lower extremities with pain    Vitamin D deficiency    Shoulder subluxation, right    DJD of right shoulder    Rotator cuff tear arthropathy of right shoulder    Alcohol induced acute pancreatitis    Morbid obesity ([address] )    Pancreatic pseudocyst    Leukocytosis    GERD (gastroesophageal reflux disease)    Branchial cleft cyst    Chronic pancreatitis ([address] )    Primary osteoarthritis of right knee    Type 2 diabetes mellitus without complication, without long-term current use of insulin ([address] )     Past Medical History:   Diagnosis Date    Abscess of neck 3/2/2020    Disease of thyroid gland     GERD (gastroesophageal reflux disease)  HL (hearing loss)     Hyperlipidemia     Polyp of sigmoid colon     last assessed 6/12/12    Sleep disturbances     last assessed 6/5/14     Past Surgical History:   Procedure Laterality Date    BREAST CYST ASPIRATION Right     20 yrs ago in dr office net results    COLONOSCOPY  06/18/2011    Complete     Repeat in 5yrs      IR IMAGE GUIDED ASPIRATION / DRAINAGE  3/3/2020    NASAL FRACTURE SURGERY       Allergies   Allergen Reactions    Statins Myalgia     Family History   Problem Relation Age of Onset    Alzheimer's disease Mother     Diabetes Mother     No Known Problems Father     No Known Problems Maternal Grandmother     No Known Problems Maternal Grandfather     No Known Problems Paternal Grandmother     No Known Problems Paternal Grandfather     No Known Problems Maternal Aunt     No Known Problems Maternal Aunt     No Known Problems Maternal Aunt     No Known Problems Maternal Aunt     No Known Problems Maternal Aunt     No Known Problems Paternal Aunt     No Known Problems Paternal Aunt     No Known Problems Paternal Aunt      Social History     Socioeconomic History    Marital status: Single     Spouse name: Not on file    Number of children: Not on file    Years of education: Not on file    Highest education level: Not on file   Occupational History    Occupation: retired    Tobacco Use    Smoking status: Never Smoker    Smokeless tobacco: Never Used   Vaping Use    Vaping Use: Never used   Substance and Sexual Activity    Alcohol use: Not Currently    Drug use: No    Sexual activity: Not Currently   Other Topics Concern    Not on file   Social History Narrative    Sleep disturbances      Social Determinants of Health     Financial Resource Strain: Low Risk     Difficulty of Paying Living Expenses: Not hard at all   Food Insecurity: No Food Insecurity    Worried About Running Out of Food in the Last Year: Never true    Lina of Food in the Last Year: Never true Transportation Needs: No Transportation Needs    Lack of Transportation (Medical): No    Lack of Transportation (Non-Medical): No   Physical Activity: Insufficiently Active    Days of Exercise per Week: 2 days    Minutes of Exercise per Session: 60 min   Stress: No Stress Concern Present    Feeling of Stress : Not at all   Social Connections: Moderately Integrated    Frequency of Communication with Friends and Family: More than three times a week    Frequency of Social Gatherings with Friends and Family: Once a week    Attends Sabian[employer] Services: More than 4 times per year    Active Member of [employer] or Organizations:  Yes    Attends Club or Organization Meetings: 1 to 4 times per year    Marital Status: Never    Intimate Partner Violence: Not At Risk    Fear of Current or Ex-Partner: No    Emotionally Abused: No    Physically Abused: No    Sexually Abused: No   Housing Stability: Not on file     Current Outpatient Medications on File Prior to Visit   Medication Sig Dispense Refill    aspirin 81 MG tablet Take 1 tablet by mouth daily      Cholecalciferol (Vitamin D3) 25 MCG (1000 UT) CAPS Take 1 capsule (1,000 Units total) by mouth daily 30 capsule 11    Diclofenac Sodium (VOLTAREN) 1 % Apply 2 g topically 4 (four) times a day 100 g 0    ezetimibe (ZETIA) 10 mg tablet Take 1 tablet (10 mg total) by mouth daily 90 tablet 1    levothyroxine 50 mcg tablet Take 2 tablets (100 mcg total) by mouth daily 180 tablet 1    lisinopril (ZESTRIL) 20 mg tablet Take 1 tablet (20 mg total) by mouth daily 90 tablet 1    multivitamin (THERAGRAN) TABS Take 1 tablet by mouth      Omega-3 Fatty Acids (FISH OIL PO) Take 2 g by mouth      oxybutynin (DITROPAN-XL) 10 MG 24 hr tablet Take 10 mg by mouth daily      pantoprazole (PROTONIX) 40 mg tablet Take 40 mg by mouth daily       [DISCONTINUED] oxybutynin (DITROPAN) 5 mg tablet Take 5 mg by mouth daily (Patient not taking: Reported on 3/16/2022 )       No current facility-administered medications on file prior to visit  Review of Systems   Constitutional: Negative  Negative for malaise/fatigue  HENT: Negative  Eyes: Negative  Negative for blurred vision  Respiratory: Negative  Negative for shortness of breath  Cardiovascular: Negative  Negative for chest pain, palpitations, orthopnea and PND  Gastrointestinal: Negative  Endocrine: Negative  Genitourinary: Negative  Musculoskeletal: Negative  Negative for neck pain  Skin: Negative  Allergic/Immunologic: Negative  Neurological: Negative  Negative for headaches  Hematological: Negative  Psychiatric/Behavioral: Negative  Objective:  Vitals:    03/16/22 0919 03/16/22 0953   BP: 122/76 120/78   BP Location: Right arm    Patient Position: Sitting    Cuff Size: Large    Pulse: 71 68   Resp: 20    Temp: (!) 97 4 °F (36 3 °C)    TempSrc: Tympanic    SpO2: 97%    Weight: 114 kg (250 lb 9 6 oz)    Height: 5' 5" (1 651 m)      Body mass index is 41 7 kg/m²  Physical Exam  Constitutional:       Appearance: She is well-developed  HENT:      Head: Normocephalic and atraumatic  Mouth/Throat:      Pharynx: No oropharyngeal exudate  Eyes:      Conjunctiva/sclera: Conjunctivae normal       Pupils: Pupils are equal, round, and reactive to light  Neck:      Thyroid: No thyromegaly  Vascular: No JVD  Trachea: No tracheal deviation  Cardiovascular:      Rate and Rhythm: Normal rate and regular rhythm  Heart sounds: Normal heart sounds  No murmur heard  No friction rub  No gallop  Pulmonary:      Effort: Pulmonary effort is normal  No respiratory distress  Breath sounds: Normal breath sounds  No stridor  No wheezing or rales  Chest:      Chest wall: No tenderness  Abdominal:      General: Bowel sounds are normal  There is no distension  Palpations: Abdomen is soft  There is no mass  Tenderness: There is no abdominal tenderness   There is no guarding or rebound  Musculoskeletal:         General: No tenderness or deformity  Normal range of motion  Cervical back: Normal range of motion  Lymphadenopathy:      Cervical: No cervical adenopathy  Skin:     General: Skin is warm and dry  Neurological:      Mental Status: She is alert and oriented to person, place, and time  Cranial Nerves: No cranial nerve deficit  Motor: No abnormal muscle tone  Coordination: Coordination normal       Deep Tendon Reflexes: Reflexes are normal and symmetric  Reflexes normal          Appointment on 02/15/2022   Component Date Value    Hemoglobin A1C 02/15/2022 6 7*    EAG 02/15/2022 146     Sodium 02/15/2022 141     Potassium 02/15/2022 4 3     Chloride 02/15/2022 108     CO2 02/15/2022 29     ANION GAP 02/15/2022 4     BUN 02/15/2022 16     Creatinine 02/15/2022 0 67     Glucose, Fasting 02/15/2022 126*    Calcium 02/15/2022 9 2     Corrected Calcium 02/15/2022 9 7     AST 02/15/2022 26     ALT 02/15/2022 39     Alkaline Phosphatase 02/15/2022 81     Total Protein 02/15/2022 7 5     Albumin 02/15/2022 3 4*    Total Bilirubin 02/15/2022 0 42     eGFR 02/15/2022 90     LDL Direct 02/15/2022 108*    Cholesterol 02/15/2022 207*    Triglycerides 02/15/2022 151*    HDL, Direct 02/15/2022 49*    LDL Calculated 02/15/2022 128*    Non-HDL-Chol (CHOL-HDL) 02/15/2022 158       BMI Counseling: Body mass index is 41 7 kg/m²  The BMI is above normal  Nutrition recommendations include decreasing portion sizes, encouraging healthy choices of fruits and vegetables, decreasing fast food intake, consuming healthier snacks, limiting drinks that contain sugar, moderation in carbohydrate intake, increasing intake of lean protein, reducing intake of saturated and trans fat and reducing intake of cholesterol  Exercise recommendations include exercising 3-5 times per week and strength training exercises  No pharmacotherapy was ordered   Patient referred to PCP  Rationale for BMI follow-up plan is due to patient being overweight or obese  Depression Screening and Follow-up Plan: Patient was screened for depression during today's encounter  They screened negative with a PHQ-2 score of 0

## 2022-04-19 DIAGNOSIS — E03.9 HYPOTHYROIDISM, UNSPECIFIED TYPE: ICD-10-CM

## 2022-04-19 RX ORDER — LEVOTHYROXINE SODIUM 50 UG/1
TABLET ORAL
Qty: 180 TABLET | Refills: 0 | Status: SHIPPED | OUTPATIENT
Start: 2022-04-19 | End: 2022-07-19

## 2022-04-25 DIAGNOSIS — I10 ESSENTIAL HYPERTENSION: ICD-10-CM

## 2022-04-25 RX ORDER — LISINOPRIL 20 MG/1
20 TABLET ORAL DAILY
Qty: 90 TABLET | Refills: 1 | Status: SHIPPED | OUTPATIENT
Start: 2022-04-25 | End: 2022-05-12

## 2022-05-09 ENCOUNTER — HOSPITAL ENCOUNTER (OUTPATIENT)
Dept: BONE DENSITY | Facility: CLINIC | Age: 67
Discharge: HOME | End: 2022-05-09
Payer: MEDICARE

## 2022-05-09 DIAGNOSIS — M85.89 OTHER SPECIFIED DISORDERS OF BONE DENSITY AND STRUCTURE, MULTIPLE SITES: ICD-10-CM

## 2022-05-09 PROCEDURE — 77080 DXA BONE DENSITY AXIAL: CPT

## 2022-05-12 DIAGNOSIS — E78.2 MIXED HYPERLIPIDEMIA: ICD-10-CM

## 2022-05-12 DIAGNOSIS — I10 ESSENTIAL HYPERTENSION: ICD-10-CM

## 2022-05-12 RX ORDER — LISINOPRIL 20 MG/1
TABLET ORAL
Qty: 90 TABLET | Refills: 0 | Status: SHIPPED | OUTPATIENT
Start: 2022-05-12 | End: 2022-08-09

## 2022-05-12 RX ORDER — EZETIMIBE 10 MG/1
TABLET ORAL
Qty: 90 TABLET | Refills: 0 | Status: SHIPPED | OUTPATIENT
Start: 2022-05-12 | End: 2022-08-09

## 2022-05-26 DIAGNOSIS — I10 PRIMARY HYPERTENSION: ICD-10-CM

## 2022-05-26 DIAGNOSIS — E78.2 MIXED HYPERLIPIDEMIA: ICD-10-CM

## 2022-05-26 DIAGNOSIS — E11.9 TYPE 2 DIABETES MELLITUS WITHOUT COMPLICATION, WITHOUT LONG-TERM CURRENT USE OF INSULIN (HCC): ICD-10-CM

## 2022-05-26 RX ORDER — METFORMIN HYDROCHLORIDE 500 MG/1
500 TABLET, EXTENDED RELEASE ORAL
Qty: 90 TABLET | Refills: 1 | Status: SHIPPED | OUTPATIENT
Start: 2022-05-26 | End: 2022-11-28

## 2022-06-15 ENCOUNTER — APPOINTMENT (OUTPATIENT)
Dept: LAB | Facility: CLINIC | Age: 67
End: 2022-06-15
Payer: MEDICARE

## 2022-06-15 DIAGNOSIS — E78.2 MIXED HYPERLIPIDEMIA: ICD-10-CM

## 2022-06-15 DIAGNOSIS — I10 PRIMARY HYPERTENSION: ICD-10-CM

## 2022-06-15 DIAGNOSIS — E11.9 TYPE 2 DIABETES MELLITUS WITHOUT COMPLICATION, WITHOUT LONG-TERM CURRENT USE OF INSULIN (HCC): ICD-10-CM

## 2022-06-15 LAB
ALBUMIN SERPL BCP-MCNC: 3.3 G/DL (ref 3.5–5)
ALP SERPL-CCNC: 80 U/L (ref 46–116)
ALT SERPL W P-5'-P-CCNC: 37 U/L (ref 12–78)
ANION GAP SERPL CALCULATED.3IONS-SCNC: 2 MMOL/L (ref 4–13)
AST SERPL W P-5'-P-CCNC: 29 U/L (ref 5–45)
BILIRUB SERPL-MCNC: 0.44 MG/DL (ref 0.2–1)
BUN SERPL-MCNC: 16 MG/DL (ref 5–25)
CALCIUM ALBUM COR SERPL-MCNC: 9.7 MG/DL (ref 8.3–10.1)
CALCIUM SERPL-MCNC: 9.1 MG/DL (ref 8.3–10.1)
CHLORIDE SERPL-SCNC: 108 MMOL/L (ref 100–108)
CHOLEST SERPL-MCNC: 193 MG/DL
CO2 SERPL-SCNC: 31 MMOL/L (ref 21–32)
CREAT SERPL-MCNC: 0.71 MG/DL (ref 0.6–1.3)
EST. AVERAGE GLUCOSE BLD GHB EST-MCNC: 143 MG/DL
GFR SERPL CREATININE-BSD FRML MDRD: 87 ML/MIN/1.73SQ M
GLUCOSE P FAST SERPL-MCNC: 114 MG/DL (ref 65–99)
HBA1C MFR BLD: 6.6 %
HDLC SERPL-MCNC: 42 MG/DL
LDLC SERPL CALC-MCNC: 116 MG/DL (ref 0–100)
LDLC SERPL DIRECT ASSAY-MCNC: 124 MG/DL (ref 0–100)
NONHDLC SERPL-MCNC: 151 MG/DL
POTASSIUM SERPL-SCNC: 4.4 MMOL/L (ref 3.5–5.3)
PROT SERPL-MCNC: 7.1 G/DL (ref 6.4–8.2)
SODIUM SERPL-SCNC: 141 MMOL/L (ref 136–145)
TRIGL SERPL-MCNC: 177 MG/DL

## 2022-06-15 PROCEDURE — 80061 LIPID PANEL: CPT

## 2022-06-15 PROCEDURE — 83721 ASSAY OF BLOOD LIPOPROTEIN: CPT

## 2022-06-15 PROCEDURE — 80053 COMPREHEN METABOLIC PANEL: CPT

## 2022-06-15 PROCEDURE — 83036 HEMOGLOBIN GLYCOSYLATED A1C: CPT

## 2022-06-15 PROCEDURE — 36415 COLL VENOUS BLD VENIPUNCTURE: CPT

## 2022-06-21 ENCOUNTER — OFFICE VISIT (OUTPATIENT)
Dept: FAMILY MEDICINE CLINIC | Facility: CLINIC | Age: 67
End: 2022-06-21
Payer: MEDICARE

## 2022-06-21 VITALS
WEIGHT: 246.6 LBS | SYSTOLIC BLOOD PRESSURE: 120 MMHG | TEMPERATURE: 98.1 F | HEIGHT: 65 IN | OXYGEN SATURATION: 98 % | RESPIRATION RATE: 20 BRPM | HEART RATE: 68 BPM | BODY MASS INDEX: 41.09 KG/M2 | DIASTOLIC BLOOD PRESSURE: 78 MMHG

## 2022-06-21 DIAGNOSIS — I10 PRIMARY HYPERTENSION: ICD-10-CM

## 2022-06-21 DIAGNOSIS — E03.9 HYPOTHYROIDISM, UNSPECIFIED TYPE: ICD-10-CM

## 2022-06-21 DIAGNOSIS — E78.2 MIXED HYPERLIPIDEMIA: ICD-10-CM

## 2022-06-21 DIAGNOSIS — E11.9 TYPE 2 DIABETES MELLITUS WITHOUT COMPLICATION, WITHOUT LONG-TERM CURRENT USE OF INSULIN (HCC): Primary | ICD-10-CM

## 2022-06-21 PROCEDURE — 99214 OFFICE O/P EST MOD 30 MIN: CPT | Performed by: FAMILY MEDICINE

## 2022-06-21 NOTE — PROGRESS NOTES
Assessment/Plan:  Problem List Items Addressed This Visit        Endocrine    Hypothyroidism    Relevant Orders    T4, free    TSH, 3rd generation    Type 2 diabetes mellitus without complication, without long-term current use of insulin ([facility] [address] ) - Primary       Lab Results   Component Value Date    HGBA1C 6 6 (H) 06/15/2022   The patient's hemoglobin A1c is at 6 6 indicating good control of her diabetes  We will maintain her on her current medication and she will continue to work on her diet and exercise as well as weight loss  We will see her back in the office as scheduled  Relevant Orders    Hemoglobin A1C    Comprehensive metabolic panel    Microalbumin / creatinine urine ratio    Lipid Panel with Direct LDL reflex       Cardiovascular and Mediastinum    Hypertension     The patient's blood pressure stable on her current medication  We have made no changes today  She will continue with her healthy diet and exercise  We will see her back as scheduled  Relevant Orders    Hemoglobin A1C    Comprehensive metabolic panel    Microalbumin / creatinine urine ratio    Lipid Panel with Direct LDL reflex       Other    Hyperlipidemia     Patient cannot tolerate statin therapy  She will continue with the Zetia as prescribed  She will continue with her healthy diet and exercise  Relevant Orders    Hemoglobin A1C    Comprehensive metabolic panel    Microalbumin / creatinine urine ratio    Lipid Panel with Direct LDL reflex      The patient is aware that she needs to get her colonoscopy scheduled and she will continue this in the near future  She has a follow-up scheduled with her GI specialist     Return in about 3 months (around 9/21/2022) for [address]  I spent 20 minutes during the visit reviewing the history from the patient, performing the examination, discussing the findings with the patient, providing counseling and education, and making a plan   I spent 10 minutes ordering referrals and testing and documenting  Subjective:   Chief Complaint   Patient presents with    Follow-up     3 month follow-up    Dizziness     Few weeks ago felt lightheaded while walking  Feeling passed after sitting for a little        Patient ID: Gerardo Aldana is a 74 y o  female presents today for a routine checkup  Gerardo Aldana is a 74 y o  female who presents today for follow-up of her type 2 diabetes, hypertension, and hyperlipidemia  She is feeling well and has no new complaints today  She is trying to work on improving her diet  She is seeing her GI in July 2022 and she is going to have colonoscopy after  She is seeing them every 3 months  There are no headaches or blurry vision  The patient denies any chest pain, shortness of breath, or palpitations  There is no edema  There are no headaches or visual changes  There is no lightheadedness, dizziness, or fainting spells  She is seeing ortho for the right knee next week  She is not checking her BS  She is taking the metformin, but occasionally forgets to take it  Diabetes  She presents for her follow-up diabetic visit  She has type 2 diabetes mellitus  Her disease course has been fluctuating  Pertinent negatives for diabetes include no blurred vision, no chest pain, no fatigue, no foot paresthesias, no foot ulcerations, no polydipsia, no polyphagia, no polyuria, no visual change, no weakness and no weight loss  Symptoms are stable  She is compliant with treatment all of the time       The following portions of the patient's history were reviewed and updated as appropriate: allergies, current medications, past family history, past medical history, past social history, past surgical history and problem list   Patient Active Problem List   Diagnosis    Female cystocele    Generalized osteoarthritis    Hyperlipidemia    Hypertension    Hypothyroidism    Sleep disturbances    Urge and stress incontinence    Varicose veins of both lower extremities with pain    Vitamin D deficiency    Shoulder subluxation, right    DJD of right shoulder    Rotator cuff tear arthropathy of right shoulder    Morbid obesity (HCC)    Pancreatic pseudocyst    Leukocytosis    GERD (gastroesophageal reflux disease)    Branchial cleft cyst    Chronic pancreatitis (HCC)    Primary osteoarthritis of right knee    Type 2 diabetes mellitus without complication, without long-term current use of insulin ([facility] [address] )     Past Medical History:   Diagnosis Date    Abscess of neck 3/2/2020    Alcohol induced acute pancreatitis 8/25/2018    Disease of thyroid gland     GERD (gastroesophageal reflux disease)     HL (hearing loss)     Hyperlipidemia     Polyp of sigmoid colon     last assessed 6/12/12    Sleep disturbances     last assessed 6/5/14     Past Surgical History:   Procedure Laterality Date    BREAST CYST ASPIRATION Right     20 yrs ago in dr office net results    COLONOSCOPY  06/18/2011    Complete     Repeat in 5yrs      IR IMAGE GUIDED ASPIRATION / DRAINAGE  3/3/2020    NASAL FRACTURE SURGERY       Allergies   Allergen Reactions    Statins Myalgia     Family History   Problem Relation Age of Onset    Alzheimer's disease Mother     Diabetes Mother     No Known Problems Father     No Known Problems Maternal Grandmother     No Known Problems Maternal Grandfather     No Known Problems Paternal Grandmother     No Known Problems Paternal Grandfather     No Known Problems Maternal Aunt     No Known Problems Maternal Aunt     No Known Problems Maternal Aunt     No Known Problems Maternal Aunt     No Known Problems Maternal Aunt     No Known Problems Paternal Aunt     No Known Problems Paternal Aunt     No Known Problems Paternal Aunt      Social History     Socioeconomic History    Marital status: Single     Spouse name: Not on file    Number of children: Not on file    Years of education: Not on file    Highest education level: Not on file   Occupational History    Occupation: retired    Tobacco Use    Smoking status: Never Smoker    Smokeless tobacco: Never Used   Vaping Use    Vaping Use: Never used   Substance and Sexual Activity    Alcohol use: Not Currently    Drug use: No    Sexual activity: Not Currently   Other Topics Concern    Not on file   Social History Narrative    Sleep disturbances      Social Determinants of Health     Financial Resource Strain: Not on file   Food Insecurity: Not on file   Transportation Needs: Not on file   Physical Activity: Insufficiently Active    Days of Exercise per Week: 2 days    Minutes of Exercise per Session: 60 min   Stress: No Stress Concern Present    Feeling of Stress : Not at all   Social Connections: Not on file   Intimate Partner Violence: Not At Risk    Fear of Current or Ex-Partner: No    Emotionally Abused: No    Physically Abused: No    Sexually Abused: No   Housing Stability: Not on file     Current Outpatient Medications on File Prior to Visit   Medication Sig Dispense Refill    aspirin 81 MG tablet Take 1 tablet by mouth daily      Cholecalciferol (Vitamin D3) 25 MCG (1000 UT) CAPS Take 1 capsule (1,000 Units total) by mouth daily 30 capsule 11    Diclofenac Sodium (VOLTAREN) 1 % Apply 2 g topically 4 (four) times a day 100 g 0    Euthyrox 50 MCG tablet TAKE 2 TABLETS BY MOUTH DAILY 180 tablet 0    ezetimibe (ZETIA) 10 mg tablet Take 1 tablet by mouth once daily 90 tablet 0    lisinopril (ZESTRIL) 20 mg tablet Take 1 tablet by mouth once daily 90 tablet 0    metFORMIN (GLUCOPHAGE-XR) 500 mg 24 hr tablet Take 1 tablet (500 mg total) by mouth daily with dinner 90 tablet 1    multivitamin (THERAGRAN) TABS Take 1 tablet by mouth      Omega-3 Fatty Acids (FISH OIL PO) Take 2 g by mouth      oxybutynin (DITROPAN-XL) 10 MG 24 hr tablet Take 10 mg by mouth daily      pantoprazole (PROTONIX) 40 mg tablet Take 40 mg by mouth daily        No current facility-administered medications on file prior to visit  Review of Systems   Constitutional: Negative  Negative for fatigue and weight loss  HENT: Negative  Eyes: Negative  Negative for blurred vision  Respiratory: Negative  Cardiovascular: Negative  Negative for chest pain  Gastrointestinal: Negative  Endocrine: Negative  Negative for polydipsia, polyphagia and polyuria  Genitourinary: Negative  Musculoskeletal: Negative  Skin: Negative  Allergic/Immunologic: Negative  Neurological: Negative  Negative for weakness  Hematological: Negative  Psychiatric/Behavioral: Negative  Objective:  Vitals:    06/21/22 1201 06/21/22 1254   BP: 136/82 120/78   BP Location: Left arm    Patient Position: Sitting    Cuff Size: Large    Pulse: 80 68   Resp: 20    Temp: 98 1 °F (36 7 °C)    TempSrc: Tympanic    SpO2: 98%    Weight: 112 kg (246 lb 9 6 oz)    Height: 5' 5" (1 651 m)      Body mass index is 41 04 kg/m²  Physical Exam  Constitutional:       Appearance: She is well-developed  HENT:      Head: Normocephalic and atraumatic  Mouth/Throat:      Pharynx: No oropharyngeal exudate  Eyes:      Conjunctiva/sclera: Conjunctivae normal       Pupils: Pupils are equal, round, and reactive to light  Neck:      Thyroid: No thyromegaly  Vascular: No JVD  Trachea: No tracheal deviation  Cardiovascular:      Rate and Rhythm: Normal rate and regular rhythm  Pulses: no weak pulses          Dorsalis pedis pulses are 2+ on the right side and 2+ on the left side  Posterior tibial pulses are 2+ on the right side and 2+ on the left side  Heart sounds: Normal heart sounds  No murmur heard  No friction rub  No gallop  Pulmonary:      Effort: Pulmonary effort is normal  No respiratory distress  Breath sounds: Normal breath sounds  No stridor  No wheezing or rales  Chest:      Chest wall: No tenderness     Abdominal:      General: Bowel sounds are normal  There is no distension  Palpations: Abdomen is soft  There is no mass  Tenderness: There is no abdominal tenderness  There is no guarding or rebound  Musculoskeletal:         General: No tenderness or deformity  Normal range of motion  Cervical back: Normal range of motion  Feet:      Right foot:      Skin integrity: No ulcer, skin breakdown, erythema, warmth, callus or dry skin  Left foot:      Skin integrity: No ulcer, skin breakdown, erythema, warmth, callus or dry skin  Lymphadenopathy:      Cervical: No cervical adenopathy  Skin:     General: Skin is warm and dry  Neurological:      Mental Status: She is alert and oriented to person, place, and time  Cranial Nerves: No cranial nerve deficit  Motor: No abnormal muscle tone  Coordination: Coordination normal       Deep Tendon Reflexes: Reflexes are normal and symmetric  Reflexes normal          Appointment on 06/15/2022   Component Date Value    Sodium 06/15/2022 141     Potassium 06/15/2022 4 4     Chloride 06/15/2022 108     CO2 06/15/2022 31     ANION GAP 06/15/2022 2 (A)    BUN 06/15/2022 16     Creatinine 06/15/2022 0 71     Glucose, Fasting 06/15/2022 114 (A)    Calcium 06/15/2022 9 1     Corrected Calcium 06/15/2022 9 7     AST 06/15/2022 29     ALT 06/15/2022 37     Alkaline Phosphatase 06/15/2022 80     Total Protein 06/15/2022 7 1     Albumin 06/15/2022 3 3 (A)    Total Bilirubin 06/15/2022 0 44     eGFR 06/15/2022 87     Hemoglobin A1C 06/15/2022 6 6 (A)    EAG 06/15/2022 143     LDL Direct 06/15/2022 124 (A)    Cholesterol 06/15/2022 193     Triglycerides 06/15/2022 177 (A)    HDL, Direct 06/15/2022 42 (A)    LDL Calculated 06/15/2022 116 (A)    Non-HDL-Chol (CHOL-HDL) 06/15/2022 151         Depression Screening and Follow-up Plan: Patient was screened for depression during today's encounter  They screened negative with a PHQ-2 score of 0        Diabetic Foot Exam    Patient's shoes and socks removed  Right Foot/Ankle   Right Foot Inspection  Skin Exam: skin normal and skin intact  No dry skin, no warmth, no callus, no erythema, no maceration, no abnormal color, no pre-ulcer, no ulcer and no callus  Toe Exam: ROM and strength within normal limits  Sensory   Vibration: intact  Proprioception: intact  Monofilament testing: intact    Vascular  Capillary refills: < 3 seconds  The right DP pulse is 2+  The right PT pulse is 2+  Left Foot/Ankle  Left Foot Inspection  Skin Exam: skin normal and skin intact  No dry skin, no warmth, no erythema, no maceration, normal color, no pre-ulcer, no ulcer and no callus  Toe Exam: ROM and strength within normal limits  Sensory   Vibration: intact  Proprioception: intact  Monofilament testing: intact    Vascular  Capillary refills: < 3 seconds  The left DP pulse is 2+  The left PT pulse is 2+       Assign Risk Category  No deformity present  No loss of protective sensation  No weak pulses  Risk: 0

## 2022-06-21 NOTE — PATIENT INSTRUCTIONS
Type 2 Diabetes in Adults: New Diagnosis   AMBULATORY CARE:   Type 2 diabetes  is a disease that affects how your body uses glucose (sugar)  Normally, when the blood sugar level increases, the pancreas makes more insulin  Insulin helps move sugar out of the blood so it can be used for energy  Type 2 diabetes develops because either the body cannot make enough insulin, or it cannot use the insulin correctly  Type 2 diabetes can be controlled to prevent damage to your heart, blood vessels, and other organs  Common symptoms include the following:   · Numbness in your fingers or toes    · Blurred vision    · Frequent urination    · More hunger or thirst than usual    Have someone call your local emergency number (911 in the [address] [facility]) if:   · You have any of the following signs of a stroke:      ? Numbness or drooping on one side of your face     ? Weakness in an arm or leg    ? Confusion or difficulty speaking    ? Dizziness, a severe headache, or vision loss    · You have any of the following signs of a heart attack:      ? Squeezing, pressure, or pain in your chest    ? You may  also have any of the following:     § Discomfort or pain in your back, neck, jaw, stomach, or arm    § Shortness of breath    § Nausea or vomiting    § Lightheadedness or a sudden cold sweat    · You have trouble breathing  Seek care immediately if:   · You have severe abdominal pain  · You vomit for more than 2 hours  · You have trouble staying awake or focusing  · You are shaking or sweating  · You feel weak or more tired than usual     · You have blurred or double vision  · Your breath has a fruity, sweet smell  Call your doctor or diabetes care team if:   · Your arms and legs are swollen  · You have an upset stomach and cannot eat the foods on your meal plan  · You feel dizzy, have headaches, or are easily irritated  · Your skin is red, warm, dry, or swollen      · You have a wound that does not heal     · You have numbness in your arms or legs  · You have trouble coping with your illness, or you feel anxious or depressed  · You have questions or concerns about your condition or care  Treatment for type 2 diabetes  helps prevent or delay complications, including heart and kidney disease  You must eat healthy meals and do regular physical activity  You may  need any of the following:  · Diabetes medicines or insulin  may be given to decrease the amount of sugar in your blood  · Blood pressure medicine  may be given to lower your blood pressure  · Cholesterol-lowering medicine  may be given to prevent heart disease  · Antiplatelets , such as aspirin, help prevent blood clots  Take your antiplatelet medicine exactly as directed  These medicines make it more likely for you to bleed or bruise  If you are told to take aspirin, do not take acetaminophen or ibuprofen instead  · Take your medicine as directed  Contact your healthcare provider if you think your medicine is not helping or if you have side effects  Tell him or her if you are allergic to any medicine  Keep a list of the medicines, vitamins, and herbs you take  Include the amounts, and when and why you take them  Bring the list or the pill bottles to follow-up visits  Carry your medicine list with you in case of an emergency  Diabetes education:  Diabetes education will start right away  Diabetes education may also happen later to refresh your memory  Your diabetes care team may include physicians, nurse practitioners, and physician assistants  It may also include nurses, dietitians, exercise specialists, pharmacists, dentists, and podiatrists  Family members, or others who are close to you, may also be part of the team  You and your team will make goals and plans to manage diabetes and other health problems  The plans and goals will be specific to your needs   Members of your diabetes care team teach you the following:  · About nutrition:  A dietitian will help you make a meal plan to keep your blood sugar level steady  You will learn how food affects your blood sugar levels  You will also learn to keep track of sugar and starchy foods (carbohydrates)  Do not skip meals  Your blood sugar level may drop too low if you have taken diabetes medicine and do not eat  You may be taught to use the plate method for portion control  With the plate method, ½ of your plate contains vegetables  The other half is divided so that ¼ contains protein or meat, and ¼ contains starches, such as potatoes  · About physical activity and diabetes: You will learn why physical activity, such as walking, is important  You and your care team provider will make a plan for your activity  Your care team provider will tell you what a healthy weight is for you  He or she will help you make a plan to get to that weight and stay there  Maintain a healthy weight to help delay or prevent complications of diabetes  · About your blood sugar level: You will learn what your blood sugar level should be  You will be given information on when and how to check your blood sugar level  You may need to check by testing a drop of blood in a glucose monitor  You may instead be given a continuous glucose monitoring (CGM) device  A sensor is placed in your abdomen or on your arm  You put a transmitter on the sensor to get a reading that shows up on the monitor  You will learn what to do if your level is too high or too low  Write down the times of your checks and your levels  Take them to all follow-up appointments  · About diabetes medicine: You may need oral diabetes medicine, insulin, or both to help control your blood sugar levels  Your healthcare provider will teach you how and when to take oral diabetes medicine  You will also be taught about side effects oral diabetes medicine can cause   Insulin may be added if oral diabetes medicine becomes less effective over time  Insulin may be injected, or given through a pump or pen  You and your care team will discuss which method is best for you  ? An insulin pump  is an implanted device that gives your insulin 24 hours a day  An insulin pump prevents the need for multiple insulin injections in a day  ? An insulin pen  is a device prefilled with the right amount of insulin  ? You and your family members will be taught how to draw up and give insulin  if this is the best method for you  Your education team will also teach you how to dispose of needles and syringes  ? You will learn how much insulin you need  and when to give it  You will be taught when not to give insulin  You will also be taught what to do if your blood sugar level drops too low  This may happen if you take insulin and do not eat the right amount of carbohydrates  Other ways to help manage type 2 diabetes:   · Talk to your care team if you have increased stress about your diagnosis  When you feel stressed about your diagnosis, it can cause you not to take care of yourself properly  Your care team can help by offering tips about self-care  Your care team may suggest you talk to a mental health provider  The provider can listen and offer help with self-care issues  · Check your feet each day for sores  Wear shoes and socks that fit correctly  Do not trim your toenails  Go to a podiatrist  Ask your care team for more information about foot care  · Do not smoke  Nicotine and other chemicals in cigarettes and cigars can cause lung damage and make diabetes harder to manage  Ask your care team provider for information if you currently smoke and need help to quit  E-cigarettes or smokeless tobacco still contain nicotine  Talk to your care team provider before you use these products  · Drink water instead of sugary drinks such as soft drinks and fruit juices    Sugary drinks cause high blood sugar and cause an increase in your weight  Your healthcare provider may tell you that diet drinks do not help with weight loss  · Know the risks if you choose to drink alcohol  Alcohol can cause your blood sugar levels to be low if you use insulin  Alcohol can cause high blood sugar levels and weight gain if you drink too much  A drink of alcohol is 12 ounces of beer, 5 ounces of wine, or 1½ ounces of liquor  Your care team can tell you how many drinks are okay to have in 24 hours and in 1 week  · Check your blood pressure as directed  If you have high blood pressure (BP), talk to your care team about your BP goals  Together you can create a plan to lower your BP if needed and keep it in a healthy range  The plan may include lifestyle changes or medicines  A normal BP is 119/79 or lower  A normal blood pressure can help prevent or delay certain complications from diabetes  Examples include retinopathy (eye damage) and kidney damage  · Wear medical alert identification  Wear medical alert jewelry or carry a card that says you have diabetes  Ask your care team provider where to get these items  · Ask about vaccines you may need  You have a higher risk for serious illness if you get the flu, pneumonia, COVID-19, or hepatitis  Ask your provider if you should get a flu, pneumonia, or hepatitis B vaccine, and when to get the COVID-19 vaccine  Risks of type 2 diabetes: It is important to learn to keep your diabetes in control  Uncontrolled diabetes can damage your nerves, veins, and arteries  Your risk for dementia increases faster the longer your diabetes is not controlled  High blood sugar levels may damage other body tissues and organs over time  Damage to arteries may increase your risk for heart attack and stroke  Nerve damage may also lead to other heart, stomach, and nerve problems  Diabetes can become life-threatening if it is not controlled  Follow up with your care team providers as directed:   You may need to return to have your A1c checked every 3 months  You will need to have your feet checked during at least 1 visit each year  You will need an eye exam 1 time each year to check for retinopathy  You will also need urine tests every year to check for kidney problems  You may need tests to monitor for heart disease, such as an EKG, stress test, blood pressure monitoring, and blood tests  Write down your questions so you remember to ask them during your visits  © Copyright [employer] 2022 Information is for End User's use only and may not be sold, redistributed or otherwise used for commercial purposes  All illustrations and images included in CareNotes® are the copyrighted property of A D A M , Inc  or [facility] [employer]   The above information is an  only  It is not intended as medical advice for individual conditions or treatments  Talk to your doctor, nurse or pharmacist before following any medical regimen to see if it is safe and effective for you  Basic Carbohydrate Counting   AMBULATORY CARE:   Carbohydrate counting  is a way to plan your meals by counting the amount of carbohydrate in foods  Carbohydrates are the sugars, starches, and fiber found in fruit, grains, vegetables, and milk products  Carbohydrates increase your blood sugar levels  Carbohydrate counting can help you eat the right amount of carbohydrate to keep your blood sugar levels under control  What you need to know about planning meals using carbohydrate counting:  · A dietitian or healthcare provider will help you develop a healthy meal plan that works best for you  You will be taught how much carbohydrate to eat or drink for each meal and snack  Your meal plan will be based on your age, weight, usual food intake, and physical activity level  If you have diabetes, it will also include your blood sugar levels and diabetes medicine   Once you know how much carbohydrate you should eat, you can decide what type of food you want to eat  · You will need to know what foods contain carbohydrate and how much they contain  Keep track of the amount of carbohydrate in meals and snacks in order to follow your meal plan  Do not avoid carbohydrates or skip meals  Your blood sugar may fall too low if you do not eat enough carbohydrate or you skip meals  Foods that contain carbohydrate:   · Breads:  Each serving of food listed below contains about 15 g of carbohydrate   ? 1 slice of bread (1 ounce) or 1 flour or corn tortilla (6 inch)    ? ½ of a hamburger bun or ¼ of a large bagel (about 1 ounce)    ? 1 pancake (about 4 inches across and ¼ inch thick)    · Cereals and grains:  Serving sizes of ready-to-eat cereals vary  Look at the serving size and the total carbohydrate amount listed on the food label  Each serving of food listed below contains about 15 g of carbohydrate   ? ¾ cup of dry, unsweetened, ready-to-eat cereal or ¼ cup of low-fat granola     ? ½ cup of oatmeal or other cooked cereal     ? ? cup of cooked rice or pasta    · Starchy vegetables and beans:  Each serving of food listed below contains about 15 g of carbohydrate   ? ½ cup of corn, green peas, sweet potatoes, or mashed potatoes    ? ¼ of a large baked potato    ? ½ cup of beans, lentils, and peas (garbanzo, lundy, kidney, white, split, black-eyed)    · Crackers and snacks:  Each serving of food listed below contains about 15 g of carbohydrate   ? 3 brittni cracker squares or 8 animal crackers     ? 6 saltine-type crackers    ? 3 cups of popcorn or ¾ ounce of pretzels, potato chips, or tortilla chips    · Fruit:  Each serving of food listed below contains about 15 g of carbohydrate   ? 1 small (4 ounce) piece of fresh fruit or ¾ to 1 cup of fresh fruit    ? ½ cup of canned or frozen fruit, packed in natural juice    ? ½ cup (4 ounces) of unsweetened fruit juice    ?  2 tablespoons of dried fruit    · Desserts or sugary foods:  Each serving of food listed below contains about 15 g of carbohydrate   ? 2-inch square unfrosted cake or brownie     ? 2 small cookies    ? ½ cup of ice cream, frozen yogurt, or nondairy frozen yogurt    ? ¼ cup of sherbet or sorbet    ? 1 tablespoon of regular syrup, jam, or jelly    ? 2 tablespoons of light syrup    · Milk and yogurt:  Foods from the milk group contain about 12 g of carbohydrate per serving  ? 1 cup of fat-free or low-fat milk    ? 1 cup of soy milk    ? ? cup of fat-free, yogurt sweetened with artificial sweetener    · Non-starchy vegetables:  Each serving contains about 5 g of carbohydrate   Three servings of non-starch vegetables count as 1 carbohydrate serving  ? ½ cup of cooked vegetables or 1 cup of raw vegetables  This includes beets, broccoli, cabbage, cauliflower, cucumber, mushrooms, tomatoes, and zucchini    ? ½ cup of vegetable juice    How to use carbohydrate counting to plan meals:   · Count carbohydrate amounts using serving sizes:      ? Pasta dinner example: You plan to have pasta, tossed salad, and an 8-ounce glass of milk  Your healthcare provider tells you that you may have 4 carbohydrate servings for dinner  One carbohydrate serving of pasta is ? cup  One cup of pasta will equal 3 carbohydrate servings  An 8-ounce glass of milk will count as 1 carbohydrate serving  These amounts of food would equal 4 carbohydrate servings  One cup of tossed salad does not count toward your carbohydrate servings  · Count carbohydrate amounts using food labels:  Find the total amount of carbohydrate in a packaged food by reading the food label  Food labels tell you the serving size of the food and the total carbohydrate amount in each serving  Find the serving size on the food label and then decide how many servings you will eat  Multiply the number of servings you plan to eat by the carbohydrate amount per serving  ? Granola bar snack example:   Your meal plan allows you to have 2 carbohydrate servings (30 grams) of carbohydrate for a snack  You plan to eat 1 package of granola bars, which contains 2 bars  According to the food label, the serving size of food in this package is 1 bar  Each serving (1 bar) contains 25 grams of carbohydrate  The total amount of carbohydrate in this package of granola bars would be 50 g  Based on your meal plan, you should eat only 1 bar  Follow up with your doctor as directed:  Write down your questions so you remember to ask them during your visits  © Copyright [employer] 2022 Information is for End User's use only and may not be sold, redistributed or otherwise used for commercial purposes  All illustrations and images included in CareNotes® are the copyrighted property of A [employer] A M , Inc  or [facility] Sandy Dela Cruz   The above information is an  only  It is not intended as medical advice for individual conditions or treatments  Talk to your doctor, nurse or pharmacist before following any medical regimen to see if it is safe and effective for you  Foot Care for People with Diabetes   AMBULATORY CARE:   What you need to know about foot care:   · Foot care helps protect your feet and prevent foot ulcers or sores  Long-term high blood sugar levels can damage the blood vessels and nerves in your legs and feet  This damage makes it hard to feel pressure, pain, temperature, and touch  You may not be able to feel a cut or sore, or shoes that are too tight  Foot care is needed to prevent serious problems, such as an infection or amputation  · Diabetes may cause your toes to become crooked or curved under  These changes may affect the way you walk and can lead to increased pressure on your foot  The pressure can decrease blood flow to your feet  Lack of blood flow increases your risk for a foot ulcer  Do not ignore small problems, such as dry skin or small wounds  These can become life-threatening over time without proper care      Call your care team provider if: · Your feet become numb, weak, or hard to move  · You have pus draining from a sore on your foot  · You have a wound on your foot that gets bigger, deeper, or does not heal      · You see blisters, cuts, scratches, calluses, or sores on your foot  · You have a fever, and your feet become red, warm, and swollen  · Your toenails become thick, curled, or yellow  · You find it hard to check your feet because your vision is poor  · You have questions or concerns about your condition or care  How to care for your feet:   · Check your feet each day  Look at your whole foot, including the bottom, and between and under your toes  Check for wounds, corns, and calluses  Use a mirror to see the bottom of your feet  The skin on your feet may be shiny, tight, or darker than normal  Your feet may also be cold and pale  Feel your feet by running your hands along the tops, bottoms, sides, and between your toes  Redness, swelling, and warmth are signs of blood flow problems that can lead to a foot ulcer  Do not try to remove corns or calluses yourself  · Wash your feet each day with soap and warm water  Do not use hot water, because this can injure your foot  Dry your feet gently with a towel after you wash them  Dry between and under your toes  · Apply lotion or a moisturizer on your dry feet  Ask your care team provider what lotions are best to use  Do not put lotion or moisturizer between your toes  Moisture between your toes could lead to skin breakdown  · Cut your toenails correctly  File or cut your toenails straight across  Use a soft brush to clean around your toenails  If your toenails are very thick, you may need to have a care team provider or specialist cut them  · Protect your feet  Do not walk barefoot or wear your shoes without socks  Check your shoes for rocks or other objects that can hurt your feet  Wear cotton socks to help keep your feet dry   Wear socks without toe seams, or wear them with the seams inside out  Change your socks each day  Do not wear socks that are dirty or damp  · Wear shoes that fit well  Wear shoes that do not rub against any area of your feet  Your shoes should be ½ to ¾ inch (1 to 2 centimeters) longer than your feet  Your shoes should also have extra space around the widest part of your feet  Walking or athletic shoes with laces or straps that adjust are best  Ask your care team provider for help to choose shoes that fit you best  Ask him or her if you need to wear an insert, orthotic, or bandage on your feet  · Go to your follow-up visits  Your care team provider will do a foot exam at least once a year  You may need a foot exam more often if you have nerve damage, foot deformities, or ulcers  He will check for nerve damage and how well you can feel your feet  He will check your shoes to see if they fit well  · Do not smoke  Smoking can damage your blood vessels and put you at increased risk for foot ulcers  Ask your care team provider for information if you currently smoke and need help to quit  E-cigarettes or smokeless tobacco still contain nicotine  Talk to your care team provider before you use these products  Follow up with your diabetes care team provider or foot specialist as directed: You will need to have your feet checked at least once a year  You may need a foot exam more often if you have nerve damage, foot deformities, or ulcers  Write down your questions so you remember to ask them during your visits  © Copyright [address] 2022 Information is for End User's use only and may not be sold, redistributed or otherwise used for commercial purposes  All illustrations and images included in CareNotes® are the copyrighted property of [employer]  or [employer]ule[employer] Nair  The above information is an  only  It is not intended as medical advice for individual conditions or treatments   Talk to your doctor, nurse or pharmacist before following any medical regimen to see if it is safe and effective for you

## 2022-06-24 PROBLEM — K85.20 ALCOHOL INDUCED ACUTE PANCREATITIS: Status: RESOLVED | Noted: 2018-08-25 | Resolved: 2022-06-24

## 2022-06-24 NOTE — ASSESSMENT & PLAN NOTE
Patient cannot tolerate statin therapy  She will continue with the Zetia as prescribed  She will continue with her healthy diet and exercise

## 2022-06-24 NOTE — ASSESSMENT & PLAN NOTE
The patient's blood pressure stable on her current medication  We have made no changes today  She will continue with her healthy diet and exercise  We will see her back as scheduled

## 2022-06-24 NOTE — ASSESSMENT & PLAN NOTE
Lab Results   Component Value Date    HGBA1C 6 6 (H) 06/15/2022   The patient's hemoglobin A1c is at 6 6 indicating good control of her diabetes  We will maintain her on her current medication and she will continue to work on her diet and exercise as well as weight loss  We will see her back in the office as scheduled

## 2022-07-19 DIAGNOSIS — E03.9 HYPOTHYROIDISM, UNSPECIFIED TYPE: ICD-10-CM

## 2022-07-19 RX ORDER — LEVOTHYROXINE SODIUM 50 UG/1
TABLET ORAL
Qty: 180 TABLET | Refills: 0 | Status: SHIPPED | OUTPATIENT
Start: 2022-07-19 | End: 2022-10-24 | Stop reason: SDUPTHER

## 2022-08-09 DIAGNOSIS — I10 ESSENTIAL HYPERTENSION: ICD-10-CM

## 2022-08-09 DIAGNOSIS — E78.2 MIXED HYPERLIPIDEMIA: ICD-10-CM

## 2022-08-09 RX ORDER — LISINOPRIL 20 MG/1
TABLET ORAL
Qty: 90 TABLET | Refills: 0 | Status: SHIPPED | OUTPATIENT
Start: 2022-08-09

## 2022-08-09 RX ORDER — EZETIMIBE 10 MG/1
TABLET ORAL
Qty: 90 TABLET | Refills: 0 | Status: SHIPPED | OUTPATIENT
Start: 2022-08-09

## 2022-10-07 NOTE — SPEECH THERAPY NOTE
Speech-Language Pathology Bedside Swallow Evaluation      Patient Name: Elizabeth Mcnally    USJIL'J Date: 3/4/2020     Problem List  Principal Problem:    Abscess of neck  Active Problems:    Hypertension    Morbid obesity ([facility] [address] )    Sepsis ([facility] [address] )    Leukocytosis      Past Medical History  Past Medical History:   Diagnosis Date    Disease of thyroid gland     GERD (gastroesophageal reflux disease)     HL (hearing loss)     Hyperlipidemia     Polyp of sigmoid colon     last assessed 6/12/12    Sleep disturbances     last assessed 6/5/14       Summary   Pt presented with functional appearing oral and pharyngeal stage swallowing skills with materials administered today  Pt with significant R neck swelling c visible swelling/displacement of R pharynx but c no effect on swallow function today and no decompensation with L head turn or rapid intake  Recommended Diet: regular diet and thin liquids   Recommended Form of Meds: as desired and tolerated  Current Medical Status  Elizabeth Mcnally is a 75y o  year old female who presents with right neck swelling since 02/27/2020  She reports having a upper respiratory infection starting approximately 1 week ago  After approximately 3-4 days of the upper respiratory infection she developed right neck swelling with subsequent fever  CT neck concerning for possible abscess immediately above the carotid bifurcation, thought to be secondary to an infected right branchial cleft cyst    DX: sepsis, neck abscess/infected right branchial cleft cyst   Ultrasound-guided needle aspiration completed  10cc of purulent drainage obtained and sent for culture       Past medical history: Please see H&P for details    Swallow Information   Current Risks for Dysphagia & Aspiration: neck abscess  Baseline Diet: regular diet and thin liquids      Baseline Assessment   Behavior/Cognition: alert  Speech/Language Status: able to participate in conversation and able to follow commands  Patient Positioning: upright in bed  Pain Status/Interventions/Response to Interventions: No report of pain  Swallow Mechanism Exam  Facial: symmetrical  Labial: WFL  Lingual: WFL  Tonsillar/Pharyngeal/Velum: swelling noted in R pharynx and tonsillar area  Velum is midline c good elevation  Mandible: adequate ROM  Dentition: adequate  Vocal quality:clear/adequate   Volitional Cough: strong/productive   Respiratory Status: on RA       Consistencies Assessed and Performance   Consistencies Administered: thin liquids, puree and hard solids  Materials administered included water/juice, applesauce, pretzels (and bagel)    Oral Stage: WFL  Mastication was adequate with the materials administered today  Bolus formation and transfer were functional with no significant oral residue noted  No overt s/s reduced oral control  Pharyngeal Stage: WFL suspected  Swallow Mechanics:  Swallowing initiation appeared prompt  Laryngeal rise was palpated and judged to be within functional limits  No coughing, throat clearing, change in vocal quality or respiratory status noted today       Esophageal Concerns: none reported    Strategies and Efficacy: I assessed with L & R head turn as well as individual and successive sips-->no decompensation    Summary and Recommendations (see above)    Results Reviewed with: patient     Treatment Recommended: None at this time 07-Oct-2022

## 2022-10-17 ENCOUNTER — RA CDI HCC (OUTPATIENT)
Dept: OTHER | Facility: HOSPITAL | Age: 67
End: 2022-10-17

## 2022-10-21 ENCOUNTER — APPOINTMENT (OUTPATIENT)
Dept: LAB | Facility: CLINIC | Age: 67
End: 2022-10-21
Payer: MEDICARE

## 2022-10-21 DIAGNOSIS — E78.2 MIXED HYPERLIPIDEMIA: ICD-10-CM

## 2022-10-21 DIAGNOSIS — I10 PRIMARY HYPERTENSION: ICD-10-CM

## 2022-10-21 DIAGNOSIS — E03.9 HYPOTHYROIDISM, UNSPECIFIED TYPE: ICD-10-CM

## 2022-10-21 DIAGNOSIS — E11.9 TYPE 2 DIABETES MELLITUS WITHOUT COMPLICATION, WITHOUT LONG-TERM CURRENT USE OF INSULIN (HCC): ICD-10-CM

## 2022-10-21 LAB
ALBUMIN SERPL BCP-MCNC: 3.5 G/DL (ref 3.5–5)
ALP SERPL-CCNC: 80 U/L (ref 46–116)
ALT SERPL W P-5'-P-CCNC: 28 U/L (ref 12–78)
ANION GAP SERPL CALCULATED.3IONS-SCNC: 3 MMOL/L (ref 4–13)
AST SERPL W P-5'-P-CCNC: 20 U/L (ref 5–45)
BILIRUB SERPL-MCNC: 0.52 MG/DL (ref 0.2–1)
BUN SERPL-MCNC: 17 MG/DL (ref 5–25)
CALCIUM SERPL-MCNC: 9.8 MG/DL (ref 8.3–10.1)
CHLORIDE SERPL-SCNC: 108 MMOL/L (ref 96–108)
CHOLEST SERPL-MCNC: 197 MG/DL
CO2 SERPL-SCNC: 30 MMOL/L (ref 21–32)
CREAT SERPL-MCNC: 0.73 MG/DL (ref 0.6–1.3)
CREAT UR-MCNC: 144 MG/DL
EST. AVERAGE GLUCOSE BLD GHB EST-MCNC: 134 MG/DL
GFR SERPL CREATININE-BSD FRML MDRD: 84 ML/MIN/1.73SQ M
GLUCOSE P FAST SERPL-MCNC: 116 MG/DL (ref 65–99)
HBA1C MFR BLD: 6.3 %
HDLC SERPL-MCNC: 56 MG/DL
LDLC SERPL CALC-MCNC: 122 MG/DL (ref 0–100)
MICROALBUMIN UR-MCNC: 104 MG/L (ref 0–20)
MICROALBUMIN/CREAT 24H UR: 72 MG/G CREATININE (ref 0–30)
POTASSIUM SERPL-SCNC: 4.3 MMOL/L (ref 3.5–5.3)
PROT SERPL-MCNC: 7.1 G/DL (ref 6.4–8.4)
SODIUM SERPL-SCNC: 141 MMOL/L (ref 135–147)
T4 FREE SERPL-MCNC: 1.01 NG/DL (ref 0.76–1.46)
TRIGL SERPL-MCNC: 96 MG/DL
TSH SERPL DL<=0.05 MIU/L-ACNC: 2.29 UIU/ML (ref 0.45–4.5)

## 2022-10-21 PROCEDURE — 80053 COMPREHEN METABOLIC PANEL: CPT

## 2022-10-21 PROCEDURE — 84439 ASSAY OF FREE THYROXINE: CPT

## 2022-10-21 PROCEDURE — 36415 COLL VENOUS BLD VENIPUNCTURE: CPT

## 2022-10-21 PROCEDURE — 84443 ASSAY THYROID STIM HORMONE: CPT

## 2022-10-21 PROCEDURE — 83036 HEMOGLOBIN GLYCOSYLATED A1C: CPT

## 2022-10-21 PROCEDURE — 80061 LIPID PANEL: CPT

## 2022-10-24 DIAGNOSIS — E03.9 HYPOTHYROIDISM, UNSPECIFIED TYPE: ICD-10-CM

## 2022-10-24 RX ORDER — LEVOTHYROXINE SODIUM 0.05 MG/1
100 TABLET ORAL DAILY
Qty: 180 TABLET | Refills: 0 | Status: SHIPPED | OUTPATIENT
Start: 2022-10-24

## 2022-11-07 DIAGNOSIS — I10 ESSENTIAL HYPERTENSION: ICD-10-CM

## 2022-11-07 DIAGNOSIS — E78.2 MIXED HYPERLIPIDEMIA: ICD-10-CM

## 2022-11-07 RX ORDER — LISINOPRIL 20 MG/1
TABLET ORAL
Qty: 90 TABLET | Refills: 0 | Status: SHIPPED | OUTPATIENT
Start: 2022-11-07 | End: 2022-11-11 | Stop reason: SDUPTHER

## 2022-11-07 RX ORDER — EZETIMIBE 10 MG/1
TABLET ORAL
Qty: 90 TABLET | Refills: 0 | Status: SHIPPED | OUTPATIENT
Start: 2022-11-07 | End: 2022-11-11 | Stop reason: SDUPTHER

## 2022-11-11 ENCOUNTER — OFFICE VISIT (OUTPATIENT)
Dept: FAMILY MEDICINE CLINIC | Facility: CLINIC | Age: 67
End: 2022-11-11

## 2022-11-11 VITALS
HEART RATE: 68 BPM | OXYGEN SATURATION: 98 % | DIASTOLIC BLOOD PRESSURE: 78 MMHG | WEIGHT: 247.6 LBS | SYSTOLIC BLOOD PRESSURE: 124 MMHG | RESPIRATION RATE: 14 BRPM | HEIGHT: 65 IN | TEMPERATURE: 98.2 F | BODY MASS INDEX: 41.25 KG/M2

## 2022-11-11 DIAGNOSIS — E11.9 TYPE 2 DIABETES MELLITUS WITHOUT COMPLICATION, WITHOUT LONG-TERM CURRENT USE OF INSULIN (HCC): Primary | ICD-10-CM

## 2022-11-11 DIAGNOSIS — E03.9 HYPOTHYROIDISM, UNSPECIFIED TYPE: ICD-10-CM

## 2022-11-11 DIAGNOSIS — I10 PRIMARY HYPERTENSION: ICD-10-CM

## 2022-11-11 DIAGNOSIS — Z23 NEED FOR VACCINATION: ICD-10-CM

## 2022-11-11 DIAGNOSIS — E78.2 MIXED HYPERLIPIDEMIA: ICD-10-CM

## 2022-11-11 RX ORDER — EZETIMIBE 10 MG/1
10 TABLET ORAL DAILY
Qty: 90 TABLET | Refills: 1 | Status: SHIPPED | OUTPATIENT
Start: 2022-11-11

## 2022-11-11 RX ORDER — LISINOPRIL 20 MG/1
20 TABLET ORAL DAILY
Qty: 90 TABLET | Refills: 1 | Status: SHIPPED | OUTPATIENT
Start: 2022-11-11

## 2022-11-11 NOTE — PROGRESS NOTES
Assessment/Plan:  Problem List Items Addressed This Visit        Endocrine    Hypothyroidism     The patient will continue on the current dose of her levothyroxine  We have made no changes today  Type 2 diabetes mellitus without complication, without long-term current use of insulin ([facility]) - Primary       Lab Results   Component Value Date    HGBA1C 6 3 (H) 10/21/2022   The patient's most recent hemoglobin A1c is at goal at 6 3  Her diabetes is under excellent control  She will continue with the current dose of her medication and will continue to work on improving her diet and exercise  We will see her back as scheduled and she will get the lab work done prior to that visit  Relevant Orders    Hemoglobin A1C    Comprehensive metabolic panel    Microalbumin / creatinine urine ratio    Lipid Panel with Direct LDL reflex       Cardiovascular and Mediastinum    Hypertension     The patient's blood pressure is well controlled on her current medication  We have made no changes today  She will continue to work on improving her diet and exercise  We will see her back as scheduled  Relevant Medications    lisinopril (ZESTRIL) 20 mg tablet    Other Relevant Orders    Hemoglobin A1C    Comprehensive metabolic panel    Microalbumin / creatinine urine ratio    Lipid Panel with Direct LDL reflex       Other    Hyperlipidemia     The patient will continue on her current medication and will continue to work on improving her diet and exercise  We will see her back as scheduled           Relevant Medications    ezetimibe (ZETIA) 10 mg tablet    Other Relevant Orders    Hemoglobin A1C    Comprehensive metabolic panel    Microalbumin / creatinine urine ratio    Lipid Panel with Direct LDL reflex      Other Visit Diagnoses     Need for vaccination        Relevant Orders    influenza vaccine, high-dose, PF 0 7 mL (FLUZONE HIGH-DOSE) (Completed)          Return in about 3 months (around 2/11/2023) for Recheck  I spent 15 minutes during the visit reviewing the history from the patient, performing the examination, discussing the findings with the patient, providing counseling and education, and making a plan  I spent 15 minutes ordering referrals and testing and documenting  Subjective:   Chief Complaint   Patient presents with   • Follow-up        Patient ID: Bryan Dumont is a 74 y o  female presents today for a routine checkup  Bryan Dumont is a 74 y o  female who presents today for follow-up of her type 2 diabetes, hypertension, hyperlipidemia, and hypothyroidism  The patient denies any chest pain, shortness of breath, or palpitations  There is no edema  There are no headaches or visual changes  There is no lightheadedness, dizziness, or fainting spells  The patient currently denies any nausea, vomiting, or GERD symptoms  she has normal bowel movements and normal urine output  she has a normal appetite  There are no problems  She is scheduled for a colonoscopy next week  Diabetes  She presents for her follow-up diabetic visit  She has type 2 diabetes mellitus  Her disease course has been stable  Pertinent negatives for hypoglycemia include no headaches or sweats  Pertinent negatives for diabetes include no blurred vision, no chest pain, no fatigue, no foot paresthesias, no polydipsia, no polyphagia, no polyuria, no visual change, no weakness and no weight loss  Symptoms are stable  Hypertension  This is a chronic problem  The current episode started more than 1 year ago  The problem is unchanged  The problem is controlled  Pertinent negatives include no anxiety, blurred vision, chest pain, headaches, malaise/fatigue, neck pain, orthopnea, palpitations, peripheral edema, PND, shortness of breath or sweats       The following portions of the patient's history were reviewed and updated as appropriate: allergies, current medications, past family history, past medical history, past social history, past surgical history and problem list   Patient Active Problem List   Diagnosis   • Female cystocele   • Generalized osteoarthritis   • Hyperlipidemia   • Hypertension   • Hypothyroidism   • Sleep disturbances   • Urge and stress incontinence   • Varicose veins of both lower extremities with pain   • Vitamin D deficiency   • Shoulder subluxation, right   • DJD of right shoulder   • Rotator cuff tear arthropathy of right shoulder   • Morbid obesity (HCC)   • Pancreatic pseudocyst   • Leukocytosis   • GERD (gastroesophageal reflux disease)   • Branchial cleft cyst   • Chronic pancreatitis (HCC)   • Primary osteoarthritis of right knee   • Type 2 diabetes mellitus without complication, without long-term current use of insulin ([facility] [address] )     Past Medical History:   Diagnosis Date   • Abscess of neck 3/2/2020   • Alcohol induced acute pancreatitis 8/25/2018   • Disease of thyroid gland    • GERD (gastroesophageal reflux disease)    • HL (hearing loss)    • Hyperlipidemia    • Polyp of sigmoid colon     last assessed 6/12/12   • Sleep disturbances     last assessed 6/5/14     Past Surgical History:   Procedure Laterality Date   • BREAST CYST ASPIRATION Right     20 yrs ago in dr office net results   • COLONOSCOPY  06/18/2011    Complete     Repeat in 5yrs     • IR IMAGE GUIDED ASPIRATION / DRAINAGE  3/3/2020   • NASAL FRACTURE SURGERY       Allergies   Allergen Reactions   • Statins Myalgia     Family History   Problem Relation Age of Onset   • Alzheimer's disease Mother    • Diabetes Mother    • No Known Problems Father    • No Known Problems Maternal Grandmother    • No Known Problems Maternal Grandfather    • No Known Problems Paternal Grandmother    • No Known Problems Paternal Grandfather    • No Known Problems Maternal Aunt    • No Known Problems Maternal Aunt    • No Known Problems Maternal Aunt    • No Known Problems Maternal Aunt    • No Known Problems Maternal Aunt    • No Known Problems Paternal Aunt    • No Known Problems Paternal Aunt    • No Known Problems Paternal Aunt      Social History     Socioeconomic History   • Marital status: Single     Spouse name: Not on file   • Number of children: Not on file   • Years of education: Not on file   • Highest education level: Not on file   Occupational History   • Occupation: retired    Tobacco Use   • Smoking status: Never Smoker   • Smokeless tobacco: Never Used   Vaping Use   • Vaping Use: Never used   Substance and Sexual Activity   • Alcohol use: Not Currently   • Drug use: No   • Sexual activity: Not Currently   Other Topics Concern   • Not on file   Social History Narrative    Sleep disturbances      Social Determinants of Health     Financial Resource Strain: Not on file   Food Insecurity: Not on file   Transportation Needs: Not on file   Physical Activity: Not on file   Stress: Not on file   Social Connections: Not on file   Intimate Partner Violence: Not At Risk   • Fear of Current or Ex-Partner: No   • Emotionally Abused: No   • Physically Abused: No   • Sexually Abused: No   Housing Stability: Not on file     Current Outpatient Medications on File Prior to Visit   Medication Sig Dispense Refill   • aspirin 81 MG tablet Take 1 tablet by mouth daily     • Cholecalciferol (Vitamin D3) 25 MCG (1000 UT) CAPS Take 1 capsule (1,000 Units total) by mouth daily 30 capsule 11   • Diclofenac Sodium (VOLTAREN) 1 % Apply 2 g topically 4 (four) times a day 100 g 0   • levothyroxine (Euthyrox) 50 mcg tablet Take 2 tablets (100 mcg total) by mouth daily 180 tablet 0   • metFORMIN (GLUCOPHAGE-XR) 500 mg 24 hr tablet Take 1 tablet (500 mg total) by mouth daily with dinner 90 tablet 1   • multivitamin (THERAGRAN) TABS Take 1 tablet by mouth     • Omega-3 Fatty Acids (FISH OIL PO) Take 2 g by mouth     • oxybutynin (DITROPAN-XL) 10 MG 24 hr tablet Take 10 mg by mouth daily     • pantoprazole (PROTONIX) 40 mg tablet Take 40 mg by mouth daily        No current facility-administered medications on file prior to visit  Review of Systems   Constitutional: Negative  Negative for fatigue, malaise/fatigue and weight loss  HENT: Negative  Eyes: Negative  Negative for blurred vision  Respiratory: Negative  Negative for shortness of breath  Cardiovascular: Negative  Negative for chest pain, palpitations, orthopnea and PND  Gastrointestinal: Negative  Endocrine: Negative  Negative for polydipsia, polyphagia and polyuria  Genitourinary: Negative  Musculoskeletal: Negative  Negative for neck pain  Skin: Negative  Allergic/Immunologic: Negative  Neurological: Negative  Negative for weakness and headaches  Hematological: Negative  Psychiatric/Behavioral: Negative  Objective:  Vitals:    11/11/22 1040 11/11/22 1109   BP: 140/88 124/78   BP Location: Right arm    Patient Position: Sitting    Cuff Size: Large    Pulse: 70 68   Resp: 14    Temp: 98 2 °F (36 8 °C)    SpO2: 98%    Weight: 112 kg (247 lb 9 6 oz)    Height: 5' 5" (1 651 m)      Body mass index is 41 2 kg/m²  Physical Exam  Constitutional:       Appearance: She is well-developed  HENT:      Head: Normocephalic and atraumatic  Mouth/Throat:      Pharynx: No oropharyngeal exudate  Eyes:      Conjunctiva/sclera: Conjunctivae normal       Pupils: Pupils are equal, round, and reactive to light  Neck:      Thyroid: No thyromegaly  Vascular: No JVD  Trachea: No tracheal deviation  Cardiovascular:      Rate and Rhythm: Normal rate and regular rhythm  Heart sounds: Normal heart sounds  No murmur heard  No friction rub  No gallop  Pulmonary:      Effort: Pulmonary effort is normal  No respiratory distress  Breath sounds: Normal breath sounds  No stridor  No wheezing or rales  Chest:      Chest wall: No tenderness  Abdominal:      General: Bowel sounds are normal  There is no distension  Palpations: Abdomen is soft  There is no mass  Tenderness: There is no abdominal tenderness  There is no guarding or rebound  Musculoskeletal:         General: No tenderness or deformity  Normal range of motion  Cervical back: Normal range of motion  Lymphadenopathy:      Cervical: No cervical adenopathy  Skin:     General: Skin is warm and dry  Neurological:      Mental Status: She is alert and oriented to person, place, and time  Cranial Nerves: No cranial nerve deficit  Motor: No abnormal muscle tone  Coordination: Coordination normal       Deep Tendon Reflexes: Reflexes are normal and symmetric   Reflexes normal          Appointment on 10/21/2022   Component Date Value   • Hemoglobin A1C 10/21/2022 6 3 (A)   • EAG 10/21/2022 134    • Sodium 10/21/2022 141    • Potassium 10/21/2022 4 3    • Chloride 10/21/2022 108    • CO2 10/21/2022 30    • ANION GAP 10/21/2022 3 (A)   • BUN 10/21/2022 17    • Creatinine 10/21/2022 0 73    • Glucose, Fasting 10/21/2022 116 (A)   • Calcium 10/21/2022 9 8    • AST 10/21/2022 20    • ALT 10/21/2022 28    • Alkaline Phosphatase 10/21/2022 80    • Total Protein 10/21/2022 7 1    • Albumin 10/21/2022 3 5    • Total Bilirubin 10/21/2022 0 52    • eGFR 10/21/2022 84    • Cholesterol 10/21/2022 197    • Triglycerides 10/21/2022 96    • HDL, Direct 10/21/2022 56    • LDL Calculated 10/21/2022 122 (A)   • Free T4 10/21/2022 1 01    • TSH 3RD GENERATON 10/21/2022 2 290

## 2022-11-14 NOTE — ASSESSMENT & PLAN NOTE
The patient will continue on her current medication and will continue to work on improving her diet and exercise  We will see her back as scheduled

## 2022-11-14 NOTE — ASSESSMENT & PLAN NOTE
Lab Results   Component Value Date    HGBA1C 6 3 (H) 10/21/2022   The patient's most recent hemoglobin A1c is at goal at 6 3  Her diabetes is under excellent control  She will continue with the current dose of her medication and will continue to work on improving her diet and exercise  We will see her back as scheduled and she will get the lab work done prior to that visit

## 2022-11-14 NOTE — ASSESSMENT & PLAN NOTE
The patient's blood pressure is well controlled on her current medication  We have made no changes today  She will continue to work on improving her diet and exercise  We will see her back as scheduled

## 2022-11-28 DIAGNOSIS — I10 PRIMARY HYPERTENSION: ICD-10-CM

## 2022-11-28 DIAGNOSIS — E78.2 MIXED HYPERLIPIDEMIA: ICD-10-CM

## 2022-11-28 DIAGNOSIS — E11.9 TYPE 2 DIABETES MELLITUS WITHOUT COMPLICATION, WITHOUT LONG-TERM CURRENT USE OF INSULIN (HCC): ICD-10-CM

## 2022-11-28 RX ORDER — METFORMIN HYDROCHLORIDE 500 MG/1
TABLET, EXTENDED RELEASE ORAL
Qty: 90 TABLET | Refills: 1 | Status: SHIPPED | OUTPATIENT
Start: 2022-11-28

## 2023-01-16 ENCOUNTER — APPOINTMENT (OUTPATIENT)
Dept: LAB | Facility: CLINIC | Age: 68
End: 2023-01-16

## 2023-01-16 DIAGNOSIS — E03.9 HYPOTHYROIDISM, UNSPECIFIED TYPE: ICD-10-CM

## 2023-01-16 DIAGNOSIS — E78.2 MIXED HYPERLIPIDEMIA: ICD-10-CM

## 2023-01-16 DIAGNOSIS — E11.9 TYPE 2 DIABETES MELLITUS WITHOUT COMPLICATION, WITHOUT LONG-TERM CURRENT USE OF INSULIN (HCC): ICD-10-CM

## 2023-01-16 DIAGNOSIS — I10 PRIMARY HYPERTENSION: ICD-10-CM

## 2023-01-16 LAB
ALBUMIN SERPL BCP-MCNC: 3.4 G/DL (ref 3.5–5)
ALP SERPL-CCNC: 74 U/L (ref 46–116)
ALT SERPL W P-5'-P-CCNC: 27 U/L (ref 12–78)
ANION GAP SERPL CALCULATED.3IONS-SCNC: 3 MMOL/L (ref 4–13)
AST SERPL W P-5'-P-CCNC: 18 U/L (ref 5–45)
BILIRUB SERPL-MCNC: 0.28 MG/DL (ref 0.2–1)
BUN SERPL-MCNC: 18 MG/DL (ref 5–25)
CALCIUM ALBUM COR SERPL-MCNC: 10 MG/DL (ref 8.3–10.1)
CALCIUM SERPL-MCNC: 9.5 MG/DL (ref 8.3–10.1)
CHLORIDE SERPL-SCNC: 108 MMOL/L (ref 96–108)
CHOLEST SERPL-MCNC: 207 MG/DL
CO2 SERPL-SCNC: 30 MMOL/L (ref 21–32)
CREAT SERPL-MCNC: 0.76 MG/DL (ref 0.6–1.3)
CREAT UR-MCNC: 120 MG/DL
EST. AVERAGE GLUCOSE BLD GHB EST-MCNC: 137 MG/DL
GFR SERPL CREATININE-BSD FRML MDRD: 80 ML/MIN/1.73SQ M
GLUCOSE P FAST SERPL-MCNC: 116 MG/DL (ref 65–99)
HBA1C MFR BLD: 6.4 %
HDLC SERPL-MCNC: 54 MG/DL
LDLC SERPL CALC-MCNC: 126 MG/DL (ref 0–100)
MICROALBUMIN UR-MCNC: 64.9 MG/L (ref 0–20)
MICROALBUMIN/CREAT 24H UR: 54 MG/G CREATININE (ref 0–30)
POTASSIUM SERPL-SCNC: 4.5 MMOL/L (ref 3.5–5.3)
PROT SERPL-MCNC: 7.2 G/DL (ref 6.4–8.4)
SODIUM SERPL-SCNC: 141 MMOL/L (ref 135–147)
TRIGL SERPL-MCNC: 135 MG/DL

## 2023-01-16 RX ORDER — LEVOTHYROXINE SODIUM 0.05 MG/1
TABLET ORAL
Qty: 180 TABLET | Refills: 0 | Status: SHIPPED | OUTPATIENT
Start: 2023-01-16 | End: 2023-01-16 | Stop reason: SDUPTHER

## 2023-01-16 RX ORDER — LEVOTHYROXINE SODIUM 0.05 MG/1
100 TABLET ORAL DAILY
Qty: 180 TABLET | Refills: 1 | Status: SHIPPED | OUTPATIENT
Start: 2023-01-16

## 2023-03-17 ENCOUNTER — OFFICE VISIT (OUTPATIENT)
Dept: URGENT CARE | Facility: CLINIC | Age: 68
End: 2023-03-17

## 2023-03-17 VITALS
DIASTOLIC BLOOD PRESSURE: 72 MMHG | SYSTOLIC BLOOD PRESSURE: 124 MMHG | TEMPERATURE: 97.8 F | HEART RATE: 78 BPM | OXYGEN SATURATION: 99 % | RESPIRATION RATE: 18 BRPM

## 2023-03-17 DIAGNOSIS — Z20.822 ENCOUNTER FOR SCREENING LABORATORY TESTING FOR SEVERE ACUTE RESPIRATORY SYNDROME CORONAVIRUS 2 (SARS-COV-2): Primary | ICD-10-CM

## 2023-03-17 DIAGNOSIS — J01.00 ACUTE MAXILLARY SINUSITIS, RECURRENCE NOT SPECIFIED: ICD-10-CM

## 2023-03-17 DIAGNOSIS — J40 BRONCHITIS: ICD-10-CM

## 2023-03-17 LAB
SARS-COV-2 AG UPPER RESP QL IA: NEGATIVE
VALID CONTROL: NORMAL

## 2023-03-17 RX ORDER — FLUTICASONE PROPIONATE 50 MCG
1 SPRAY, SUSPENSION (ML) NASAL DAILY
Qty: 9.9 ML | Refills: 0 | Status: SHIPPED | OUTPATIENT
Start: 2023-03-17

## 2023-03-17 RX ORDER — DOXYCYCLINE 100 MG/1
100 TABLET ORAL 2 TIMES DAILY
Qty: 20 TABLET | Refills: 0 | Status: SHIPPED | OUTPATIENT
Start: 2023-03-17 | End: 2023-03-27

## 2023-03-17 RX ORDER — BENZONATATE 100 MG/1
100 CAPSULE ORAL 3 TIMES DAILY PRN
Qty: 20 CAPSULE | Refills: 0 | Status: SHIPPED | OUTPATIENT
Start: 2023-03-17 | End: 2023-03-24 | Stop reason: SDUPTHER

## 2023-03-24 ENCOUNTER — OFFICE VISIT (OUTPATIENT)
Dept: FAMILY MEDICINE CLINIC | Facility: CLINIC | Age: 68
End: 2023-03-24

## 2023-03-24 VITALS
WEIGHT: 247.8 LBS | SYSTOLIC BLOOD PRESSURE: 130 MMHG | TEMPERATURE: 98 F | BODY MASS INDEX: 41.29 KG/M2 | OXYGEN SATURATION: 97 % | DIASTOLIC BLOOD PRESSURE: 90 MMHG | HEIGHT: 65 IN | RESPIRATION RATE: 18 BRPM | HEART RATE: 88 BPM

## 2023-03-24 DIAGNOSIS — J40 BRONCHITIS: ICD-10-CM

## 2023-03-24 RX ORDER — BENZONATATE 100 MG/1
100 CAPSULE ORAL 3 TIMES DAILY PRN
Qty: 30 CAPSULE | Refills: 0 | Status: SHIPPED | OUTPATIENT
Start: 2023-03-24

## 2023-03-24 NOTE — PROGRESS NOTES
Name: Orlando Ulloa      : 1955      MRN: [id number]  Encounter Provider: Mercedez Coy PA-C  Encounter Date: 3/24/2023   Encounter department: [facility]    Assessment & Plan     1  Bronchitis  Comments:  Patient was seen in urgent care on  treated with doxycycline, Flonase and Tessalon perles  symptoms improving  Would like refill on Tessalon Perles  Orders:  -     benzonatate (TESSALON PERLES) 100 mg capsule; Take 1 capsule (100 mg total) by mouth 3 (three) times a day as needed for cough       Patient will call with any questions, concerns, persistent or worsening symptoms  Subjective      HPI   68-year-old female here today for sick appointment  Patient was evaluated and treated at urgent care on  for acute sinusitis and bronchitis  COVID was negative  Doxycycline, Tessalon Perles and Flonase was ordered for patient  Patient has couple more days of doxycycline to complete however ran out of her [place] [facility]ег and would like a refill  Patient states that her symptoms are improving  Denies any sinusitis symptoms at this time  Does have some mild nasal congestion  Cough is improving as well  Denies any dyspnea chest pain fevers, chills, abdominal pain or GI symptoms    Has any sore throat  Review of Systems  As per HPI  All other systems negative  Current Outpatient Medications on File Prior to Visit   Medication Sig   • aspirin 81 MG tablet Take 1 tablet by mouth daily   • Cholecalciferol (Vitamin D3) 25 MCG (1000 UT) CAPS Take 1 capsule (1,000 Units total) by mouth daily   • doxycycline (ADOXA) 100 MG tablet Take 1 tablet (100 mg total) by mouth 2 (two) times a day for 10 days   • ezetimibe (ZETIA) 10 mg tablet Take 1 tablet (10 mg total) by mouth daily   • fluticasone (FLONASE) 50 mcg/act nasal spray 1 spray into each nostril daily   • levothyroxine 50 mcg tablet Take 2 tablets (100 mcg total) by mouth daily   • lisinopril (ZESTRIL) 20 mg tablet Take 1 tablet (20 mg total) by mouth daily   • metFORMIN (GLUCOPHAGE-XR) 500 mg 24 hr tablet TAKE 1 TABLET BY MOUTH ONCE DAILY WITH  DINNER   • multivitamin (THERAGRAN) TABS Take 1 tablet by mouth   • Omega-3 Fatty Acids (FISH OIL PO) Take 2 g by mouth   • oxybutynin (DITROPAN-XL) 10 MG 24 hr tablet Take 10 mg by mouth daily   • pantoprazole (PROTONIX) 40 mg tablet Take 40 mg by mouth daily   • [DISCONTINUED] benzonatate (TESSALON PERLES) 100 mg capsule Take 1 capsule (100 mg total) by mouth 3 (three) times a day as needed for cough   • Diclofenac Sodium (VOLTAREN) 1 % Apply 2 g topically 4 (four) times a day (Patient not taking: Reported on 3/24/2023)       Objective     /90 (BP Location: Left arm, Patient Position: Sitting, Cuff Size: Large)   Pulse 88   Temp 98 °F (36 7 °C)   Resp 18   Ht 5' 5" (1 651 m)   Wt 112 kg (247 lb 12 8 oz)   LMP  (LMP Unknown)   SpO2 97%   BMI 41 24 kg/m²     Physical Exam  Vitals and nursing note reviewed  Constitutional:       General: She is not in acute distress  Appearance: She is not ill-appearing, toxic-appearing or diaphoretic  HENT:      Head: Normocephalic  Right Ear: Tympanic membrane normal       Left Ear: Tympanic membrane normal       Nose: Congestion present  Comments: No sinus tenderness  Mouth/Throat:      Mouth: Mucous membranes are moist       Pharynx: Uvula midline  No oropharyngeal exudate or posterior oropharyngeal erythema  Eyes:      General: No scleral icterus  Conjunctiva/sclera: Conjunctivae normal    Cardiovascular:      Rate and Rhythm: Normal rate and regular rhythm  Heart sounds: Normal heart sounds  Pulmonary:      Effort: Pulmonary effort is normal  No respiratory distress  Breath sounds: Normal breath sounds  No wheezing, rhonchi or rales  Abdominal:      General: Bowel sounds are normal       Palpations: Abdomen is soft  Tenderness: There is no abdominal tenderness  Musculoskeletal:      Cervical back: Neck supple  Right lower leg: No edema  Left lower leg: No edema  Lymphadenopathy:      Cervical: No cervical adenopathy  Skin:     Capillary Refill: Capillary refill takes less than 2 seconds  Neurological:      General: No focal deficit present  Mental Status: She is alert and oriented to person, place, and time     Psychiatric:         Mood and Affect: Mood normal        Mercedez Coy PA-C

## 2023-04-19 PROBLEM — D72.829 LEUKOCYTOSIS: Status: RESOLVED | Noted: 2020-03-02 | Resolved: 2023-04-19

## 2023-04-19 PROBLEM — M54.2 CHRONIC NECK PAIN: Status: ACTIVE | Noted: 2023-04-19

## 2023-04-19 PROBLEM — G89.29 CHRONIC NECK PAIN: Status: ACTIVE | Noted: 2023-04-19

## 2023-04-25 ENCOUNTER — APPOINTMENT (OUTPATIENT)
Dept: RADIOLOGY | Facility: CLINIC | Age: 68
End: 2023-04-25

## 2023-04-25 DIAGNOSIS — G89.29 CHRONIC NECK PAIN: ICD-10-CM

## 2023-04-25 DIAGNOSIS — M54.2 CHRONIC NECK PAIN: ICD-10-CM

## 2023-05-04 ENCOUNTER — TELEPHONE (OUTPATIENT)
Dept: FAMILY MEDICINE CLINIC | Facility: CLINIC | Age: 68
End: 2023-05-04

## 2023-05-04 NOTE — TELEPHONE ENCOUNTER
Pt returned call, reviewed results and recommendations  Pt reports she has the order for PT and will start it when she is finished PT of her knee

## 2023-05-04 NOTE — TELEPHONE ENCOUNTER
Left detailed message for pt with results of X-ray and recommendations to follow up with PT and neck stretches  Advised pt to return call with any questions

## 2023-05-16 DIAGNOSIS — I10 PRIMARY HYPERTENSION: ICD-10-CM

## 2023-05-16 DIAGNOSIS — E78.2 MIXED HYPERLIPIDEMIA: ICD-10-CM

## 2023-05-16 RX ORDER — LISINOPRIL 20 MG/1
TABLET ORAL
Qty: 90 TABLET | Refills: 0 | Status: SHIPPED | OUTPATIENT
Start: 2023-05-16

## 2023-05-16 RX ORDER — EZETIMIBE 10 MG/1
TABLET ORAL
Qty: 90 TABLET | Refills: 0 | Status: SHIPPED | OUTPATIENT
Start: 2023-05-16

## 2023-05-23 ENCOUNTER — HOSPITAL ENCOUNTER (OUTPATIENT)
Dept: MAMMOGRAPHY | Facility: CLINIC | Age: 68
Discharge: HOME | End: 2023-05-23

## 2023-05-23 VITALS — WEIGHT: 246 LBS | BODY MASS INDEX: 40.98 KG/M2 | HEIGHT: 65 IN

## 2023-05-23 DIAGNOSIS — Z12.31 ENCOUNTER FOR SCREENING MAMMOGRAM FOR BREAST CANCER: ICD-10-CM

## 2023-06-05 ENCOUNTER — APPOINTMENT (OUTPATIENT)
Dept: LAB | Facility: CLINIC | Age: 68
End: 2023-06-05
Payer: MEDICARE

## 2023-06-05 DIAGNOSIS — E66.01 MORBID OBESITY (HCC): ICD-10-CM

## 2023-06-05 DIAGNOSIS — G89.29 CHRONIC NECK PAIN: ICD-10-CM

## 2023-06-05 DIAGNOSIS — E11.9 TYPE 2 DIABETES MELLITUS WITHOUT COMPLICATION, WITHOUT LONG-TERM CURRENT USE OF INSULIN (HCC): ICD-10-CM

## 2023-06-05 DIAGNOSIS — M54.2 CHRONIC NECK PAIN: ICD-10-CM

## 2023-06-05 LAB
ALBUMIN SERPL BCP-MCNC: 3.5 G/DL (ref 3.5–5)
ALP SERPL-CCNC: 78 U/L (ref 46–116)
ALT SERPL W P-5'-P-CCNC: 31 U/L (ref 12–78)
ANION GAP SERPL CALCULATED.3IONS-SCNC: 1 MMOL/L (ref 4–13)
AST SERPL W P-5'-P-CCNC: 21 U/L (ref 5–45)
BILIRUB SERPL-MCNC: 0.29 MG/DL (ref 0.2–1)
BUN SERPL-MCNC: 16 MG/DL (ref 5–25)
CALCIUM SERPL-MCNC: 9.2 MG/DL (ref 8.3–10.1)
CHLORIDE SERPL-SCNC: 110 MMOL/L (ref 96–108)
CHOLEST SERPL-MCNC: 199 MG/DL
CO2 SERPL-SCNC: 30 MMOL/L (ref 21–32)
CREAT SERPL-MCNC: 0.75 MG/DL (ref 0.6–1.3)
CREAT UR-MCNC: 94.3 MG/DL
EST. AVERAGE GLUCOSE BLD GHB EST-MCNC: 140 MG/DL
GFR SERPL CREATININE-BSD FRML MDRD: 81 ML/MIN/1.73SQ M
GLUCOSE P FAST SERPL-MCNC: 110 MG/DL (ref 65–99)
HBA1C MFR BLD: 6.5 %
HDLC SERPL-MCNC: 47 MG/DL
LDLC SERPL CALC-MCNC: 115 MG/DL (ref 0–100)
MICROALBUMIN UR-MCNC: 58.2 MG/L (ref 0–20)
MICROALBUMIN/CREAT 24H UR: 62 MG/G CREATININE (ref 0–30)
POTASSIUM SERPL-SCNC: 4.5 MMOL/L (ref 3.5–5.3)
PROT SERPL-MCNC: 7.2 G/DL (ref 6.4–8.4)
SODIUM SERPL-SCNC: 141 MMOL/L (ref 135–147)
TRIGL SERPL-MCNC: 185 MG/DL

## 2023-06-05 PROCEDURE — 36415 COLL VENOUS BLD VENIPUNCTURE: CPT

## 2023-06-05 PROCEDURE — 80061 LIPID PANEL: CPT

## 2023-06-05 PROCEDURE — 82043 UR ALBUMIN QUANTITATIVE: CPT

## 2023-06-05 PROCEDURE — 83036 HEMOGLOBIN GLYCOSYLATED A1C: CPT

## 2023-06-05 PROCEDURE — 82570 ASSAY OF URINE CREATININE: CPT

## 2023-06-05 PROCEDURE — 80053 COMPREHEN METABOLIC PANEL: CPT

## 2023-06-14 DIAGNOSIS — I10 PRIMARY HYPERTENSION: ICD-10-CM

## 2023-06-14 DIAGNOSIS — E78.2 MIXED HYPERLIPIDEMIA: ICD-10-CM

## 2023-06-14 DIAGNOSIS — E11.9 TYPE 2 DIABETES MELLITUS WITHOUT COMPLICATION, WITHOUT LONG-TERM CURRENT USE OF INSULIN (HCC): ICD-10-CM

## 2023-06-14 RX ORDER — METFORMIN HYDROCHLORIDE 500 MG/1
TABLET, EXTENDED RELEASE ORAL
Qty: 90 TABLET | Refills: 0 | Status: SHIPPED | OUTPATIENT
Start: 2023-06-14

## 2023-06-23 ENCOUNTER — RA CDI HCC (OUTPATIENT)
Dept: OTHER | Facility: HOSPITAL | Age: 68
End: 2023-06-23

## 2023-06-30 ENCOUNTER — OFFICE VISIT (OUTPATIENT)
Dept: FAMILY MEDICINE CLINIC | Facility: CLINIC | Age: 68
End: 2023-06-30
Payer: MEDICARE

## 2023-06-30 VITALS
OXYGEN SATURATION: 96 % | RESPIRATION RATE: 18 BRPM | DIASTOLIC BLOOD PRESSURE: 80 MMHG | SYSTOLIC BLOOD PRESSURE: 120 MMHG | HEART RATE: 74 BPM | TEMPERATURE: 97.6 F | HEIGHT: 65 IN | WEIGHT: 243.2 LBS | BODY MASS INDEX: 40.52 KG/M2

## 2023-06-30 DIAGNOSIS — E03.9 HYPOTHYROIDISM, UNSPECIFIED TYPE: ICD-10-CM

## 2023-06-30 DIAGNOSIS — I10 PRIMARY HYPERTENSION: ICD-10-CM

## 2023-06-30 DIAGNOSIS — Z78.9 STATIN INTOLERANCE: ICD-10-CM

## 2023-06-30 DIAGNOSIS — E11.9 TYPE 2 DIABETES MELLITUS WITHOUT COMPLICATION, WITHOUT LONG-TERM CURRENT USE OF INSULIN (HCC): ICD-10-CM

## 2023-06-30 DIAGNOSIS — Z00.00 MEDICARE ANNUAL WELLNESS VISIT, SUBSEQUENT: Primary | ICD-10-CM

## 2023-06-30 DIAGNOSIS — E78.2 MIXED HYPERLIPIDEMIA: ICD-10-CM

## 2023-06-30 NOTE — PATIENT INSTRUCTIONS
Medicare Preventive Visit Patient Instructions  Thank you for completing your Welcome to Medicare Visit or Medicare Annual Wellness Visit today  Your next wellness visit will be due in one year (6/30/2024)  The screening/preventive services that you may require over the next 5-10 years are detailed below  Some tests may not apply to you based off risk factors and/or age  Screening tests ordered at today's visit but not completed yet may show as past due  Also, please note that scanned in results may not display below  Preventive Screenings:  Service Recommendations Previous Testing/Comments   Colorectal Cancer Screening  * Colonoscopy    * Fecal Occult Blood Test (FOBT)/Fecal Immunochemical Test (FIT)  * Fecal DNA/Cologuard Test  * Flexible Sigmoidoscopy Age: 39-70 years old   Colonoscopy: every 10 years (may be performed more frequently if at higher risk)  OR  FOBT/FIT: every 1 year  OR  Cologuard: every 3 years  OR  Sigmoidoscopy: every 5 years  Screening may be recommended earlier than age 39 if at higher risk for colorectal cancer  Also, an individualized decision between you and your healthcare provider will decide whether screening between the ages of 74-80 would be appropriate  Colonoscopy: 11/16/2022  FOBT/FIT: Not on file  Cologuard: Not on file  Sigmoidoscopy: Not on file    Screening Current     Breast Cancer Screening Age: 36 years old  Frequency: every 1-2 years  Not required if history of left and right mastectomy Mammogram: 05/23/2023    Screening Current   Cervical Cancer Screening Between the ages of 21-29, pap smear recommended once every 3 years  Between the ages of 33-67, can perform pap smear with HPV co-testing every 5 years     Recommendations may differ for women with a history of total hysterectomy, cervical cancer, or abnormal pap smears in past  Pap Smear: 08/23/2018    Screening Not Indicated   Hepatitis C Screening Once for adults born between 1945 and 1965  More frequently in patients at high risk for Hepatitis C Hep C Antibody: 08/27/2019    Screening Current   Diabetes Screening 1-2 times per year if you're at risk for diabetes or have pre-diabetes Fasting glucose: 110 mg/dL (6/5/2023)  A1C: 6 5 % (6/5/2023)  Screening Not Indicated  History Diabetes   Cholesterol Screening Once every 5 years if you don't have a lipid disorder  May order more often based on risk factors  Lipid panel: 06/05/2023    Screening Not Indicated  History Lipid Disorder     Other Preventive Screenings Covered by Medicare:  1  Abdominal Aortic Aneurysm (AAA) Screening: covered once if your at risk  You're considered to be at risk if you have a family history of AAA  2  Lung Cancer Screening: covers low dose CT scan once per year if you meet all of the following conditions: (1) Age 50-69; (2) No signs or symptoms of lung cancer; (3) Current smoker or have quit smoking within the last 15 years; (4) You have a tobacco smoking history of at least 20 pack years (packs per day multiplied by number of years you smoked); (5) You get a written order from a healthcare provider  3  Glaucoma Screening: covered annually if you're considered high risk: (1) You have diabetes OR (2) Family history of glaucoma OR (3)  aged 48 and older OR (3)  American aged 72 and older  3  Osteoporosis Screening: covered every 2 years if you meet one of the following conditions: (1) You're estrogen deficient and at risk for osteoporosis based off medical history and other findings; (2) Have a vertebral abnormality; (3) On glucocorticoid therapy for more than 3 months; (4) Have primary hyperparathyroidism; (5) On osteoporosis medications and need to assess response to drug therapy  · Last bone density test (DXA Scan): 05/09/2022   5  HIV Screening: covered annually if you're between the age of 15-65  Also covered annually if you are younger than 13 and older than 72 with risk factors for HIV infection   For pregnant patients, it is covered up to 3 times per pregnancy  Immunizations:  Immunization Recommendations   Influenza Vaccine Annual influenza vaccination during flu season is recommended for all persons aged >= 6 months who do not have contraindications   Pneumococcal Vaccine   * Pneumococcal conjugate vaccine = PCV13 (Prevnar 13), PCV15 (Vaxneuvance), PCV20 (Prevnar 20)  * Pneumococcal polysaccharide vaccine = PPSV23 (Pneumovax) Adults 25-60 years old: 1-3 doses may be recommended based on certain risk factors  Adults 72 years old: 1-2 doses may be recommended based off what pneumonia vaccine you previously received   Hepatitis B Vaccine 3 dose series if at intermediate or high risk (ex: diabetes, end stage renal disease, liver disease)   Tetanus (Td) Vaccine - COST NOT COVERED BY MEDICARE PART B Following completion of primary series, a booster dose should be given every 10 years to maintain immunity against tetanus  Td may also be given as tetanus wound prophylaxis  Tdap Vaccine - COST NOT COVERED BY MEDICARE PART B Recommended at least once for all adults  For pregnant patients, recommended with each pregnancy  Shingles Vaccine (Shingrix) - COST NOT COVERED BY MEDICARE PART B  2 shot series recommended in those aged 48 and above     Health Maintenance Due:      Topic Date Due   • DXA SCAN  05/09/2024   • Breast Cancer Screening: Mammogram  05/23/2024   • Colorectal Cancer Screening  09/07/2027   • Hepatitis C Screening  Completed     Immunizations Due:      Topic Date Due   • COVID-19 Vaccine (6 - [facility] [place] series) 02/20/2023     Advance Directives   What are advance directives? Advance directives are legal documents that state your wishes and plans for medical care  These plans are made ahead of time in case you lose your ability to make decisions for yourself  Advance directives can apply to any medical decision, such as the treatments you want, and if you want to donate organs     What are the types of advance directives? There are many types of advance directives, and each state has rules about how to use them  You may choose a combination of any of the following:  · Living will: This is a written record of the treatment you want  You can also choose which treatments you do not want, which to limit, and which to stop at a certain time  This includes surgery, medicine, IV fluid, and tube feedings  · Durable power of  for healthcare [facility]): This is a written record that states who you want to make healthcare choices for you when you are unable to make them for yourself  This person, called a proxy, is usually a family member or a friend  You may choose more than 1 proxy  · Do not resuscitate (DNR) order:  A DNR order is used in case your heart stops beating or you stop breathing  It is a request not to have certain forms of treatment, such as CPR  A DNR order may be included in other types of advance directives  · Medical directive: This covers the care that you want if you are in a coma, near death, or unable to make decisions for yourself  You can list the treatments you want for each condition  Treatment may include pain medicine, surgery, blood transfusions, dialysis, IV or tube feedings, and a ventilator (breathing machine)  · Values history: This document has questions about your views, beliefs, and how you feel and think about life  This information can help others choose the care that you would choose  Why are advance directives important? An advance directive helps you control your care  Although spoken wishes may be used, it is better to have your wishes written down  Spoken wishes can be misunderstood, or not followed  Treatments may be given even if you do not want them  An advance directive may make it easier for your family to make difficult choices about your care  Fall Prevention    Fall prevention  includes ways to make your home and other areas safer   It also includes ways you can move more carefully to prevent a fall  Health conditions that cause changes in your blood pressure, vision, or muscle strength and coordination may increase your risk for falls  Medicines may also increase your risk for falls if they make you dizzy, weak, or sleepy  Fall prevention tips:   · Stand or sit up slowly  · Use assistive devices as directed  · Wear shoes that fit well and have soles that   · Wear a personal alarm  · Stay active  · Manage your medical conditions  Home Safety Tips:  · Add items to prevent falls in the bathroom  · Keep paths clear  · Install bright lights in your home  · Keep items you use often on shelves within reach  · Paint or place reflective tape on the edges of your stairs  Urinary Incontinence   Urinary incontinence (UI)  is when you lose control of your bladder  UI develops because your bladder cannot store or empty urine properly  The 3 most common types of UI are stress incontinence, urge incontinence, or both  Medicines:   · May be given to help strengthen your bladder control  Report any side effects of medication to your healthcare provider  Do pelvic muscle exercises often:  Your pelvic muscles help you stop urinating  Squeeze these muscles tight for 5 seconds, then relax for 5 seconds  Gradually work up to squeezing for 10 seconds  Do 3 sets of 15 repetitions a day, or as directed  This will help strengthen your pelvic muscles and improve bladder control  Train your bladder:  Go to the bathroom at set times, such as every 2 hours, even if you do not feel the urge to go  You can also try to hold your urine when you feel the urge to go  For example, hold your urine for 5 minutes when you feel the urge to go  As that becomes easier, hold your urine for 10 minutes  Self-care:   · Keep a UI record  Write down how often you leak urine and how much you leak  Make a note of what you were doing when you leaked urine    · Drink liquids as directed  You may need to limit the amount of liquid you drink to help control your urine leakage  Do not drink any liquid right before you go to bed  Limit or do not have drinks that contain caffeine or alcohol  · Prevent constipation  Eat a variety of high-fiber foods  Good examples are high-fiber cereals, beans, vegetables, and whole-grain breads  Walking is the best way to trigger your intestines to have a bowel movement  · Exercise regularly and maintain a healthy weight  Weight loss and exercise will decrease pressure on your bladder and help you control your leakage  · Use a catheter as directed  to help empty your bladder  A catheter is a tiny, plastic tube that is put into your bladder to drain your urine  · Go to behavior therapy as directed  Behavior therapy may be used to help you learn to control your urge to urinate  Weight Management   Why it is important to manage your weight:  Being overweight increases your risk of health conditions such as heart disease, high blood pressure, type 2 diabetes, and certain types of cancer  It can also increase your risk for osteoarthritis, sleep apnea, and other respiratory problems  Aim for a slow, steady weight loss  Even a small amount of weight loss can lower your risk of health problems  How to lose weight safely:  A safe and healthy way to lose weight is to eat fewer calories and get regular exercise  You can lose up about 1 pound a week by decreasing the number of calories you eat by 500 calories each day  Healthy meal plan for weight management:  A healthy meal plan includes a variety of foods, contains fewer calories, and helps you stay healthy  A healthy meal plan includes the following:  · Eat whole-grain foods more often  A healthy meal plan should contain fiber  Fiber is the part of grains, fruits, and vegetables that is not broken down by your body  Whole-grain foods are healthy and provide extra fiber in your diet   Some examples of whole-grain foods are whole-wheat breads and pastas, oatmeal, brown rice, and bulgur  · Eat a variety of vegetables every day  Include dark, leafy greens such as spinach, kale, pablo greens, and mustard greens  Eat yellow and orange vegetables such as carrots, sweet potatoes, and winter squash  · Eat a variety of fruits every day  Choose fresh or canned fruit (canned in its own juice or light syrup) instead of juice  Fruit juice has very little or no fiber  · Eat low-fat dairy foods  Drink fat-free (skim) milk or 1% milk  Eat fat-free yogurt and low-fat cottage cheese  Try low-fat cheeses such as mozzarella and other reduced-fat cheeses  · Choose meat and other protein foods that are low in fat  Choose beans or other legumes such as split peas or lentils  Choose fish, skinless poultry (chicken or turkey), or lean cuts of red meat (beef or pork)  Before you cook meat or poultry, cut off any visible fat  · Use less fat and oil  Try baking foods instead of frying them  Add less fat, such as margarine, sour cream, regular salad dressing and mayonnaise to foods  Eat fewer high-fat foods  Some examples of high-fat foods include french fries, doughnuts, ice cream, and cakes  · Eat fewer sweets  Limit foods and drinks that are high in sugar  This includes candy, cookies, regular soda, and sweetened drinks  Exercise:  Exercise at least 30 minutes per day on most days of the week  Some examples of exercise include walking, biking, dancing, and swimming  You can also fit in more physical activity by taking the stairs instead of the elevator or parking farther away from stores  Ask your healthcare provider about the best exercise plan for you  © Copyright [employer] 2018 Information is for End User's use only and may not be sold, redistributed or otherwise used for commercial purposes   All illustrations and images included in CareNotes® are the copyrighted property of [employer]  or [employer]

## 2023-06-30 NOTE — ASSESSMENT & PLAN NOTE
The patient cannot tolerate statin therapy  She continues to be stable on the current dose of the Zetia  She will continue to work on improving her diet and exercise  We will see her back as scheduled

## 2023-06-30 NOTE — ASSESSMENT & PLAN NOTE
Lab Results   Component Value Date    HGBA1C 6 5 (H) 06/05/2023   The patient's hemoglobin A1c is good at 6 5 demonstrating good control of her type 2 diabetes  We have made no changes today  She will continue with her healthy diet and exercise  She will try to avoid concentrated sweets  She will continue with her current medication  We will see her back as scheduled in 6 months or sooner as needed

## 2023-06-30 NOTE — PROGRESS NOTES
Assessment and Plan:     Problem List Items Addressed This Visit        Endocrine    Hypothyroidism     The patient is stable on the current dose of her levothyroxine  We have made no changes today  Relevant Orders    Albumin / creatinine urine ratio    Comprehensive metabolic panel    Hemoglobin A1C    Lipid Panel with Direct LDL reflex    TSH, 3rd generation with Free T4 reflex    Type 2 diabetes mellitus without complication, without long-term current use of insulin ([facility])       Lab Results   Component Value Date    HGBA1C 6 5 (H) 06/05/2023   The patient's hemoglobin A1c is good at 6 5 demonstrating good control of her type 2 diabetes  We have made no changes today  She will continue with her healthy diet and exercise  She will try to avoid concentrated sweets  She will continue with her current medication  We will see her back as scheduled in 6 months or sooner as needed  Relevant Orders    Albumin / creatinine urine ratio    Comprehensive metabolic panel    Hemoglobin A1C    Lipid Panel with Direct LDL reflex    TSH, 3rd generation with Free T4 reflex       Cardiovascular and Mediastinum    Hypertension     The patient's blood pressure stable on the current dose of her lisinopril  We have made no changes today  We will see her back as scheduled  Other    Hyperlipidemia     The patient cannot tolerate statin therapy  She continues to be stable on the current dose of the Zetia  She will continue to work on improving her diet and exercise  We will see her back as scheduled           Relevant Orders    Albumin / creatinine urine ratio    Comprehensive metabolic panel    Hemoglobin A1C    Lipid Panel with Direct LDL reflex    TSH, 3rd generation with Free T4 reflex    Statin intolerance    Relevant Orders    Albumin / creatinine urine ratio    Comprehensive metabolic panel    Hemoglobin A1C    Lipid Panel with Direct LDL reflex    TSH, 3rd generation with Free T4 reflex   Other Visit Diagnoses     Medicare annual wellness visit, subsequent    -  Primary          Depression Screening and Follow-up Plan: Patient was screened for depression during today's encounter  They screened negative with a PHQ-2 score of 0  Urinary Incontinence Plan of Care: counseling topics discussed: practice Kegel (pelvic floor strengthening) exercises, limit alcohol, caffeine, spicy foods, and acidic foods and limiting fluid intake 3-4 hours before bed  Referral was placed for Urogynecology  Preventive health issues were discussed with patient, and age appropriate screening tests were ordered as noted in patient's After Visit Summary  Personalized health advice and appropriate referrals for health education or preventive services given if needed, as noted in patient's After Visit Summary  Chief Complaint   Patient presents with   • Medicare Wellness Visit     Subsequent Medicare Wellness        History of Present Illness:     Patient presents for a Medicare Wellness Visit    Milad England is a 69 y o  female who presents today for subsequent Medicare wellness visit  She is here for a checkup of her diabetes, hypothyroidism, and hyperlipidemia  She still sees the GI specialist once a year  The patient denies any chest pain, shortness of breath, or palpitations  There is no edema  There are no headaches or visual changes  There is no lightheadedness, dizziness, or fainting spells  The patient currently denies any nausea, vomiting, or GERD symptoms  she has normal bowel movements and normal urine output  she has a normal appetite  Diabetes  She presents for her follow-up diabetic visit  She has type 2 diabetes mellitus  Her disease course has been stable  Pertinent negatives for diabetes include no blurred vision, no chest pain, no fatigue, no foot paresthesias, no foot ulcerations, no polydipsia, no polyphagia, no polyuria, no visual change, no weakness and no weight loss   Symptoms are stable  Patient Care Team:  Starr Coornado DO as PCP - General (Family Medicine)     Review of Systems:     Review of Systems   Constitutional: Negative  Negative for fatigue and weight loss  HENT: Negative  Eyes: Negative  Negative for blurred vision  Respiratory: Negative  Cardiovascular: Negative  Negative for chest pain  Gastrointestinal: Negative  Endocrine: Negative  Negative for polydipsia, polyphagia and polyuria  Genitourinary: Negative  Musculoskeletal: Negative  Skin: Negative  Allergic/Immunologic: Negative  Neurological: Negative  Negative for weakness  Hematological: Negative  Psychiatric/Behavioral: Negative           Problem List:     Patient Active Problem List   Diagnosis   • Female cystocele   • Generalized osteoarthritis   • Hyperlipidemia   • Hypertension   • Hypothyroidism   • Sleep disturbances   • Urge and stress incontinence   • Varicose veins of both lower extremities with pain   • Vitamin D deficiency   • Shoulder subluxation, right   • DJD of right shoulder   • Rotator cuff tear arthropathy of right shoulder   • Morbid obesity (HCC)   • Pancreatic pseudocyst   • GERD (gastroesophageal reflux disease)   • Branchial cleft cyst   • Chronic pancreatitis (HCC)   • Primary osteoarthritis of right knee   • Type 2 diabetes mellitus without complication, without long-term current use of insulin (HCC)   • Chronic neck pain   • Statin intolerance      Past Medical and Surgical History:     Past Medical History:   Diagnosis Date   • Abscess of neck 3/2/2020   • Alcohol induced acute pancreatitis 8/25/2018   • Disease of thyroid gland    • GERD (gastroesophageal reflux disease)    • HL (hearing loss)    • Hyperlipidemia    • Polyp of sigmoid colon     last assessed 6/12/12   • Sleep disturbances     last assessed 6/5/14     Past Surgical History:   Procedure Laterality Date   • BREAST CYST ASPIRATION Right     20 yrs ago in dr office net results   • COLONOSCOPY  06/18/2011    Complete  Repeat in 5yrs     • IR IMAGE GUIDED ASPIRATION / DRAINAGE  3/3/2020   • NASAL FRACTURE SURGERY        Family History:     Family History   Problem Relation Age of Onset   • Alzheimer's disease Mother    • Diabetes Mother    • No Known Problems Father    • No Known Problems Maternal Grandmother    • No Known Problems Maternal Grandfather    • No Known Problems Paternal Grandmother    • No Known Problems Paternal Grandfather    • No Known Problems Maternal Aunt    • No Known Problems Maternal Aunt    • No Known Problems Maternal Aunt    • No Known Problems Maternal Aunt    • No Known Problems Maternal Aunt    • No Known Problems Paternal Aunt    • No Known Problems Paternal Aunt    • No Known Problems Paternal Aunt       Social History:     Social History     Socioeconomic History   • Marital status: Single     Spouse name: None   • Number of children: None   • Years of education: None   • Highest education level: None   Occupational History   • Occupation: retired    Tobacco Use   • Smoking status: Never     Passive exposure: Past   • Smokeless tobacco: Never   Vaping Use   • Vaping Use: Never used   Substance and Sexual Activity   • Alcohol use: Not Currently   • Drug use: No   • Sexual activity: Not Currently   Other Topics Concern   • None   Social History Narrative    Sleep disturbances      Social Determinants of Health     Financial Resource Strain: Low Risk  (6/30/2023)    Overall Financial Resource Strain (CARDIA)    • Difficulty of Paying Living Expenses: Not very hard   Food Insecurity: No Food Insecurity (5/17/2021)    Hunger Vital Sign    • Worried About Running Out of Food in the Last Year: Never true    • Ran Out of Food in the Last Year: Never true   Transportation Needs: No Transportation Needs (6/30/2023)    PRAPARE - Transportation    • Lack of Transportation (Medical): No    • Lack of Transportation (Non-Medical):  No   Physical Activity: Insufficiently Active (8/18/2021)    Exercise Vital Sign    • Days of Exercise per Week: 2 days    • Minutes of Exercise per Session: 60 min   Stress: No Stress Concern Present (8/18/2021)    [address]    • Feeling of Stress : Not at all   Social Connections: Moderately Integrated (5/17/2021)    Social Connection and Isolation Panel [NHANES]    • Frequency of Communication with Friends and Family: More than three times a week    • Frequency of Social Gatherings with Friends and Family: Once a week    • Attends [employer] Services: More than 4 times per year    • Active Member of Clubs or Organizations:  Yes    • Attends Club or Organization Meetings: 1 to 4 times per year    • Marital Status: Never    Intimate Partner Violence: Not At Risk (6/30/2023)    Humiliation, Afraid, Rape, and Kick questionnaire    • Fear of Current or Ex-Partner: No    • Emotionally Abused: No    • Physically Abused: No    • Sexually Abused: No   Housing Stability: Not on file      Medications and Allergies:     Current Outpatient Medications   Medication Sig Dispense Refill   • aspirin 81 MG tablet Take 1 tablet by mouth daily     • Cholecalciferol (Vitamin D3) 25 MCG (1000 UT) CAPS Take 1 capsule (1,000 Units total) by mouth daily 30 capsule 11   • ezetimibe (ZETIA) 10 mg tablet Take 1 tablet by mouth once daily 90 tablet 0   • levothyroxine 50 mcg tablet Take 2 tablets (100 mcg total) by mouth daily 180 tablet 1   • lisinopril (ZESTRIL) 20 mg tablet Take 1 tablet by mouth once daily 90 tablet 0   • metFORMIN (GLUCOPHAGE-XR) 500 mg 24 hr tablet TAKE 1 TABLET BY MOUTH ONCE DAILY WITH SUPPER 90 tablet 0   • multivitamin (THERAGRAN) TABS Take 1 tablet by mouth     • Omega-3 Fatty Acids (FISH OIL PO) Take 2 g by mouth     • oxybutynin (DITROPAN-XL) 10 MG 24 hr tablet Take 10 mg by mouth daily     • pantoprazole (PROTONIX) 40 mg tablet Take 40 mg by mouth daily       No current facility-administered medications for this visit  Allergies   Allergen Reactions   • Statins Myalgia      Immunizations:     Immunization History   Administered Date(s) Administered   • COVID-19 MODERNA VACC 0 5 ML IM 03/21/2021, 04/18/2021, 11/01/2021, 05/19/2022   • COVID-19 Moderna Vac BIVALENT 12 Yr+ IM (BOOSTER ONLY) 0 5 ML 10/20/2022   • INFLUENZA 11/13/2018, 10/28/2020   • Influenza Quadrivalent, 6-35 Months IM 10/07/2016   • Influenza, high dose seasonal 0 7 mL 10/21/2019, 10/28/2020, 10/06/2021, 11/11/2022   • Influenza, seasonal, injectable 10/01/2013, 10/09/2017   • Pneumococcal Conjugate 13-Valent 10/21/2019   • Pneumococcal Polysaccharide PPV23 08/31/2018, 12/11/2020   • TD (adult) Preservative Free 03/19/2014      Health Maintenance:         Topic Date Due   • DXA SCAN  05/09/2024   • Breast Cancer Screening: Mammogram  05/23/2024   • Colorectal Cancer Screening  09/07/2027   • Hepatitis C Screening  Completed         Topic Date Due   • COVID-19 Vaccine (6 - Odessia Mars series) 02/20/2023      Medicare Screening Tests and Risk Assessments:     Samuel Lopez is here for her Subsequent Wellness visit  Last Medicare Wellness visit information reviewed, patient interviewed and updates made to the record today  Health Risk Assessment:   Patient rates overall health as good  Patient feels that their physical health rating is same  Patient is very satisfied with their life  Eyesight was rated as same  Hearing was rated as same  Patient feels that their emotional and mental health rating is same  Patients states they are never, rarely angry  Patient states they are sometimes unusually tired/fatigued  Pain experienced in the last 7 days has been none  Patient states that she has experienced no weight loss or gain in last 6 months  Depression Screening:   PHQ-2 Score: 0      Fall Risk Screening:    In the past year, patient has experienced: history of falling in past year    Number of falls: 1  Injured during fall?: No    Feels unsteady when standing or walking?: No    Worried about falling?: No      Urinary Incontinence Screening:   Patient has not leaked urine accidently in the last six months  Home Safety:  Patient does not have trouble with stairs inside or outside of their home  Patient has working smoke alarms and has working carbon monoxide detector  Home safety hazards include: none  Nutrition:   Current diet is Regular  She has a decreased appetite- she is watching her diet  Medications:   Patient is currently taking over-the-counter supplements  OTC medications include: see medication list  Patient is able to manage medications  Activities of Daily Living (ADLs)/Instrumental Activities of Daily Living (IADLs):   Walk and transfer into and out of bed and chair?: Yes  Dress and groom yourself?: Yes    Bathe or shower yourself?: Yes    Feed yourself? Yes  Do your laundry/housekeeping?: Yes  Manage your money, pay your bills and track your expenses?: Yes  Make your own meals?: Yes    Do your own shopping?: Yes    Previous Hospitalizations:   Any hospitalizations or ED visits within the last 12 months?: No      Advance Care Planning:   Living will: Yes    Durable POA for healthcare:  Yes    Advanced directive: Yes    Advanced directive counseling given: Yes    Five wishes given: No    Patient declined ACP directive: No    End of Life Decisions reviewed with patient: Yes    Provider agrees with end of life decisions: Yes      PREVENTIVE SCREENINGS      Cardiovascular Screening:    General: Screening Not Indicated, History Lipid Disorder, Risks and Benefits Discussed and Screening Current    Due for: Lipid Panel      Diabetes Screening:     General: Screening Not Indicated, History Diabetes, Risks and Benefits Discussed and Screening Current      Colorectal Cancer Screening:     General: Screening Current      Breast Cancer Screening:     General: Screening Current      Cervical Cancer Screening: "General: Screening Not Indicated      Osteoporosis Screening:    General: Screening Current      Abdominal Aortic Aneurysm (AAA) Screening:        General: Screening Not Indicated      Lung Cancer Screening:     General: Screening Not Indicated      Hepatitis C Screening:    General: Screening Current    Screening, Brief Intervention, and Referral to Treatment (SBIRT)    Screening  Typical number of drinks in a day: 0  Typical number of drinks in a week: 0  Interpretation: Low risk drinking behavior  AUDIT-C Screenin) How often did you have a drink containing alcohol in the past year? never  2) How many drinks did you have on a typical day when you were drinking in the past year? 0  3) How often did you have 6 or more drinks on one occasion in the past year? never    AUDIT-C Score: 0  Interpretation: Score 0-2 (female): Negative screen for alcohol misuse    Single Item Drug Screening:  How often have you used an illegal drug (including marijuana) or a prescription medication for non-medical reasons in the past year? never    Single Item Drug Screen Score: 0  Interpretation: Negative screen for possible drug use disorder    Brief Intervention  Alcohol & drug use screenings were reviewed  No concerns regarding substance use disorder identified  Other Counseling Topics:   Car/seat belt/driving safety, skin self-exam, sunscreen and regular weightbearing exercise and calcium and vitamin D intake  Vision Screening    Right eye Left eye Both eyes   Without correction      With correction   20/50   Comments: Wears glasses- sees the eye doctor every 3 months- she had floaters                Physical Exam:     /80 (BP Location: Right arm, Patient Position: Sitting, Cuff Size: Large)   Pulse 74   Temp 97 6 °F (36 4 °C) (Tympanic)   Resp 18   Ht 5' 5\" (1 651 m)   Wt 110 kg (243 lb 3 2 oz)   LMP  (LMP Unknown)   SpO2 96%   BMI 40 47 kg/m²     Physical Exam  Constitutional:       General: She is not " in acute distress  Appearance: She is well-developed  She is not diaphoretic  HENT:      Head: Normocephalic and atraumatic  Right Ear: External ear normal       Left Ear: External ear normal       Nose: Nose normal       Mouth/Throat:      Pharynx: No oropharyngeal exudate  Eyes:      General: No scleral icterus  Right eye: No discharge  Left eye: No discharge  Pupils: Pupils are equal, round, and reactive to light  Neck:      Thyroid: No thyromegaly  Vascular: No JVD  Trachea: No tracheal deviation  Cardiovascular:      Rate and Rhythm: Normal rate and regular rhythm  Pulses: no weak pulses          Dorsalis pedis pulses are 2+ on the right side and 2+ on the left side  Posterior tibial pulses are 2+ on the right side and 2+ on the left side  Heart sounds: Normal heart sounds  No murmur heard  No friction rub  No gallop  Pulmonary:      Effort: Pulmonary effort is normal  No respiratory distress  Breath sounds: Normal breath sounds  No wheezing or rales  Chest:      Chest wall: No tenderness  Abdominal:      General: Bowel sounds are normal  There is no distension  Palpations: Abdomen is soft  There is no mass  Tenderness: There is no abdominal tenderness  There is no guarding or rebound  Hernia: No hernia is present  Musculoskeletal:         General: No tenderness or deformity  Normal range of motion  Cervical back: Normal range of motion and neck supple  Feet:      Right foot:      Skin integrity: No ulcer, skin breakdown, erythema, warmth, callus or dry skin  Left foot:      Skin integrity: No ulcer, skin breakdown, erythema, warmth, callus or dry skin  Lymphadenopathy:      Cervical: No cervical adenopathy  Skin:     General: Skin is warm and dry  Coloration: Skin is not pale  Findings: No erythema or rash     Neurological:      Mental Status: She is alert and oriented to person, place, and time       Cranial Nerves: No cranial nerve deficit  Sensory: No sensory deficit  Motor: No abnormal muscle tone  Coordination: Coordination normal       Deep Tendon Reflexes: Reflexes normal    Psychiatric:         Behavior: Behavior normal          Thought Content: Thought content normal           Recent Results (from the past 1008 hour(s))   Hemoglobin A1C    Collection Time: 06/05/23  8:49 AM   Result Value Ref Range    Hemoglobin A1C 6 5 (H) Normal 3 8-5 6%; PreDiabetic 5 7-6 4%; Diabetic >=6 5%; Glycemic control for adults with diabetes <7 0% %     mg/dl   Comprehensive metabolic panel    Collection Time: 06/05/23  8:49 AM   Result Value Ref Range    Sodium 141 135 - 147 mmol/L    Potassium 4 5 3 5 - 5 3 mmol/L    Chloride 110 (H) 96 - 108 mmol/L    CO2 30 21 - 32 mmol/L    ANION GAP 1 (L) 4 - 13 mmol/L    BUN 16 5 - 25 mg/dL    Creatinine 0 75 0 60 - 1 30 mg/dL    Glucose, Fasting 110 (H) 65 - 99 mg/dL    Calcium 9 2 8 3 - 10 1 mg/dL    AST 21 5 - 45 U/L    ALT 31 12 - 78 U/L    Alkaline Phosphatase 78 46 - 116 U/L    Total Protein 7 2 6 4 - 8 4 g/dL    Albumin 3 5 3 5 - 5 0 g/dL    Total Bilirubin 0 29 0 20 - 1 00 mg/dL    eGFR 81 ml/min/1 73sq m   Microalbumin / creatinine urine ratio    Collection Time: 06/05/23  8:49 AM   Result Value Ref Range    Creatinine, Ur 94 3 mg/dL    Albumin,U,Random 58 2 (H) 0 0 - 20 0 mg/L    Albumin Creat Ratio 62 (H) 0 - 30 mg/g creatinine   Lipid Panel with Direct LDL reflex    Collection Time: 06/05/23  8:49 AM   Result Value Ref Range    Cholesterol 199 See Comment mg/dL    Triglycerides 185 (H) See Comment mg/dL    HDL, Direct 47 (L) >=50 mg/dL    LDL Calculated 115 (H) 0 - 100 mg/dL       Diabetic Foot Exam    Patient's shoes and socks removed  Right Foot/Ankle   Right Foot Inspection  Skin Exam: skin normal and skin intact  No dry skin, no warmth, no callus, no erythema, no maceration, no abnormal color, no pre-ulcer, no ulcer and no callus  Toe Exam: ROM and strength within normal limits  Sensory   Vibration: intact  Proprioception: intact  Monofilament testing: intact    Vascular  Capillary refills: < 3 seconds  The right DP pulse is 2+  The right PT pulse is 2+  Left Foot/Ankle  Left Foot Inspection  Skin Exam: skin normal and skin intact  No dry skin, no warmth, no erythema, no maceration, normal color, no pre-ulcer, no ulcer and no callus  Toe Exam: ROM and strength within normal limits  Sensory   Vibration: intact  Proprioception: intact  Monofilament testing: intact    Vascular  Capillary refills: < 3 seconds  The left DP pulse is 2+  The left PT pulse is 2+       Assign Risk Category  No deformity present  No loss of protective sensation  No weak pulses  Risk: 0    Trudi Wright, DO

## 2023-06-30 NOTE — ASSESSMENT & PLAN NOTE
The patient's blood pressure stable on the current dose of her lisinopril  We have made no changes today  We will see her back as scheduled

## 2023-07-07 DIAGNOSIS — E03.9 HYPOTHYROIDISM, UNSPECIFIED TYPE: ICD-10-CM

## 2023-07-07 RX ORDER — LEVOTHYROXINE SODIUM 0.05 MG/1
TABLET ORAL
Qty: 180 TABLET | Refills: 0 | Status: SHIPPED | OUTPATIENT
Start: 2023-07-07

## 2023-08-15 ENCOUNTER — OFFICE VISIT (OUTPATIENT)
Dept: FAMILY MEDICINE CLINIC | Facility: CLINIC | Age: 68
End: 2023-08-15
Payer: MEDICARE

## 2023-08-15 VITALS
WEIGHT: 242.8 LBS | DIASTOLIC BLOOD PRESSURE: 64 MMHG | RESPIRATION RATE: 18 BRPM | OXYGEN SATURATION: 96 % | HEIGHT: 65 IN | BODY MASS INDEX: 40.45 KG/M2 | TEMPERATURE: 98 F | HEART RATE: 76 BPM | SYSTOLIC BLOOD PRESSURE: 96 MMHG

## 2023-08-15 DIAGNOSIS — E78.2 MIXED HYPERLIPIDEMIA: ICD-10-CM

## 2023-08-15 DIAGNOSIS — R53.83 OTHER FATIGUE: ICD-10-CM

## 2023-08-15 DIAGNOSIS — I10 PRIMARY HYPERTENSION: ICD-10-CM

## 2023-08-15 DIAGNOSIS — I10 PRIMARY HYPERTENSION: Primary | ICD-10-CM

## 2023-08-15 DIAGNOSIS — R40.0 DAYTIME SOMNOLENCE: ICD-10-CM

## 2023-08-15 PROCEDURE — 99214 OFFICE O/P EST MOD 30 MIN: CPT | Performed by: FAMILY MEDICINE

## 2023-08-15 RX ORDER — LISINOPRIL 20 MG/1
TABLET ORAL
Qty: 90 TABLET | Refills: 0 | Status: SHIPPED | OUTPATIENT
Start: 2023-08-15 | End: 2023-08-15 | Stop reason: SDUPTHER

## 2023-08-15 RX ORDER — LISINOPRIL 20 MG/1
20 TABLET ORAL DAILY
Qty: 90 TABLET | Refills: 1 | Status: SHIPPED | OUTPATIENT
Start: 2023-08-15

## 2023-08-15 RX ORDER — EZETIMIBE 10 MG/1
TABLET ORAL
Qty: 90 TABLET | Refills: 0 | Status: SHIPPED | OUTPATIENT
Start: 2023-08-15 | End: 2023-08-15 | Stop reason: SDUPTHER

## 2023-08-15 RX ORDER — EZETIMIBE 10 MG/1
10 TABLET ORAL DAILY
Qty: 90 TABLET | Refills: 1 | Status: SHIPPED | OUTPATIENT
Start: 2023-08-15

## 2023-08-15 NOTE — PROGRESS NOTES
Assessment/Plan:  Problem List Items Addressed This Visit        Cardiovascular and Mediastinum    Hypertension - Primary    Relevant Medications    lisinopril (ZESTRIL) 20 mg tablet    Other Relevant Orders    T4, free    TSH, 3rd generation    Comprehensive metabolic panel    CBC and differential       Other    Hyperlipidemia    Relevant Medications    ezetimibe (ZETIA) 10 mg tablet   Other Visit Diagnoses     Other fatigue        Relevant Orders    Ambulatory Referral to Sleep Medicine    T4, free    TSH, 3rd generation    Comprehensive metabolic panel    CBC and differential    Daytime somnolence        Relevant Orders    Ambulatory Referral to Sleep Medicine      The patient is stable on her current medication. I suspect that her fatigue and daytime somnolence is related to sleep apnea so we will refer her to the sleep center as ordered for further evaluation and a probable sleep study. We will send her for the lab work also as ordered to evaluate for other causes of fatigue and we will follow-up with the results. We will see her back in the office as scheduled. Return for Next scheduled follow up. I spent 15 minutes during the visit reviewing the history from the patient, performing the examination, discussing the findings with the patient, providing counseling and education, and making a plan. I spent 15 minutes ordering referrals and testing and documenting. Subjective:   Chief Complaint   Patient presents with   • Headache     Pt was having headaches but they have subsided. She was treated for an infection and the headaches have felt better since. • Fatigue     Pt reports she is tired upon waking. Naps in afternoon. Watches TV about 830/9pm in bed and falls asleep to it. Wakes up about 8 am. Sometimes has trouble staying sleeping. Patient ID: Maciej Pineda is a 69 y.o. female presents today for evaluation of fatigue.   Maciej Pineda is a 69 y.o. female who presents today complaining of increasing fatigue and daytime somnolence. She does have some nighttime awakenings. She was recently treated for a UTI and the headaches resolved. She is fatigued with waking up in the morning. There is no snoring that she knows of. There is no nighttime awakenings. She is napping during the day. The patient denies any chest pain, shortness of breath, or palpitations. There is no edema. There are no headaches or visual changes. There is no lightheadedness, dizziness, or fainting spells. There are no GI problems. She is no  She has fatigue in the am.    Fatigue  This is a new problem. The current episode started more than 1 month ago. The problem occurs constantly. The problem has been gradually worsening. Associated symptoms include fatigue and headaches. Pertinent negatives include no abdominal pain, anorexia, arthralgias, change in bowel habit, chest pain, chills, congestion, coughing, diaphoresis, joint swelling, myalgias, nausea, neck pain, numbness, rash, sore throat, swollen glands, urinary symptoms, vertigo, visual change, vomiting or weakness.      The following portions of the patient's history were reviewed and updated as appropriate: allergies, current medications, past family history, past medical history, past social history, past surgical history and problem list.  Patient Active Problem List   Diagnosis   • Female cystocele   • Generalized osteoarthritis   • Hyperlipidemia   • Hypertension   • Hypothyroidism   • Sleep disturbances   • Urge and stress incontinence   • Varicose veins of both lower extremities with pain   • Vitamin D deficiency   • Shoulder subluxation, right   • DJD of right shoulder   • Rotator cuff tear arthropathy of right shoulder   • Morbid obesity ([address])   • Pancreatic pseudocyst   • GERD (gastroesophageal reflux disease)   • Branchial cleft cyst   • Chronic pancreatitis ([address])   • Primary osteoarthritis of right knee   • Type 2 diabetes mellitus without complication, without long-term current use of insulin (HCC)   • Chronic neck pain   • Statin intolerance     Past Medical History:   Diagnosis Date   • Abscess of neck 3/2/2020   • Alcohol induced acute pancreatitis 8/25/2018   • Disease of thyroid gland    • GERD (gastroesophageal reflux disease)    • HL (hearing loss)    • Hyperlipidemia    • Polyp of sigmoid colon     last assessed 6/12/12   • Sleep disturbances     last assessed 6/5/14     Past Surgical History:   Procedure Laterality Date   • BREAST CYST ASPIRATION Right     20 yrs ago in dr office net results   • COLONOSCOPY  06/18/2011    Complete.    Repeat in 5yrs     • IR IMAGE GUIDED ASPIRATION / DRAINAGE  3/3/2020   • NASAL FRACTURE SURGERY       Allergies   Allergen Reactions   • Statins Myalgia     Family History   Problem Relation Age of Onset   • Alzheimer's disease Mother    • Diabetes Mother    • No Known Problems Father    • No Known Problems Maternal Grandmother    • No Known Problems Maternal Grandfather    • No Known Problems Paternal Grandmother    • No Known Problems Paternal Grandfather    • No Known Problems Maternal Aunt    • No Known Problems Maternal Aunt    • No Known Problems Maternal Aunt    • No Known Problems Maternal Aunt    • No Known Problems Maternal Aunt    • No Known Problems Paternal Aunt    • No Known Problems Paternal Aunt    • No Known Problems Paternal Aunt      Social History     Socioeconomic History   • Marital status: Single     Spouse name: Not on file   • Number of children: Not on file   • Years of education: Not on file   • Highest education level: Not on file   Occupational History   • Occupation: retired    Tobacco Use   • Smoking status: Never     Passive exposure: Past   • Smokeless tobacco: Never   Vaping Use   • Vaping Use: Never used   Substance and Sexual Activity   • Alcohol use: Not Currently   • Drug use: No   • Sexual activity: Not Currently   Other Topics Concern   • Not on file   Social History Narrative    Sleep disturbances      Social Determinants of Health     Financial Resource Strain: Low Risk  (6/30/2023)    Overall Financial Resource Strain (CARDIA)    • Difficulty of Paying Living Expenses: Not very hard   Food Insecurity: No Food Insecurity (5/17/2021)    Hunger Vital Sign    • Worried About Running Out of Food in the Last Year: Never true    • Ran Out of Food in the Last Year: Never true   Transportation Needs: No Transportation Needs (6/30/2023)    PRAPARE - Transportation    • Lack of Transportation (Medical): No    • Lack of Transportation (Non-Medical): No   Physical Activity: Insufficiently Active (8/18/2021)    Exercise Vital Sign    • Days of Exercise per Week: 2 days    • Minutes of Exercise per Session: 60 min   Stress: No Stress Concern Present (8/18/2021)    [facility]    • Feeling of Stress : Not at all   Social Connections: Moderately Integrated (5/17/2021)    Social Connection and Isolation Panel [NHANES]    • Frequency of Communication with Friends and Family: More than three times a week    • Frequency of Social Gatherings with Friends and Family: Once a week    • Attends Restorationist Services: More than 4 times per year    • Active Member of Clubs or Organizations:  Yes    • Attends Club or Organization Meetings: 1 to 4 times per year    • Marital Status: Never    Intimate Partner Violence: Not At Risk (6/30/2023)    Humiliation, Afraid, Rape, and Kick questionnaire    • Fear of Current or Ex-Partner: No    • Emotionally Abused: No    • Physically Abused: No    • Sexually Abused: No   Housing Stability: Not on file     Current Outpatient Medications on File Prior to Visit   Medication Sig Dispense Refill   • aspirin 81 MG tablet Take 1 tablet by mouth daily     • Cholecalciferol (Vitamin D3) 25 MCG (1000 UT) CAPS Take 1 capsule (1,000 Units total) by mouth daily 30 capsule 11   • levothyroxine 50 mcg tablet TAKE 2 TABLETS BY MOUTH DAILY 180 tablet 0   • metFORMIN (GLUCOPHAGE-XR) 500 mg 24 hr tablet TAKE 1 TABLET BY MOUTH ONCE DAILY WITH SUPPER 90 tablet 0   • multivitamin (THERAGRAN) TABS Take 1 tablet by mouth     • Omega-3 Fatty Acids (FISH OIL PO) Take 2 g by mouth     • oxybutynin (DITROPAN-XL) 10 MG 24 hr tablet Take 10 mg by mouth daily     • pantoprazole (PROTONIX) 40 mg tablet Take 40 mg by mouth daily       No current facility-administered medications on file prior to visit. Review of Systems   Constitutional: Positive for fatigue. Negative for chills and diaphoresis. HENT: Negative. Negative for congestion and sore throat. Eyes: Negative. Respiratory: Negative. Negative for cough. Cardiovascular: Negative. Negative for chest pain. Gastrointestinal: Negative. Negative for abdominal pain, anorexia, change in bowel habit, nausea and vomiting. Endocrine: Negative. Genitourinary: Negative. Musculoskeletal: Negative. Negative for arthralgias, joint swelling, myalgias and neck pain. Skin: Negative. Negative for rash. Allergic/Immunologic: Negative. Neurological: Positive for headaches. Negative for vertigo, weakness and numbness. Hematological: Negative. Psychiatric/Behavioral: Negative. Objective:  Vitals:    08/15/23 1243   BP: 96/64   BP Location: Left arm   Patient Position: Sitting   Cuff Size: Standard   Pulse: 76   Resp: 18   Temp: 98 °F (36.7 °C)   TempSrc: Tympanic   SpO2: 96%   Weight: 110 kg (242 lb 12.8 oz)   Height: 5' 5" (1.651 m)     Body mass index is 40.4 kg/m². Physical Exam  Constitutional:       Appearance: She is well-developed. HENT:      Head: Normocephalic and atraumatic. Mouth/Throat:      Pharynx: No oropharyngeal exudate. Eyes:      Conjunctiva/sclera: Conjunctivae normal.      Pupils: Pupils are equal, round, and reactive to light. Neck:      Thyroid: No thyromegaly. Vascular: No JVD.       Trachea: No tracheal deviation. Cardiovascular:      Rate and Rhythm: Normal rate and regular rhythm. Heart sounds: Normal heart sounds. No murmur heard. No friction rub. No gallop. Pulmonary:      Effort: Pulmonary effort is normal. No respiratory distress. Breath sounds: Normal breath sounds. No stridor. No wheezing or rales. Chest:      Chest wall: No tenderness. Abdominal:      General: Bowel sounds are normal. There is no distension. Palpations: Abdomen is soft. There is no mass. Tenderness: There is no abdominal tenderness. There is no guarding or rebound. Musculoskeletal:         General: No tenderness or deformity. Normal range of motion. Cervical back: Normal range of motion. Lymphadenopathy:      Cervical: No cervical adenopathy. Skin:     General: Skin is warm and dry. Neurological:      Mental Status: She is alert and oriented to person, place, and time. Cranial Nerves: No cranial nerve deficit. Motor: No abnormal muscle tone. Coordination: Coordination normal.      Deep Tendon Reflexes: Reflexes are normal and symmetric.  Reflexes normal.

## 2023-08-29 ENCOUNTER — APPOINTMENT (OUTPATIENT)
Dept: LAB | Facility: CLINIC | Age: 68
End: 2023-08-29
Payer: MEDICARE

## 2023-08-29 DIAGNOSIS — R53.83 OTHER FATIGUE: ICD-10-CM

## 2023-08-29 DIAGNOSIS — I10 PRIMARY HYPERTENSION: ICD-10-CM

## 2023-08-29 LAB
ALBUMIN SERPL BCP-MCNC: 4 G/DL (ref 3.5–5)
ALP SERPL-CCNC: 69 U/L (ref 34–104)
ALT SERPL W P-5'-P-CCNC: 27 U/L (ref 7–52)
ANION GAP SERPL CALCULATED.3IONS-SCNC: 8 MMOL/L
AST SERPL W P-5'-P-CCNC: 25 U/L (ref 13–39)
BASOPHILS # BLD AUTO: 0.05 THOUSANDS/ÂΜL (ref 0–0.1)
BASOPHILS NFR BLD AUTO: 1 % (ref 0–1)
BILIRUB SERPL-MCNC: 0.4 MG/DL (ref 0.2–1)
BUN SERPL-MCNC: 22 MG/DL (ref 5–25)
CALCIUM SERPL-MCNC: 9.7 MG/DL (ref 8.4–10.2)
CHLORIDE SERPL-SCNC: 102 MMOL/L (ref 96–108)
CO2 SERPL-SCNC: 30 MMOL/L (ref 21–32)
CREAT SERPL-MCNC: 0.81 MG/DL (ref 0.6–1.3)
EOSINOPHIL # BLD AUTO: 0.13 THOUSAND/ÂΜL (ref 0–0.61)
EOSINOPHIL NFR BLD AUTO: 2 % (ref 0–6)
ERYTHROCYTE [DISTWIDTH] IN BLOOD BY AUTOMATED COUNT: 13.1 % (ref 11.6–15.1)
GFR SERPL CREATININE-BSD FRML MDRD: 74 ML/MIN/1.73SQ M
GLUCOSE SERPL-MCNC: 103 MG/DL (ref 65–140)
HCT VFR BLD AUTO: 41.6 % (ref 34.8–46.1)
HGB BLD-MCNC: 12.8 G/DL (ref 11.5–15.4)
IMM GRANULOCYTES # BLD AUTO: 0.03 THOUSAND/UL (ref 0–0.2)
IMM GRANULOCYTES NFR BLD AUTO: 0 % (ref 0–2)
LYMPHOCYTES # BLD AUTO: 1.76 THOUSANDS/ÂΜL (ref 0.6–4.47)
LYMPHOCYTES NFR BLD AUTO: 21 % (ref 14–44)
MCH RBC QN AUTO: 29.8 PG (ref 26.8–34.3)
MCHC RBC AUTO-ENTMCNC: 30.8 G/DL (ref 31.4–37.4)
MCV RBC AUTO: 97 FL (ref 82–98)
MONOCYTES # BLD AUTO: 0.7 THOUSAND/ÂΜL (ref 0.17–1.22)
MONOCYTES NFR BLD AUTO: 8 % (ref 4–12)
NEUTROPHILS # BLD AUTO: 5.78 THOUSANDS/ÂΜL (ref 1.85–7.62)
NEUTS SEG NFR BLD AUTO: 68 % (ref 43–75)
NRBC BLD AUTO-RTO: 0 /100 WBCS
PLATELET # BLD AUTO: 197 THOUSANDS/UL (ref 149–390)
PMV BLD AUTO: 12.4 FL (ref 8.9–12.7)
POTASSIUM SERPL-SCNC: 4.1 MMOL/L (ref 3.5–5.3)
PROT SERPL-MCNC: 7.1 G/DL (ref 6.4–8.4)
RBC # BLD AUTO: 4.3 MILLION/UL (ref 3.81–5.12)
SODIUM SERPL-SCNC: 140 MMOL/L (ref 135–147)
T4 FREE SERPL-MCNC: 0.87 NG/DL (ref 0.61–1.12)
TSH SERPL DL<=0.05 MIU/L-ACNC: 3.24 UIU/ML (ref 0.45–4.5)
WBC # BLD AUTO: 8.45 THOUSAND/UL (ref 4.31–10.16)

## 2023-08-29 PROCEDURE — 84439 ASSAY OF FREE THYROXINE: CPT

## 2023-08-29 PROCEDURE — 84443 ASSAY THYROID STIM HORMONE: CPT

## 2023-08-29 PROCEDURE — 80053 COMPREHEN METABOLIC PANEL: CPT

## 2023-08-29 PROCEDURE — 85025 COMPLETE CBC W/AUTO DIFF WBC: CPT

## 2023-08-29 PROCEDURE — 36415 COLL VENOUS BLD VENIPUNCTURE: CPT

## 2023-09-15 DIAGNOSIS — I10 PRIMARY HYPERTENSION: ICD-10-CM

## 2023-09-15 DIAGNOSIS — E11.9 TYPE 2 DIABETES MELLITUS WITHOUT COMPLICATION, WITHOUT LONG-TERM CURRENT USE OF INSULIN (HCC): ICD-10-CM

## 2023-09-15 DIAGNOSIS — E78.2 MIXED HYPERLIPIDEMIA: ICD-10-CM

## 2023-09-15 RX ORDER — METFORMIN HYDROCHLORIDE 500 MG/1
TABLET, EXTENDED RELEASE ORAL
Qty: 90 TABLET | Refills: 0 | Status: SHIPPED | OUTPATIENT
Start: 2023-09-15

## 2023-10-17 DIAGNOSIS — E03.9 HYPOTHYROIDISM, UNSPECIFIED TYPE: ICD-10-CM

## 2023-10-17 RX ORDER — LEVOTHYROXINE SODIUM 0.05 MG/1
TABLET ORAL
Qty: 180 TABLET | Refills: 0 | Status: SHIPPED | OUTPATIENT
Start: 2023-10-17

## 2023-11-11 ENCOUNTER — TELEPHONE (OUTPATIENT)
Dept: OTHER | Facility: OTHER | Age: 68
End: 2023-11-11

## 2023-11-11 NOTE — TELEPHONE ENCOUNTER
Medication Refill Request     Name lisinopril (ZESTRIL) 20 mg tablet     Dose/Frequency Take 1 tablet (20 mg total) by mouth daily     Quantity 90    Does patient have enough for the next 3 days?  Yes [x] No []

## 2023-11-14 NOTE — ASSESSMENT & PLAN NOTE
· Infected right brachial cleft cyst (transfer from [facility])  · There is a 2 5x3x3 5 cm abscess centered deep to the right sternocleidomastoid muscle immediately above the carotid bifurcation with associated surrounding multi regional deep soft tissue neck edema  · IR aspiration 3/3  · ENT input noted outpatient ENT follow-up  · Cultures noted - placed on IV Unasyn  · Discussed with infectious disease, continue IV Unasyn  · Follow-up on CT neck Keystone Flap Text: The defect edges were debeveled with a #15 scalpel blade. Given the location of the defect, shape of the defect a keystone flap was deemed most appropriate. Using a sterile surgical marker, an appropriate keystone flap was drawn incorporating the defect, outlining the appropriate donor tissue and placing the expected incisions within the relaxed skin tension lines where possible. The area thus outlined was incised deep to adipose tissue with a #15 scalpel blade. The skin margins were undermined to an appropriate distance in all directions around the primary defect and laterally outward around the flap utilizing iris scissors. Following this, the designed flap was carried into the primary defect and sutured into place.

## 2023-12-04 ENCOUNTER — APPOINTMENT (OUTPATIENT)
Dept: LAB | Facility: CLINIC | Age: 68
End: 2023-12-04
Payer: MEDICARE

## 2023-12-04 DIAGNOSIS — Z78.9 STATIN INTOLERANCE: ICD-10-CM

## 2023-12-04 DIAGNOSIS — E78.2 MIXED HYPERLIPIDEMIA: ICD-10-CM

## 2023-12-04 DIAGNOSIS — E11.9 TYPE 2 DIABETES MELLITUS WITHOUT COMPLICATION, WITHOUT LONG-TERM CURRENT USE OF INSULIN (HCC): ICD-10-CM

## 2023-12-04 DIAGNOSIS — E03.9 HYPOTHYROIDISM, UNSPECIFIED TYPE: ICD-10-CM

## 2023-12-04 LAB
ALBUMIN SERPL BCP-MCNC: 3.9 G/DL (ref 3.5–5)
ALP SERPL-CCNC: 65 U/L (ref 34–104)
ALT SERPL W P-5'-P-CCNC: 21 U/L (ref 7–52)
ANION GAP SERPL CALCULATED.3IONS-SCNC: 7 MMOL/L
AST SERPL W P-5'-P-CCNC: 23 U/L (ref 13–39)
BILIRUB SERPL-MCNC: 0.42 MG/DL (ref 0.2–1)
BUN SERPL-MCNC: 17 MG/DL (ref 5–25)
CALCIUM SERPL-MCNC: 9.3 MG/DL (ref 8.4–10.2)
CHLORIDE SERPL-SCNC: 106 MMOL/L (ref 96–108)
CHOLEST SERPL-MCNC: 203 MG/DL
CO2 SERPL-SCNC: 32 MMOL/L (ref 21–32)
CREAT SERPL-MCNC: 0.68 MG/DL (ref 0.6–1.3)
CREAT UR-MCNC: 157.8 MG/DL
EST. AVERAGE GLUCOSE BLD GHB EST-MCNC: 140 MG/DL
GFR SERPL CREATININE-BSD FRML MDRD: 89 ML/MIN/1.73SQ M
GLUCOSE P FAST SERPL-MCNC: 101 MG/DL (ref 65–99)
HBA1C MFR BLD: 6.5 %
HDLC SERPL-MCNC: 56 MG/DL
LDLC SERPL CALC-MCNC: 120 MG/DL (ref 0–100)
MICROALBUMIN UR-MCNC: 123.9 MG/L
MICROALBUMIN/CREAT 24H UR: 79 MG/G CREATININE (ref 0–30)
POTASSIUM SERPL-SCNC: 4.3 MMOL/L (ref 3.5–5.3)
PROT SERPL-MCNC: 6.5 G/DL (ref 6.4–8.4)
SODIUM SERPL-SCNC: 145 MMOL/L (ref 135–147)
TRIGL SERPL-MCNC: 135 MG/DL
TSH SERPL DL<=0.05 MIU/L-ACNC: 1.76 UIU/ML (ref 0.45–4.5)

## 2023-12-04 PROCEDURE — 80053 COMPREHEN METABOLIC PANEL: CPT

## 2023-12-04 PROCEDURE — 36415 COLL VENOUS BLD VENIPUNCTURE: CPT

## 2023-12-04 PROCEDURE — 80061 LIPID PANEL: CPT

## 2023-12-04 PROCEDURE — 82570 ASSAY OF URINE CREATININE: CPT

## 2023-12-04 PROCEDURE — 82043 UR ALBUMIN QUANTITATIVE: CPT

## 2023-12-04 PROCEDURE — 83036 HEMOGLOBIN GLYCOSYLATED A1C: CPT

## 2023-12-04 PROCEDURE — 84443 ASSAY THYROID STIM HORMONE: CPT

## 2023-12-09 DIAGNOSIS — I10 PRIMARY HYPERTENSION: ICD-10-CM

## 2023-12-09 DIAGNOSIS — E11.9 TYPE 2 DIABETES MELLITUS WITHOUT COMPLICATION, WITHOUT LONG-TERM CURRENT USE OF INSULIN (HCC): ICD-10-CM

## 2023-12-09 DIAGNOSIS — E78.2 MIXED HYPERLIPIDEMIA: ICD-10-CM

## 2023-12-09 RX ORDER — METFORMIN HYDROCHLORIDE 500 MG/1
TABLET, EXTENDED RELEASE ORAL
Qty: 90 TABLET | Refills: 0 | Status: SHIPPED | OUTPATIENT
Start: 2023-12-09

## 2023-12-28 ENCOUNTER — RA CDI HCC (OUTPATIENT)
Dept: OTHER | Facility: HOSPITAL | Age: 68
End: 2023-12-28

## 2024-01-03 ENCOUNTER — OFFICE VISIT (OUTPATIENT)
Dept: FAMILY MEDICINE CLINIC | Facility: CLINIC | Age: 69
End: 2024-01-03
Payer: MEDICARE

## 2024-01-03 VITALS
BODY MASS INDEX: 40.95 KG/M2 | OXYGEN SATURATION: 97 % | SYSTOLIC BLOOD PRESSURE: 134 MMHG | HEIGHT: 65 IN | DIASTOLIC BLOOD PRESSURE: 82 MMHG | WEIGHT: 245.8 LBS | TEMPERATURE: 97.2 F | RESPIRATION RATE: 18 BRPM | HEART RATE: 72 BPM

## 2024-01-03 DIAGNOSIS — E66.01 MORBID OBESITY (HCC): ICD-10-CM

## 2024-01-03 DIAGNOSIS — E11.29 MICROALBUMINURIA DUE TO TYPE 2 DIABETES MELLITUS: ICD-10-CM

## 2024-01-03 DIAGNOSIS — I10 PRIMARY HYPERTENSION: ICD-10-CM

## 2024-01-03 DIAGNOSIS — E78.2 MIXED HYPERLIPIDEMIA: ICD-10-CM

## 2024-01-03 DIAGNOSIS — K86.1 CHRONIC PANCREATITIS, UNSPECIFIED PANCREATITIS TYPE (HCC): ICD-10-CM

## 2024-01-03 DIAGNOSIS — E11.9 TYPE 2 DIABETES MELLITUS WITHOUT COMPLICATION, WITHOUT LONG-TERM CURRENT USE OF INSULIN (HCC): Primary | ICD-10-CM

## 2024-01-03 DIAGNOSIS — R80.9 MICROALBUMINURIA DUE TO TYPE 2 DIABETES MELLITUS: ICD-10-CM

## 2024-01-03 DIAGNOSIS — E03.9 HYPOTHYROIDISM, UNSPECIFIED TYPE: ICD-10-CM

## 2024-01-03 PROCEDURE — 99214 OFFICE O/P EST MOD 30 MIN: CPT | Performed by: FAMILY MEDICINE

## 2024-01-03 RX ORDER — PANCRELIPASE LIPASE, PANCRELIPASE PROTEASE, PANCRELIPASE AMYLASE 40000; 126000; 168000 [USP'U]/1; [USP'U]/1; [USP'U]/1
1 CAPSULE, DELAYED RELEASE ORAL AS NEEDED
COMMUNITY
Start: 2023-11-14

## 2024-01-03 RX ORDER — LISINOPRIL 30 MG/1
30 TABLET ORAL DAILY
Qty: 30 TABLET | Refills: 5 | Status: SHIPPED | OUTPATIENT
Start: 2024-01-03

## 2024-01-03 NOTE — PROGRESS NOTES
Assessment/Plan:  Problem List Items Addressed This Visit        Digestive    Chronic pancreatitis (HCC)     The patient is currently stable and will follow-up with her GI specialist as scheduled.            Endocrine    Hypothyroidism     The patient is stable on the current dose of her levothyroxine.  We have made no changes today.         Relevant Orders    TSH, 3rd generation with Free T4 reflex    Type 2 diabetes mellitus without complication, without long-term current use of insulin (HCC) - Primary       Lab Results   Component Value Date    HGBA1C 6.5 (H) 12/04/2023   The patient has a hemoglobin A1c of 6.5 demonstrating good control of her type 2 diabetes.  We will maintain her on her current medication.  She will continue to work on her diet and exercise and losing weight.  She did have some evidence of some increased microalbumin on her urine sample so we will titrate up on the dose of her lisinopril to 30 mg daily as indicated.  We will see her back in the office as scheduled and she will get lab work done prior to that visit.         Relevant Medications    lisinopril (ZESTRIL) 30 mg tablet    Other Relevant Orders    Albumin / creatinine urine ratio    Comprehensive metabolic panel    Hemoglobin A1C    Lipid Panel with Direct LDL reflex    TSH, 3rd generation with Free T4 reflex    CBC and differential       Cardiovascular and Mediastinum    Hypertension     The patient's blood pressure is well-controlled on her current medication.  We did titrate up on the dose of lisinopril to better control her microalbuminuria.  We will see her back as scheduled.         Relevant Medications    lisinopril (ZESTRIL) 30 mg tablet    Other Relevant Orders    Albumin / creatinine urine ratio    Comprehensive metabolic panel    Hemoglobin A1C    Lipid Panel with Direct LDL reflex    TSH, 3rd generation with Free T4 reflex    CBC and differential       Other    Hyperlipidemia     The patient is stable on the current dose  of her Zetia.  We have made no changes today.         Relevant Orders    Albumin / creatinine urine ratio    Comprehensive metabolic panel    Hemoglobin A1C    Lipid Panel with Direct LDL reflex    TSH, 3rd generation with Free T4 reflex    CBC and differential    Morbid obesity (HCC)     I advised her to start weighing herself daily to track her weight.  The patient was advised to start eating 7 non starchy vegetables and 3 fruits every day as well as 10-12 nuts per day.  She was also advised to add on psyllium fiber 1 tbsp twice a day for goal of 13 g per day.  She was advised to drink 16 oz of water before all meals and to avoid any artificially sweetened drinks.  She was also advised to try high intensity interval training for 4 minutes per day along with resistance exercises.  I also advised her to keep a food diary to track everything that she is eating in the course of the day.  At meals, she should always cut his dish in  half and eat half her meal and then determine if she is still hungry prior to eating the additional half.  We will see her back in the office as scheduled.          Relevant Orders    Albumin / creatinine urine ratio    Comprehensive metabolic panel    Hemoglobin A1C    Lipid Panel with Direct LDL reflex    TSH, 3rd generation with Free T4 reflex    CBC and differential   Other Visit Diagnoses     Microalbuminuria due to type 2 diabetes mellitus         Relevant Medications    lisinopril (ZESTRIL) 30 mg tablet    Other Relevant Orders    Albumin / creatinine urine ratio    Comprehensive metabolic panel          Return in about 6 months (around 7/3/2024) for Annual physical-AWV.   I spent 20 minutes during the visit reviewing the history from the patient, performing the examination, discussing the findings with the patient, providing counseling and education, and making a plan. I spent 10 minutes ordering referrals and testing and documenting.    Subjective:   Chief Complaint   Patient  presents with   • Follow-up     Check up 6 months diabetes   • Headache     On and off        Patient ID: Vee Baldwin is a 68 y.o. female presents today for routine checkup.  Vee Baldwin is a 68 y.o. female who presents today for follow-up of her type 2 diabetes, hypertension, hypothyroidism, and hyperlipidemia.  She has been having some headaches on ad off at night and she takes an aspirin with relief- she has one today.  She does have a cough in the am as well.  There are no visual changes.  There is a frontal headache.  There is mild pain.  The patient denies any chest pain, shortness of breath, or palpitations.  There is no edema.  There are no headaches or visual changes.  There is no lightheadedness, dizziness, or fainting spells.  She is seeing the GI specialist on 2/01/2023- she is having some increased abdominal discomfort.  She is not checking her BS at home- she is eating less ice cream.  She is eating fruits and vegetables.    She is scheduled to see the eye doctor.        Diabetes  She presents for her follow-up diabetic visit. She has type 2 diabetes mellitus. Her disease course has been stable. Pertinent negatives for diabetes include no blurred vision, no chest pain, no fatigue, no foot paresthesias, no foot ulcerations, no polydipsia, no polyphagia, no polyuria, no visual change, no weakness and no weight loss. Symptoms are stable.     The following portions of the patient's history were reviewed and updated as appropriate: allergies, current medications, past family history, past medical history, past social history, past surgical history and problem list.  Patient Active Problem List   Diagnosis   • Female cystocele   • Generalized osteoarthritis   • Hyperlipidemia   • Hypertension   • Hypothyroidism   • Sleep disturbances   • Urge and stress incontinence   • Varicose veins of both lower extremities with pain   • Vitamin D deficiency   • Shoulder subluxation, right   • DJD of right  shoulder   • Rotator cuff tear arthropathy of right shoulder   • Morbid obesity (HCC)   • Pancreatic pseudocyst   • GERD (gastroesophageal reflux disease)   • Branchial cleft cyst   • Chronic pancreatitis (HCC)   • Primary osteoarthritis of right knee   • Type 2 diabetes mellitus without complication, without long-term current use of insulin (HCC)   • Chronic neck pain   • Statin intolerance     Past Medical History:   Diagnosis Date   • Abscess of neck 3/2/2020   • Alcohol induced acute pancreatitis 8/25/2018   • Disease of thyroid gland    • GERD (gastroesophageal reflux disease)    • HL (hearing loss)    • Hyperlipidemia    • Polyp of sigmoid colon     last assessed 6/12/12   • Sleep disturbances     last assessed 6/5/14     Past Surgical History:   Procedure Laterality Date   • BREAST CYST ASPIRATION Right     20 yrs ago in dr office net results   • COLONOSCOPY  06/18/2011    Complete.   Repeat in 5yrs     • IR IMAGE GUIDED ASPIRATION / DRAINAGE  3/3/2020   • NASAL FRACTURE SURGERY       Allergies   Allergen Reactions   • Statins Myalgia     Family History   Problem Relation Age of Onset   • Alzheimer's disease Mother    • Diabetes Mother    • No Known Problems Father    • No Known Problems Maternal Grandmother    • No Known Problems Maternal Grandfather    • No Known Problems Paternal Grandmother    • No Known Problems Paternal Grandfather    • No Known Problems Maternal Aunt    • No Known Problems Maternal Aunt    • No Known Problems Maternal Aunt    • No Known Problems Maternal Aunt    • No Known Problems Maternal Aunt    • No Known Problems Paternal Aunt    • No Known Problems Paternal Aunt    • No Known Problems Paternal Aunt      Social History     Socioeconomic History   • Marital status: Single     Spouse name: Not on file   • Number of children: Not on file   • Years of education: Not on file   • Highest education level: Not on file   Occupational History   • Occupation: retired    Tobacco Use   •  Smoking status: Never     Passive exposure: Past   • Smokeless tobacco: Never   Vaping Use   • Vaping status: Never Used   Substance and Sexual Activity   • Alcohol use: Not Currently   • Drug use: No   • Sexual activity: Not Currently   Other Topics Concern   • Not on file   Social History Narrative    Sleep disturbances      Social Determinants of Health     Financial Resource Strain: Low Risk  (6/30/2023)    Overall Financial Resource Strain (CARDIA)    • Difficulty of Paying Living Expenses: Not very hard   Food Insecurity: No Food Insecurity (5/17/2021)    Hunger Vital Sign    • Worried About Running Out of Food in the Last Year: Never true    • Ran Out of Food in the Last Year: Never true   Transportation Needs: No Transportation Needs (6/30/2023)    PRAPARE - Transportation    • Lack of Transportation (Medical): No    • Lack of Transportation (Non-Medical): No   Physical Activity: Insufficiently Active (8/18/2021)    Exercise Vital Sign    • Days of Exercise per Week: 2 days    • Minutes of Exercise per Session: 60 min   Stress: No Stress Concern Present (8/18/2021)    Citizen of [place] of Occupational Health - Occupational Stress Questionnaire    • Feeling of Stress : Not at all   Social Connections: Moderately Integrated (5/17/2021)    Social Connection and Isolation Panel [NHANES]    • Frequency of Communication with Friends and Family: More than three times a week    • Frequency of Social Gatherings with Friends and Family: Once a week    • Attends [employer] Services: More than 4 times per year    • Active Member of Clubs or Organizations: Yes    • Attends Club or Organization Meetings: 1 to 4 times per year    • Marital Status: Never    Intimate Partner Violence: Not At Risk (6/30/2023)    Humiliation, Afraid, Rape, and Kick questionnaire    • Fear of Current or Ex-Partner: No    • Emotionally Abused: No    • Physically Abused: No    • Sexually Abused: No   Housing Stability: Not on file  "    Current Outpatient Medications on File Prior to Visit   Medication Sig Dispense Refill   • aspirin 81 MG tablet Take 1 tablet by mouth daily     • Cholecalciferol (Vitamin D3) 25 MCG (1000 UT) CAPS Take 1 capsule (1,000 Units total) by mouth daily 30 capsule 11   • ezetimibe (ZETIA) 10 mg tablet Take 1 tablet (10 mg total) by mouth daily 90 tablet 1   • levothyroxine 50 mcg tablet TAKE 2 TABLETS BY MOUTH  DAILY 180 tablet 0   • metFORMIN (GLUCOPHAGE-XR) 500 mg 24 hr tablet TAKE 1 TABLET BY MOUTH ONCE DAILY WITH SUPPER 90 tablet 0   • multivitamin (THERAGRAN) TABS Take 1 tablet by mouth     • Omega-3 Fatty Acids (FISH OIL PO) Take 2 g by mouth     • oxybutynin (DITROPAN-XL) 10 MG 24 hr tablet Take 10 mg by mouth daily     • pantoprazole (PROTONIX) 40 mg tablet Take 40 mg by mouth daily     • Zenpep [id number] units CPEP Take 1 capsule by mouth as needed       No current facility-administered medications on file prior to visit.     Review of Systems   Constitutional: Negative.  Negative for fatigue and weight loss.   HENT: Negative.     Eyes: Negative.  Negative for blurred vision.   Respiratory: Negative.     Cardiovascular: Negative.  Negative for chest pain.   Gastrointestinal: Negative.    Endocrine: Negative.  Negative for polydipsia, polyphagia and polyuria.   Genitourinary: Negative.    Musculoskeletal: Negative.    Skin: Negative.    Allergic/Immunologic: Negative.    Neurological: Negative.  Negative for weakness.   Hematological: Negative.    Psychiatric/Behavioral: Negative.         Objective:  Vitals:    01/03/24 1312   BP: 134/82   BP Location: Left arm   Patient Position: Sitting   Cuff Size: Large   Pulse: 72   Resp: 18   Temp: (!) 97.2 °F (36.2 °C)   TempSrc: Tympanic   SpO2: 97%   Weight: 111 kg (245 lb 12.8 oz)   Height: 5' 5\" (1.651 m)     Body mass index is 40.9 kg/m².     Physical Exam  Constitutional:       Appearance: She is well-developed.   HENT:      Head: Normocephalic and atraumatic. "      Right Ear: Tympanic membrane, ear canal and external ear normal.      Left Ear: Tympanic membrane, ear canal and external ear normal.      Nose: Nose normal.      Mouth/Throat:      Mouth: Mucous membranes are moist.      Pharynx: No oropharyngeal exudate.   Eyes:      Extraocular Movements: Extraocular movements intact.      Conjunctiva/sclera: Conjunctivae normal.      Pupils: Pupils are equal, round, and reactive to light.   Neck:      Thyroid: No thyromegaly.      Vascular: No JVD.      Trachea: No tracheal deviation.   Cardiovascular:      Rate and Rhythm: Normal rate and regular rhythm.      Pulses: Normal pulses.      Heart sounds: Normal heart sounds. No murmur heard.     No friction rub. No gallop.   Pulmonary:      Effort: Pulmonary effort is normal. No respiratory distress.      Breath sounds: Normal breath sounds. No stridor. No wheezing or rales.   Chest:      Chest wall: No tenderness.   Abdominal:      General: Bowel sounds are normal. There is no distension.      Palpations: Abdomen is soft. There is no mass.      Tenderness: There is no abdominal tenderness. There is no guarding or rebound.   Musculoskeletal:         General: No swelling, tenderness, deformity or signs of injury. Normal range of motion.      Cervical back: Normal range of motion.      Right lower leg: No edema.      Left lower leg: No edema.   Lymphadenopathy:      Cervical: No cervical adenopathy.   Skin:     General: Skin is warm and dry.   Neurological:      General: No focal deficit present.      Mental Status: She is alert and oriented to person, place, and time.      Cranial Nerves: No cranial nerve deficit.      Motor: No abnormal muscle tone.      Coordination: Coordination normal.      Deep Tendon Reflexes: Reflexes are normal and symmetric. Reflexes normal.         Appointment on 12/04/2023   Component Date Value   • Creatinine, Ur 12/04/2023 157.8    • Albumin,U,Random 12/04/2023 123.9 (H)    • Albumin Creat Ratio  12/04/2023 79 (H)    • Sodium 12/04/2023 145    • Potassium 12/04/2023 4.3    • Chloride 12/04/2023 106    • CO2 12/04/2023 32    • ANION GAP 12/04/2023 7    • BUN 12/04/2023 17    • Creatinine 12/04/2023 0.68    • Glucose, Fasting 12/04/2023 101 (H)    • Calcium 12/04/2023 9.3    • AST 12/04/2023 23    • ALT 12/04/2023 21    • Alkaline Phosphatase 12/04/2023 65    • Total Protein 12/04/2023 6.5    • Albumin 12/04/2023 3.9    • Total Bilirubin 12/04/2023 0.42    • eGFR 12/04/2023 89    • Hemoglobin A1C 12/04/2023 6.5 (H)    • EAG 12/04/2023 140    • Cholesterol 12/04/2023 203 (H)    • Triglycerides 12/04/2023 135    • HDL, Direct 12/04/2023 56    • LDL Calculated 12/04/2023 120 (H)    • TSH 3RD GENERATON 12/04/2023 1.765         Depression Screening and Follow-up Plan: Patient was screened for depression during today's encounter. They screened negative with a PHQ-2 score of 0.

## 2024-01-04 NOTE — ASSESSMENT & PLAN NOTE
I advised her to start weighing herself daily to track her weight.  The patient was advised to start eating 7 non starchy vegetables and 3 fruits every day as well as 10-12 nuts per day.  She was also advised to add on psyllium fiber 1 tbsp twice a day for goal of 13 g per day.  She was advised to drink 16 oz of water before all meals and to avoid any artificially sweetened drinks.  She was also advised to try high intensity interval training for 4 minutes per day along with resistance exercises.  I also advised her to keep a food diary to track everything that she is eating in the course of the day.  At meals, she should always cut his dish in  half and eat half her meal and then determine if she is still hungry prior to eating the additional half.  We will see her back in the office as scheduled.

## 2024-01-04 NOTE — ASSESSMENT & PLAN NOTE
The patient's blood pressure is well-controlled on her current medication.  We did titrate up on the dose of lisinopril to better control her microalbuminuria.  We will see her back as scheduled.

## 2024-01-04 NOTE — ASSESSMENT & PLAN NOTE
Lab Results   Component Value Date    HGBA1C 6.5 (H) 12/04/2023   The patient has a hemoglobin A1c of 6.5 demonstrating good control of her type 2 diabetes.  We will maintain her on her current medication.  She will continue to work on her diet and exercise and losing weight.  She did have some evidence of some increased microalbumin on her urine sample so we will titrate up on the dose of her lisinopril to 30 mg daily as indicated.  We will see her back in the office as scheduled and she will get lab work done prior to that visit.

## 2024-01-17 DIAGNOSIS — E03.9 HYPOTHYROIDISM, UNSPECIFIED TYPE: ICD-10-CM

## 2024-01-17 RX ORDER — LEVOTHYROXINE SODIUM 0.05 MG/1
TABLET ORAL
Qty: 180 TABLET | Refills: 0 | Status: SHIPPED | OUTPATIENT
Start: 2024-01-17 | End: 2024-01-17 | Stop reason: SDUPTHER

## 2024-01-17 RX ORDER — LEVOTHYROXINE SODIUM 0.05 MG/1
100 TABLET ORAL DAILY
Qty: 90 TABLET | Refills: 1 | Status: SHIPPED | OUTPATIENT
Start: 2024-01-17

## 2024-02-08 LAB
LEFT EYE DIABETIC RETINOPATHY: NORMAL
RIGHT EYE DIABETIC RETINOPATHY: NORMAL

## 2024-03-05 ENCOUNTER — OFFICE VISIT (OUTPATIENT)
Dept: URGENT CARE | Facility: CLINIC | Age: 69
End: 2024-03-05
Payer: MEDICARE

## 2024-03-05 VITALS
OXYGEN SATURATION: 96 % | TEMPERATURE: 97.4 F | SYSTOLIC BLOOD PRESSURE: 126 MMHG | RESPIRATION RATE: 20 BRPM | HEART RATE: 80 BPM | DIASTOLIC BLOOD PRESSURE: 84 MMHG

## 2024-03-05 DIAGNOSIS — H10.9 BACTERIAL CONJUNCTIVITIS OF BOTH EYES: Primary | ICD-10-CM

## 2024-03-05 DIAGNOSIS — B96.89 BACTERIAL CONJUNCTIVITIS OF BOTH EYES: Primary | ICD-10-CM

## 2024-03-05 DIAGNOSIS — J06.9 ACUTE URI: ICD-10-CM

## 2024-03-05 PROCEDURE — 99213 OFFICE O/P EST LOW 20 MIN: CPT

## 2024-03-05 PROCEDURE — G0463 HOSPITAL OUTPT CLINIC VISIT: HCPCS

## 2024-03-05 RX ORDER — OFLOXACIN 3 MG/ML
SOLUTION/ DROPS OPHTHALMIC EVERY 4 HOURS
Qty: 5 ML | Refills: 0 | Status: SHIPPED | OUTPATIENT
Start: 2024-03-05 | End: 2024-03-12

## 2024-03-05 NOTE — PROGRESS NOTES
[facility]        NAME: Vee Baldwin is a 68 y.o. female  : 1955    MRN: [id number]  DATE: 2024  TIME: 11:40 AM    Assessment and Plan   Bacterial conjunctivitis of both eyes [H10.9, B96.89]  1. Bacterial conjunctivitis of both eyes  ofloxacin (OCUFLOX) 0.3 % ophthalmic solution      2. Acute URI        Supportive care for URI.  Patient Instructions     Ophthalmic eye abx given today. Explained contagious nature of pink eye. Avoid rubbing eye and wash hands frequently.    Follow-up with PCP in the next 3-5 days if no improvement.   Go to the ED if symptoms severely worsen.    Chief Complaint     Chief Complaint   Patient presents with    Conjunctivitis     Patient has bilateral eye redness, discharge, and itchiness that started last night. Also reports a sore throat      History of Present Illness     Vee Baldwin is a 68 y.o. female presenting to the office today for eye itching. Symptoms have been present for 1 days, and include b/l eye redness, discharge, and crusting.   She has tried none for her symptoms, no relief.  Sick contacts include: none  Recent travel: none    Review of Systems     Review of Systems   Constitutional:  Negative for chills and fever.   HENT:  Positive for sore throat. Negative for congestion.    Eyes:  Positive for discharge, redness and itching. Negative for photophobia, pain and visual disturbance.   Respiratory:  Positive for cough. Negative for shortness of breath, wheezing and stridor.    Gastrointestinal:  Negative for abdominal pain, diarrhea, nausea and vomiting.   Genitourinary: Negative.    Musculoskeletal:  Negative for myalgias.   Skin:  Negative for color change and rash.   Neurological:  Negative for seizures and syncope.     Current Medications       Current Outpatient Medications:     levothyroxine 50 mcg tablet, Take 2 tablets (100 mcg total) by mouth daily, Disp: 90 tablet, Rfl: 1    ofloxacin (OCUFLOX) 0.3 % ophthalmic solution,  Administer 1 drop to both eyes every 4 (four) hours for 2 days, THEN 1 drop 4 (four) times a day for 5 days., Disp: 5 mL, Rfl: 0    aspirin 81 MG tablet, Take 1 tablet by mouth daily, Disp: , Rfl:     Cholecalciferol (Vitamin D3) 25 MCG (1000 UT) CAPS, Take 1 capsule (1,000 Units total) by mouth daily, Disp: 30 capsule, Rfl: 11    ezetimibe (ZETIA) 10 mg tablet, Take 1 tablet (10 mg total) by mouth daily, Disp: 90 tablet, Rfl: 1    lisinopril (ZESTRIL) 30 mg tablet, Take 1 tablet (30 mg total) by mouth daily, Disp: 30 tablet, Rfl: 5    metFORMIN (GLUCOPHAGE-XR) 500 mg 24 hr tablet, TAKE 1 TABLET BY MOUTH ONCE DAILY WITH SUPPER, Disp: 90 tablet, Rfl: 0    multivitamin (THERAGRAN) TABS, Take 1 tablet by mouth, Disp: , Rfl:     Omega-3 Fatty Acids (FISH OIL PO), Take 2 g by mouth, Disp: , Rfl:     oxybutynin (DITROPAN-XL) 10 MG 24 hr tablet, Take 10 mg by mouth daily, Disp: , Rfl:     pantoprazole (PROTONIX) 40 mg tablet, Take 40 mg by mouth daily, Disp: , Rfl:     Zenpep [id number] units CPEP, Take 1 capsule by mouth as needed, Disp: , Rfl:     Current Allergies     Allergies as of 03/05/2024 - Reviewed 01/04/2024   Allergen Reaction Noted    Statins Myalgia 11/18/2013            The following portions of the patient's history were reviewed and updated as appropriate: allergies, current medications, past family history, past medical history, past social history, past surgical history and problem list.     Past Medical History:   Diagnosis Date    Abscess of neck 3/2/2020    Alcohol induced acute pancreatitis 8/25/2018    Disease of thyroid gland     GERD (gastroesophageal reflux disease)     HL (hearing loss)     Hyperlipidemia     Polyp of sigmoid colon     last assessed 6/12/12    Sleep disturbances     last assessed 6/5/14       Past Surgical History:   Procedure Laterality Date    BREAST CYST ASPIRATION Right     20 yrs ago in dr office net results    COLONOSCOPY  06/18/2011    Complete.   Repeat in 5yrs      IR  IMAGE GUIDED ASPIRATION / DRAINAGE  3/3/2020    NASAL FRACTURE SURGERY         Family History   Problem Relation Age of Onset    Alzheimer's disease Mother     Diabetes Mother     No Known Problems Father     No Known Problems Maternal Grandmother     No Known Problems Maternal Grandfather     No Known Problems Paternal Grandmother     No Known Problems Paternal Grandfather     No Known Problems Maternal Aunt     No Known Problems Maternal Aunt     No Known Problems Maternal Aunt     No Known Problems Maternal Aunt     No Known Problems Maternal Aunt     No Known Problems Paternal Aunt     No Known Problems Paternal Aunt     No Known Problems Paternal Aunt        Medications have been verified.    Objective     /84   Pulse 80   Temp (!) 97.4 °F (36.3 °C)   Resp 20   LMP  (LMP Unknown)   SpO2 96%   No LMP recorded (lmp unknown). Patient is postmenopausal.     Physical Exam     Physical Exam  Constitutional:       General: She is not in acute distress.     Appearance: Normal appearance. She is normal weight. She is not ill-appearing, toxic-appearing or diaphoretic.   HENT:      Head: Normocephalic and atraumatic.      Right Ear: Tympanic membrane, ear canal and external ear normal. There is no impacted cerumen.      Left Ear: Tympanic membrane, ear canal and external ear normal. There is no impacted cerumen.      Nose: Congestion and rhinorrhea present.      Mouth/Throat:      Mouth: Mucous membranes are moist.      Pharynx: Posterior oropharyngeal erythema present. No oropharyngeal exudate.   Eyes:      General: No scleral icterus.        Right eye: Discharge (and erythema) present.         Left eye: Discharge (and erythema) present.  Cardiovascular:      Rate and Rhythm: Normal rate and regular rhythm.      Pulses: Normal pulses.      Heart sounds: Normal heart sounds. No murmur heard.     No friction rub. No gallop.   Pulmonary:      Effort: Pulmonary effort is normal. No respiratory distress.       Breath sounds: Normal breath sounds. No stridor. No wheezing, rhonchi or rales.   Chest:      Chest wall: No tenderness.   Musculoskeletal:         General: Normal range of motion.      Cervical back: Normal range of motion and neck supple. No rigidity or tenderness.   Lymphadenopathy:      Cervical: No cervical adenopathy.   Skin:     General: Skin is warm and dry.      Capillary Refill: Capillary refill takes less than 2 seconds.      Coloration: Skin is not jaundiced.      Findings: No bruising or rash.   Neurological:      General: No focal deficit present.      Mental Status: She is alert. Mental status is at baseline.   Psychiatric:         Mood and Affect: Mood normal.         Behavior: Behavior normal.

## 2024-03-06 ENCOUNTER — OFFICE VISIT (OUTPATIENT)
Dept: URGENT CARE | Facility: CLINIC | Age: 69
End: 2024-03-06
Payer: MEDICARE

## 2024-03-06 VITALS
TEMPERATURE: 97.2 F | DIASTOLIC BLOOD PRESSURE: 80 MMHG | OXYGEN SATURATION: 97 % | RESPIRATION RATE: 16 BRPM | HEART RATE: 90 BPM | SYSTOLIC BLOOD PRESSURE: 128 MMHG | WEIGHT: 245 LBS | BODY MASS INDEX: 40.82 KG/M2 | HEIGHT: 65 IN

## 2024-03-06 DIAGNOSIS — J06.9 ACUTE URI: Primary | ICD-10-CM

## 2024-03-06 LAB
SARS-COV-2 AG UPPER RESP QL IA: NEGATIVE
VALID CONTROL: NORMAL

## 2024-03-06 PROCEDURE — G0463 HOSPITAL OUTPT CLINIC VISIT: HCPCS

## 2024-03-06 PROCEDURE — 87811 SARS-COV-2 COVID19 W/OPTIC: CPT

## 2024-03-06 PROCEDURE — 99213 OFFICE O/P EST LOW 20 MIN: CPT

## 2024-03-06 RX ORDER — BENZONATATE 200 MG/1
200 CAPSULE ORAL 3 TIMES DAILY PRN
Qty: 20 CAPSULE | Refills: 0 | Status: SHIPPED | OUTPATIENT
Start: 2024-03-06

## 2024-03-06 NOTE — PROGRESS NOTES
[facility]        NAME: Vee Baldwin is a 68 y.o. female  : 1955    MRN: [id number]  DATE: 2024  TIME: 10:58 AM    Assessment and Plan   Acute URI [J06.9]  1. Acute URI  Poct Covid 19 Rapid Antigen Test            Patient Instructions       Follow up with PCP in 3-5 days.  Proceed to  ER if symptoms worsen.    If tests have been performed at [facility], our office will contact you with results if changes need to be made to the care plan discussed with you at the visit.  [facility]    Chief Complaint     Chief Complaint   Patient presents with    Cold Like Symptoms     Pt reports 2 days of congestion, cough, scratchy throat and sneezing. Seen here yesterday for conjunctivitis. Pt said her friend told her to come back because she could have covid.          History of Present Illness       68-year-old female presents for cold-like symptoms x 2 days.  Was seen yesterday and diagnosed with bilateral conjunctivitis and URI.No formal testing.  Has been using Flonase. Reported improvement using ofloxacin eyedrops as directed.         Review of Systems   Review of Systems   Constitutional:  Negative for chills and fever.   HENT:  Positive for congestion, rhinorrhea and sore throat. Negative for ear pain.    Respiratory:  Positive for cough.    Musculoskeletal:  Negative for myalgias.   Neurological:  Positive for headaches.         Current Medications       Current Outpatient Medications:     aspirin 81 MG tablet, Take 1 tablet by mouth daily, Disp: , Rfl:     Cholecalciferol (Vitamin D3) 25 MCG (1000 UT) CAPS, Take 1 capsule (1,000 Units total) by mouth daily, Disp: 30 capsule, Rfl: 11    ezetimibe (ZETIA) 10 mg tablet, Take 1 tablet (10 mg total) by mouth daily, Disp: 90 tablet, Rfl: 1    levothyroxine 50 mcg tablet, Take 2 tablets (100 mcg total) by mouth daily, Disp: 90 tablet, Rfl: 1    lisinopril (ZESTRIL) 30 mg tablet, Take 1 tablet (30 mg  total) by mouth daily, Disp: 30 tablet, Rfl: 5    metFORMIN (GLUCOPHAGE-XR) 500 mg 24 hr tablet, TAKE 1 TABLET BY MOUTH ONCE DAILY WITH SUPPER, Disp: 90 tablet, Rfl: 0    multivitamin (THERAGRAN) TABS, Take 1 tablet by mouth, Disp: , Rfl:     ofloxacin (OCUFLOX) 0.3 % ophthalmic solution, Administer 1 drop to both eyes every 4 (four) hours for 2 days, THEN 1 drop 4 (four) times a day for 5 days., Disp: 5 mL, Rfl: 0    Omega-3 Fatty Acids (FISH OIL PO), Take 2 g by mouth, Disp: , Rfl:     oxybutynin (DITROPAN-XL) 10 MG 24 hr tablet, Take 10 mg by mouth daily, Disp: , Rfl:     pantoprazole (PROTONIX) 40 mg tablet, Take 40 mg by mouth daily, Disp: , Rfl:     Zenpep [id number] units CPEP, Take 1 capsule by mouth as needed, Disp: , Rfl:     Current Allergies     Allergies as of 03/06/2024 - Reviewed 03/06/2024   Allergen Reaction Noted    Statins Myalgia 11/18/2013            The following portions of the patient's history were reviewed and updated as appropriate: allergies, current medications, past family history, past medical history, past social history, past surgical history and problem list.     Past Medical History:   Diagnosis Date    Abscess of neck 3/2/2020    Alcohol induced acute pancreatitis 8/25/2018    Disease of thyroid gland     GERD (gastroesophageal reflux disease)     HL (hearing loss)     Hyperlipidemia     Polyp of sigmoid colon     last assessed 6/12/12    Sleep disturbances     last assessed 6/5/14       Past Surgical History:   Procedure Laterality Date    BREAST CYST ASPIRATION Right     20 yrs ago in dr office net results    COLONOSCOPY  06/18/2011    Complete.   Repeat in 5yrs      IR IMAGE GUIDED ASPIRATION / DRAINAGE  3/3/2020    NASAL FRACTURE SURGERY         Family History   Problem Relation Age of Onset    Alzheimer's disease Mother     Diabetes Mother     No Known Problems Father     No Known Problems Maternal Grandmother     No Known Problems Maternal Grandfather     No Known Problems  "Paternal Grandmother     No Known Problems Paternal Grandfather     No Known Problems Maternal Aunt     No Known Problems Maternal Aunt     No Known Problems Maternal Aunt     No Known Problems Maternal Aunt     No Known Problems Maternal Aunt     No Known Problems Paternal Aunt     No Known Problems Paternal Aunt     No Known Problems Paternal Aunt          Medications have been verified.        Objective   /80   Pulse 90   Temp (!) 97.2 °F (36.2 °C)   Resp 16   Ht 5' 5\" (1.651 m)   Wt 111 kg (245 lb)   LMP  (LMP Unknown)   SpO2 97%   BMI 40.77 kg/m²   No LMP recorded (lmp unknown). Patient is postmenopausal.       Physical Exam     Physical Exam  Vitals and nursing note reviewed.   Constitutional:       General: She is not in acute distress.     Appearance: She is not toxic-appearing.   HENT:      Head: Normocephalic and atraumatic.      Right Ear: Tympanic membrane, ear canal and external ear normal.      Left Ear: Tympanic membrane and external ear normal.      Nose: Nose normal.      Mouth/Throat:      Mouth: Mucous membranes are moist.      Pharynx: No oropharyngeal exudate.   Eyes:      General:         Right eye: Discharge present.         Left eye: Discharge present.  Pulmonary:      Effort: Pulmonary effort is normal.      Breath sounds: Normal breath sounds.   Lymphadenopathy:      Cervical: No cervical adenopathy.   Skin:     Capillary Refill: Capillary refill takes less than 2 seconds.   Neurological:      Mental Status: She is alert.   Psychiatric:         Mood and Affect: Mood normal.         Behavior: Behavior normal.                   "

## 2024-03-12 DIAGNOSIS — I10 PRIMARY HYPERTENSION: ICD-10-CM

## 2024-03-12 DIAGNOSIS — E11.9 TYPE 2 DIABETES MELLITUS WITHOUT COMPLICATION, WITHOUT LONG-TERM CURRENT USE OF INSULIN (HCC): ICD-10-CM

## 2024-03-12 DIAGNOSIS — E78.2 MIXED HYPERLIPIDEMIA: ICD-10-CM

## 2024-03-12 RX ORDER — METFORMIN HYDROCHLORIDE 500 MG/1
TABLET, EXTENDED RELEASE ORAL
Qty: 90 TABLET | Refills: 0 | Status: SHIPPED | OUTPATIENT
Start: 2024-03-12

## 2024-04-01 ENCOUNTER — APPOINTMENT (OUTPATIENT)
Dept: LAB | Facility: CLINIC | Age: 69
End: 2024-04-01
Payer: MEDICARE

## 2024-04-01 DIAGNOSIS — I10 PRIMARY HYPERTENSION: ICD-10-CM

## 2024-04-01 DIAGNOSIS — E78.2 MIXED HYPERLIPIDEMIA: ICD-10-CM

## 2024-04-01 DIAGNOSIS — E11.9 TYPE 2 DIABETES MELLITUS WITHOUT COMPLICATION, WITHOUT LONG-TERM CURRENT USE OF INSULIN (HCC): ICD-10-CM

## 2024-04-01 DIAGNOSIS — E66.01 MORBID OBESITY (HCC): ICD-10-CM

## 2024-04-01 DIAGNOSIS — R80.9 MICROALBUMINURIA DUE TO TYPE 2 DIABETES MELLITUS  (HCC): ICD-10-CM

## 2024-04-01 DIAGNOSIS — E03.9 HYPOTHYROIDISM, UNSPECIFIED TYPE: ICD-10-CM

## 2024-04-01 DIAGNOSIS — E11.29 MICROALBUMINURIA DUE TO TYPE 2 DIABETES MELLITUS  (HCC): ICD-10-CM

## 2024-04-01 LAB
ALBUMIN SERPL BCP-MCNC: 3.9 G/DL (ref 3.5–5)
ALP SERPL-CCNC: 63 U/L (ref 34–104)
ALT SERPL W P-5'-P-CCNC: 24 U/L (ref 7–52)
ANION GAP SERPL CALCULATED.3IONS-SCNC: 7 MMOL/L (ref 4–13)
AST SERPL W P-5'-P-CCNC: 24 U/L (ref 13–39)
BASOPHILS # BLD AUTO: 0.06 THOUSANDS/ÂΜL (ref 0–0.1)
BASOPHILS NFR BLD AUTO: 1 % (ref 0–1)
BILIRUB SERPL-MCNC: 0.47 MG/DL (ref 0.2–1)
BUN SERPL-MCNC: 15 MG/DL (ref 5–25)
CALCIUM SERPL-MCNC: 9.3 MG/DL (ref 8.4–10.2)
CHLORIDE SERPL-SCNC: 105 MMOL/L (ref 96–108)
CHOLEST SERPL-MCNC: 189 MG/DL
CO2 SERPL-SCNC: 28 MMOL/L (ref 21–32)
CREAT SERPL-MCNC: 0.68 MG/DL (ref 0.6–1.3)
CREAT UR-MCNC: 57.2 MG/DL
EOSINOPHIL # BLD AUTO: 0.3 THOUSAND/ÂΜL (ref 0–0.61)
EOSINOPHIL NFR BLD AUTO: 3 % (ref 0–6)
ERYTHROCYTE [DISTWIDTH] IN BLOOD BY AUTOMATED COUNT: 13.2 % (ref 11.6–15.1)
EST. AVERAGE GLUCOSE BLD GHB EST-MCNC: 146 MG/DL
GFR SERPL CREATININE-BSD FRML MDRD: 88 ML/MIN/1.73SQ M
GLUCOSE P FAST SERPL-MCNC: 108 MG/DL (ref 65–99)
HBA1C MFR BLD: 6.7 %
HCT VFR BLD AUTO: 42.7 % (ref 34.8–46.1)
HDLC SERPL-MCNC: 57 MG/DL
HGB BLD-MCNC: 13.2 G/DL (ref 11.5–15.4)
IMM GRANULOCYTES # BLD AUTO: 0.02 THOUSAND/UL (ref 0–0.2)
IMM GRANULOCYTES NFR BLD AUTO: 0 % (ref 0–2)
LDLC SERPL CALC-MCNC: 92 MG/DL (ref 0–100)
LYMPHOCYTES # BLD AUTO: 1.77 THOUSANDS/ÂΜL (ref 0.6–4.47)
LYMPHOCYTES NFR BLD AUTO: 19 % (ref 14–44)
MCH RBC QN AUTO: 30.3 PG (ref 26.8–34.3)
MCHC RBC AUTO-ENTMCNC: 30.9 G/DL (ref 31.4–37.4)
MCV RBC AUTO: 98 FL (ref 82–98)
MICROALBUMIN UR-MCNC: 51.8 MG/L
MICROALBUMIN/CREAT 24H UR: 91 MG/G CREATININE (ref 0–30)
MONOCYTES # BLD AUTO: 0.77 THOUSAND/ÂΜL (ref 0.17–1.22)
MONOCYTES NFR BLD AUTO: 8 % (ref 4–12)
NEUTROPHILS # BLD AUTO: 6.63 THOUSANDS/ÂΜL (ref 1.85–7.62)
NEUTS SEG NFR BLD AUTO: 69 % (ref 43–75)
NRBC BLD AUTO-RTO: 0 /100 WBCS
PLATELET # BLD AUTO: 189 THOUSANDS/UL (ref 149–390)
PMV BLD AUTO: 12.9 FL (ref 8.9–12.7)
POTASSIUM SERPL-SCNC: 4.8 MMOL/L (ref 3.5–5.3)
PROT SERPL-MCNC: 6.9 G/DL (ref 6.4–8.4)
RBC # BLD AUTO: 4.35 MILLION/UL (ref 3.81–5.12)
SODIUM SERPL-SCNC: 140 MMOL/L (ref 135–147)
T4 FREE SERPL-MCNC: 0.83 NG/DL (ref 0.61–1.12)
TRIGL SERPL-MCNC: 199 MG/DL
TSH SERPL DL<=0.05 MIU/L-ACNC: 4.95 UIU/ML (ref 0.45–4.5)
WBC # BLD AUTO: 9.55 THOUSAND/UL (ref 4.31–10.16)

## 2024-04-01 PROCEDURE — 83036 HEMOGLOBIN GLYCOSYLATED A1C: CPT

## 2024-04-01 PROCEDURE — 84443 ASSAY THYROID STIM HORMONE: CPT

## 2024-04-01 PROCEDURE — 82570 ASSAY OF URINE CREATININE: CPT

## 2024-04-01 PROCEDURE — 85025 COMPLETE CBC W/AUTO DIFF WBC: CPT

## 2024-04-01 PROCEDURE — 82043 UR ALBUMIN QUANTITATIVE: CPT

## 2024-04-01 PROCEDURE — 84439 ASSAY OF FREE THYROXINE: CPT

## 2024-04-01 PROCEDURE — 80061 LIPID PANEL: CPT

## 2024-04-01 PROCEDURE — 36415 COLL VENOUS BLD VENIPUNCTURE: CPT

## 2024-04-01 PROCEDURE — 80053 COMPREHEN METABOLIC PANEL: CPT

## 2024-04-03 ENCOUNTER — OFFICE VISIT (OUTPATIENT)
Dept: URGENT CARE | Facility: CLINIC | Age: 69
End: 2024-04-03
Payer: MEDICARE

## 2024-04-03 VITALS
HEART RATE: 70 BPM | RESPIRATION RATE: 16 BRPM | SYSTOLIC BLOOD PRESSURE: 130 MMHG | BODY MASS INDEX: 40.15 KG/M2 | WEIGHT: 241 LBS | DIASTOLIC BLOOD PRESSURE: 80 MMHG | TEMPERATURE: 97.1 F | OXYGEN SATURATION: 97 % | HEIGHT: 65 IN

## 2024-04-03 DIAGNOSIS — J06.9 VIRAL UPPER RESPIRATORY TRACT INFECTION WITH COUGH: Primary | ICD-10-CM

## 2024-04-03 PROCEDURE — G0463 HOSPITAL OUTPT CLINIC VISIT: HCPCS | Performed by: PHYSICIAN ASSISTANT

## 2024-04-03 PROCEDURE — 99212 OFFICE O/P EST SF 10 MIN: CPT | Performed by: PHYSICIAN ASSISTANT

## 2024-04-03 NOTE — PATIENT INSTRUCTIONS
Your symptoms are consistent with a viral illness.    For nasal/sinus congestion you can try steam, warm compresses, saline nasal spray, Neti pot, nasal steroid (Flonase, Nasocort) to be used at bedtime after the saline nasal spray, or nasal decongestant (Afrin - for 3 days only).      For cough you can take an over-the-counter expectorant such as plain Robitussion or Mucinex. A spoonful of honey at bedtime may also be helpful.    For cold symptoms with high blood pressure take Coricidin cough/cold.     For sore throat you can use Cepacol lozenges, do warm salt water gargles, drink warm water with lemon or herbal teas, or use an over-the-counter throat spray (Chloraseptic).    You can take ibuprofen/Motrin and acetaminophen/Tylenol as needed for pain, fever, body aches. Do not take ibuprofen/Motrin/Advil if you have a history of heart disease, bleeding ulcers, or if you take blood thinners.     Drink plenty of fluids to stay hydrated.    Follow up with your PCP in 5-7 days for persistent symptoms.    Go to the ER if symptoms worsen.    Follow up with PCP in 3-5 days.  Proceed to  ER if symptoms worsen.

## 2024-04-03 NOTE — PROGRESS NOTES
[facility]        NAME: Vee Baldwin is a 68 y.o. female  : 1955    MRN: [id number]  DATE: April 3, 2024  TIME: 9:10 AM    Assessment and Plan   Viral upper respiratory tract infection with cough [J06.9]  1. Viral upper respiratory tract infection with cough              Patient Instructions     Your symptoms are consistent with a viral illness.    For nasal/sinus congestion you can try steam, warm compresses, saline nasal spray, Neti pot, nasal steroid (Flonase, Nasocort) to be used at bedtime after the saline nasal spray, or nasal decongestant (Afrin - for 3 days only).      For cough you can take an over-the-counter expectorant such as plain Robitussion or Mucinex. A spoonful of honey at bedtime may also be helpful.    For cold symptoms with high blood pressure take Coricidin cough/cold.     For sore throat you can use Cepacol lozenges, do warm salt water gargles, drink warm water with lemon or herbal teas, or use an over-the-counter throat spray (Chloraseptic).    You can take ibuprofen/Motrin and acetaminophen/Tylenol as needed for pain, fever, body aches. Do not take ibuprofen/Motrin/Advil if you have a history of heart disease, bleeding ulcers, or if you take blood thinners.     Drink plenty of fluids to stay hydrated.    Follow up with your PCP in 5-7 days for persistent symptoms.    Go to the ER if symptoms worsen.    Follow up with PCP in 3-5 days.  Proceed to  ER if symptoms worsen.    If tests have been performed at [facility], our office will contact you with results if changes need to be made to the care plan discussed with you at the visit.  [facility]    Chief Complaint     Chief Complaint   Patient presents with    Cold Like Symptoms     Pt reports yesterday she developed nasal congestion, sneezing, cough and scratchy throat. Taking tylenol.          History of Present Illness       URI   This is a new problem. The current episode  started yesterday. The problem has been unchanged. There has been no fever. Associated symptoms include coughing, rhinorrhea and a sore throat. Pertinent negatives include no abdominal pain, chest pain, congestion, dysuria, ear pain, headaches, plugged ear sensation, rash, sinus pain or vomiting. She has tried acetaminophen for the symptoms. The treatment provided no relief.   PMH: Htn, DM, hypothyroid, chronic pancreatitis    Review of Systems   Review of Systems   Constitutional:  Negative for chills and fever.   HENT:  Positive for rhinorrhea and sore throat. Negative for congestion, ear pain and sinus pain.    Eyes:  Negative for pain and visual disturbance.   Respiratory:  Positive for cough. Negative for shortness of breath.    Cardiovascular:  Negative for chest pain and palpitations.   Gastrointestinal:  Negative for abdominal pain and vomiting.   Genitourinary:  Negative for dysuria and hematuria.   Musculoskeletal:  Negative for arthralgias and back pain.   Skin:  Negative for color change and rash.   Neurological:  Negative for seizures, syncope and headaches.   All other systems reviewed and are negative.        Current Medications       Current Outpatient Medications:     aspirin 81 MG tablet, Take 1 tablet by mouth daily, Disp: , Rfl:     Cholecalciferol (Vitamin D3) 25 MCG (1000 UT) CAPS, Take 1 capsule (1,000 Units total) by mouth daily, Disp: 30 capsule, Rfl: 11    ezetimibe (ZETIA) 10 mg tablet, Take 1 tablet (10 mg total) by mouth daily, Disp: 90 tablet, Rfl: 1    levothyroxine 50 mcg tablet, Take 2 tablets (100 mcg total) by mouth daily, Disp: 90 tablet, Rfl: 1    lisinopril (ZESTRIL) 30 mg tablet, Take 1 tablet (30 mg total) by mouth daily, Disp: 30 tablet, Rfl: 5    metFORMIN (GLUCOPHAGE-XR) 500 mg 24 hr tablet, TAKE 1 TABLET BY MOUTH ONCE DAILY WITH SUPPER, Disp: 90 tablet, Rfl: 0    multivitamin (THERAGRAN) TABS, Take 1 tablet by mouth, Disp: , Rfl:     Omega-3 Fatty Acids (FISH OIL PO), Take  2 g by mouth, Disp: , Rfl:     oxybutynin (DITROPAN-XL) 10 MG 24 hr tablet, Take 10 mg by mouth daily, Disp: , Rfl:     pantoprazole (PROTONIX) 40 mg tablet, Take 40 mg by mouth daily, Disp: , Rfl:     benzonatate (TESSALON) 200 MG capsule, Take 1 capsule (200 mg total) by mouth 3 (three) times a day as needed for cough (Patient not taking: Reported on 4/3/2024), Disp: 20 capsule, Rfl: 0    Zenpep [id number] units CPEP, Take 1 capsule by mouth as needed (Patient not taking: Reported on 4/3/2024), Disp: , Rfl:     Current Allergies     Allergies as of 04/03/2024 - Reviewed 04/03/2024   Allergen Reaction Noted    Statins Myalgia 11/18/2013            The following portions of the patient's history were reviewed and updated as appropriate: allergies, current medications, past family history, past medical history, past social history, past surgical history and problem list.     Past Medical History:   Diagnosis Date    Abscess of neck 3/2/2020    Alcohol induced acute pancreatitis 8/25/2018    Disease of thyroid gland     GERD (gastroesophageal reflux disease)     HL (hearing loss)     Hyperlipidemia     Polyp of sigmoid colon     last assessed 6/12/12    Sleep disturbances     last assessed 6/5/14       Past Surgical History:   Procedure Laterality Date    BREAST CYST ASPIRATION Right     20 yrs ago in dr office net results    COLONOSCOPY  06/18/2011    Complete.   Repeat in 5yrs      IR IMAGE GUIDED ASPIRATION / DRAINAGE  3/3/2020    NASAL FRACTURE SURGERY         Family History   Problem Relation Age of Onset    Alzheimer's disease Mother     Diabetes Mother     No Known Problems Father     No Known Problems Maternal Grandmother     No Known Problems Maternal Grandfather     No Known Problems Paternal Grandmother     No Known Problems Paternal Grandfather     No Known Problems Maternal Aunt     No Known Problems Maternal Aunt     No Known Problems Maternal Aunt     No Known Problems Maternal Aunt     No Known  "Problems Maternal Aunt     No Known Problems Paternal Aunt     No Known Problems Paternal Aunt     No Known Problems Paternal Aunt          Medications have been verified.        Objective   /80   Pulse 70   Temp (!) 97.1 °F (36.2 °C)   Resp 16   Ht 5' 5\" (1.651 m)   Wt 109 kg (241 lb)   LMP  (LMP Unknown)   SpO2 97%   BMI 40.10 kg/m²   No LMP recorded (lmp unknown). Patient is postmenopausal.       Physical Exam     Physical Exam  Vitals and nursing note reviewed.   Constitutional:       General: She is not in acute distress.     Appearance: Normal appearance.   HENT:      Head: Normocephalic and atraumatic.      Right Ear: Tympanic membrane and ear canal normal.      Left Ear: Tympanic membrane and ear canal normal.      Nose: Rhinorrhea present.      Mouth/Throat:      Mouth: Mucous membranes are moist.      Pharynx: No oropharyngeal exudate.      Comments: Mild erythema with PND  Eyes:      Conjunctiva/sclera: Conjunctivae normal.   Cardiovascular:      Rate and Rhythm: Normal rate and regular rhythm.      Pulses: Normal pulses.      Heart sounds: Normal heart sounds.   Pulmonary:      Effort: Pulmonary effort is normal.      Breath sounds: Normal breath sounds. No wheezing, rhonchi or rales.   Lymphadenopathy:      Cervical: No cervical adenopathy.   Skin:     General: Skin is warm and dry.   Neurological:      Mental Status: She is alert and oriented to person, place, and time.   Psychiatric:         Mood and Affect: Mood normal.         Behavior: Behavior normal.                   "

## 2024-04-04 ENCOUNTER — TELEPHONE (OUTPATIENT)
Dept: FAMILY MEDICINE CLINIC | Facility: CLINIC | Age: 69
End: 2024-04-04

## 2024-04-12 DIAGNOSIS — E03.9 HYPOTHYROIDISM, UNSPECIFIED TYPE: ICD-10-CM

## 2024-04-12 RX ORDER — LEVOTHYROXINE SODIUM 0.05 MG/1
100 TABLET ORAL DAILY
Qty: 180 TABLET | Refills: 1 | Status: SHIPPED | OUTPATIENT
Start: 2024-04-12

## 2024-04-19 ENCOUNTER — OFFICE VISIT (OUTPATIENT)
Dept: FAMILY MEDICINE CLINIC | Facility: CLINIC | Age: 69
End: 2024-04-19
Payer: MEDICARE

## 2024-04-19 VITALS
OXYGEN SATURATION: 95 % | TEMPERATURE: 97.4 F | HEART RATE: 76 BPM | WEIGHT: 249.8 LBS | DIASTOLIC BLOOD PRESSURE: 72 MMHG | RESPIRATION RATE: 16 BRPM | SYSTOLIC BLOOD PRESSURE: 122 MMHG | HEIGHT: 65 IN | BODY MASS INDEX: 41.62 KG/M2

## 2024-04-19 DIAGNOSIS — Z12.31 SCREENING MAMMOGRAM FOR BREAST CANCER: ICD-10-CM

## 2024-04-19 DIAGNOSIS — I10 PRIMARY HYPERTENSION: ICD-10-CM

## 2024-04-19 DIAGNOSIS — J30.89 ENVIRONMENTAL AND SEASONAL ALLERGIES: Primary | ICD-10-CM

## 2024-04-19 DIAGNOSIS — R05.9 COUGH, UNSPECIFIED TYPE: ICD-10-CM

## 2024-04-19 PROBLEM — R10.9 ABDOMINAL PAIN: Status: ACTIVE | Noted: 2024-04-19

## 2024-04-19 PROBLEM — Z86.010 HISTORY OF COLONIC POLYPS: Status: ACTIVE | Noted: 2024-04-19

## 2024-04-19 PROBLEM — D12.6 ADENOMATOUS POLYP OF COLON: Status: ACTIVE | Noted: 2024-04-19

## 2024-04-19 PROBLEM — U07.1 COVID-19: Status: RESOLVED | Noted: 2024-04-19 | Resolved: 2024-04-19

## 2024-04-19 PROBLEM — K80.20 CHOLELITHIASIS: Status: ACTIVE | Noted: 2024-04-19

## 2024-04-19 PROBLEM — K76.0 FATTY LIVER: Status: ACTIVE | Noted: 2024-04-19

## 2024-04-19 PROBLEM — K86.89: Status: ACTIVE | Noted: 2024-04-19

## 2024-04-19 PROBLEM — Z86.0100 HISTORY OF COLONIC POLYPS: Status: ACTIVE | Noted: 2024-04-19

## 2024-04-19 PROCEDURE — G2211 COMPLEX E/M VISIT ADD ON: HCPCS

## 2024-04-19 PROCEDURE — 99213 OFFICE O/P EST LOW 20 MIN: CPT

## 2024-04-19 NOTE — PATIENT INSTRUCTIONS
"Claritin or Zyrtec.  Do not get medication with \"DM\" on the bottle because it can raise your blood pressure.   "

## 2024-04-19 NOTE — PROGRESS NOTES
"Name: Vee Baldwin      : 1955      MRN: [id number]  Encounter Provider: SUSY Ramirez  Encounter Date: 2024   Encounter department: [facility]    Assessment & Plan     1. Environmental and seasonal allergies    2. Cough, unspecified type    3. Primary hypertension  Assessment & Plan:  BP stable today. Continue medications as prescribed. No changes.       4. Screening mammogram for breast cancer  -     Mammo screening bilateral w 3d & cad; Future; Expected date: 2024       Recommend loratadine for allergy symptoms. Advised Vee to avoid products labeled \"DM\" to avoid increased BP. Mammogram script provided today. Follow- up as needed.     Subjective      Vee Baldwin is a 68 year old female who presents today with a complaint of a cough for a few days and sore throat which began this morning. She reports she was sick at the beginning of April and went to urgent care, felt better, and now she has a cough again. She also report sneezing. She denies fever, congestion, runny nose, PND, sinus pain or pressure, headaches, ear pain, abdominal pain, dizziness, chest pain, or SOB. She used the tessalon pearls but does not feel they help her cough. She has tried no other symptoms.    Sore Throat   Associated symptoms include coughing (occasional). Pertinent negatives include no abdominal pain, congestion, ear pain, shortness of breath or vomiting.     Review of Systems   Constitutional: Negative.  Negative for chills, fatigue and fever.   HENT:  Positive for sneezing and sore throat. Negative for congestion, ear pain, postnasal drip, rhinorrhea and sinus pressure.    Eyes: Negative.  Negative for pain and visual disturbance.   Respiratory:  Positive for cough (occasional). Negative for chest tightness and shortness of breath.    Cardiovascular: Negative.  Negative for chest pain, palpitations and leg swelling.   Gastrointestinal: Negative.  Negative for " "abdominal pain and vomiting.   Endocrine: Negative.    Genitourinary: Negative.  Negative for dysuria and hematuria.   Musculoskeletal: Negative.  Negative for arthralgias and back pain.   Skin: Negative.  Negative for color change and rash.   Allergic/Immunologic: Negative.    Neurological: Negative.  Negative for seizures and syncope.   Hematological: Negative.    Psychiatric/Behavioral: Negative.     All other systems reviewed and are negative.      Current Outpatient Medications on File Prior to Visit   Medication Sig    aspirin 81 MG tablet Take 1 tablet by mouth daily    Cholecalciferol (Vitamin D3) 25 MCG (1000 UT) CAPS Take 1 capsule (1,000 Units total) by mouth daily    ezetimibe (ZETIA) 10 mg tablet Take 1 tablet (10 mg total) by mouth daily    levothyroxine 50 mcg tablet Take 2 tablets by mouth once daily    lisinopril (ZESTRIL) 30 mg tablet Take 1 tablet (30 mg total) by mouth daily    metFORMIN (GLUCOPHAGE-XR) 500 mg 24 hr tablet TAKE 1 TABLET BY MOUTH ONCE DAILY WITH SUPPER    multivitamin (THERAGRAN) TABS Take 1 tablet by mouth    Omega-3 Fatty Acids (FISH OIL PO) Take 2 g by mouth    oxybutynin (DITROPAN-XL) 10 MG 24 hr tablet Take 10 mg by mouth daily    pantoprazole (PROTONIX) 40 mg tablet Take 40 mg by mouth daily    Zenpep [id number] units CPEP Take 1 capsule by mouth as needed (Patient not taking: Reported on 4/3/2024)    [DISCONTINUED] benzonatate (TESSALON) 200 MG capsule Take 1 capsule (200 mg total) by mouth 3 (three) times a day as needed for cough (Patient not taking: Reported on 4/3/2024)       Objective     /72   Pulse 76   Temp (!) 97.4 °F (36.3 °C) (Tympanic)   Resp 16   Ht 5' 5\" (1.651 m)   Wt 113 kg (249 lb 12.8 oz)   LMP  (LMP Unknown)   SpO2 95%   BMI 41.57 kg/m²     Physical Exam  Vitals and nursing note reviewed.   Constitutional:       General: She is not in acute distress.     Appearance: Normal appearance. She is obese. She is not ill-appearing.   HENT:      " Head: Normocephalic and atraumatic.      Right Ear: Tympanic membrane, ear canal and external ear normal. No middle ear effusion. Tympanic membrane is not erythematous.      Left Ear: Tympanic membrane, ear canal and external ear normal.  No middle ear effusion. Tympanic membrane is not erythematous.      Nose: Nose normal. No congestion or rhinorrhea.      Mouth/Throat:      Mouth: Mucous membranes are moist.      Pharynx: Oropharynx is clear. No oropharyngeal exudate or posterior oropharyngeal erythema.      Tonsils: No tonsillar exudate. 0 on the right. 0 on the left.   Eyes:      Extraocular Movements: Extraocular movements intact.      Conjunctiva/sclera: Conjunctivae normal.      Pupils: Pupils are equal, round, and reactive to light.   Cardiovascular:      Rate and Rhythm: Normal rate and regular rhythm.      Heart sounds: Normal heart sounds. No murmur heard.  Pulmonary:      Effort: Pulmonary effort is normal. No respiratory distress.      Breath sounds: Normal breath sounds. No wheezing.   Abdominal:      General: Abdomen is flat. Bowel sounds are normal.      Palpations: Abdomen is soft. There is no mass.      Tenderness: There is no abdominal tenderness.   Musculoskeletal:         General: No swelling, tenderness or signs of injury. Normal range of motion.      Cervical back: Normal range of motion and neck supple. No tenderness.   Lymphadenopathy:      Cervical: No cervical adenopathy.   Skin:     General: Skin is warm and dry.      Capillary Refill: Capillary refill takes less than 2 seconds.      Findings: No bruising or erythema.   Neurological:      General: No focal deficit present.      Mental Status: She is alert and oriented to person, place, and time.   Psychiatric:         Mood and Affect: Mood normal.         Behavior: Behavior normal.         Thought Content: Thought content normal.       SUSY Ramirez

## 2024-04-30 ENCOUNTER — OFFICE VISIT (OUTPATIENT)
Dept: FAMILY MEDICINE CLINIC | Facility: CLINIC | Age: 69
End: 2024-04-30
Payer: MEDICARE

## 2024-04-30 VITALS
WEIGHT: 244.6 LBS | TEMPERATURE: 98.2 F | OXYGEN SATURATION: 96 % | RESPIRATION RATE: 20 BRPM | DIASTOLIC BLOOD PRESSURE: 82 MMHG | SYSTOLIC BLOOD PRESSURE: 120 MMHG | HEIGHT: 65 IN | BODY MASS INDEX: 40.75 KG/M2 | HEART RATE: 73 BPM

## 2024-04-30 DIAGNOSIS — J40 BRONCHITIS: Primary | ICD-10-CM

## 2024-04-30 PROCEDURE — G2211 COMPLEX E/M VISIT ADD ON: HCPCS | Performed by: FAMILY MEDICINE

## 2024-04-30 PROCEDURE — 99214 OFFICE O/P EST MOD 30 MIN: CPT | Performed by: FAMILY MEDICINE

## 2024-04-30 RX ORDER — ALBUTEROL SULFATE 90 UG/1
2 AEROSOL, METERED RESPIRATORY (INHALATION) EVERY 6 HOURS PRN
COMMUNITY
Start: 2024-04-28

## 2024-04-30 RX ORDER — AMOXICILLIN AND CLAVULANATE POTASSIUM 875; 125 MG/1; MG/1
1 TABLET, FILM COATED ORAL 2 TIMES DAILY
COMMUNITY
Start: 2024-04-28 | End: 2024-05-08

## 2024-04-30 RX ORDER — BENZONATATE 200 MG/1
200 CAPSULE ORAL 3 TIMES DAILY PRN
COMMUNITY
Start: 2024-04-28 | End: 2024-05-05

## 2024-04-30 RX ORDER — PREDNISONE 20 MG/1
40 TABLET ORAL DAILY
Qty: 10 TABLET | Refills: 0 | Status: SHIPPED | OUTPATIENT
Start: 2024-04-30 | End: 2024-05-05

## 2024-04-30 NOTE — PROGRESS NOTES
Vee Baldwin 1955 female MRN: [id number]    Family Medicine Acute Visit    ASSESSMENT/PLAN  Problem List Items Addressed This Visit    None  Visit Diagnoses       Bronchitis    -  Primary    Relevant Medications    albuterol (PROVENTIL HFA,VENTOLIN HFA) 90 mcg/act inhaler    benzonatate (TESSALON) 200 MG capsule    promethazine (PHENERGAN) 12.5 mg/10 mL syrup    predniSONE 20 mg tablet              Continue augmentin from the urgent care. Will add on steroid with cough syrup at night to help her sleep. Monitor closely for any changes.     Future Appointments   Date Time Provider Department Center   7/8/2024  1:20 PM DO MAKENZIE Whitley  [facility]          SUBJECTIVE  CC: Cough (Started abx, inhaler, tessalon perles on 4/28 - saw urgent care. Had CXR. )      HPI:  Vee Baldwin is a 68 y.o. female who presents for sick visit  Reports she has been sick for almost a month- cough, congestion, trouble sleeping, mucous production. No fever. Went to urgent care- given augmentin is on day 2. She was also given an albuterol inhaler as well and reports she is not getting better. CXR done at urgent care and was clear.     Review of Systems   Constitutional:  Negative for chills, fatigue and fever.   HENT:  Positive for congestion, postnasal drip and rhinorrhea. Negative for sinus pressure.    Eyes:  Negative for photophobia and visual disturbance.   Respiratory:  Positive for cough and wheezing. Negative for shortness of breath.    Cardiovascular:  Negative for chest pain, palpitations and leg swelling.   Gastrointestinal:  Negative for abdominal pain, constipation, diarrhea, nausea and vomiting.   Genitourinary:  Negative for difficulty urinating and dysuria.   Musculoskeletal:  Negative for arthralgias and myalgias.   Skin:  Negative for color change and rash.   Neurological:  Negative for dizziness, weakness, light-headedness and headaches.       Historical Information   The patient history was  reviewed as follows:  Past Medical History:   Diagnosis Date    Abscess of neck 03/02/2020    Alcohol induced acute pancreatitis 08/25/2018    COVID-19 04/19/2024    Disease of thyroid gland     GERD (gastroesophageal reflux disease)     HL (hearing loss)     Hyperlipidemia     Polyp of sigmoid colon     last assessed 6/12/12    Sleep disturbances     last assessed 6/5/14         Past Surgical History:   Procedure Laterality Date    BREAST CYST ASPIRATION Right     20 yrs ago in dr office net results    COLONOSCOPY  06/18/2011    Complete.   Repeat in 5yrs      IR IMAGE GUIDED ASPIRATION / DRAINAGE  3/3/2020    NASAL FRACTURE SURGERY       Family History   Problem Relation Age of Onset    Alzheimer's disease Mother     Diabetes Mother     No Known Problems Father     No Known Problems Maternal Grandmother     No Known Problems Maternal Grandfather     No Known Problems Paternal Grandmother     No Known Problems Paternal Grandfather     No Known Problems Maternal Aunt     No Known Problems Maternal Aunt     No Known Problems Maternal Aunt     No Known Problems Maternal Aunt     No Known Problems Maternal Aunt     No Known Problems Paternal Aunt     No Known Problems Paternal Aunt     No Known Problems Paternal Aunt       Social History   Social History     Substance and Sexual Activity   Alcohol Use Not Currently     Social History     Substance and Sexual Activity   Drug Use No     Social History     Tobacco Use   Smoking Status Never    Passive exposure: Past   Smokeless Tobacco Never       Medications:     Current Outpatient Medications:     albuterol (PROVENTIL HFA,VENTOLIN HFA) 90 mcg/act inhaler, Inhale 2 puffs every 6 (six) hours as needed, Disp: , Rfl:     amoxicillin-clavulanate (AUGMENTIN) 875-125 mg per tablet, Take 1 tablet by mouth 2 (two) times a day, Disp: , Rfl:     aspirin 81 MG tablet, Take 1 tablet by mouth daily, Disp: , Rfl:     benzonatate (TESSALON) 200 MG capsule, Take 200 mg by mouth Three  "times daily as needed, Disp: , Rfl:     Cholecalciferol (Vitamin D3) 25 MCG (1000 UT) CAPS, Take 1 capsule (1,000 Units total) by mouth daily, Disp: 30 capsule, Rfl: 11    ezetimibe (ZETIA) 10 mg tablet, Take 1 tablet (10 mg total) by mouth daily, Disp: 90 tablet, Rfl: 1    levothyroxine 50 mcg tablet, Take 2 tablets by mouth once daily, Disp: 180 tablet, Rfl: 1    lisinopril (ZESTRIL) 30 mg tablet, Take 1 tablet (30 mg total) by mouth daily, Disp: 30 tablet, Rfl: 5    metFORMIN (GLUCOPHAGE-XR) 500 mg 24 hr tablet, TAKE 1 TABLET BY MOUTH ONCE DAILY WITH SUPPER, Disp: 90 tablet, Rfl: 0    multivitamin (THERAGRAN) TABS, Take 1 tablet by mouth, Disp: , Rfl:     Omega-3 Fatty Acids (FISH OIL PO), Take 2 g by mouth, Disp: , Rfl:     oxybutynin (DITROPAN-XL) 10 MG 24 hr tablet, Take 10 mg by mouth daily, Disp: , Rfl:     pantoprazole (PROTONIX) 40 mg tablet, Take 40 mg by mouth daily, Disp: , Rfl:     predniSONE 20 mg tablet, Take 2 tablets (40 mg total) by mouth daily for 5 days, Disp: 10 tablet, Rfl: 0    promethazine (PHENERGAN) 12.5 mg/10 mL syrup, Take 5 mL (6.25 mg total) by mouth daily at bedtime as needed (cough), Disp: 120 mL, Rfl: 0    Zenpep 40051-239982 units CPEP, Take 1 capsule by mouth as needed (Patient not taking: Reported on 4/3/2024), Disp: , Rfl:     Allergies   Allergen Reactions    Statins Myalgia       OBJECTIVE  Vitals:   Vitals:    04/30/24 0814   BP: 120/82   BP Location: Left arm   Patient Position: Sitting   Cuff Size: Large   Pulse: 73   Resp: 20   Temp: 98.2 °F (36.8 °C)   TempSrc: Tympanic   SpO2: 96%   Weight: 111 kg (244 lb 9.6 oz)   Height: 5' 5\" (1.651 m)         Physical Exam  Constitutional:       Appearance: She is well-developed.   HENT:      Head: Normocephalic and atraumatic.      Right Ear: Tympanic membrane, ear canal and external ear normal.      Left Ear: Tympanic membrane, ear canal and external ear normal.      Nose: Congestion present.   Eyes:      Extraocular Movements: " Extraocular movements intact.      Conjunctiva/sclera: Conjunctivae normal.      Pupils: Pupils are equal, round, and reactive to light.   Cardiovascular:      Rate and Rhythm: Normal rate and regular rhythm.      Heart sounds: Normal heart sounds.   Pulmonary:      Effort: Pulmonary effort is normal. No respiratory distress.      Breath sounds: Wheezing present.   Musculoskeletal:         General: No tenderness. Normal range of motion.      Cervical back: Normal range of motion and neck supple.   Skin:     General: Skin is warm and dry.   Neurological:      Mental Status: She is alert and oriented to person, place, and time.   Psychiatric:         Mood and Affect: Mood normal.         Behavior: Behavior normal.                    Charlene Odonnell, DO    4/30/2024

## 2024-05-02 ENCOUNTER — TELEPHONE (OUTPATIENT)
Age: 69
End: 2024-05-02

## 2024-05-02 DIAGNOSIS — J40 BRONCHITIS: ICD-10-CM

## 2024-05-02 NOTE — TELEPHONE ENCOUNTER
Patient called the RX Refill Line. Message is being forwarded to the office.     Patient is requesting a refill on her promethazine (PHENERGAN) 12.5 mg/10 mL syrup. Medication refill specialist is unable to queue up this medication due to system wanting to select and alternative medication. Please review and advise. The patient would also like to know what she should do about her cough and if she has a viral infection.     Please contact patient at

## 2024-05-03 RX ORDER — PROMETHAZINE HYDROCHLORIDE 6.25 MG/5ML
6.25 SYRUP ORAL
Qty: 120 ML | Refills: 0 | Status: SHIPPED | OUTPATIENT
Start: 2024-05-03

## 2024-05-21 DIAGNOSIS — E78.2 MIXED HYPERLIPIDEMIA: ICD-10-CM

## 2024-05-21 RX ORDER — EZETIMIBE 10 MG/1
10 TABLET ORAL DAILY
Qty: 90 TABLET | Refills: 0 | Status: SHIPPED | OUTPATIENT
Start: 2024-05-21

## 2024-06-10 ENCOUNTER — TELEPHONE (OUTPATIENT)
Dept: FAMILY MEDICINE CLINIC | Facility: CLINIC | Age: 69
End: 2024-06-10

## 2024-06-10 NOTE — TELEPHONE ENCOUNTER
I called patient.  She has an upcoming appointment on 7/8 and will discuss options at that time.  Thank you.

## 2024-06-10 NOTE — TELEPHONE ENCOUNTER
Patient called.  She has a friend who has the same knee problem she does.  She said that her friend is Rx'd Meloxicam, who told her it works.  She was wondering if that was something she could try?  I told her that maybe she should check with her doctor she sees for her knee pain.  I told her I would check to see what you recommended and I can call her back.  [phone]  Thank you.

## 2024-06-16 DIAGNOSIS — I10 PRIMARY HYPERTENSION: ICD-10-CM

## 2024-06-16 DIAGNOSIS — E78.2 MIXED HYPERLIPIDEMIA: ICD-10-CM

## 2024-06-16 DIAGNOSIS — E11.9 TYPE 2 DIABETES MELLITUS WITHOUT COMPLICATION, WITHOUT LONG-TERM CURRENT USE OF INSULIN (HCC): ICD-10-CM

## 2024-06-16 RX ORDER — METFORMIN HYDROCHLORIDE 500 MG/1
TABLET, EXTENDED RELEASE ORAL
Qty: 90 TABLET | Refills: 0 | Status: SHIPPED | OUTPATIENT
Start: 2024-06-16

## 2024-06-17 ENCOUNTER — TELEPHONE (OUTPATIENT)
Age: 69
End: 2024-06-17

## 2024-06-17 DIAGNOSIS — E11.29 MICROALBUMINURIA DUE TO TYPE 2 DIABETES MELLITUS  (HCC): ICD-10-CM

## 2024-06-17 DIAGNOSIS — E11.9 TYPE 2 DIABETES MELLITUS WITHOUT COMPLICATION, WITHOUT LONG-TERM CURRENT USE OF INSULIN (HCC): ICD-10-CM

## 2024-06-17 DIAGNOSIS — R80.9 MICROALBUMINURIA DUE TO TYPE 2 DIABETES MELLITUS  (HCC): ICD-10-CM

## 2024-06-17 DIAGNOSIS — I10 PRIMARY HYPERTENSION: ICD-10-CM

## 2024-06-17 RX ORDER — LISINOPRIL 30 MG/1
30 TABLET ORAL DAILY
Qty: 90 TABLET | Refills: 1 | Status: SHIPPED | OUTPATIENT
Start: 2024-06-17

## 2024-06-17 NOTE — TELEPHONE ENCOUNTER
Medication: lisinopril (ZESTRIL)    Dose/Frequency: 30 mg, 1 tablet daily    Quantity: 30    Pharmacy: [facility] - [address] [place] [address]    Office:   [x] PCP/Provider -   [] Speciality/Provider -     Does the patient have enough for 3 days?   [] Yes   [x] No - Send as HP to POD

## 2024-07-10 ENCOUNTER — TELEPHONE (OUTPATIENT)
Age: 69
End: 2024-07-10

## 2024-07-10 NOTE — TELEPHONE ENCOUNTER
Vee Arshad had her labs done in April- She is wondering if you would like them done for her appt in October?

## 2024-07-10 NOTE — TELEPHONE ENCOUNTER
PT call about lab order for her appointment in October for a wellness and would like to know if she need blood work again for this visit. If PT need lab again please contact the PT and inform her about the lab order. Thank you

## 2024-07-11 DIAGNOSIS — E78.2 MIXED HYPERLIPIDEMIA: ICD-10-CM

## 2024-07-11 DIAGNOSIS — E66.01 MORBID OBESITY (HCC): ICD-10-CM

## 2024-07-11 DIAGNOSIS — E03.9 HYPOTHYROIDISM, UNSPECIFIED TYPE: ICD-10-CM

## 2024-07-11 DIAGNOSIS — E11.9 TYPE 2 DIABETES MELLITUS WITHOUT COMPLICATION, WITHOUT LONG-TERM CURRENT USE OF INSULIN (HCC): ICD-10-CM

## 2024-07-11 DIAGNOSIS — E55.9 VITAMIN D DEFICIENCY: ICD-10-CM

## 2024-07-11 DIAGNOSIS — I10 PRIMARY HYPERTENSION: Primary | ICD-10-CM

## 2024-07-12 NOTE — TELEPHONE ENCOUNTER
Patient called back, read the message to her, she will be going to the lab in late September, I advised patient she needs to be fasting

## 2024-07-24 ENCOUNTER — HOSPITAL ENCOUNTER (OUTPATIENT)
Dept: MAMMOGRAPHY | Facility: CLINIC | Age: 69
Discharge: HOME | End: 2024-07-24
Payer: MEDICARE

## 2024-07-24 VITALS — BODY MASS INDEX: 40.65 KG/M2 | WEIGHT: 244 LBS | HEIGHT: 65 IN

## 2024-07-24 DIAGNOSIS — Z12.31 SCREENING MAMMOGRAM FOR BREAST CANCER: ICD-10-CM

## 2024-07-24 PROCEDURE — 77067 SCR MAMMO BI INCL CAD: CPT

## 2024-07-24 PROCEDURE — 77063 BREAST TOMOSYNTHESIS BI: CPT

## 2024-07-29 ENCOUNTER — TELEPHONE (OUTPATIENT)
Age: 69
End: 2024-07-29

## 2024-07-29 NOTE — TELEPHONE ENCOUNTER
Pt called for results to Mammo, pt states she is going on vacation and would like a call back after 8/7/24 with those results. Thank you

## 2024-08-12 ENCOUNTER — TELEPHONE (OUTPATIENT)
Age: 69
End: 2024-08-12

## 2024-08-12 NOTE — TELEPHONE ENCOUNTER
Patients friend (Tania) called to see if a walker can be ordered for patient.  She fell 7/31/24, no injury but wanted to see if a walker could be ordered to have her feel more steady.    Thank you

## 2024-08-14 NOTE — TELEPHONE ENCOUNTER
Patient received the message. She would like to know how she will be receiving the walker. Please call and advise.

## 2024-08-16 LAB
DME PARACHUTE DELIVERY DATE ACTUAL: NORMAL
DME PARACHUTE DELIVERY DATE REQUESTED: NORMAL
DME PARACHUTE ITEM DESCRIPTION: NORMAL
DME PARACHUTE ORDER STATUS: NORMAL
DME PARACHUTE SUPPLIER NAME: NORMAL
DME PARACHUTE SUPPLIER PHONE: NORMAL

## 2024-08-19 ENCOUNTER — TELEPHONE (OUTPATIENT)
Age: 69
End: 2024-08-19

## 2024-08-19 NOTE — TELEPHONE ENCOUNTER
Patient called stating that she sent back the walker that was ordered and does not wish to use one.  Patient will be using cane instead.

## 2024-09-03 ENCOUNTER — APPOINTMENT (OUTPATIENT)
Dept: LAB | Facility: CLINIC | Age: 69
End: 2024-09-03
Payer: MEDICARE

## 2024-09-03 ENCOUNTER — TELEPHONE (OUTPATIENT)
Age: 69
End: 2024-09-03

## 2024-09-03 DIAGNOSIS — E11.9 TYPE 2 DIABETES MELLITUS WITHOUT COMPLICATION, WITHOUT LONG-TERM CURRENT USE OF INSULIN (HCC): ICD-10-CM

## 2024-09-03 DIAGNOSIS — E66.01 MORBID OBESITY (HCC): ICD-10-CM

## 2024-09-03 DIAGNOSIS — E78.2 MIXED HYPERLIPIDEMIA: ICD-10-CM

## 2024-09-03 DIAGNOSIS — E55.9 VITAMIN D DEFICIENCY: ICD-10-CM

## 2024-09-03 DIAGNOSIS — I10 PRIMARY HYPERTENSION: ICD-10-CM

## 2024-09-03 DIAGNOSIS — E03.9 HYPOTHYROIDISM, UNSPECIFIED TYPE: ICD-10-CM

## 2024-09-03 LAB
25(OH)D3 SERPL-MCNC: 20.1 NG/ML (ref 30–100)
ALBUMIN SERPL BCG-MCNC: 4.1 G/DL (ref 3.5–5)
ALP SERPL-CCNC: 69 U/L (ref 34–104)
ALT SERPL W P-5'-P-CCNC: 23 U/L (ref 7–52)
ANION GAP SERPL CALCULATED.3IONS-SCNC: 11 MMOL/L (ref 4–13)
AST SERPL W P-5'-P-CCNC: 22 U/L (ref 13–39)
BACTERIA UR QL AUTO: ABNORMAL /HPF
BASOPHILS # BLD AUTO: 0.04 THOUSANDS/ÂΜL (ref 0–0.1)
BASOPHILS NFR BLD AUTO: 1 % (ref 0–1)
BILIRUB SERPL-MCNC: 0.33 MG/DL (ref 0.2–1)
BILIRUB UR QL STRIP: NEGATIVE
BUN SERPL-MCNC: 15 MG/DL (ref 5–25)
CALCIUM SERPL-MCNC: 9.9 MG/DL (ref 8.4–10.2)
CHLORIDE SERPL-SCNC: 101 MMOL/L (ref 96–108)
CHOLEST SERPL-MCNC: 261 MG/DL
CLARITY UR: CLEAR
CO2 SERPL-SCNC: 30 MMOL/L (ref 21–32)
COLOR UR: ABNORMAL
CREAT SERPL-MCNC: 0.65 MG/DL (ref 0.6–1.3)
CREAT UR-MCNC: 95.5 MG/DL
EOSINOPHIL # BLD AUTO: 0.23 THOUSAND/ÂΜL (ref 0–0.61)
EOSINOPHIL NFR BLD AUTO: 3 % (ref 0–6)
ERYTHROCYTE [DISTWIDTH] IN BLOOD BY AUTOMATED COUNT: 12.5 % (ref 11.6–15.1)
EST. AVERAGE GLUCOSE BLD GHB EST-MCNC: 154 MG/DL
GFR SERPL CREATININE-BSD FRML MDRD: 90 ML/MIN/1.73SQ M
GLUCOSE P FAST SERPL-MCNC: 111 MG/DL (ref 65–99)
GLUCOSE UR STRIP-MCNC: NEGATIVE MG/DL
HBA1C MFR BLD: 7 %
HCT VFR BLD AUTO: 43.3 % (ref 34.8–46.1)
HDLC SERPL-MCNC: 45 MG/DL
HGB BLD-MCNC: 13.5 G/DL (ref 11.5–15.4)
HGB UR QL STRIP.AUTO: NEGATIVE
IMM GRANULOCYTES # BLD AUTO: 0.02 THOUSAND/UL (ref 0–0.2)
IMM GRANULOCYTES NFR BLD AUTO: 0 % (ref 0–2)
KETONES UR STRIP-MCNC: NEGATIVE MG/DL
LDLC SERPL CALC-MCNC: 143 MG/DL (ref 0–100)
LDLC SERPL DIRECT ASSAY-MCNC: 159 MG/DL (ref 0–100)
LEUKOCYTE ESTERASE UR QL STRIP: ABNORMAL
LYMPHOCYTES # BLD AUTO: 2.21 THOUSANDS/ÂΜL (ref 0.6–4.47)
LYMPHOCYTES NFR BLD AUTO: 28 % (ref 14–44)
MCH RBC QN AUTO: 29.9 PG (ref 26.8–34.3)
MCHC RBC AUTO-ENTMCNC: 31.2 G/DL (ref 31.4–37.4)
MCV RBC AUTO: 96 FL (ref 82–98)
MICROALBUMIN UR-MCNC: 57 MG/L
MICROALBUMIN/CREAT 24H UR: 60 MG/G CREATININE (ref 0–30)
MONOCYTES # BLD AUTO: 0.81 THOUSAND/ÂΜL (ref 0.17–1.22)
MONOCYTES NFR BLD AUTO: 10 % (ref 4–12)
MUCOUS THREADS UR QL AUTO: ABNORMAL
NEUTROPHILS # BLD AUTO: 4.54 THOUSANDS/ÂΜL (ref 1.85–7.62)
NEUTS SEG NFR BLD AUTO: 58 % (ref 43–75)
NITRITE UR QL STRIP: NEGATIVE
NON-SQ EPI CELLS URNS QL MICRO: ABNORMAL /HPF
NONHDLC SERPL-MCNC: 216 MG/DL
NRBC BLD AUTO-RTO: 0 /100 WBCS
PH UR STRIP.AUTO: 6.5 [PH]
PLATELET # BLD AUTO: 198 THOUSANDS/UL (ref 149–390)
PMV BLD AUTO: 12.6 FL (ref 8.9–12.7)
POTASSIUM SERPL-SCNC: 4.8 MMOL/L (ref 3.5–5.3)
PROT SERPL-MCNC: 7 G/DL (ref 6.4–8.4)
PROT UR STRIP-MCNC: ABNORMAL MG/DL
RBC # BLD AUTO: 4.52 MILLION/UL (ref 3.81–5.12)
RBC #/AREA URNS AUTO: ABNORMAL /HPF
SODIUM SERPL-SCNC: 142 MMOL/L (ref 135–147)
SP GR UR STRIP.AUTO: 1.01 (ref 1–1.03)
T4 FREE SERPL-MCNC: 0.77 NG/DL (ref 0.61–1.12)
TRIGL SERPL-MCNC: 367 MG/DL
TSH SERPL DL<=0.05 MIU/L-ACNC: 4.87 UIU/ML (ref 0.45–4.5)
UROBILINOGEN UR STRIP-ACNC: <2 MG/DL
WBC # BLD AUTO: 7.85 THOUSAND/UL (ref 4.31–10.16)
WBC #/AREA URNS AUTO: ABNORMAL /HPF

## 2024-09-03 PROCEDURE — 85025 COMPLETE CBC W/AUTO DIFF WBC: CPT

## 2024-09-03 PROCEDURE — 81001 URINALYSIS AUTO W/SCOPE: CPT

## 2024-09-03 PROCEDURE — 80053 COMPREHEN METABOLIC PANEL: CPT

## 2024-09-03 PROCEDURE — 80061 LIPID PANEL: CPT

## 2024-09-03 PROCEDURE — 84443 ASSAY THYROID STIM HORMONE: CPT

## 2024-09-03 PROCEDURE — 83036 HEMOGLOBIN GLYCOSYLATED A1C: CPT

## 2024-09-03 PROCEDURE — 82306 VITAMIN D 25 HYDROXY: CPT

## 2024-09-03 PROCEDURE — 82043 UR ALBUMIN QUANTITATIVE: CPT

## 2024-09-03 PROCEDURE — 36415 COLL VENOUS BLD VENIPUNCTURE: CPT

## 2024-09-03 PROCEDURE — 82570 ASSAY OF URINE CREATININE: CPT

## 2024-09-03 PROCEDURE — 84439 ASSAY OF FREE THYROXINE: CPT

## 2024-09-03 PROCEDURE — 83721 ASSAY OF BLOOD LIPOPROTEIN: CPT

## 2024-09-03 NOTE — TELEPHONE ENCOUNTER
Patient called the RX Refill Line. Message is being forwarded to the office.     Patient is requesting a refill on   benzonatate (TESSALON) 200 MG capsule. They were prescribed at [facility].  She is still coughing and would like a refill. If appropriate  , please send to [facility]    Please contact patient at [phone] with any questions

## 2024-09-05 ENCOUNTER — OFFICE VISIT (OUTPATIENT)
Dept: FAMILY MEDICINE CLINIC | Facility: CLINIC | Age: 69
End: 2024-09-05
Payer: MEDICARE

## 2024-09-05 VITALS
BODY MASS INDEX: 39.85 KG/M2 | OXYGEN SATURATION: 97 % | HEART RATE: 80 BPM | TEMPERATURE: 97.1 F | WEIGHT: 239.2 LBS | DIASTOLIC BLOOD PRESSURE: 72 MMHG | SYSTOLIC BLOOD PRESSURE: 114 MMHG | HEIGHT: 65 IN | RESPIRATION RATE: 18 BRPM

## 2024-09-05 DIAGNOSIS — M17.11 PRIMARY OSTEOARTHRITIS OF RIGHT KNEE: ICD-10-CM

## 2024-09-05 DIAGNOSIS — R05.9 COUGH, UNSPECIFIED TYPE: Primary | ICD-10-CM

## 2024-09-05 DIAGNOSIS — E03.9 HYPOTHYROIDISM, UNSPECIFIED TYPE: ICD-10-CM

## 2024-09-05 DIAGNOSIS — E55.9 VITAMIN D DEFICIENCY: ICD-10-CM

## 2024-09-05 PROCEDURE — G2211 COMPLEX E/M VISIT ADD ON: HCPCS

## 2024-09-05 PROCEDURE — 99214 OFFICE O/P EST MOD 30 MIN: CPT

## 2024-09-05 RX ORDER — ERGOCALCIFEROL 1.25 MG/1
50000 CAPSULE, LIQUID FILLED ORAL WEEKLY
Qty: 12 CAPSULE | Refills: 0 | Status: SHIPPED | OUTPATIENT
Start: 2024-09-05 | End: 2024-11-22

## 2024-09-05 RX ORDER — BENZONATATE 100 MG/1
100 CAPSULE ORAL 3 TIMES DAILY PRN
Qty: 20 CAPSULE | Refills: 0 | Status: SHIPPED | OUTPATIENT
Start: 2024-09-05

## 2024-09-05 NOTE — ASSESSMENT & PLAN NOTE
The patient is currently undergoing physical therapy treatments for this problem. She reports a new referral is required. A referral to PT was placed today.

## 2024-09-05 NOTE — ASSESSMENT & PLAN NOTE
Recent lab work reviewed. The patient's levels remain low despite supplementation. The patient will begin treatment with ergocalciferol 50,000 units as ordered today for 12 weeks. Treatment discussed with patient. Labs to be completed after therapy completion.    9/3/2024:   Vit D, 25-Hydroxy  30.0 - 100.0 ng/mL 20.1 Low

## 2024-09-05 NOTE — PROGRESS NOTES
Ambulatory Visit  Name: Vee Baldwin      : 1955      MRN: [id number]  Encounter Provider: SUSY Ramirez  Encounter Date: 2024   Encounter department: [facility]    Assessment & Plan   1. Cough, unspecified type  Assessment & Plan:  This problem is slowly resolving. Positive Covid test on 2024. Discussed need to wear a mask through day 10 and other proper precautions. The patient will continue supportive measures at home. Discussed OTC cough medication use. Benzonatate 100 mg ordered today for cough. The patient will return if symptoms worsen or fail to improve. Follow up as needed or at next regularly scheduled appointment.    Orders:  -     benzonatate (TESSALON PERLES) 100 mg capsule; Take 1 capsule (100 mg total) by mouth 3 (three) times a day as needed for cough  2. Vitamin D deficiency  Assessment & Plan:  Recent lab work reviewed. The patient's levels remain low despite supplementation. The patient will begin treatment with ergocalciferol 50,000 units as ordered today for 12 weeks. Treatment discussed with patient. Labs to be completed after therapy completion.    9/3/2024:   Vit D, 25-Hydroxy  30.0 - 100.0 ng/mL 20.1 Low      Orders:  -     ergocalciferol (VITAMIN D2) 50,000 units; Take 1 capsule (50,000 Units total) by mouth once a week for 12 doses Then transition to vitamin D 2000 units daily for maintenance.  3. Hypothyroidism, unspecified type  Assessment & Plan:  TSH slightly elevated recently. Discussed administration of the medication with the patient. She reports she takes her levothyroxine with other medications. Discussed importance of taking thyroid medication alone and 30-60 minutes prior to other medications or food. The patient will begin to take her medication appropriately. Will recheck TSH as ordered today to see if there is improvement with this change. The patient will obtain the ordered lab work prior to her appointment in  October with Dr. Yadav. No medication changes today.     Lab Results   Component Value Date    [id number] 4.865 (H) 09/03/2024    TSH 3.08 11/30/2020       Orders:  -     TSH, 3rd generation with Free T4 reflex; Future; Expected date: 10/03/2024  4. Primary osteoarthritis of right knee  Assessment & Plan:  The patient is currently undergoing physical therapy treatments for this problem. She reports a new referral is required. A referral to PT was placed today.  Orders:  -     Ambulatory Referral to Physical Therapy; Future       History of Present Illness     Vee Baldwin is a 68 y.o. year old female who presents today with a concern of a cough. Covid positive on 8/26. Went to the ED. Loss of voice. Symptoms started 7 days ago. Has boosters. Sleep not interrupted by cough.      Cough  The problem has been gradually improving. The problem occurs every few hours. The cough is Non-productive. Pertinent negatives include no chest pain, chills, ear congestion, ear pain, fever, headaches, myalgias, nasal congestion, postnasal drip, rash, rhinorrhea, sore throat, shortness of breath, sweats, weight loss or wheezing. Nothing aggravates the symptoms. She has tried prescription cough suppressant and rest for the symptoms. The treatment provided mild relief.       Review of Systems   Constitutional:  Positive for fatigue. Negative for chills, fever and weight loss.   HENT: Negative.  Negative for congestion, ear pain, postnasal drip, rhinorrhea and sore throat.    Eyes: Negative.  Negative for pain and visual disturbance.   Respiratory:  Positive for cough. Negative for shortness of breath and wheezing.    Cardiovascular: Negative.  Negative for chest pain, palpitations and leg swelling.   Gastrointestinal:  Negative for abdominal pain, constipation, diarrhea, nausea and vomiting.   Endocrine: Negative.    Genitourinary: Negative.  Negative for dysuria, frequency and urgency.   Musculoskeletal: Negative.  Negative for  "back pain and myalgias.   Skin: Negative.  Negative for rash.   Allergic/Immunologic: Negative.    Neurological: Negative.  Negative for dizziness, weakness, light-headedness and headaches.   Hematological: Negative.    Psychiatric/Behavioral: Negative.         Objective     /72 (BP Location: Left arm, Patient Position: Sitting, Cuff Size: Standard)   Pulse 80   Temp (!) 97.1 °F (36.2 °C) (Tympanic)   Resp 18   Ht 5' 5\" (1.651 m)   Wt 109 kg (239 lb 3.2 oz)   LMP  (LMP Unknown)   SpO2 97%   BMI 39.80 kg/m²     Physical Exam  Vitals and nursing note reviewed.   Constitutional:       General: She is not in acute distress.     Appearance: Normal appearance. She is not ill-appearing.   HENT:      Head: Normocephalic and atraumatic.      Right Ear: Tympanic membrane, ear canal and external ear normal.      Left Ear: Tympanic membrane, ear canal and external ear normal.      Nose: Nose normal. No congestion.      Mouth/Throat:      Mouth: Mucous membranes are moist.      Pharynx: Oropharynx is clear. No oropharyngeal exudate or posterior oropharyngeal erythema.   Eyes:      Extraocular Movements: Extraocular movements intact.      Conjunctiva/sclera: Conjunctivae normal.      Pupils: Pupils are equal, round, and reactive to light.   Cardiovascular:      Rate and Rhythm: Normal rate and regular rhythm.      Heart sounds: Normal heart sounds. No murmur heard.  Pulmonary:      Effort: Pulmonary effort is normal. No respiratory distress.      Breath sounds: Normal breath sounds. No wheezing.   Musculoskeletal:         General: Normal range of motion.   Skin:     General: Skin is warm and dry.      Capillary Refill: Capillary refill takes less than 2 seconds.   Neurological:      General: No focal deficit present.      Mental Status: She is alert and oriented to person, place, and time.   Psychiatric:         Mood and Affect: Mood normal.         Behavior: Behavior normal.       Administrative Statements "

## 2024-09-05 NOTE — ASSESSMENT & PLAN NOTE
This problem is slowly resolving. Positive Covid test on 8/26/2024. Discussed need to wear a mask through day 10 and other proper precautions. The patient will continue supportive measures at home. Discussed OTC cough medication use. Benzonatate 100 mg ordered today for cough. The patient will return if symptoms worsen or fail to improve. Follow up as needed or at next regularly scheduled appointment.

## 2024-09-05 NOTE — ASSESSMENT & PLAN NOTE
TSH slightly elevated recently. Discussed administration of the medication with the patient. She reports she takes her levothyroxine with other medications. Discussed importance of taking thyroid medication alone and 30-60 minutes prior to other medications or food. The patient will begin to take her medication appropriately. Will recheck TSH as ordered today to see if there is improvement with this change. The patient will obtain the ordered lab work prior to her appointment in October with Dr. Yadav. No medication changes today.     Lab Results   Component Value Date    TQQ5WQXAZVPF 4.865 (H) 09/03/2024    TSH 3.08 11/30/2020

## 2024-09-18 ENCOUNTER — RA CDI HCC (OUTPATIENT)
Dept: OTHER | Facility: HOSPITAL | Age: 69
End: 2024-09-18

## 2024-09-18 DIAGNOSIS — I10 PRIMARY HYPERTENSION: ICD-10-CM

## 2024-09-18 DIAGNOSIS — E78.2 MIXED HYPERLIPIDEMIA: ICD-10-CM

## 2024-09-18 DIAGNOSIS — E11.9 TYPE 2 DIABETES MELLITUS WITHOUT COMPLICATION, WITHOUT LONG-TERM CURRENT USE OF INSULIN (HCC): ICD-10-CM

## 2024-09-19 RX ORDER — METFORMIN HCL 500 MG
TABLET, EXTENDED RELEASE 24 HR ORAL
Qty: 90 TABLET | Refills: 1 | Status: SHIPPED | OUTPATIENT
Start: 2024-09-19

## 2024-09-23 ENCOUNTER — LAB (OUTPATIENT)
Dept: LAB | Facility: CLINIC | Age: 69
End: 2024-09-23
Payer: MEDICARE

## 2024-09-23 DIAGNOSIS — E03.9 HYPOTHYROIDISM, UNSPECIFIED TYPE: ICD-10-CM

## 2024-09-23 LAB — TSH SERPL DL<=0.05 MIU/L-ACNC: 4.06 UIU/ML (ref 0.45–4.5)

## 2024-09-23 PROCEDURE — 36415 COLL VENOUS BLD VENIPUNCTURE: CPT

## 2024-09-23 PROCEDURE — 84443 ASSAY THYROID STIM HORMONE: CPT

## 2024-09-25 NOTE — PROGRESS NOTES
Patient ID: Vee Baldwin is a 68 y.o. female Date of Birth 1955       Chief Complaint   Patient presents with    Diabetes     New patient - [facility]             Diagnosis:  1. Type 2 diabetes mellitus without complication, without long-term current use of insulin (Formerly [facility])       1. Initial/Annual  Diabetic Foot exam with socks and shoes removed bilaterally.  2.  High risk  foot precautions reviewed with patient including the need to wear protective shoegear at all times when walking including in the home, the need to check feet all surfaces daily with a mirror and report and skin breaks to podiatry, the need to apply an emollient to skin of feet daily.  3. We discussed proper glycemic control and the risk of pedal complications with hyperglycemia.   4.  Toenails were both manually mechanically debrided x 10 without incident.  5.  Patient understands and agrees with plan and she will follow-up in 6 months.        Subjective:   Vee who is a type II diabetic presents today for initial/annual diabetic foot examination and nail care.  Her most recent visit with PCP was on 9/5/2024, most recent HbA1c was 7.0 on 9/3/2024.  She states she has a hard time cutting her toenails.        The following portions of the patient's history were reviewed and updated as appropriate:     Past Medical History:   Diagnosis Date    Abscess of neck 03/02/2020    Alcohol induced acute pancreatitis 08/25/2018    COVID-19 04/19/2024    Disease of thyroid gland     GERD (gastroesophageal reflux disease)     HL (hearing loss)     Hyperlipidemia     Polyp of sigmoid colon     last assessed 6/12/12    Sleep disturbances     last assessed 6/5/14       Past Surgical History:   Procedure Laterality Date    BREAST CYST ASPIRATION Right     20 yrs ago in dr office net results    COLONOSCOPY  06/18/2011    Complete.   Repeat in 5yrs      IR IMAGE GUIDED ASPIRATION / DRAINAGE  3/3/2020    NASAL FRACTURE SURGERY         Social History     Socioeconomic  History    Marital status: Single     Spouse name: None    Number of children: None    Years of education: None    Highest education level: None   Occupational History    Occupation: retired    Tobacco Use    Smoking status: Never     Passive exposure: Past    Smokeless tobacco: Never   Vaping Use    Vaping status: Never Used   Substance and Sexual Activity    Alcohol use: Not Currently    Drug use: No    Sexual activity: Not Currently   Other Topics Concern    None   Social History Narrative    Sleep disturbances      Social Determinants of Health     Financial Resource Strain: Low Risk  (6/30/2023)    Overall Financial Resource Strain (CARDIA)     Difficulty of Paying Living Expenses: Not very hard   Food Insecurity: No Food Insecurity (5/17/2021)    Hunger Vital Sign     Worried About Running Out of Food in the Last Year: Never true     Ran Out of Food in the Last Year: Never true   Transportation Needs: No Transportation Needs (6/30/2023)    PRAPARE - Transportation     Lack of Transportation (Medical): No     Lack of Transportation (Non-Medical): No   Physical Activity: Insufficiently Active (8/18/2021)    Exercise Vital Sign     Days of Exercise per Week: 2 days     Minutes of Exercise per Session: 60 min   Stress: No Stress Concern Present (8/18/2021)    [employer] - Occupational Stress Questionnaire     Feeling of Stress : Not at all   Social Connections: Moderately Integrated (5/17/2021)    Social Connection and Isolation Panel [NHANES]     Frequency of Communication with Friends and Family: More than three times a week     Frequency of Social Gatherings with Friends and Family: Once a week     Attends [employer] Services: More than 4 times per year     Active Member of Clubs or Organizations: Yes     Attends Club or Organization Meetings: 1 to 4 times per year     Marital Status: Never    Intimate Partner Violence: Not At Risk (6/30/2023)    Humiliation, Afraid, Rape,  and Kick questionnaire     Fear of Current or Ex-Partner: No     Emotionally Abused: No     Physically Abused: No     Sexually Abused: No   Housing Stability: Not on file          Current Outpatient Medications:     albuterol (PROVENTIL HFA,VENTOLIN HFA) 90 mcg/act inhaler, Inhale 2 puffs every 6 (six) hours as needed, Disp: , Rfl:     aspirin 81 MG tablet, Take 1 tablet by mouth daily, Disp: , Rfl:     benzonatate (TESSALON PERLES) 100 mg capsule, Take 1 capsule (100 mg total) by mouth 3 (three) times a day as needed for cough, Disp: 20 capsule, Rfl: 0    Cholecalciferol (Vitamin D3) 25 MCG (1000 UT) CAPS, Take 1 capsule (1,000 Units total) by mouth daily, Disp: 30 capsule, Rfl: 11    ergocalciferol (VITAMIN D2) 50,000 units, Take 1 capsule (50,000 Units total) by mouth once a week for 12 doses Then transition to vitamin D 2000 units daily for maintenance., Disp: 12 capsule, Rfl: 0    ezetimibe (ZETIA) 10 mg tablet, Take 1 tablet by mouth once daily, Disp: 90 tablet, Rfl: 0    levothyroxine 50 mcg tablet, Take 2 tablets by mouth once daily, Disp: 180 tablet, Rfl: 1    lisinopril (ZESTRIL) 30 mg tablet, Take 1 tablet (30 mg total) by mouth daily, Disp: 90 tablet, Rfl: 1    meloxicam (MOBIC) 7.5 mg tablet, Take 7.5 mg by mouth daily, Disp: , Rfl:     metFORMIN (GLUCOPHAGE-XR) 500 mg 24 hr tablet, TAKE 1 TABLET BY MOUTH ONCE DAILY WITH SUPPER, Disp: 90 tablet, Rfl: 1    multivitamin (THERAGRAN) TABS, Take 1 tablet by mouth, Disp: , Rfl:     Omega-3 Fatty Acids (FISH OIL PO), Take 2 g by mouth, Disp: , Rfl:     oxybutynin (DITROPAN-XL) 10 MG 24 hr tablet, Take 10 mg by mouth daily, Disp: , Rfl:     pantoprazole (PROTONIX) 40 mg tablet, Take 40 mg by mouth daily, Disp: , Rfl:     promethazine (PHENERGAN) 6.25 mg/5 mL oral solution, Take 5 mL (6.25 mg total) by mouth daily at bedtime as needed (cough), Disp: 120 mL, Rfl: 0    Zenpep 17849-172936 units CPEP, Take 1 capsule by mouth as needed, Disp: , Rfl:  "    Allergies  Statins    Family History   Problem Relation Age of Onset    Alzheimer's disease Mother     Diabetes Mother     No Known Problems Father     No Known Problems Maternal Grandmother     No Known Problems Maternal Grandfather     No Known Problems Paternal Grandmother     No Known Problems Paternal Grandfather     No Known Problems Brother     No Known Problems Maternal Aunt     No Known Problems Maternal Aunt     No Known Problems Maternal Aunt     No Known Problems Maternal Aunt     No Known Problems Maternal Aunt     No Known Problems Paternal Aunt     No Known Problems Paternal Aunt     No Known Problems Paternal Aunt            Objective:  /69 (BP Location: Left arm, Patient Position: Sitting, Cuff Size: Large)   Pulse 73   Ht 5' 5\" (1.651 m) Comment: verbal  Wt 112 kg (246 lb)   LMP  (LMP Unknown)   BMI 40.94 kg/m²     Review of Systems   Constitutional:  Negative for chills and fever.   HENT:  Negative for ear pain and sore throat.    Eyes:  Negative for pain and visual disturbance.   Respiratory:  Negative for cough and shortness of breath.    Cardiovascular:  Negative for chest pain and palpitations.   Gastrointestinal:  Negative for abdominal pain and vomiting.   Genitourinary:  Negative for dysuria and hematuria.   Musculoskeletal:  Negative for arthralgias and back pain.   Skin:  Negative for color change and rash.         Elongated toenails   Neurological:  Negative for seizures and syncope.   All other systems reviewed and are negative.      Physical Exam  Constitutional:       Appearance: Normal appearance. She is well-developed. She is obese.   HENT:      Head: Normocephalic and atraumatic.      Nose: Nose normal.      Mouth/Throat:      Mouth: Mucous membranes are moist.      Pharynx: Oropharynx is clear.   Eyes:      Conjunctiva/sclera: Conjunctivae normal.      Pupils: Pupils are equal, round, and reactive to light.   Cardiovascular:      Pulses: no weak pulses.           " Dorsalis pedis pulses are 2+ on the right side and 2+ on the left side.        Posterior tibial pulses are 2+ on the right side and 2+ on the left side.      Comments: Bilateral lower extremity varicosities noted  Pulmonary:      Effort: Pulmonary effort is normal.   Musculoskeletal:         General: Normal range of motion.      Cervical back: Normal range of motion.      Right lower le+ Pitting Edema present.      Left lower le+ Pitting Edema present.      Right foot: Bunion present.      Left foot: Bunion present.   Feet:      Right foot:      Protective Sensation: 10 sites tested.  10 sites sensed.      Skin integrity: No ulcer, skin breakdown, erythema, warmth, callus or dry skin.      Toenail Condition: Right toenails are abnormally thick and long. Fungal disease present.     Left foot:      Protective Sensation: 10 sites tested.  10 sites sensed.      Skin integrity: No ulcer, skin breakdown, erythema, warmth, callus or dry skin.      Toenail Condition: Left toenails are abnormally thick and long. Fungal disease present.  Skin:     General: Skin is warm and dry.      Capillary Refill: Capillary refill takes less than 2 seconds.   Neurological:      General: No focal deficit present.      Mental Status: She is alert and oriented to person, place, and time. Mental status is at baseline.   Psychiatric:         Mood and Affect: Mood normal.         Behavior: Behavior normal.         Thought Content: Thought content normal.         Judgment: Judgment normal.          Diabetic Foot Exam    Patient's shoes and socks removed.    Right Foot/Ankle   Right Foot Inspection  Skin Exam: skin normal and skin intact. No dry skin, no warmth, no callus, no erythema, no maceration, no abnormal color, no pre-ulcer, no ulcer and no callus.     Toe Exam: ROM and strength within normal limits.     Sensory   Vibration: diminished  Proprioception: intact  Monofilament testing: intact    Vascular  Capillary refills: < 3  "seconds  The right DP pulse is 2+. The right PT pulse is 2+.     Right Toe  - Comprehensive Exam  Hallux valgus: yes      Left Foot/Ankle  Left Foot Inspection  Skin Exam: skin normal and skin intact. No dry skin, no warmth, no erythema, no maceration, normal color, no pre-ulcer, no ulcer and no callus.     Toe Exam: ROM and strength within normal limits.     Sensory   Vibration: diminished  Proprioception: intact  Monofilament testing: intact    Vascular  Capillary refills: < 3 seconds  The left DP pulse is 2+. The left PT pulse is 2+.     Left Toe  - Comprehensive Exam  Hallux valgus: yes      Assign Risk Category  No deformity present  No loss of protective sensation  No weak pulses  Risk: 0    No pertinent results found.      Michelle Graham, GLENM, DPM, FACFAS    Portions of the record may have been created with voice recognition software. Occasional wrong word or \"sound a like\" substitutions may have occurred due to the inherent limitations of voice recognition software. Read the chart carefully and recognize, using context, where substitutions have occurred.  "

## 2024-09-25 NOTE — RESULT ENCOUNTER NOTE
Informed patient of lab results for Thyroid and to continue Levythroxine .Patient   Is also now taking Vitamin D 2000 units daily over the counter

## 2024-09-26 ENCOUNTER — OFFICE VISIT (OUTPATIENT)
Dept: PODIATRY | Facility: CLINIC | Age: 69
End: 2024-09-26
Payer: MEDICARE

## 2024-09-26 VITALS
HEART RATE: 73 BPM | HEIGHT: 65 IN | BODY MASS INDEX: 40.98 KG/M2 | DIASTOLIC BLOOD PRESSURE: 69 MMHG | WEIGHT: 246 LBS | SYSTOLIC BLOOD PRESSURE: 105 MMHG

## 2024-09-26 DIAGNOSIS — E11.9 TYPE 2 DIABETES MELLITUS WITHOUT COMPLICATION, WITHOUT LONG-TERM CURRENT USE OF INSULIN (HCC): Primary | ICD-10-CM

## 2024-09-26 PROCEDURE — 99203 OFFICE O/P NEW LOW 30 MIN: CPT | Performed by: PODIATRIST

## 2024-09-26 PROCEDURE — 11721 DEBRIDE NAIL 6 OR MORE: CPT | Performed by: PODIATRIST

## 2024-10-02 ENCOUNTER — OFFICE VISIT (OUTPATIENT)
Dept: FAMILY MEDICINE CLINIC | Facility: CLINIC | Age: 69
End: 2024-10-02
Payer: MEDICARE

## 2024-10-02 VITALS
RESPIRATION RATE: 18 BRPM | BODY MASS INDEX: 40.65 KG/M2 | HEART RATE: 84 BPM | TEMPERATURE: 96.9 F | WEIGHT: 244 LBS | HEIGHT: 65 IN | OXYGEN SATURATION: 97 % | DIASTOLIC BLOOD PRESSURE: 76 MMHG | SYSTOLIC BLOOD PRESSURE: 120 MMHG

## 2024-10-02 DIAGNOSIS — Z23 NEED FOR INFLUENZA VACCINATION: ICD-10-CM

## 2024-10-02 DIAGNOSIS — E11.9 TYPE 2 DIABETES MELLITUS WITHOUT COMPLICATION, WITHOUT LONG-TERM CURRENT USE OF INSULIN (HCC): ICD-10-CM

## 2024-10-02 DIAGNOSIS — Z00.00 MEDICARE ANNUAL WELLNESS VISIT, SUBSEQUENT: Primary | ICD-10-CM

## 2024-10-02 DIAGNOSIS — I10 PRIMARY HYPERTENSION: ICD-10-CM

## 2024-10-02 DIAGNOSIS — E78.2 MIXED HYPERLIPIDEMIA: ICD-10-CM

## 2024-10-02 DIAGNOSIS — M85.89 OTHER SPECIFIED DISORDERS OF BONE DENSITY AND STRUCTURE, MULTIPLE SITES: ICD-10-CM

## 2024-10-02 PROBLEM — Q43.8 TORTUOUS COLON: Status: ACTIVE | Noted: 2024-04-28

## 2024-10-02 PROCEDURE — G0439 PPPS, SUBSEQ VISIT: HCPCS | Performed by: FAMILY MEDICINE

## 2024-10-02 PROCEDURE — G0008 ADMIN INFLUENZA VIRUS VAC: HCPCS

## 2024-10-02 PROCEDURE — 90662 IIV NO PRSV INCREASED AG IM: CPT

## 2024-10-02 PROCEDURE — 99214 OFFICE O/P EST MOD 30 MIN: CPT | Performed by: FAMILY MEDICINE

## 2024-10-02 NOTE — ASSESSMENT & PLAN NOTE
Lab Results   Component Value Date    HGBA1C 7.0 (H) 09/03/2024   Patient's hemoglobin A1c is stable at 7.0.  It did go up slightly from last time.  She will continue to work on improving her diet and exercise.  She will continue with her current medication.  We will see her back as scheduled and she will get lab work done prior to that visit.    Orders:    Albumin / creatinine urine ratio; Future    Comprehensive metabolic panel; Future    Hemoglobin A1C; Future    Lipid Panel with Direct LDL reflex; Future    TSH, 3rd generation with Free T4 reflex; Future

## 2024-10-02 NOTE — PROGRESS NOTES
Ambulatory Visit  Name: Vee Baldwin      : 1955      MRN: [id number]  Encounter Provider: Judy Yadav DO  Encounter Date: 10/2/2024   Encounter department: [facility]    Assessment & Plan  Medicare annual wellness visit, subsequent  The patient is stable on her current medication.  She will go for the testing as ordered.  We will see her back as scheduled.       Need for influenza vaccination    Orders:    influenza vaccine, high-dose, PF 0.5 mL (Fluzone High Dose)    Other specified disorders of bone density and structure, multiple sites    Orders:    DXA bone density spine hip and pelvis; Future    Type 2 diabetes mellitus without complication, without long-term current use of insulin ([facility])    Lab Results   Component Value Date    HGBA1C 7.0 (H) 2024   Patient's hemoglobin A1c is stable at 7.0.  It did go up slightly from last time.  She will continue to work on improving her diet and exercise.  She will continue with her current medication.  We will see her back as scheduled and she will get lab work done prior to that visit.    Orders:    Albumin / creatinine urine ratio; Future    Comprehensive metabolic panel; Future    Hemoglobin A1C; Future    Lipid Panel with Direct LDL reflex; Future    TSH, 3rd generation with Free T4 reflex; Future    Primary hypertension  The patient's blood pressure is well-controlled on her current medication.  We have made no changes today.  We will see her back as scheduled.  Orders:    Albumin / creatinine urine ratio; Future    Comprehensive metabolic panel; Future    Hemoglobin A1C; Future    Lipid Panel with Direct LDL reflex; Future    TSH, 3rd generation with Free T4 reflex; Future    Mixed hyperlipidemia  The patient was counseled on healthy lifestyle interventions for weight loss and improving her cholesterol.  Discussed implementing dietary changes (smaller portions, higher protein, 4-5 servings of fruits and vegetables a  day) as well as increasing activity (strength training and 150 minutes weekly of moderate exercise).    Orders:    Albumin / creatinine urine ratio; Future    Comprehensive metabolic panel; Future    Hemoglobin A1C; Future    Lipid Panel with Direct LDL reflex; Future    TSH, 3rd generation with Free T4 reflex; Future      Urinary Incontinence Plan of Care: counseling topics discussed: practice Kegel (pelvic floor strengthening) exercises, limit alcohol, caffeine, spicy foods, and acidic foods and limiting fluid intake 3-4 hours before bed. Referral was placed for Urogynecology.       Preventive health issues were discussed with patient, and age appropriate screening tests were ordered as noted in patient's After Visit Summary. Personalized health advice and appropriate referrals for health education or preventive services given if needed, as noted in patient's After Visit Summary.  Chief Complaint   Patient presents with    Medicare Wellness Visit     Subsequent       History of Present Illness     Vee Baldwin is a 68 y.o. female who presents today for subsequent Medicare wellness visit.  The patient denies any chest pain, shortness of breath, or palpitations.  There is no ROBLEDO, PND, or orthopnea.  There is no edema.  There are no headaches or visual changes.  There is no lightheadedness, dizziness, or fainting spells.  The patient currently denies any nausea, vomiting, or GERD symptoms.  she has normal bowel movements and normal urine output.  she has a normal appetite.  There is occasional abdominal pain, but mild.  She sees GI once a year.    She is going to get her right TKR on 2/12/2024.  She is still doing therapy for her balance and her knee.    She recovered from having Covid 2 weeks ago.      Diabetes  She presents for her follow-up diabetic visit. She has type 2 diabetes mellitus. Her disease course has been stable. Pertinent negatives for diabetes include no blurred vision, no chest pain, no fatigue,  no foot paresthesias, no foot ulcerations, no polydipsia, no polyphagia, no polyuria, no visual change, no weakness and no weight loss.      Patient Care Team:  Judy Yadav DO as PCP - General (Family Medicine)    Review of Systems   Constitutional: Negative.  Negative for fatigue and weight loss.   HENT: Negative.     Eyes: Negative.  Negative for blurred vision.   Respiratory: Negative.     Cardiovascular: Negative.  Negative for chest pain.   Gastrointestinal: Negative.    Endocrine: Negative.  Negative for polydipsia, polyphagia and polyuria.   Genitourinary: Negative.    Musculoskeletal: Negative.    Skin: Negative.    Allergic/Immunologic: Negative.    Neurological: Negative.  Negative for weakness.   Hematological: Negative.    Psychiatric/Behavioral: Negative.       Medical History Reviewed by provider this encounter:  Tobacco  Allergies  Meds  Problems  Med Hx  Surg Hx  Fam Hx  Soc   Hx      Annual Wellness Visit Questionnaire   Vee is here for her Subsequent Wellness visit. Last Medicare Wellness visit information reviewed, patient interviewed and updates made to the record today.      Health Risk Assessment:   Patient rates overall health as good. Patient feels that their physical health rating is same. Patient is very satisfied with their life. Eyesight was rated as same. Hearing was rated as same. Patient feels that their emotional and mental health rating is same. Patients states they are never, rarely angry. Patient states they are sometimes unusually tired/fatigued. Pain experienced in the last 7 days has been some. Patient's pain rating has been 1/10. Patient states that she has experienced no weight loss or gain in last 6 months. There is pain in the right knee- feeling better with PT.      Fall Risk Screening:   In the past year, patient has experienced: history of falling in past year    Number of falls: 1  Injured during fall?: No    Feels unsteady when standing or walking?: No     Worried about falling?: No      Urinary Incontinence Screening:   Patient has leaked urine accidently in the last six months.     Home Safety:  Patient does not have trouble with stairs inside or outside of their home. Patient has working smoke alarms and has working carbon monoxide detector. Home safety hazards include: none. She lives on one floor.      Nutrition:   Current diet is Regular. She has a decreased appetite- she is watching her diet.    She is working on this.      Medications:   Patient is currently taking over-the-counter supplements. OTC medications include: see medication list. Patient is able to manage medications.     Activities of Daily Living (ADLs)/Instrumental Activities of Daily Living (IADLs):   Walk and transfer into and out of bed and chair?: Yes  Dress and groom yourself?: Yes    Bathe or shower yourself?: Yes    Feed yourself? Yes  Do your laundry/housekeeping?: Yes  Manage your money, pay your bills and track your expenses?: Yes  Make your own meals?: Yes    Do your own shopping?: Yes    Previous Hospitalizations:   Any hospitalizations or ED visits within the last 12 months?: No      Advance Care Planning:   Living will: Yes    Durable POA for healthcare: Yes    Advanced directive: Yes    Advanced directive counseling given: Yes    Five wishes given: No    Patient declined ACP directive: No    End of Life Decisions reviewed with patient: Yes    Provider agrees with end of life decisions: Yes      PREVENTIVE SCREENINGS      Cardiovascular Screening:    General: Screening Not Indicated, History Lipid Disorder, Risks and Benefits Discussed and Screening Current    Due for: Lipid Panel      Diabetes Screening:     General: Screening Not Indicated, History Diabetes, Risks and Benefits Discussed and Screening Current      Colorectal Cancer Screening:     General: Screening Current      Breast Cancer Screening:     General: Screening Current      Cervical Cancer Screening:    General:  Screening Not Indicated      Osteoporosis Screening:    General: Screening Current      Abdominal Aortic Aneurysm (AAA) Screening:        General: Screening Not Indicated      Lung Cancer Screening:     General: Screening Not Indicated      Hepatitis C Screening:    General: Screening Current    Screening, Brief Intervention, and Referral to Treatment (SBIRT)    Screening  Typical number of drinks in a day: 0  Typical number of drinks in a week: 0  Interpretation: Low risk drinking behavior.    AUDIT-C Screenin) How often did you have a drink containing alcohol in the past year? never  2) How many drinks did you have on a typical day when you were drinking in the past year? 0  3) How often did you have 6 or more drinks on one occasion in the past year? never    AUDIT-C Score: 0  Interpretation: Score 0-2 (female): Negative screen for alcohol misuse    Single Item Drug Screening:  How often have you used an illegal drug (including marijuana) or a prescription medication for non-medical reasons in the past year? never    Single Item Drug Screen Score: 0  Interpretation: Negative screen for possible drug use disorder    Brief Intervention  Alcohol & drug use screenings were reviewed. No concerns regarding substance use disorder identified.     Other Counseling Topics:   Car/seat belt/driving safety, skin self-exam, sunscreen and regular weightbearing exercise and calcium and vitamin D intake.     Social Determinants of Health     Financial Resource Strain: Low Risk  (2023)    Overall Financial Resource Strain (CARDIA)     Difficulty of Paying Living Expenses: Not very hard   Food Insecurity: No Food Insecurity (10/2/2024)    Hunger Vital Sign     Worried About Running Out of Food in the Last Year: Never true     Ran Out of Food in the Last Year: Never true   Transportation Needs: No Transportation Needs (10/2/2024)    PRAPARE - Transportation     Lack of Transportation (Medical): No     Lack of Transportation  "(Non-Medical): No   Housing Stability: Low Risk  (10/2/2024)    Housing Stability Vital Sign     Unable to Pay for Housing in the Last Year: No     Number of Times Moved in the Last Year: 0     Homeless in the Last Year: No   Utilities: Not At Risk (10/2/2024)    [facility] Utilities     Threatened with loss of utilities: No     Vision Screening - Comments:: She sees the eye doctor and is scheduled.      Objective     /76 (BP Location: Right arm, Patient Position: Sitting, Cuff Size: Large)   Pulse 84   Temp (!) 96.9 °F (36.1 °C) (Tympanic)   Resp 18   Ht 5' 5\" (1.651 m)   Wt 111 kg (244 lb)   LMP  (LMP Unknown)   SpO2 97%   BMI 40.60 kg/m²     Physical Exam  Constitutional:       General: She is not in acute distress.     Appearance: Normal appearance. She is well-developed. She is not diaphoretic.   HENT:      Head: Normocephalic and atraumatic.      Right Ear: Tympanic membrane, ear canal and external ear normal.      Left Ear: Tympanic membrane, ear canal and external ear normal.      Nose: Nose normal.      Mouth/Throat:      Mouth: Mucous membranes are moist.      Pharynx: Oropharynx is clear. No oropharyngeal exudate.   Eyes:      General: No scleral icterus.        Right eye: No discharge.         Left eye: No discharge.      Extraocular Movements: Extraocular movements intact.      Pupils: Pupils are equal, round, and reactive to light.   Neck:      Thyroid: No thyromegaly.      Vascular: No JVD.      Trachea: No tracheal deviation.   Cardiovascular:      Rate and Rhythm: Normal rate and regular rhythm.      Heart sounds: Normal heart sounds. No murmur heard.     No friction rub. No gallop.   Pulmonary:      Effort: Pulmonary effort is normal. No respiratory distress.      Breath sounds: Normal breath sounds. No wheezing or rales.   Chest:      Chest wall: No tenderness.   Abdominal:      General: Bowel sounds are normal. There is no distension.      Palpations: Abdomen is soft. There is no mass.     "  Tenderness: There is no abdominal tenderness. There is no guarding or rebound.      Hernia: No hernia is present.   Musculoskeletal:         General: No tenderness or deformity. Normal range of motion.      Cervical back: Normal range of motion and neck supple.   Lymphadenopathy:      Cervical: No cervical adenopathy.   Skin:     General: Skin is warm and dry.      Coloration: Skin is not pale.      Findings: No erythema or rash.   Neurological:      Mental Status: She is alert and oriented to person, place, and time.      Cranial Nerves: No cranial nerve deficit.      Sensory: No sensory deficit.      Motor: No abnormal muscle tone.      Coordination: Coordination normal.      Deep Tendon Reflexes: Reflexes normal.   Psychiatric:         Mood and Affect: Mood normal.         Behavior: Behavior normal.         Thought Content: Thought content normal.         Judgment: Judgment normal.       Administrative Statements   I have spent a total time of 40 minutes in caring for this patient on the day of the visit/encounter including Diagnostic results, Prognosis, Risks and benefits of tx options, Instructions for management, Patient and family education, Importance of tx compliance, Risk factor reductions, Impressions, Counseling / Coordination of care, Documenting in the medical record, and Reviewing / ordering tests, medicine, procedures  .

## 2024-10-02 NOTE — ASSESSMENT & PLAN NOTE
The patient's blood pressure is well-controlled on her current medication.  We have made no changes today.  We will see her back as scheduled.  Orders:    Albumin / creatinine urine ratio; Future    Comprehensive metabolic panel; Future    Hemoglobin A1C; Future    Lipid Panel with Direct LDL reflex; Future    TSH, 3rd generation with Free T4 reflex; Future

## 2024-10-02 NOTE — ASSESSMENT & PLAN NOTE
The patient was counseled on healthy lifestyle interventions for weight loss and improving her cholesterol.  Discussed implementing dietary changes (smaller portions, higher protein, 4-5 servings of fruits and vegetables a day) as well as increasing activity (strength training and 150 minutes weekly of moderate exercise).    Orders:    Albumin / creatinine urine ratio; Future    Comprehensive metabolic panel; Future    Hemoglobin A1C; Future    Lipid Panel with Direct LDL reflex; Future    TSH, 3rd generation with Free T4 reflex; Future

## 2024-10-02 NOTE — PATIENT INSTRUCTIONS
Medicare Preventive Visit Patient Instructions  Thank you for completing your Welcome to Medicare Visit or Medicare Annual Wellness Visit today. Your next wellness visit will be due in one year (10/3/2025).  The screening/preventive services that you may require over the next 5-10 years are detailed below. Some tests may not apply to you based off risk factors and/or age. Screening tests ordered at today's visit but not completed yet may show as past due. Also, please note that scanned in results may not display below.  Preventive Screenings:  Service Recommendations Previous Testing/Comments   Colorectal Cancer Screening  * Colonoscopy    * Fecal Occult Blood Test (FOBT)/Fecal Immunochemical Test (FIT)  * Fecal DNA/Cologuard Test  * Flexible Sigmoidoscopy Age: 45-75 years old   Colonoscopy: every 10 years (may be performed more frequently if at higher risk)  OR  FOBT/FIT: every 1 year  OR  Cologuard: every 3 years  OR  Sigmoidoscopy: every 5 years  Screening may be recommended earlier than age 45 if at higher risk for colorectal cancer. Also, an individualized decision between you and your healthcare provider will decide whether screening between the ages of 76-85 would be appropriate. Colonoscopy: 09/07/2022  FOBT/FIT: Not on file  Cologuard: Not on file  Sigmoidoscopy: Not on file          Breast Cancer Screening Age: 40+ years old  Frequency: every 1-2 years  Not required if history of left and right mastectomy Mammogram: 07/24/2024        Cervical Cancer Screening Between the ages of 21-29, pap smear recommended once every 3 years.   Between the ages of 30-65, can perform pap smear with HPV co-testing every 5 years.   Recommendations may differ for women with a history of total hysterectomy, cervical cancer, or abnormal pap smears in past. Pap Smear: 08/23/2018        Hepatitis C Screening Once for adults born between 1945 and 1965  More frequently in patients at high risk for Hepatitis C Hep C Antibody:  08/27/2019        Diabetes Screening 1-2 times per year if you're at risk for diabetes or have pre-diabetes Fasting glucose: 111 mg/dL (9/3/2024)  A1C: 7.0 % (9/3/2024)      Cholesterol Screening Once every 5 years if you don't have a lipid disorder. May order more often based on risk factors. Lipid panel: 09/03/2024          Other Preventive Screenings Covered by Medicare:  Abdominal Aortic Aneurysm (AAA) Screening: covered once if your at risk. You're considered to be at risk if you have a family history of AAA.  Lung Cancer Screening: covers low dose CT scan once per year if you meet all of the following conditions: (1) Age 55-77; (2) No signs or symptoms of lung cancer; (3) Current smoker or have quit smoking within the last 15 years; (4) You have a tobacco smoking history of at least 20 pack years (packs per day multiplied by number of years you smoked); (5) You get a written order from a healthcare provider.  Glaucoma Screening: covered annually if you're considered high risk: (1) You have diabetes OR (2) Family history of glaucoma OR (3)  aged 50 and older OR (4)  American aged 65 and older  Osteoporosis Screening: covered every 2 years if you meet one of the following conditions: (1) You're estrogen deficient and at risk for osteoporosis based off medical history and other findings; (2) Have a vertebral abnormality; (3) On glucocorticoid therapy for more than 3 months; (4) Have primary hyperparathyroidism; (5) On osteoporosis medications and need to assess response to drug therapy.   Last bone density test (DXA Scan): 05/09/2022.  HIV Screening: covered annually if you're between the age of 15-65. Also covered annually if you are younger than 15 and older than 65 with risk factors for HIV infection. For pregnant patients, it is covered up to 3 times per pregnancy.    Immunizations:  Immunization Recommendations   Influenza Vaccine Annual influenza vaccination during flu season is  recommended for all persons aged >= 6 months who do not have contraindications   Pneumococcal Vaccine   * Pneumococcal conjugate vaccine = PCV13 (Prevnar 13), PCV15 (Vaxneuvance), PCV20 (Prevnar 20)  * Pneumococcal polysaccharide vaccine = PPSV23 (Pneumovax) Adults 19-65 yo with certain risk factors or if 65+ yo  If never received any pneumonia vaccine: recommend Prevnar 20 (PCV20)  Give PCV20 if previously received 1 dose of PCV13 or PPSV23   Hepatitis B Vaccine 3 dose series if at intermediate or high risk (ex: diabetes, end stage renal disease, liver disease)   Respiratory syncytial virus (RSV) Vaccine - COVERED BY MEDICARE PART D  * RSVPreF3 (Arexvy) CDC recommends that adults 60 years of age and older may receive a single dose of RSV vaccine using shared clinical decision-making (SCDM)   Tetanus (Td) Vaccine - COST NOT COVERED BY MEDICARE PART B Following completion of primary series, a booster dose should be given every 10 years to maintain immunity against tetanus. Td may also be given as tetanus wound prophylaxis.   Tdap Vaccine - COST NOT COVERED BY MEDICARE PART B Recommended at least once for all adults. For pregnant patients, recommended with each pregnancy.   Shingles Vaccine (Shingrix) - COST NOT COVERED BY MEDICARE PART B  2 shot series recommended in those 19 years and older who have or will have weakened immune systems or those 50 years and older     Health Maintenance Due:      Topic Date Due   • DXA SCAN  05/09/2024   • Breast Cancer Screening: Mammogram  07/24/2025   • Colorectal Cancer Screening  09/07/2027   • Hepatitis C Screening  Completed     Immunizations Due:      Topic Date Due   • Influenza Vaccine (1) 09/01/2024     Advance Directives   What are advance directives?  Advance directives are legal documents that state your wishes and plans for medical care. These plans are made ahead of time in case you lose your ability to make decisions for yourself. Advance directives can apply to any  medical decision, such as the treatments you want, and if you want to donate organs.   What are the types of advance directives?  There are many types of advance directives, and each state has rules about how to use them. You may choose a combination of any of the following:  Living will:  This is a written record of the treatment you want. You can also choose which treatments you do not want, which to limit, and which to stop at a certain time. This includes surgery, medicine, IV fluid, and tube feedings.   Durable power of  for healthcare (DPAHC):  This is a written record that states who you want to make healthcare choices for you when you are unable to make them for yourself. This person, called a proxy, is usually a family member or a friend. You may choose more than 1 proxy.  Do not resuscitate (DNR) order:  A DNR order is used in case your heart stops beating or you stop breathing. It is a request not to have certain forms of treatment, such as CPR. A DNR order may be included in other types of advance directives.  Medical directive:  This covers the care that you want if you are in a coma, near death, or unable to make decisions for yourself. You can list the treatments you want for each condition. Treatment may include pain medicine, surgery, blood transfusions, dialysis, IV or tube feedings, and a ventilator (breathing machine).  Values history:  This document has questions about your views, beliefs, and how you feel and think about life. This information can help others choose the care that you would choose.  Why are advance directives important?  An advance directive helps you control your care. Although spoken wishes may be used, it is better to have your wishes written down. Spoken wishes can be misunderstood, or not followed. Treatments may be given even if you do not want them. An advance directive may make it easier for your family to make difficult choices about your care.   Urinary  Incontinence   Urinary incontinence (UI)  is when you lose control of your bladder. UI develops because your bladder cannot store or empty urine properly. The 3 most common types of UI are stress incontinence, urge incontinence, or both.  Medicines:   May be given to help strengthen your bladder control. Report any side effects of medication to your healthcare provider.  Do pelvic muscle exercises often:  Your pelvic muscles help you stop urinating. Squeeze these muscles tight for 5 seconds, then relax for 5 seconds. Gradually work up to squeezing for 10 seconds. Do 3 sets of 15 repetitions a day, or as directed. This will help strengthen your pelvic muscles and improve bladder control.  Train your bladder:  Go to the bathroom at set times, such as every 2 hours, even if you do not feel the urge to go. You can also try to hold your urine when you feel the urge to go. For example, hold your urine for 5 minutes when you feel the urge to go. As that becomes easier, hold your urine for 10 minutes.   Self-care:   Keep a UI record.  Write down how often you leak urine and how much you leak. Make a note of what you were doing when you leaked urine.  Drink liquids as directed. You may need to limit the amount of liquid you drink to help control your urine leakage. Do not drink any liquid right before you go to bed. Limit or do not have drinks that contain caffeine or alcohol.   Prevent constipation.  Eat a variety of high-fiber foods. Good examples are high-fiber cereals, beans, vegetables, and whole-grain breads. Walking is the best way to trigger your intestines to have a bowel movement.  Exercise regularly and maintain a healthy weight.  Weight loss and exercise will decrease pressure on your bladder and help you control your leakage.   Use a catheter as directed  to help empty your bladder. A catheter is a tiny, plastic tube that is put into your bladder to drain your urine.   Go to behavior therapy as directed.   Behavior therapy may be used to help you learn to control your urge to urinate.    Weight Management   Why it is important to manage your weight:  Being overweight increases your risk of health conditions such as heart disease, high blood pressure, type 2 diabetes, and certain types of cancer. It can also increase your risk for osteoarthritis, sleep apnea, and other respiratory problems. Aim for a slow, steady weight loss. Even a small amount of weight loss can lower your risk of health problems.  How to lose weight safely:  A safe and healthy way to lose weight is to eat fewer calories and get regular exercise. You can lose up about 1 pound a week by decreasing the number of calories you eat by 500 calories each day.   Healthy meal plan for weight management:  A healthy meal plan includes a variety of foods, contains fewer calories, and helps you stay healthy. A healthy meal plan includes the following:  Eat whole-grain foods more often.  A healthy meal plan should contain fiber. Fiber is the part of grains, fruits, and vegetables that is not broken down by your body. Whole-grain foods are healthy and provide extra fiber in your diet. Some examples of whole-grain foods are whole-wheat breads and pastas, oatmeal, brown rice, and bulgur.  Eat a variety of vegetables every day.  Include dark, leafy greens such as spinach, kale, pablo greens, and mustard greens. Eat yellow and orange vegetables such as carrots, sweet potatoes, and winter squash.   Eat a variety of fruits every day.  Choose fresh or canned fruit (canned in its own juice or light syrup) instead of juice. Fruit juice has very little or no fiber.  Eat low-fat dairy foods.  Drink fat-free (skim) milk or 1% milk. Eat fat-free yogurt and low-fat cottage cheese. Try low-fat cheeses such as mozzarella and other reduced-fat cheeses.  Choose meat and other protein foods that are low in fat.  Choose beans or other legumes such as split peas or lentils. Choose  fish, skinless poultry (chicken or turkey), or lean cuts of red meat (beef or pork). Before you cook meat or poultry, cut off any visible fat.   Use less fat and oil.  Try baking foods instead of frying them. Add less fat, such as margarine, sour cream, regular salad dressing and mayonnaise to foods. Eat fewer high-fat foods. Some examples of high-fat foods include french fries, doughnuts, ice cream, and cakes.  Eat fewer sweets.  Limit foods and drinks that are high in sugar. This includes candy, cookies, regular soda, and sweetened drinks.  Exercise:  Exercise at least 30 minutes per day on most days of the week. Some examples of exercise include walking, biking, dancing, and swimming. You can also fit in more physical activity by taking the stairs instead of the elevator or parking farther away from stores. Ask your healthcare provider about the best exercise plan for you.      © Copyright [employer] 2018 Information is for End User's use only and may not be sold, redistributed or otherwise used for commercial purposes. All illustrations and images included in CareNotes® are the copyrighted property of [employer] or [employer]

## 2024-10-05 PROBLEM — R05.9 COUGH: Status: RESOLVED | Noted: 2024-09-05 | Resolved: 2024-10-05

## 2024-10-11 DIAGNOSIS — E03.9 HYPOTHYROIDISM, UNSPECIFIED TYPE: ICD-10-CM

## 2024-10-11 RX ORDER — LEVOTHYROXINE SODIUM 50 UG/1
100 TABLET ORAL DAILY
Qty: 180 TABLET | Refills: 1 | Status: SHIPPED | OUTPATIENT
Start: 2024-10-11

## 2024-10-25 ENCOUNTER — TELEPHONE (OUTPATIENT)
Age: 69
End: 2024-10-25

## 2024-10-29 ENCOUNTER — TELEPHONE (OUTPATIENT)
Dept: ADMINISTRATIVE | Facility: OTHER | Age: 69
End: 2024-10-29

## 2024-10-29 NOTE — TELEPHONE ENCOUNTER
Upon review of the In Basket request we have found/obtained the documentation. After careful review of the document we are unable to complete this request for Diabetic Eye Exam because the documentation does not have the result(s) needed to close the requested care gap(s).    Any additional questions or concerns should be emailed to the Practice Liaisons via the appropriate education email address, please do not reply via In Basket.    Thank you  Dev Donnelly MA   [facility]

## 2024-10-29 NOTE — TELEPHONE ENCOUNTER
----- Message from Whitley SALCEDO sent at 10/29/2024  9:39 AM EDT -----  Regarding: diabetic eye  10/29/24 9:40 AM    Hello, our patient Vee Baldwin has had Diabetic Eye Exam completed/performed. Please assist in updating the patient chart by pulling the document from the Media Tab. The date of service is 10/23/24.     Thank you,  Whitley Rios MA  [facility]

## 2024-10-29 NOTE — TELEPHONE ENCOUNTER
Upon review of the In Basket request and the patient's chart, initial outreach has been made via fax to facility. Please see Contacts section for details.     Thank you  Dev Donnelly MA

## 2024-10-29 NOTE — LETTER
Diabetic Eye Exam Form    Date Requested: 10/29/24  Patient: Vee Baldwin     Please complete form  Patient : [id number]   Referring Provider: Judy Yadav,       DIABETIC Eye Exam Date _______________________________      Type of Exam MUST be documented for Diabetic Eye Exams. Please CHECK ONE.     Retinal Exam       Dilated Retinal Exam       OCT       Optomap-Iris Exam      Fundus Photography       Left Eye - Please check Retinopathy or No Retinopathy        Exam did show retinopathy    Exam did not show retinopathy       Right Eye - Please check Retinopathy or No Retinopathy       Exam did show retinopathy    Exam did not show retinopathy       Comments __________________________________________________________    Practice Providing Exam ______________________________________________    Exam Performed By (print name) _______________________________________      Provider Signature ___________________________________________________      These reports are needed for  compliance.  Please fax this completed form and a copy of the Diabetic Eye Exam report to our office located at [address] [place] [address] as soon as possible via Fax [phone] attention Dev: Phone [phone]  We thank you for your assistance in treating our mutual patient.

## 2024-10-30 NOTE — TELEPHONE ENCOUNTER
Upon review of the In Basket request we were able to locate, review, and update the patient chart as requested for Diabetic Eye Exam.    Any additional questions or concerns should be emailed to the Practice Liaisons via the appropriate education email address, please do not reply via In Basket.    Thank you  Dev Donnelly MA   [facility]

## 2024-11-14 ENCOUNTER — CONSULT (OUTPATIENT)
Dept: FAMILY MEDICINE CLINIC | Facility: CLINIC | Age: 69
End: 2024-11-14
Payer: MEDICARE

## 2024-11-14 VITALS
RESPIRATION RATE: 18 BRPM | HEART RATE: 80 BPM | BODY MASS INDEX: 40.52 KG/M2 | TEMPERATURE: 97.1 F | HEIGHT: 65 IN | WEIGHT: 243.2 LBS | DIASTOLIC BLOOD PRESSURE: 78 MMHG | SYSTOLIC BLOOD PRESSURE: 118 MMHG | OXYGEN SATURATION: 97 %

## 2024-11-14 DIAGNOSIS — N36.42 INTRINSIC SPHINCTER DEFICIENCY (ISD): ICD-10-CM

## 2024-11-14 DIAGNOSIS — E03.9 HYPOTHYROIDISM, UNSPECIFIED TYPE: ICD-10-CM

## 2024-11-14 DIAGNOSIS — K21.9 GASTROESOPHAGEAL REFLUX DISEASE WITHOUT ESOPHAGITIS: ICD-10-CM

## 2024-11-14 DIAGNOSIS — E78.2 MIXED HYPERLIPIDEMIA: ICD-10-CM

## 2024-11-14 DIAGNOSIS — E11.9 TYPE 2 DIABETES MELLITUS WITHOUT COMPLICATION, WITHOUT LONG-TERM CURRENT USE OF INSULIN (HCC): ICD-10-CM

## 2024-11-14 DIAGNOSIS — N39.46 URGE AND STRESS INCONTINENCE: ICD-10-CM

## 2024-11-14 DIAGNOSIS — Z01.818 PREOPERATIVE GENERAL PHYSICAL EXAMINATION: Primary | ICD-10-CM

## 2024-11-14 DIAGNOSIS — I10 PRIMARY HYPERTENSION: ICD-10-CM

## 2024-11-14 PROCEDURE — G2211 COMPLEX E/M VISIT ADD ON: HCPCS | Performed by: FAMILY MEDICINE

## 2024-11-14 PROCEDURE — 99214 OFFICE O/P EST MOD 30 MIN: CPT | Performed by: FAMILY MEDICINE

## 2024-11-14 RX ORDER — ESTRADIOL 0.1 MG/G
CREAM VAGINAL
COMMUNITY
Start: 2024-10-01

## 2024-11-14 NOTE — ASSESSMENT & PLAN NOTE
The patient will continue on the current dose of her levothyroxine leading up to the day of the procedure and will take it as scheduled.

## 2024-11-14 NOTE — ASSESSMENT & PLAN NOTE
The patient's blood sugar is stable on her current medication.  We have made no changes today.  Lab Results   Component Value Date    HGBA1C 7.0 (H) 09/03/2024

## 2024-11-14 NOTE — ASSESSMENT & PLAN NOTE
The patient's blood pressure stable on her current medication.  We have made no changes.  She was advised to hold her lisinopril the morning of the procedure.

## 2024-11-14 NOTE — PROGRESS NOTES
Pre-operative Clearance  Name: Vee Baldwin      : 1955      MRN: [id number]  Encounter Provider: Judy Yadav DO  Encounter Date: 2024   Encounter department: [facility]    Assessment & Plan  Preoperative general physical examination  The patient had an unremarkable exam in the office today.  She is stable on her current medications.  She is cleared for her scheduled surgery.  She was advised to stop her aspirin for 7 days prior to surgery and also not to take any new herbs or vitamins for 7 days prior to the procedure.  Also she should avoid any NSAIDs for 7 days before the procedure and only take Tylenol.  The patient understood and voiced an understanding.  We will see her after her surgery.       Intrinsic sphincter deficiency (ISD)  The patient will proceed with her surgery as scheduled.       Urge and stress incontinence  The patient will proceed with her surgery as scheduled.       Type 2 diabetes mellitus without complication, without long-term current use of insulin (HCC)  The patient's blood sugar is stable on her current medication.  We have made no changes today.  Lab Results   Component Value Date    HGBA1C 7.0 (H) 2024            Primary hypertension  The patient's blood pressure stable on her current medication.  We have made no changes.  She was advised to hold her lisinopril the morning of the procedure.       Gastroesophageal reflux disease without esophagitis  The patient is stable on the current dose of her pantoprazole.  We have made no changes today.       Hypothyroidism, unspecified type  The patient will continue on the current dose of her levothyroxine leading up to the day of the procedure and will take it as scheduled.       Mixed hyperlipidemia  The patient will continue with the current dose of her Zetia.  She can take it the day of her procedure.       Pre-operative Clearance:     Revised Cardiac Risk Index:  RCI RISK CLASS I (0  risk factors, risk of major cardiac complications approximately 0.5%)    Clearance:  Patient is medically optimized (CLEARED) for proposed surgery without any additional cardiac testing.      Medication Instructions:   - Avoid herbs or non-directed vitamins one week prior to surgery    - Avoid aspirin containing medications or non-steroidal anti-inflammatory drugs one week preceding surgery    - May take tylenol for pain up until the night before surgery    - ACE Inhibitors or ARBs: Continue this medication up to the evening before surgery/procedure, but do not take the morning of the day of surgery.  - Hormone replacement therapy: Continue to take this medication on your normal schedule. This medication may need to be discontinued for at least 4 weeks prior to surgery if the surgical procedure is associated with high risk of blood clots. Consult with your surgeon.  - Hyperlipidemia meds: Continue to take this medication on your normal schedule.  - Thyroid meds: Continue to take this medication on your normal schedule.  - Other med instructions: Hold the lisinopril the day of the surgery.        Medicine Instructions for Adults with Diabetes (NO Bowel Prep)    Follow these instructions when a BOWEL PREP is NOT required for your procedure or surgery!    NOTE:  GLP Agonists taken weekly: do not take in the 7 days before your procedure. **Bariatric surgery: do not take 4 weeks prior to your procedure.    SGLT-2 Inhibitors: do not take in the 4 days before your procedure    On the Day Before Surgery/Procedure  If you are having a procedure (e.g., Colonoscopy) or surgery which DOES NOT require a bowel prep, follow the directions below based on the type of medicine you take for your diabetes.  Type of Medicine You Take Examples What to Do   Pre-Mixed Insulin Intermediate  Skljtvn82/25, Yqrofdk72/30, Novolog 70/30, Regular Insulin Take 1/2 your regular dose the evening before our procedure.   Rapid/Fast Acting  Insulin  and/or Long-Acting Insulin Humalog U200, NovoLog, Apidra,  Lantus, Levemir, Tresiba, Toujeo,  Fias, Basaglar Take your FULL regular dose the day before procedure.   Oral Diabetic Medicines (sulfonylurea) Glipizide/Glimepiride/  Glucotrol Take your regular dose with dinner the evening before your procedure.   Other Oral Diabetic Medicines Metformin, Glucophage, Glucophage  XR, Riomet, Glumetza, Actose,  Avandia, Gl set, Prandin Take your regular dose with dinner the evening before your procedure   GLP Agonists Adlyxin, Byetta, Bydureon,  Ozempic, Soliqua, Tanzeum,  Trulicity, Victoza, Saxenda,  Rybelsus, Wegovy, Mounjaro, Zepbound If taken daily, take as normal  If taken weekly, do not take this medicine for 7 days before your procedure including the day of the procedure (resume taking after the procedure). **Bariatric surgery: do not take 4 weeks prior to procedure   SGLT-2 Inhibitors Jardiance, Invokana, Farxiga, Steglatro, Brenzavvy, Qtern, Segluromet Glyxambi, Synjardy, Synjardy XR, Invokamet, InvokametXR, Trijary XR, Xigduo X Do not take for 4 days before your procedure including the day of the procedure (resume taking after the procedure)   This educational material has been approved by the Patient Education Advisory Committee.    On the Day of Surgery/Procedure  Follow the directions below based on the type of medicine you take for your diabetes.  Type of Medicine You Take  Examples What to Do   Long-Acting Insulin Lantus, Levemir, Tresiba,  Toujeo, Basaglar, Semglee If you normally take your Long Acting Insulin in the morning, take the full dose as scheduled.   GLP-I Agonists Adlyxin, Byetta, Bydureon,  Ozempic, Soliqua, Tanzeum,  Trulicity, Victoza, Saxenda,  Rybelsus, Mounjaro Do NOT take this medicine on the day of your procedure (resume taking after the procedure)   Except for the morning Long-Acting Insulin, DO NOT take ANY diabetic medicine on the day of your procedure unless you were instructed by the  doctor who manages your diabetes medicines.  Continue to check your blood sugars.  If you have an insulin pump, ask your endocrinologist for instructions at least 3 days before your procedure. NOTE: If you are not able to ask your endocrinologist in advance, on the day of the procedure set your insulin pump to your basal rate only. Bring your insulin pump supplies to the hospital.         Chief Complaint   Patient presents with    Pre-op Exam     [facility] Pt is scheduled for urogyn surgery with Dr. Amador on 12/2/24. EKG and blood work done.       History of Present Illness     Pre-op Exam  Surgery: Urethral Bulking  Anticipated Date of Surgery: 12/2/2024  Surgeon: Dr. Asher Amador    Vee Baldwin is a 68 y.o. female who presents for a preoperative physical for her upcoming Urethral Bulking to be performed by Dr Asher Amador on December 2, 2024 under [facility]  She is feeling well.  The patient denies any chest pain, shortness of breath, or palpitations.  There is no ROBLEDO, PND, or orthopnea.  There is no edema.  There are no headaches or visual changes.  There is no lightheadedness, dizziness, or fainting spells.  The patient currently denies any nausea, vomiting, or GERD symptoms.  she has normal bowel movements and normal urine output.  she has a normal appetite.  There are no fevers or chills or cold symptoms.    She had a blood clot many years ago.        Previous history of bleeding disorders or clots?: Yes  Previous Anesthesia reaction?: No  Prolonged steroid use in the last 6 months?: No    Assessment of Cardiac Risk:   - Unstable or severe angina or MI in the last 6 weeks or history of stent placement in the last year?: No   - Decompensated heart failure (e.g. New onset heart failure, NYHA  Class IV heart failure, or worsening existing heart failure)?: No  - Significant arrhythmias such as high grade AV block, symptomatic ventricular arrhythmia, newly recognized ventricular tachycardia, supraventricular  tachycardia with resting heart rate >100, or symptomatic bradycardia?: No  - Severe heart valve disease including aortic stenosis or symptomatic mitral stenosis?: No      Pre-operative Risk Factors:  Elevated-risk surgery: No    History of cerebrovascular disease: No    History of ischemic heart disease: No  Pre-operative treatment with insulin: No  Pre-operative creatinine >2 mg/dL: No    History of congestive heart failure: No    Duke Activity Status Index (DASI):   DASI Total Score: 44.95  METs: 8.3    Medications of Perioperative Concern:   Anti-platelet (aspirin, clopidogrel, ticagrelor, prasugrel, cilostazol, dipyridamole)    Review of Systems   Constitutional: Negative.    HENT: Negative.     Eyes: Negative.    Respiratory: Negative.     Cardiovascular: Negative.    Gastrointestinal: Negative.    Endocrine: Negative.    Genitourinary: Negative.    Musculoskeletal: Negative.    Skin: Negative.    Allergic/Immunologic: Negative.    Neurological: Negative.    Hematological: Negative.    Psychiatric/Behavioral: Negative.       Past Medical History   Past Medical History:   Diagnosis Date    Abscess of neck 03/02/2020    Alcohol induced acute pancreatitis 08/25/2018    COVID-19 04/19/2024    Disease of thyroid gland     GERD (gastroesophageal reflux disease)     HL (hearing loss)     Hyperlipidemia     Polyp of sigmoid colon     last assessed 6/12/12    Sleep disturbances     last assessed 6/5/14     Past Surgical History:   Procedure Laterality Date    BREAST CYST ASPIRATION Right     20 yrs ago in dr office net results    COLONOSCOPY  06/18/2011    Complete.   Repeat in 5yrs      IR IMAGE GUIDED ASPIRATION / DRAINAGE  3/3/2020    NASAL FRACTURE SURGERY       Family History   Problem Relation Age of Onset    Alzheimer's disease Mother     Diabetes Mother     No Known Problems Father     No Known Problems Maternal Grandmother     No Known Problems Maternal Grandfather     No Known Problems Paternal Grandmother      No Known Problems Paternal Grandfather     No Known Problems Brother     No Known Problems Maternal Aunt     No Known Problems Maternal Aunt     No Known Problems Maternal Aunt     No Known Problems Maternal Aunt     No Known Problems Maternal Aunt     No Known Problems Paternal Aunt     No Known Problems Paternal Aunt     No Known Problems Paternal Aunt      Social History     Tobacco Use    Smoking status: Never     Passive exposure: Past    Smokeless tobacco: Never   Vaping Use    Vaping status: Never Used   Substance and Sexual Activity    Alcohol use: Not Currently    Drug use: No    Sexual activity: Not Currently     Current Outpatient Medications on File Prior to Visit   Medication Sig    albuterol (PROVENTIL HFA,VENTOLIN HFA) 90 mcg/act inhaler Inhale 2 puffs every 6 (six) hours as needed    aspirin 81 MG tablet Take 1 tablet by mouth daily    Cholecalciferol (Vitamin D3) 25 MCG (1000 UT) CAPS Take 1 capsule (1,000 Units total) by mouth daily    ergocalciferol (VITAMIN D2) 50,000 units Take 1 capsule (50,000 Units total) by mouth once a week for 12 doses Then transition to vitamin D 2000 units daily for maintenance.    estradiol (ESTRACE) 0.1 mg/g vaginal cream USE 1 GRAM VAGINALLY TWICE A WEEK    ezetimibe (ZETIA) 10 mg tablet Take 1 tablet by mouth once daily    levothyroxine 50 mcg tablet Take 2 tablets by mouth once daily    lisinopril (ZESTRIL) 30 mg tablet Take 1 tablet (30 mg total) by mouth daily    meloxicam (MOBIC) 7.5 mg tablet Take 7.5 mg by mouth daily    metFORMIN (GLUCOPHAGE-XR) 500 mg 24 hr tablet TAKE 1 TABLET BY MOUTH ONCE DAILY WITH SUPPER    multivitamin (THERAGRAN) TABS Take 1 tablet by mouth    Omega-3 Fatty Acids (FISH OIL PO) Take 2 g by mouth    pantoprazole (PROTONIX) 40 mg tablet Take 40 mg by mouth daily    Zenpep 49844-243643 units CPEP Take 1 capsule by mouth as needed     Allergies   Allergen Reactions    Statins Myalgia     Objective   /78 (BP Location: Left arm, Patient  "Position: Sitting, Cuff Size: Standard)   Pulse 80   Temp (!) 97.1 °F (36.2 °C) (Tympanic)   Resp 18   Ht 5' 5\" (1.651 m)   Wt 110 kg (243 lb 3.2 oz)   LMP  (LMP Unknown)   SpO2 97%   BMI 40.47 kg/m²     Physical Exam  Constitutional:       General: She is not in acute distress.     Appearance: Normal appearance. She is well-developed. She is not diaphoretic.   HENT:      Head: Normocephalic and atraumatic.      Right Ear: Tympanic membrane, ear canal and external ear normal.      Left Ear: Tympanic membrane, ear canal and external ear normal.      Nose: Nose normal.      Mouth/Throat:      Mouth: Mucous membranes are moist.      Pharynx: Oropharynx is clear. No oropharyngeal exudate.   Eyes:      General: No scleral icterus.        Right eye: No discharge.         Left eye: No discharge.      Extraocular Movements: Extraocular movements intact.      Pupils: Pupils are equal, round, and reactive to light.   Neck:      Thyroid: No thyromegaly.      Vascular: No JVD.      Trachea: No tracheal deviation.   Cardiovascular:      Rate and Rhythm: Normal rate and regular rhythm.      Heart sounds: Normal heart sounds. No murmur heard.     No friction rub. No gallop.   Pulmonary:      Effort: Pulmonary effort is normal. No respiratory distress.      Breath sounds: Normal breath sounds. No wheezing or rales.   Chest:      Chest wall: No tenderness.   Abdominal:      General: Bowel sounds are normal. There is no distension.      Palpations: Abdomen is soft. There is no mass.      Tenderness: There is no abdominal tenderness. There is no guarding or rebound.      Hernia: No hernia is present.   Musculoskeletal:         General: No tenderness or deformity. Normal range of motion.      Cervical back: Normal range of motion and neck supple.   Lymphadenopathy:      Cervical: No cervical adenopathy.   Skin:     General: Skin is warm and dry.      Coloration: Skin is not pale.      Findings: No erythema or rash. "   Neurological:      Mental Status: She is alert and oriented to person, place, and time.      Cranial Nerves: No cranial nerve deficit.      Sensory: No sensory deficit.      Motor: No abnormal muscle tone.      Coordination: Coordination normal.      Deep Tendon Reflexes: Reflexes normal.   Psychiatric:         Mood and Affect: Mood normal.         Behavior: Behavior normal.         Thought Content: Thought content normal.         Judgment: Judgment normal.       Administrative Statements   I have spent a total time of 40 minutes in caring for this patient on the day of the visit/encounter including Diagnostic results, Prognosis, Risks and benefits of tx options, Instructions for management, Patient and family education, Importance of tx compliance, Risk factor reductions, Impressions, Counseling / Coordination of care, Documenting in the medical record, Reviewing / ordering tests, medicine, procedures  , and Obtaining or reviewing history  .     Judy Yadav, DO

## 2024-11-14 NOTE — ASSESSMENT & PLAN NOTE
The patient is stable on the current dose of her pantoprazole.  We have made no changes today.

## 2024-11-14 NOTE — ASSESSMENT & PLAN NOTE
The patient will continue with the current dose of her Zetia.  She can take it the day of her procedure.

## 2024-11-18 ENCOUNTER — TELEPHONE (OUTPATIENT)
Age: 69
End: 2024-11-18

## 2024-11-18 NOTE — TELEPHONE ENCOUNTER
Dr Magallanes office called in regards to pre op clearance forms faxed on 11/12.  Unable to find in chart.  Office will refax forms today as well.  They would also like last office visit notes faxed to office.  Please fax to [phone].  Call [phone] if any questions.

## 2024-11-22 ENCOUNTER — TELEPHONE (OUTPATIENT)
Age: 69
End: 2024-11-22

## 2024-11-22 NOTE — TELEPHONE ENCOUNTER
Patient called in regards to upcoming surgery 12/2.  She wants to know what medications she should stop prior to surgery.  Please call patient.

## 2024-11-25 NOTE — TELEPHONE ENCOUNTER
. My signature below certifies that the above stated patient is homebound and upon completion of the Face-To-Face encounter, has the need for intermittent skilled nursing, physical therapy and/or speech or occupational therapy services in their home for their current diagnosis as outlined in their initial plan of care. These services will continue to be monitored by myself or another physician.

## 2024-11-25 NOTE — TELEPHONE ENCOUNTER
Pt called in again. She needs to know what medications she needs to stop prior to her surgery on Monday please advise.

## 2024-12-12 ENCOUNTER — TELEPHONE (OUTPATIENT)
Dept: OBGYN CLINIC | Facility: CLINIC | Age: 69
End: 2024-12-12

## 2024-12-18 DIAGNOSIS — E11.9 TYPE 2 DIABETES MELLITUS WITHOUT COMPLICATION, WITHOUT LONG-TERM CURRENT USE OF INSULIN (HCC): ICD-10-CM

## 2024-12-18 DIAGNOSIS — R80.9 MICROALBUMINURIA DUE TO TYPE 2 DIABETES MELLITUS  (HCC): ICD-10-CM

## 2024-12-18 DIAGNOSIS — E11.29 MICROALBUMINURIA DUE TO TYPE 2 DIABETES MELLITUS  (HCC): ICD-10-CM

## 2024-12-18 DIAGNOSIS — I10 PRIMARY HYPERTENSION: ICD-10-CM

## 2024-12-19 RX ORDER — LISINOPRIL 30 MG/1
30 TABLET ORAL DAILY
Qty: 90 TABLET | Refills: 1 | Status: SHIPPED | OUTPATIENT
Start: 2024-12-19

## 2025-01-03 ENCOUNTER — APPOINTMENT (OUTPATIENT)
Dept: LAB | Facility: CLINIC | Age: 70
End: 2025-01-03
Payer: MEDICARE

## 2025-01-03 DIAGNOSIS — E78.2 MIXED HYPERLIPIDEMIA: ICD-10-CM

## 2025-01-03 DIAGNOSIS — I10 PRIMARY HYPERTENSION: ICD-10-CM

## 2025-01-03 DIAGNOSIS — E11.9 TYPE 2 DIABETES MELLITUS WITHOUT COMPLICATION, WITHOUT LONG-TERM CURRENT USE OF INSULIN (HCC): ICD-10-CM

## 2025-01-03 LAB
ALBUMIN SERPL BCG-MCNC: 4 G/DL (ref 3.5–5)
ALP SERPL-CCNC: 75 U/L (ref 34–104)
ALT SERPL W P-5'-P-CCNC: 20 U/L (ref 7–52)
ANION GAP SERPL CALCULATED.3IONS-SCNC: 8 MMOL/L (ref 4–13)
AST SERPL W P-5'-P-CCNC: 21 U/L (ref 13–39)
BILIRUB SERPL-MCNC: 0.32 MG/DL (ref 0.2–1)
BUN SERPL-MCNC: 19 MG/DL (ref 5–25)
CALCIUM SERPL-MCNC: 9.3 MG/DL (ref 8.4–10.2)
CHLORIDE SERPL-SCNC: 105 MMOL/L (ref 96–108)
CHOLEST SERPL-MCNC: 272 MG/DL (ref ?–200)
CO2 SERPL-SCNC: 30 MMOL/L (ref 21–32)
CREAT SERPL-MCNC: 0.65 MG/DL (ref 0.6–1.3)
CREAT UR-MCNC: 101.5 MG/DL
EST. AVERAGE GLUCOSE BLD GHB EST-MCNC: 146 MG/DL
GFR SERPL CREATININE-BSD FRML MDRD: 89 ML/MIN/1.73SQ M
GLUCOSE P FAST SERPL-MCNC: 116 MG/DL (ref 65–99)
HBA1C MFR BLD: 6.7 %
HDLC SERPL-MCNC: 57 MG/DL
LDLC SERPL CALC-MCNC: 188 MG/DL (ref 0–100)
MICROALBUMIN UR-MCNC: 145.8 MG/L
MICROALBUMIN/CREAT 24H UR: 144 MG/G CREATININE (ref 0–30)
POTASSIUM SERPL-SCNC: 4.9 MMOL/L (ref 3.5–5.3)
PROT SERPL-MCNC: 7.2 G/DL (ref 6.4–8.4)
SODIUM SERPL-SCNC: 143 MMOL/L (ref 135–147)
TRIGL SERPL-MCNC: 135 MG/DL (ref ?–150)

## 2025-01-03 PROCEDURE — 82043 UR ALBUMIN QUANTITATIVE: CPT

## 2025-01-03 PROCEDURE — 36415 COLL VENOUS BLD VENIPUNCTURE: CPT

## 2025-01-03 PROCEDURE — 80061 LIPID PANEL: CPT

## 2025-01-03 PROCEDURE — 80053 COMPREHEN METABOLIC PANEL: CPT

## 2025-01-03 PROCEDURE — 84439 ASSAY OF FREE THYROXINE: CPT

## 2025-01-03 PROCEDURE — 82570 ASSAY OF URINE CREATININE: CPT

## 2025-01-03 PROCEDURE — 83036 HEMOGLOBIN GLYCOSYLATED A1C: CPT

## 2025-01-03 PROCEDURE — 84443 ASSAY THYROID STIM HORMONE: CPT

## 2025-01-04 LAB
T4 FREE SERPL-MCNC: 0.73 NG/DL (ref 0.61–1.12)
TSH SERPL DL<=0.05 MIU/L-ACNC: 7.57 UIU/ML (ref 0.45–4.5)

## 2025-01-06 ENCOUNTER — RESULTS FOLLOW-UP (OUTPATIENT)
Dept: FAMILY MEDICINE CLINIC | Facility: CLINIC | Age: 70
End: 2025-01-06

## 2025-01-07 NOTE — TELEPHONE ENCOUNTER
Patient called regarding lab work results. I informed her that Dr Yadav will discuss results at upcoming visit, she verbalized understanding, no further questions.

## 2025-01-13 ENCOUNTER — APPOINTMENT (OUTPATIENT)
Dept: RADIOLOGY | Facility: CLINIC | Age: 70
End: 2025-01-13
Payer: MEDICARE

## 2025-01-13 ENCOUNTER — HOSPITAL ENCOUNTER (OUTPATIENT)
Dept: BONE DENSITY | Facility: CLINIC | Age: 70
Discharge: HOME | End: 2025-01-13
Payer: MEDICARE

## 2025-01-13 VITALS — HEIGHT: 64 IN | BODY MASS INDEX: 40.97 KG/M2 | WEIGHT: 240 LBS

## 2025-01-13 DIAGNOSIS — M85.89 OTHER SPECIFIED DISORDERS OF BONE DENSITY AND STRUCTURE, MULTIPLE SITES: ICD-10-CM

## 2025-01-13 PROCEDURE — 77080 DXA BONE DENSITY AXIAL: CPT

## 2025-01-22 DIAGNOSIS — E03.9 HYPOTHYROIDISM, UNSPECIFIED TYPE: ICD-10-CM

## 2025-01-23 ENCOUNTER — TELEPHONE (OUTPATIENT)
Age: 70
End: 2025-01-23

## 2025-01-23 RX ORDER — LEVOTHYROXINE SODIUM 50 UG/1
100 TABLET ORAL DAILY
Qty: 180 TABLET | Refills: 1 | Status: SHIPPED | OUTPATIENT
Start: 2025-01-23

## 2025-01-23 NOTE — TELEPHONE ENCOUNTER
Pt received text that she is due for another test. Pt is not sure what that could be. Please advise

## 2025-01-28 RX ORDER — NITROFURANTOIN MACROCRYSTALS 100 MG/1
CAPSULE ORAL
COMMUNITY
Start: 2024-11-04 | End: 2025-01-29

## 2025-01-28 RX ORDER — AMOXICILLIN 500 MG/1
CAPSULE ORAL
COMMUNITY
Start: 2024-11-12 | End: 2025-01-29

## 2025-01-29 ENCOUNTER — CONSULT (OUTPATIENT)
Dept: FAMILY MEDICINE CLINIC | Facility: CLINIC | Age: 70
End: 2025-01-29
Payer: MEDICARE

## 2025-01-29 VITALS
BODY MASS INDEX: 41.83 KG/M2 | SYSTOLIC BLOOD PRESSURE: 132 MMHG | RESPIRATION RATE: 16 BRPM | DIASTOLIC BLOOD PRESSURE: 76 MMHG | TEMPERATURE: 97.7 F | OXYGEN SATURATION: 97 % | WEIGHT: 245 LBS | HEIGHT: 64 IN | HEART RATE: 73 BPM

## 2025-01-29 DIAGNOSIS — E11.29 MICROALBUMINURIA DUE TO TYPE 2 DIABETES MELLITUS  (HCC): ICD-10-CM

## 2025-01-29 DIAGNOSIS — I10 PRIMARY HYPERTENSION: ICD-10-CM

## 2025-01-29 DIAGNOSIS — R80.9 MICROALBUMINURIA DUE TO TYPE 2 DIABETES MELLITUS  (HCC): ICD-10-CM

## 2025-01-29 DIAGNOSIS — E03.9 HYPOTHYROIDISM, UNSPECIFIED TYPE: ICD-10-CM

## 2025-01-29 DIAGNOSIS — E78.2 MIXED HYPERLIPIDEMIA: ICD-10-CM

## 2025-01-29 DIAGNOSIS — K86.1 CHRONIC PANCREATITIS, UNSPECIFIED PANCREATITIS TYPE (HCC): ICD-10-CM

## 2025-01-29 DIAGNOSIS — M17.11 PRIMARY OSTEOARTHRITIS OF RIGHT KNEE: ICD-10-CM

## 2025-01-29 DIAGNOSIS — E55.9 VITAMIN D DEFICIENCY: ICD-10-CM

## 2025-01-29 DIAGNOSIS — E11.9 TYPE 2 DIABETES MELLITUS WITHOUT COMPLICATION, WITHOUT LONG-TERM CURRENT USE OF INSULIN (HCC): ICD-10-CM

## 2025-01-29 DIAGNOSIS — E66.01 MORBID OBESITY (HCC): ICD-10-CM

## 2025-01-29 DIAGNOSIS — Z01.818 PREOP EXAMINATION: Primary | ICD-10-CM

## 2025-01-29 PROBLEM — R10.9 ABDOMINAL PAIN: Status: RESOLVED | Noted: 2024-04-19 | Resolved: 2025-01-29

## 2025-01-29 PROCEDURE — G2211 COMPLEX E/M VISIT ADD ON: HCPCS | Performed by: FAMILY MEDICINE

## 2025-01-29 PROCEDURE — 99214 OFFICE O/P EST MOD 30 MIN: CPT | Performed by: FAMILY MEDICINE

## 2025-01-29 NOTE — PROGRESS NOTES
Pre-operative Clearance  Name: Vee Baldwin      : 1955      MRN: [id number]  Encounter Provider: Judy Yadav DO  Encounter Date: 2025   Encounter department: [facility]    Assessment & Plan  Preop examination  The patient had a normal exam today in the office.  We reviewed all of her preoperative testing including her lab work, EKG, chest x-ray and everything was within normal limits.  She is medically cleared for her scheduled surgery.  The patient was instructed to stop her aspirin for 1 week prior to the surgery.       Primary osteoarthritis of right knee  The patient will only take Tylenol as needed for pain up until her surgery.  She will follow-up with her surgery as scheduled.       Primary hypertension  The patient's blood pressure stable on her current medication we have made no changes today.       Type 2 diabetes mellitus without complication, without long-term current use of insulin (HCC)    Lab Results   Component Value Date    HGBA1C 6.7 (H) 2025   The patient's hemoglobin A1c was at 6.7 demonstrating very good control of her diabetes.  We have made no changes today.  We will see her back as scheduled.       Chronic pancreatitis, unspecified pancreatitis type (HCC)  The patient has had no further abdominal pain.  She is currently stable.       Morbid obesity (HCC)  The patient is encouraged to continue with her healthy diet.       Hypothyroidism, unspecified type  The patient is stable in the current dose of her levothyroxine.  She will take it as scheduled after her surgery.       Mixed hyperlipidemia  The patient continues to be stable on her Zetia.  We have made no changes today.       Vitamin D deficiency  The patient will temporarily place her vitamin D on hold until after surgery.       Microalbuminuria due to type 2 diabetes mellitus  (HCC)    Lab Results   Component Value Date    HGBA1C 6.7 (H) 2025          Pre-operative  Clearance:     Revised Cardiac Risk Index:  RCI RISK CLASS I (0 risk factors, risk of major cardiac complications approximately 0.5%)    Clearance:  Patient is medically optimized (CLEARED) for proposed surgery without any additional cardiac testing.       We reviewed the patient's preoperative testing.  The patient's EKG was normal sinus rhythm and there were no acute changes when compared with her previous EKG performed November 11, 2024.  The patient's chest x-ray performed on January 21, 2025 is normal.  We also reviewed her lab work including her CMP, CBC, PT/PTT, and UA performed on January 21, 2025 and everything was within normal range.  The patient was instructed to stop her aspirin 1 week prior to the surgery.  She was instructed to continue with her lisinopril up until the day before surgery and not to take any the day of the surgery.  She was advised to do the same with the metformin.  She will continue her other medications as scheduled.  She was advised to stop all of her vitamins and supplements for 1 week prior to the surgery.    Medication Instructions:   - Avoid herbs or non-directed vitamins one week prior to surgery    - Avoid aspirin containing medications or non-steroidal anti-inflammatory drugs one week preceding surgery    - May take tylenol for pain up until the night before surgery    - ACE Inhibitors or ARBs: Lisinopril- Continue this medication up to the evening before surgery/procedure, but do not take the morning of the day of surgery.  - Hyperlipidemia meds: Continue to take this medication on your normal schedule.  - Thyroid meds: Continue to take this medication on your normal schedule.      Medicine Instructions for Adults with Diabetes (NO Bowel Prep)    Follow these instructions when a BOWEL PREP is NOT required for your procedure or surgery!    NOTE:  GLP Agonists taken weekly: do not take in the 7 days before your procedure. **Bariatric surgery: do not take 4 weeks prior to your  procedure.    SGLT-2 Inhibitors: do not take in the 4 days before your procedure    On the Day Before Surgery/Procedure  If you are having a procedure (e.g., Colonoscopy) or surgery which DOES NOT require a bowel prep, follow the directions below based on the type of medicine you take for your diabetes.  Type of Medicine You Take Examples What to Do   Pre-Mixed Insulin Intermediate  Ejrncdv10/25, Lifaqjh87/30, Novolog 70/30, Regular Insulin Take 1/2 your regular dose the evening before our procedure.   Rapid/Fast Acting  Insulin and/or Long-Acting Insulin Humalog U200, NovoLog, Apidra,  Lantus, Levemir, Tresiba, Toujeo,  Fias, Basaglar Take your FULL regular dose the day before procedure.   Oral Diabetic Medicines (sulfonylurea) Glipizide/Glimepiride/  Glucotrol Take your regular dose with dinner the evening before your procedure.   Other Oral Diabetic Medicines Metformin, Glucophage, Glucophage  XR, Riomet, Glumetza, Actose,  Avandia, Gl set, Prandin Take your regular dose with dinner the evening before your procedure   GLP Agonists Adlyxin, Byetta, Bydureon,  Ozempic, Soliqua, Tanzeum,  Trulicity, Victoza, Saxenda,  Rybelsus, Wegovy, Mounjaro, Zepbound If taken daily, take as normal  If taken weekly, do not take this medicine for 7 days before your procedure including the day of the procedure (resume taking after the procedure). **Bariatric surgery: do not take 4 weeks prior to procedure   SGLT-2 Inhibitors Jardiance, Invokana, Farxiga, Steglatro, Brenzavvy, Qtern, Segluromet Glyxambi, Synjardy, Synjardy XR, Invokamet, InvokametXR, Trijary XR, Xigduo X Do not take for 4 days before your procedure including the day of the procedure (resume taking after the procedure)   This educational material has been approved by the Patient Education Advisory Committee.    On the Day of Surgery/Procedure  Follow the directions below based on the type of medicine you take for your diabetes.  Type of Medicine You Take  Examples  What to Do   Long-Acting Insulin Lantus, Levemir, Tresiba,  Toujeo, Basaglar, Semglee If you normally take your Long Acting Insulin in the morning, take the full dose as scheduled.   GLP-I Agonists Adlyxin, Byetta, Bydureon,  Ozempic, Soliqua, Tanzeum,  Trulicity, Victoza, Saxenda,  Rybelsus, Mounjaro Do NOT take this medicine on the day of your procedure (resume taking after the procedure)   Except for the morning Long-Acting Insulin, DO NOT take ANY diabetic medicine on the day of your procedure unless you were instructed by the doctor who manages your diabetes medicines.  Continue to check your blood sugars.  If you have an insulin pump, ask your endocrinologist for instructions at least 3 days before your procedure. NOTE: If you are not able to ask your endocrinologist in advance, on the day of the procedure set your insulin pump to your basal rate only. Bring your insulin pump supplies to the hospital.        Depression Screening and Follow-up Plan: Patient was screened for depression during today's encounter. They screened negative with a PHQ-2 score of 0.    Urinary Incontinence Plan of Care: counseling topics discussed: practice Kegel (pelvic floor strengthening) exercises, limit alcohol, caffeine, spicy foods, and acidic foods and limiting fluid intake 3-4 hours before bed.       Chief Complaint   Patient presents with   • Follow-up     Pre-op 2/12  [place] , right knee replacement       History of Present Illness     Vee Baldwin is a 69 y.o. female who presents today for preop physical for a right total knee replacement to be performed at [facility] by Dr. Jericho Mayen on 2/12/2025.        Pre-op Exam  Surgery: Right total knee arthroplasty  Anticipated Date of Surgery: 2/12/2025  Surgeon: Dr. Jericho Mayen    The patient presents today for a preoperative physical for her upcoming right total knee replacement with Andi robotic assist to be performed on February 12, 2025.  She is feeling well  overall and has no concerns today.  She did have all of her preoperative testing performed.  She has already stopped her aspirin and her vitamins.  She has no complaints today.  The patient denies any chest pain, shortness of breath, or palpitations.  There is no ROBLEDO, PND, or orthopnea.  There is no edema.  There are no headaches or visual changes.  There is no lightheadedness, dizziness, or fainting spells.  There are no fevers or chills or cold symptoms.    The patient currently denies any nausea, vomiting, or GERD symptoms.  she has normal bowel movements and normal urine output.  she has a normal appetite.  There are no problems with her bowels.    She had a remote history of a blood clot after and injury, but no issues since then.    Previous history of bleeding disorders or clots?: Yes  Previous Anesthesia reaction?: No  Prolonged steroid use in the last 6 months?: No    Assessment of Cardiac Risk:   - Unstable or severe angina or MI in the last 6 weeks or history of stent placement in the last year?: No   - Decompensated heart failure (e.g. New onset heart failure, NYHA  Class IV heart failure, or worsening existing heart failure)?: No  - Significant arrhythmias such as high grade AV block, symptomatic ventricular arrhythmia, newly recognized ventricular tachycardia, supraventricular tachycardia with resting heart rate >100, or symptomatic bradycardia?: No  - Severe heart valve disease including aortic stenosis or symptomatic mitral stenosis?: No      Pre-operative Risk Factors:  Elevated-risk surgery: No    History of cerebrovascular disease: No    History of ischemic heart disease: No  Pre-operative treatment with insulin: No  Pre-operative creatinine >2 mg/dL: No    History of congestive heart failure: No    Duke Activity Status Index (DASI):   DASI Total Score: 44.95  METs: 8.3    Medications of Perioperative Concern:   Anti-platelet (aspirin, clopidogrel, ticagrelor, prasugrel, cilostazol, dipyridamole),  ACE inhibitors/ARBs and Metformin    Review of Systems   Constitutional: Negative.    HENT: Negative.     Eyes: Negative.    Respiratory: Negative.     Cardiovascular: Negative.    Gastrointestinal: Negative.    Endocrine: Negative.    Genitourinary: Negative.    Musculoskeletal: Negative.    Skin: Negative.    Allergic/Immunologic: Negative.    Neurological: Negative.    Hematological: Negative.    Psychiatric/Behavioral: Negative.       Past Medical History   Past Medical History:   Diagnosis Date   • Abscess of neck 03/02/2020   • Alcohol induced acute pancreatitis 08/25/2018   • COVID-19 04/19/2024   • Disease of thyroid gland    • GERD (gastroesophageal reflux disease)    • HL (hearing loss)    • Hyperlipidemia    • Polyp of sigmoid colon     last assessed 6/12/12   • Sleep disturbances     last assessed 6/5/14     Past Surgical History:   Procedure Laterality Date   • BREAST CYST ASPIRATION Right     20 yrs ago in dr office net results   • COLONOSCOPY  06/18/2011    Complete.   Repeat in 5yrs     • IR IMAGE GUIDED ASPIRATION / DRAINAGE  3/3/2020   • NASAL FRACTURE SURGERY       Family History   Problem Relation Age of Onset   • Alzheimer's disease Mother    • Diabetes Mother    • No Known Problems Father    • No Known Problems Maternal Grandmother    • No Known Problems Maternal Grandfather    • No Known Problems Paternal Grandmother    • No Known Problems Paternal Grandfather    • No Known Problems Brother    • No Known Problems Maternal Aunt    • No Known Problems Maternal Aunt    • No Known Problems Maternal Aunt    • No Known Problems Maternal Aunt    • No Known Problems Maternal Aunt    • No Known Problems Paternal Aunt    • No Known Problems Paternal Aunt    • No Known Problems Paternal Aunt      Social History     Tobacco Use   • Smoking status: Never     Passive exposure: Past   • Smokeless tobacco: Never   Vaping Use   • Vaping status: Never Used   Substance and Sexual Activity   • Alcohol use: Not  Currently   • Drug use: No   • Sexual activity: Not Currently     Current Outpatient Medications on File Prior to Visit   Medication Sig   • Cholecalciferol (Vitamin D3) 25 MCG (1000 UT) CAPS Take 1 capsule (1,000 Units total) by mouth daily (Patient taking differently: Take 1 capsule by mouth daily As of 1/29 stopped because of knee surgery)   • ergocalciferol (VITAMIN D2) 50,000 units Take 1 capsule (50,000 Units total) by mouth once a week for 12 doses Then transition to vitamin D 2000 units daily for maintenance. (Patient taking differently: Take 50,000 Units by mouth once a week Then transition to vitamin D 2000 units daily for maintenance.    As of 1/29 stopped because of knee surgery)   • ezetimibe (ZETIA) 10 mg tablet Take 1 tablet by mouth once daily   • levothyroxine 50 mcg tablet TAKE 2 TABLETS BY MOUTH ONCE DAILY   • lisinopril (ZESTRIL) 30 mg tablet Take 1 tablet by mouth once daily   • meloxicam (MOBIC) 7.5 mg tablet Take 7.5 mg by mouth daily   • metFORMIN (GLUCOPHAGE-XR) 500 mg 24 hr tablet TAKE 1 TABLET BY MOUTH ONCE DAILY WITH SUPPER   • multivitamin (THERAGRAN) TABS Take 1 tablet by mouth (Patient taking differently: Take 1 tablet by mouth As of 1/29 stopped because of  knee surgery)   • Omega-3 Fatty Acids (FISH OIL PO) Take 2 g by mouth (Patient taking differently: Take 2 g by mouth As of 1/29 stopped because of knee surgery)   • pantoprazole (PROTONIX) 40 mg tablet Take 40 mg by mouth daily   • [DISCONTINUED] aspirin 81 MG tablet Take 1 tablet by mouth daily (Patient taking differently: Take 1 tablet by mouth daily Stopped as of 1/29/25 because knee surgery)   • [DISCONTINUED] albuterol (PROVENTIL HFA,VENTOLIN HFA) 90 mcg/act inhaler Inhale 2 puffs every 6 (six) hours as needed (Patient not taking: Reported on 1/29/2025)   • [DISCONTINUED] amoxicillin (AMOXIL) 500 mg capsule take 1 capsule by mouth every 8 hours until gone (Patient not taking: Reported on 1/29/2025)   • [DISCONTINUED] estradiol  "(ESTRACE) 0.1 mg/g vaginal cream USE 1 GRAM VAGINALLY TWICE A WEEK (Patient not taking: Reported on 1/29/2025)   • [DISCONTINUED] nitrofurantoin (MACRODANTIN) 100 mg capsule TAKE 1 CAPSULE BY MOUTH TWICE DAILY FOR 5 DAYS. MUST TAKE WITH A MEAL/FOOD. START 2 DAYS PRIOR TO SURGERY (Patient not taking: Reported on 1/29/2025)   • [DISCONTINUED] Zenpep 41335-719674 units CPEP Take 1 capsule by mouth as needed (Patient not taking: Reported on 1/29/2025)     Allergies   Allergen Reactions   • Statins Myalgia     Objective   /76 (BP Location: Left arm, Patient Position: Sitting, Cuff Size: Large)   Pulse 73   Temp 97.7 °F (36.5 °C) (Tympanic)   Resp 16   Ht 5' 3.5\" (1.613 m)   Wt 111 kg (245 lb)   LMP  (LMP Unknown)   SpO2 97%   BMI 42.72 kg/m²     Physical Exam  Constitutional:       General: She is not in acute distress.     Appearance: Normal appearance. She is well-developed. She is not diaphoretic.   HENT:      Head: Normocephalic and atraumatic.      Right Ear: Tympanic membrane, ear canal and external ear normal.      Left Ear: Tympanic membrane, ear canal and external ear normal.      Nose: Nose normal.      Mouth/Throat:      Mouth: Mucous membranes are moist.      Pharynx: Oropharynx is clear. No oropharyngeal exudate.   Eyes:      General: No scleral icterus.        Right eye: No discharge.         Left eye: No discharge.      Extraocular Movements: Extraocular movements intact.      Pupils: Pupils are equal, round, and reactive to light.   Neck:      Thyroid: No thyromegaly.      Vascular: No JVD.      Trachea: No tracheal deviation.   Cardiovascular:      Rate and Rhythm: Normal rate and regular rhythm.      Heart sounds: Normal heart sounds. No murmur heard.     No friction rub. No gallop.   Pulmonary:      Effort: Pulmonary effort is normal. No respiratory distress.      Breath sounds: Normal breath sounds. No wheezing or rales.   Chest:      Chest wall: No tenderness.   Abdominal:      General: " Bowel sounds are normal. There is no distension.      Palpations: Abdomen is soft. There is no mass.      Tenderness: There is no abdominal tenderness. There is no guarding or rebound.      Hernia: No hernia is present.   Musculoskeletal:         General: No tenderness or deformity. Normal range of motion.      Cervical back: Normal range of motion and neck supple.   Lymphadenopathy:      Cervical: No cervical adenopathy.   Skin:     General: Skin is warm and dry.      Coloration: Skin is not pale.      Findings: No erythema or rash.   Neurological:      Mental Status: She is alert and oriented to person, place, and time.      Cranial Nerves: No cranial nerve deficit.      Sensory: No sensory deficit.      Motor: No abnormal muscle tone.      Coordination: Coordination normal.      Deep Tendon Reflexes: Reflexes normal.   Psychiatric:         Mood and Affect: Mood normal.         Behavior: Behavior normal.         Thought Content: Thought content normal.         Judgment: Judgment normal.       Administrative Statements   I have spent a total time of 20 minutes in caring for this patient on the day of the visit/encounter including Prognosis, Risks and benefits of tx options, Instructions for management, Patient and family education, Importance of tx compliance, Risk factor reductions, Impressions, Counseling / Coordination of care, Documenting in the medical record, Reviewing / ordering tests, medicine, procedures  , and Obtaining or reviewing history  .     Judy Yadav DO

## 2025-01-29 NOTE — PATIENT INSTRUCTIONS
Stop all vitamins and supplements and aspirin and the Meloxicam now and do not start until after your surgery per the surgeon.  Only take Tylenol for pain.    Take the Lisinopril up until the day before surgery and hold the day of surgery- same with the Metformin.  The other medications you take on the regular schedule.

## 2025-01-29 NOTE — ASSESSMENT & PLAN NOTE
Lab Results   Component Value Date    HGBA1C 6.7 (H) 01/28/2025   The patient's hemoglobin A1c was at 6.7 demonstrating very good control of her diabetes.  We have made no changes today.  We will see her back as scheduled.

## 2025-01-29 NOTE — ASSESSMENT & PLAN NOTE
The patient is stable in the current dose of her levothyroxine.  She will take it as scheduled after her surgery.

## 2025-01-29 NOTE — ASSESSMENT & PLAN NOTE
The patient will only take Tylenol as needed for pain up until her surgery.  She will follow-up with her surgery as scheduled.

## 2025-02-08 DIAGNOSIS — I10 PRIMARY HYPERTENSION: ICD-10-CM

## 2025-02-08 DIAGNOSIS — E78.2 MIXED HYPERLIPIDEMIA: ICD-10-CM

## 2025-02-08 DIAGNOSIS — E11.9 TYPE 2 DIABETES MELLITUS WITHOUT COMPLICATION, WITHOUT LONG-TERM CURRENT USE OF INSULIN (HCC): ICD-10-CM

## 2025-02-08 NOTE — TELEPHONE ENCOUNTER
Medication Refill Request       Medication: metFORMIN (GLUCOPHAGE-XR) 500 mg 24 hr tablet    Dose/Frequency: As Directed     Quantity: 90 tablet     Pharmacy: [facility]    Office:   [x] PCP/Provider -   [] Specialty/Provider -     Does the patient have enough for 3 days?   [x] Yes   [] No - Send as HP to POD    Is the patient completely out of the medication or does not have enough until the next business day?  [] Yes - send to [facility] Hub  [x] No - Send as HP to POD

## 2025-02-10 RX ORDER — METFORMIN HYDROCHLORIDE 500 MG/1
500 TABLET, EXTENDED RELEASE ORAL
Qty: 90 TABLET | Refills: 1 | Status: SHIPPED | OUTPATIENT
Start: 2025-02-10

## 2025-02-13 ENCOUNTER — TELEPHONE (OUTPATIENT)
Age: 70
End: 2025-02-13

## 2025-02-13 NOTE — TELEPHONE ENCOUNTER
Patient was returning call that she missed today, patient is currently in the hospital having her surgery. We were not able to see any notes on who called or why and patient reported no VM was left.   Making note for documentation purposes only.

## 2025-02-13 NOTE — TELEPHONE ENCOUNTER
Patient called and stated that the paper she brought in for the Dr to fill out can be discarded because she's in the hospital.

## 2025-04-28 DIAGNOSIS — I10 PRIMARY HYPERTENSION: ICD-10-CM

## 2025-04-28 DIAGNOSIS — R80.9 MICROALBUMINURIA DUE TO TYPE 2 DIABETES MELLITUS  (HCC): ICD-10-CM

## 2025-04-28 DIAGNOSIS — E11.9 TYPE 2 DIABETES MELLITUS WITHOUT COMPLICATION, WITHOUT LONG-TERM CURRENT USE OF INSULIN (HCC): ICD-10-CM

## 2025-04-28 DIAGNOSIS — E11.29 MICROALBUMINURIA DUE TO TYPE 2 DIABETES MELLITUS  (HCC): ICD-10-CM

## 2025-04-29 RX ORDER — LISINOPRIL 30 MG/1
30 TABLET ORAL DAILY
Qty: 90 TABLET | Refills: 1 | Status: SHIPPED | OUTPATIENT
Start: 2025-04-29

## 2025-05-14 NOTE — PROGRESS NOTES
"Patient ID: Vee Baldwin is a 69 y.o. female Date of Birth 1955       Chief Complaint   Patient presents with    DFC             Diagnosis:  1. Type 2 diabetes mellitus without complication, without long-term current use of insulin ([facility])  2. Onychomycosis       Annual  Diabetic Foot exam with socks and shoes removed bilaterally.  High risk  foot precautions reviewed with patient including the need to wear protective shoegear at all times when walking including in the home, the need to check feet all surfaces daily with a mirror and report and skin breaks to podiatry, the need to apply an emollient to skin of feet daily.  We discussed proper glycemic control and the risk of pedal complications with hyperglycemia.   Toenails were manually and debrided x 10 without incident.  Advise patient to trim her nails or use an emery board to file them for length and thickness between visit.  She understands and agrees with the plan, follow up 6 months.      Subjective:   5/15/25  -  Vee presents today for her biannual diabetic foot examination, her most recent HbA1c was 6.7 on 1/28/2025, most recent visit with PCP was on 10/2/2024.  She states she is doing better since she had right TKR.          The following portions of the patient's history were reviewed and updated as appropriate: allergies, current medications, past family history, past medical history, past social history, past surgical history, and problem list.        Objective:  Ht 5' 3\" (1.6 m) Comment: verbal  Wt 108 kg (237 lb)   LMP  (LMP Unknown)   BMI 41.98 kg/m²     Review of Systems   Constitutional:  Negative for chills and fever.   HENT:  Negative for ear pain and sore throat.    Eyes:  Negative for pain and visual disturbance.   Respiratory:  Negative for cough and shortness of breath.    Cardiovascular:  Negative for chest pain and palpitations.   Gastrointestinal:  Negative for abdominal pain and vomiting.   Genitourinary:  Negative for " dysuria and hematuria.   Musculoskeletal:  Negative for arthralgias and back pain.   Skin:  Negative for color change and rash.        Elongated toe nails   Neurological:  Negative for seizures and syncope.   All other systems reviewed and are negative.      Physical Exam  Constitutional:       Appearance: Normal appearance. She is well-developed. She is obese.   HENT:      Head: Normocephalic and atraumatic.      Nose: Nose normal.      Mouth/Throat:      Mouth: Mucous membranes are moist.      Pharynx: Oropharynx is clear.     Eyes:      Conjunctiva/sclera: Conjunctivae normal.      Pupils: Pupils are equal, round, and reactive to light.       Cardiovascular:      Pulses: no weak pulses.           Dorsalis pedis pulses are 2+ on the right side and 2+ on the left side.        Posterior tibial pulses are 2+ on the right side and 2+ on the left side.   Pulmonary:      Effort: Pulmonary effort is normal.     Musculoskeletal:         General: Normal range of motion.      Cervical back: Normal range of motion.      Right lower leg: No edema.      Left lower leg: No edema.   Feet:      Right foot:      Protective Sensation: 10 sites tested.  10 sites sensed.      Skin integrity: Skin integrity normal. No ulcer, skin breakdown, erythema, warmth, callus or dry skin.      Toenail Condition: Right toenails are abnormally thick and long.      Left foot:      Protective Sensation: 10 sites tested.  10 sites sensed.      Skin integrity: Skin integrity normal. No ulcer, skin breakdown, erythema, warmth, callus or dry skin.      Toenail Condition: Left toenails are long.     Skin:     General: Skin is warm and dry.      Capillary Refill: Capillary refill takes less than 2 seconds.     Neurological:      General: No focal deficit present.      Mental Status: She is alert and oriented to person, place, and time. Mental status is at baseline.     Psychiatric:         Mood and Affect: Mood normal.         Behavior: Behavior normal.    "      Thought Content: Thought content normal.         Judgment: Judgment normal.          Diabetic Foot Exam    Patient's shoes and socks removed.    Right Foot/Ankle   Right Foot Inspection  Skin Exam: skin normal and skin intact. No dry skin, no warmth, no callus, no erythema, no maceration, no abnormal color, no pre-ulcer, no ulcer and no callus.     Toe Exam: ROM and strength within normal limits.     Sensory   Vibration: diminished  Proprioception: intact  Monofilament testing: intact    Vascular  Capillary refills: < 3 seconds  The right DP pulse is 2+. The right PT pulse is 2+.     Left Foot/Ankle  Left Foot Inspection  Skin Exam: skin normal and skin intact. No dry skin, no warmth, no erythema, no maceration, normal color, no pre-ulcer, no ulcer and no callus.     Toe Exam: ROM and strength within normal limits.     Sensory   Vibration: diminished  Proprioception: intact  Monofilament testing: intact    Vascular  Capillary refills: < 3 seconds  The left DP pulse is 2+. The left PT pulse is 2+.     Assign Risk Category  No deformity present  No loss of protective sensation  No weak pulses  Risk: 0      No pertinent results found.      Michelle Graham, DPM, DPM, FACFAS    Portions of the record may have been created with voice recognition software. Occasional wrong word or \"sound a like\" substitutions may have occurred due to the inherent limitations of voice recognition software. Read the chart carefully and recognize, using context, where substitutions have occurred.  "

## 2025-05-15 ENCOUNTER — PROCEDURE VISIT (OUTPATIENT)
Dept: PODIATRY | Facility: CLINIC | Age: 70
End: 2025-05-15
Payer: MEDICARE

## 2025-05-15 VITALS — WEIGHT: 237 LBS | BODY MASS INDEX: 41.99 KG/M2 | HEIGHT: 63 IN

## 2025-05-15 DIAGNOSIS — E11.9 TYPE 2 DIABETES MELLITUS WITHOUT COMPLICATION, WITHOUT LONG-TERM CURRENT USE OF INSULIN (HCC): Primary | ICD-10-CM

## 2025-05-15 DIAGNOSIS — B35.1 ONYCHOMYCOSIS: ICD-10-CM

## 2025-05-15 PROCEDURE — 11721 DEBRIDE NAIL 6 OR MORE: CPT | Performed by: PODIATRIST

## 2025-05-15 PROCEDURE — 99213 OFFICE O/P EST LOW 20 MIN: CPT | Performed by: PODIATRIST

## 2025-05-15 RX ORDER — HYDROCODONE BITARTRATE AND ACETAMINOPHEN 5; 325 MG/1; MG/1
TABLET ORAL
COMMUNITY
Start: 2025-05-06

## 2025-05-15 RX ORDER — DARIFENACIN 7.5 MG/1
1 TABLET, EXTENDED RELEASE ORAL DAILY
COMMUNITY
Start: 2025-04-25

## 2025-05-21 ENCOUNTER — RA CDI HCC (OUTPATIENT)
Dept: OTHER | Facility: HOSPITAL | Age: 70
End: 2025-05-21

## 2025-05-22 ENCOUNTER — OFFICE VISIT (OUTPATIENT)
Dept: FAMILY MEDICINE CLINIC | Facility: CLINIC | Age: 70
End: 2025-05-22

## 2025-05-22 VITALS
BODY MASS INDEX: 42.42 KG/M2 | TEMPERATURE: 97.4 F | HEIGHT: 63 IN | OXYGEN SATURATION: 99 % | HEART RATE: 68 BPM | WEIGHT: 239.4 LBS | SYSTOLIC BLOOD PRESSURE: 134 MMHG | DIASTOLIC BLOOD PRESSURE: 80 MMHG | RESPIRATION RATE: 16 BRPM

## 2025-05-22 DIAGNOSIS — R80.9 MICROALBUMINURIA DUE TO TYPE 2 DIABETES MELLITUS  (HCC): ICD-10-CM

## 2025-05-22 DIAGNOSIS — E11.29 MICROALBUMINURIA DUE TO TYPE 2 DIABETES MELLITUS  (HCC): ICD-10-CM

## 2025-05-22 DIAGNOSIS — G72.0 DRUG-INDUCED MYOPATHY: ICD-10-CM

## 2025-05-22 DIAGNOSIS — E78.2 MIXED HYPERLIPIDEMIA: ICD-10-CM

## 2025-05-22 DIAGNOSIS — E11.9 TYPE 2 DIABETES MELLITUS WITHOUT COMPLICATION, WITHOUT LONG-TERM CURRENT USE OF INSULIN (HCC): Primary | ICD-10-CM

## 2025-05-22 DIAGNOSIS — R26.89 BALANCE PROBLEMS: ICD-10-CM

## 2025-05-22 DIAGNOSIS — Z96.651 STATUS POST RIGHT KNEE REPLACEMENT: ICD-10-CM

## 2025-05-22 DIAGNOSIS — I10 PRIMARY HYPERTENSION: ICD-10-CM

## 2025-05-22 NOTE — ASSESSMENT & PLAN NOTE
Patient is due for diabetes lab work and will go for the testing as indicated.  We will follow-up with the results when available.  Lab Results   Component Value Date    HGBA1C 6.7 (H) 01/28/2025   The patient's hemoglobin A1c was at 6.7 demonstrating very good control of her diabetes.  We have made no changes today.  We will see her back as scheduled.    Lab Results   Component Value Date    HGBA1C 6.7 (H) 01/28/2025       Orders:  •  CBC and differential; Future  •  Comprehensive metabolic panel; Future  •  LDL cholesterol, direct; Future  •  Lipid panel; Future  •  TSH, 3rd generation with Free T4 reflex; Future  •  Albumin / creatinine urine ratio; Future  •  Hemoglobin A1C; Future

## 2025-05-22 NOTE — ASSESSMENT & PLAN NOTE
The patient's blood pressure stable on her current medication.  We have made no changes today.  Orders:  •  CBC and differential; Future  •  Comprehensive metabolic panel; Future  •  LDL cholesterol, direct; Future  •  Lipid panel; Future  •  TSH, 3rd generation with Free T4 reflex; Future  •  Albumin / creatinine urine ratio; Future  •  Hemoglobin A1C; Future

## 2025-05-22 NOTE — ASSESSMENT & PLAN NOTE
The patient will go for the up-to-date lab work as indicated.  She will continue with her current medications.  Lab Results   Component Value Date    HGBA1C 6.7 (H) 01/28/2025       Orders:  •  CBC and differential; Future  •  Comprehensive metabolic panel; Future  •  LDL cholesterol, direct; Future  •  Lipid panel; Future  •  TSH, 3rd generation with Free T4 reflex; Future  •  Albumin / creatinine urine ratio; Future  •  Hemoglobin A1C; Future

## 2025-05-22 NOTE — PROGRESS NOTES
Name: Vee Baldwin      : 1955      MRN: [id number]  Encounter Provider: Judy Yadav DO  Encounter Date: 2025   Encounter department: [facility]  :  Assessment & Plan  Type 2 diabetes mellitus without complication, without long-term current use of insulin (HCC)  Patient is due for diabetes lab work and will go for the testing as indicated.  We will follow-up with the results when available.  Lab Results   Component Value Date    HGBA1C 6.7 (H) 2025   The patient's hemoglobin A1c was at 6.7 demonstrating very good control of her diabetes.  We have made no changes today.  We will see her back as scheduled.    Lab Results   Component Value Date    HGBA1C 6.7 (H) 2025       Orders:  •  CBC and differential; Future  •  Comprehensive metabolic panel; Future  •  LDL cholesterol, direct; Future  •  Lipid panel; Future  •  TSH, 3rd generation with Free T4 reflex; Future  •  Albumin / creatinine urine ratio; Future  •  Hemoglobin A1C; Future    Microalbuminuria due to type 2 diabetes mellitus  (HCC)  The patient will go for the up-to-date lab work as indicated.  She will continue with her current medications.  Lab Results   Component Value Date    HGBA1C 6.7 (H) 2025       Orders:  •  CBC and differential; Future  •  Comprehensive metabolic panel; Future  •  LDL cholesterol, direct; Future  •  Lipid panel; Future  •  TSH, 3rd generation with Free T4 reflex; Future  •  Albumin / creatinine urine ratio; Future  •  Hemoglobin A1C; Future    Primary hypertension  The patient's blood pressure stable on her current medication.  We have made no changes today.  Orders:  •  CBC and differential; Future  •  Comprehensive metabolic panel; Future  •  LDL cholesterol, direct; Future  •  Lipid panel; Future  •  TSH, 3rd generation with Free T4 reflex; Future  •  Albumin / creatinine urine ratio; Future  •  Hemoglobin A1C; Future    Mixed hyperlipidemia  The patient  continues to be stable on her Zetia.  We have made no changes today.    Orders:  •  CBC and differential; Future  •  Comprehensive metabolic panel; Future  •  LDL cholesterol, direct; Future  •  Lipid panel; Future  •  TSH, 3rd generation with Free T4 reflex; Future  •  Albumin / creatinine urine ratio; Future  •  Hemoglobin A1C; Future     Status post right knee replacement  The patient is having some continued instability since her right knee replacement and wishes to continue with physical therapy.  We placed the order in the system today.  Orders:  •  Ambulatory Referral to Physical Therapy; Future    Balance problems    Orders:  •  Ambulatory Referral to Physical Therapy; Future    Drug-induced myopathy  Patient cannot tolerate statin therapy.  She will continue with the Zetia as prescribed.             Depression Screening and Follow-up Plan: Patient was screened for depression during today's encounter. They screened negative with a PHQ-2 score of 0.        Chief Complaint   Patient presents with   • Follow-up     Check up  diabetes       History of Present Illness   Vee Baldwin is a 69 y.o. female who presents today for follow-up of her type 2 diabetes, hypertension, and hyperlipidemia.  The patient denies any chest pain, shortness of breath, or palpitations.  There is no ROBLEDO, PND, or orthopnea.  There is no edema.  There are no headaches or visual changes.  There is no lightheadedness, dizziness, or fainting spells.  The patient currently denies any nausea, vomiting, or GERD symptoms.  she has normal bowel movements and normal urine output.  she has a normal appetite.      She needs an order for [facility] for physical therapy for her balance.    She is seeing GI on July 16,2025.      Diabetes  She presents for her follow-up diabetic visit. She has type 2 diabetes mellitus. Her disease course has been stable. Pertinent negatives for diabetes include no blurred vision, no chest pain, no fatigue, no  "foot paresthesias, no foot ulcerations, no polydipsia, no polyphagia, no polyuria, no visual change, no weakness and no weight loss. Symptoms are stable.     Review of Systems   Constitutional: Negative.  Negative for fatigue and weight loss.   HENT: Negative.     Eyes: Negative.  Negative for blurred vision.   Respiratory: Negative.     Cardiovascular: Negative.  Negative for chest pain.   Gastrointestinal: Negative.    Endocrine: Negative.  Negative for polydipsia, polyphagia and polyuria.   Genitourinary: Negative.    Musculoskeletal: Negative.    Skin: Negative.    Allergic/Immunologic: Negative.    Neurological: Negative.  Negative for weakness.   Hematological: Negative.    Psychiatric/Behavioral: Negative.         Objective   /80   Pulse 68   Temp (!) 97.4 °F (36.3 °C) (Tympanic)   Resp 16   Ht 5' 3\" (1.6 m)   Wt 109 kg (239 lb 6.4 oz)   LMP  (LMP Unknown)   SpO2 99%   BMI 42.41 kg/m²      Physical Exam  Constitutional:       General: She is not in acute distress.     Appearance: Normal appearance. She is well-developed. She is not diaphoretic.   HENT:      Head: Normocephalic and atraumatic.      Right Ear: Tympanic membrane, ear canal and external ear normal.      Left Ear: Tympanic membrane, ear canal and external ear normal.      Nose: Nose normal.      Mouth/Throat:      Mouth: Mucous membranes are moist.      Pharynx: Oropharynx is clear. No oropharyngeal exudate.     Eyes:      General: No scleral icterus.        Right eye: No discharge.         Left eye: No discharge.      Extraocular Movements: Extraocular movements intact.      Pupils: Pupils are equal, round, and reactive to light.     Neck:      Thyroid: No thyromegaly.      Vascular: No JVD.      Trachea: No tracheal deviation.     Cardiovascular:      Rate and Rhythm: Normal rate and regular rhythm.      Heart sounds: Normal heart sounds. No murmur heard.     No friction rub. No gallop.   Pulmonary:      Effort: Pulmonary effort is " normal. No respiratory distress.      Breath sounds: Normal breath sounds. No wheezing or rales.   Chest:      Chest wall: No tenderness.   Abdominal:      General: Bowel sounds are normal. There is no distension.      Palpations: Abdomen is soft. There is no mass.      Tenderness: There is no abdominal tenderness. There is no guarding or rebound.      Hernia: No hernia is present.     Musculoskeletal:         General: No tenderness or deformity. Normal range of motion.      Cervical back: Normal range of motion and neck supple.   Lymphadenopathy:      Cervical: No cervical adenopathy.     Skin:     General: Skin is warm and dry.      Coloration: Skin is not pale.      Findings: No erythema or rash.     Neurological:      Mental Status: She is alert and oriented to person, place, and time.      Cranial Nerves: No cranial nerve deficit.      Sensory: No sensory deficit.      Motor: No abnormal muscle tone.      Coordination: Coordination normal.      Deep Tendon Reflexes: Reflexes normal.     Psychiatric:         Mood and Affect: Mood normal.         Behavior: Behavior normal.         Thought Content: Thought content normal.         Judgment: Judgment normal.       Administrative Statements   I have spent a total time of 20 minutes in caring for this patient on the day of the visit/encounter including Prognosis, Risks and benefits of tx options, Instructions for management, Patient and family education, Importance of tx compliance, Risk factor reductions, Impressions, Counseling / Coordination of care, Documenting in the medical record, Reviewing/placing orders in the medical record (including tests, medications, and/or procedures), and Obtaining or reviewing history  .

## 2025-05-22 NOTE — ASSESSMENT & PLAN NOTE
The patient continues to be stable on her Zetia.  We have made no changes today.    Orders:  •  CBC and differential; Future  •  Comprehensive metabolic panel; Future  •  LDL cholesterol, direct; Future  •  Lipid panel; Future  •  TSH, 3rd generation with Free T4 reflex; Future  •  Albumin / creatinine urine ratio; Future  •  Hemoglobin A1C; Future

## 2025-06-02 ENCOUNTER — APPOINTMENT (OUTPATIENT)
Dept: LAB | Facility: CLINIC | Age: 70
End: 2025-06-02
Payer: MEDICARE

## 2025-06-02 DIAGNOSIS — R80.9 MICROALBUMINURIA DUE TO TYPE 2 DIABETES MELLITUS  (HCC): ICD-10-CM

## 2025-06-02 DIAGNOSIS — E11.29 MICROALBUMINURIA DUE TO TYPE 2 DIABETES MELLITUS  (HCC): ICD-10-CM

## 2025-06-02 DIAGNOSIS — E78.2 MIXED HYPERLIPIDEMIA: ICD-10-CM

## 2025-06-02 DIAGNOSIS — E11.9 TYPE 2 DIABETES MELLITUS WITHOUT COMPLICATION, WITHOUT LONG-TERM CURRENT USE OF INSULIN (HCC): ICD-10-CM

## 2025-06-02 DIAGNOSIS — I10 PRIMARY HYPERTENSION: ICD-10-CM

## 2025-06-02 LAB
ALBUMIN SERPL BCG-MCNC: 4.1 G/DL (ref 3.5–5)
ALP SERPL-CCNC: 89 U/L (ref 34–104)
ALT SERPL W P-5'-P-CCNC: 19 U/L (ref 7–52)
ANION GAP SERPL CALCULATED.3IONS-SCNC: 10 MMOL/L (ref 4–13)
AST SERPL W P-5'-P-CCNC: 22 U/L (ref 13–39)
BASOPHILS # BLD AUTO: 0.04 THOUSANDS/ÂΜL (ref 0–0.1)
BASOPHILS NFR BLD AUTO: 1 % (ref 0–1)
BILIRUB SERPL-MCNC: 0.38 MG/DL (ref 0.2–1)
BUN SERPL-MCNC: 15 MG/DL (ref 5–25)
CALCIUM SERPL-MCNC: 9.4 MG/DL (ref 8.4–10.2)
CHLORIDE SERPL-SCNC: 104 MMOL/L (ref 96–108)
CHOLEST SERPL-MCNC: 254 MG/DL (ref ?–200)
CO2 SERPL-SCNC: 27 MMOL/L (ref 21–32)
CREAT SERPL-MCNC: 0.54 MG/DL (ref 0.6–1.3)
CREAT UR-MCNC: 92.6 MG/DL
EOSINOPHIL # BLD AUTO: 0.18 THOUSAND/ÂΜL (ref 0–0.61)
EOSINOPHIL NFR BLD AUTO: 2 % (ref 0–6)
ERYTHROCYTE [DISTWIDTH] IN BLOOD BY AUTOMATED COUNT: 13.4 % (ref 11.6–15.1)
EST. AVERAGE GLUCOSE BLD GHB EST-MCNC: 140 MG/DL
GFR SERPL CREATININE-BSD FRML MDRD: 95 ML/MIN/1.73SQ M
GLUCOSE P FAST SERPL-MCNC: 113 MG/DL (ref 65–99)
HBA1C MFR BLD: 6.5 %
HCT VFR BLD AUTO: 42.4 % (ref 34.8–46.1)
HDLC SERPL-MCNC: 53 MG/DL
HGB BLD-MCNC: 13 G/DL (ref 11.5–15.4)
IMM GRANULOCYTES # BLD AUTO: 0.03 THOUSAND/UL (ref 0–0.2)
IMM GRANULOCYTES NFR BLD AUTO: 0 % (ref 0–2)
LDLC SERPL CALC-MCNC: 166 MG/DL (ref 0–100)
LDLC SERPL DIRECT ASSAY-MCNC: 171 MG/DL (ref 0–100)
LYMPHOCYTES # BLD AUTO: 1.83 THOUSANDS/ÂΜL (ref 0.6–4.47)
LYMPHOCYTES NFR BLD AUTO: 24 % (ref 14–44)
MCH RBC QN AUTO: 29.4 PG (ref 26.8–34.3)
MCHC RBC AUTO-ENTMCNC: 30.7 G/DL (ref 31.4–37.4)
MCV RBC AUTO: 96 FL (ref 82–98)
MICROALBUMIN UR-MCNC: 55.1 MG/L
MICROALBUMIN/CREAT 24H UR: 60 MG/G CREATININE (ref 0–30)
MONOCYTES # BLD AUTO: 0.62 THOUSAND/ÂΜL (ref 0.17–1.22)
MONOCYTES NFR BLD AUTO: 8 % (ref 4–12)
NEUTROPHILS # BLD AUTO: 4.89 THOUSANDS/ÂΜL (ref 1.85–7.62)
NEUTS SEG NFR BLD AUTO: 65 % (ref 43–75)
NONHDLC SERPL-MCNC: 201 MG/DL
NRBC BLD AUTO-RTO: 0 /100 WBCS
PLATELET # BLD AUTO: 178 THOUSANDS/UL (ref 149–390)
PMV BLD AUTO: 13.6 FL (ref 8.9–12.7)
POTASSIUM SERPL-SCNC: 4.4 MMOL/L (ref 3.5–5.3)
PROT SERPL-MCNC: 7 G/DL (ref 6.4–8.4)
RBC # BLD AUTO: 4.42 MILLION/UL (ref 3.81–5.12)
SODIUM SERPL-SCNC: 141 MMOL/L (ref 135–147)
TRIGL SERPL-MCNC: 176 MG/DL (ref ?–150)
TSH SERPL DL<=0.05 MIU/L-ACNC: 1.53 UIU/ML (ref 0.45–4.5)
WBC # BLD AUTO: 7.59 THOUSAND/UL (ref 4.31–10.16)

## 2025-06-02 PROCEDURE — 85025 COMPLETE CBC W/AUTO DIFF WBC: CPT

## 2025-06-02 PROCEDURE — 83721 ASSAY OF BLOOD LIPOPROTEIN: CPT

## 2025-06-02 PROCEDURE — 80053 COMPREHEN METABOLIC PANEL: CPT

## 2025-06-02 PROCEDURE — 82570 ASSAY OF URINE CREATININE: CPT

## 2025-06-02 PROCEDURE — 84443 ASSAY THYROID STIM HORMONE: CPT

## 2025-06-02 PROCEDURE — 82043 UR ALBUMIN QUANTITATIVE: CPT

## 2025-06-02 PROCEDURE — 36415 COLL VENOUS BLD VENIPUNCTURE: CPT

## 2025-06-02 PROCEDURE — 83036 HEMOGLOBIN GLYCOSYLATED A1C: CPT

## 2025-06-02 PROCEDURE — 80061 LIPID PANEL: CPT

## 2025-06-03 ENCOUNTER — TELEPHONE (OUTPATIENT)
Dept: OTHER | Facility: OTHER | Age: 70
End: 2025-06-03

## 2025-06-03 ENCOUNTER — RESULTS FOLLOW-UP (OUTPATIENT)
Dept: FAMILY MEDICINE CLINIC | Facility: CLINIC | Age: 70
End: 2025-06-03

## 2025-06-03 NOTE — TELEPHONE ENCOUNTER
Patient was returning Dr. Yadav' phone call regarding lab results. Patient stated she does not have access to [facility], so she cannot see Dr. Yadav' explanation. Please call patient back when the office reopens regarding her early lab results, as noted by Dr. Yadav. Patient says she will be home until 11am tomorrow, as she has an appointment. Thank you!

## 2025-07-21 DIAGNOSIS — E03.9 HYPOTHYROIDISM, UNSPECIFIED TYPE: ICD-10-CM

## 2025-07-22 ENCOUNTER — TELEPHONE (OUTPATIENT)
Age: 70
End: 2025-07-22

## 2025-07-22 DIAGNOSIS — Z96.651 AFTERCARE FOLLOWING RIGHT KNEE JOINT REPLACEMENT SURGERY: Primary | ICD-10-CM

## 2025-07-22 DIAGNOSIS — Z47.1 AFTERCARE FOLLOWING RIGHT KNEE JOINT REPLACEMENT SURGERY: Primary | ICD-10-CM

## 2025-07-22 NOTE — TELEPHONE ENCOUNTER
Patient called to request refill for darifenacin 7.5mg - per [facility], 30 tabs with 3 refills were ordered on 6/11/25 to [facility]. Advised patient to call [facility] for next refill, and to let us know if a new prescription needs to be sent.

## 2025-07-23 RX ORDER — LEVOTHYROXINE SODIUM 50 UG/1
100 TABLET ORAL DAILY
Qty: 180 TABLET | Refills: 1 | Status: SHIPPED | OUTPATIENT
Start: 2025-07-23

## 2025-07-30 RX ORDER — HYDROCODONE BITARTRATE AND ACETAMINOPHEN 5; 325 MG/1; MG/1
1 TABLET ORAL EVERY 6 HOURS PRN
Qty: 20 TABLET | Refills: 0 | Status: SHIPPED | OUTPATIENT
Start: 2025-07-30

## 2025-07-30 NOTE — TELEPHONE ENCOUNTER
Caller: Patient    Doctor: Dr. Mayen    Reason for call: Patient is requesting medication refill for hydrocodone (NORCO) 5-325 mg per tablet.    [facility]/pharmacy #[id number] [place] [id number]  Phone: [phone]  Fax: [phone]  LISBETH #: [id number]       Call back#: [phone]

## 2025-08-19 DIAGNOSIS — I10 PRIMARY HYPERTENSION: ICD-10-CM

## 2025-08-19 DIAGNOSIS — E78.2 MIXED HYPERLIPIDEMIA: ICD-10-CM

## 2025-08-19 DIAGNOSIS — E11.9 TYPE 2 DIABETES MELLITUS WITHOUT COMPLICATION, WITHOUT LONG-TERM CURRENT USE OF INSULIN (HCC): ICD-10-CM

## 2025-08-19 RX ORDER — APIXABAN 2.5 MG/1
TABLET, FILM COATED ORAL
Qty: 60 TABLET | OUTPATIENT
Start: 2025-08-19

## 2025-08-21 RX ORDER — METFORMIN HYDROCHLORIDE 500 MG/1
500 TABLET, EXTENDED RELEASE ORAL
Qty: 90 TABLET | Refills: 1 | Status: SHIPPED | OUTPATIENT
Start: 2025-08-21

## 2025-08-26 PROBLEM — Z96.651 AFTERCARE FOLLOWING RIGHT KNEE JOINT REPLACEMENT SURGERY: Status: ACTIVE | Noted: 2025-08-26

## 2025-08-26 PROBLEM — Z47.1 AFTERCARE FOLLOWING RIGHT KNEE JOINT REPLACEMENT SURGERY: Status: ACTIVE | Noted: 2025-08-26
